# Patient Record
Sex: MALE | Race: ASIAN | NOT HISPANIC OR LATINO | URBAN - METROPOLITAN AREA
[De-identification: names, ages, dates, MRNs, and addresses within clinical notes are randomized per-mention and may not be internally consistent; named-entity substitution may affect disease eponyms.]

---

## 2022-12-20 ENCOUNTER — INPATIENT (INPATIENT)
Facility: HOSPITAL | Age: 72
LOS: 9 days | Discharge: HOME | DRG: 291 | End: 2022-12-30
Attending: STUDENT IN AN ORGANIZED HEALTH CARE EDUCATION/TRAINING PROGRAM | Admitting: STUDENT IN AN ORGANIZED HEALTH CARE EDUCATION/TRAINING PROGRAM
Payer: COMMERCIAL

## 2022-12-20 VITALS
TEMPERATURE: 98 F | WEIGHT: 190.04 LBS | RESPIRATION RATE: 16 BRPM | OXYGEN SATURATION: 98 % | DIASTOLIC BLOOD PRESSURE: 75 MMHG | HEIGHT: 65 IN | SYSTOLIC BLOOD PRESSURE: 176 MMHG | HEART RATE: 75 BPM

## 2022-12-20 LAB
ALBUMIN SERPL ELPH-MCNC: 3.2 G/DL — LOW (ref 3.3–5)
ALP SERPL-CCNC: 109 U/L — SIGNIFICANT CHANGE UP (ref 40–120)
ALT FLD-CCNC: 27 U/L — SIGNIFICANT CHANGE UP (ref 10–45)
ANION GAP SERPL CALC-SCNC: 12 MMOL/L — SIGNIFICANT CHANGE UP (ref 5–17)
APPEARANCE UR: CLEAR — SIGNIFICANT CHANGE UP
APTT BLD: 35.4 SEC — SIGNIFICANT CHANGE UP (ref 27.5–35.5)
AST SERPL-CCNC: 38 U/L — SIGNIFICANT CHANGE UP (ref 10–40)
BACTERIA # UR AUTO: NEGATIVE — SIGNIFICANT CHANGE UP
BASOPHILS # BLD AUTO: 0.13 K/UL — SIGNIFICANT CHANGE UP (ref 0–0.2)
BASOPHILS NFR BLD AUTO: 1.4 % — SIGNIFICANT CHANGE UP (ref 0–2)
BILIRUB SERPL-MCNC: 0.3 MG/DL — SIGNIFICANT CHANGE UP (ref 0.2–1.2)
BILIRUB UR-MCNC: NEGATIVE — SIGNIFICANT CHANGE UP
BUN SERPL-MCNC: 46 MG/DL — HIGH (ref 7–23)
CALCIUM SERPL-MCNC: 8.5 MG/DL — SIGNIFICANT CHANGE UP (ref 8.4–10.5)
CHLORIDE SERPL-SCNC: 109 MMOL/L — HIGH (ref 96–108)
CO2 SERPL-SCNC: 21 MMOL/L — LOW (ref 22–31)
COLOR SPEC: SIGNIFICANT CHANGE UP
CREAT SERPL-MCNC: 3.52 MG/DL — HIGH (ref 0.5–1.3)
DIFF PNL FLD: ABNORMAL
EGFR: 18 ML/MIN/1.73M2 — LOW
EOSINOPHIL # BLD AUTO: 0.26 K/UL — SIGNIFICANT CHANGE UP (ref 0–0.5)
EOSINOPHIL NFR BLD AUTO: 2.7 % — SIGNIFICANT CHANGE UP (ref 0–6)
EPI CELLS # UR: 2 /HPF — SIGNIFICANT CHANGE UP
FLUAV AG NPH QL: SIGNIFICANT CHANGE UP
FLUBV AG NPH QL: SIGNIFICANT CHANGE UP
GLUCOSE SERPL-MCNC: 119 MG/DL — HIGH (ref 70–99)
GLUCOSE UR QL: ABNORMAL
HCT VFR BLD CALC: 29.4 % — LOW (ref 39–50)
HGB BLD-MCNC: 9.7 G/DL — LOW (ref 13–17)
HYALINE CASTS # UR AUTO: 2 /LPF — SIGNIFICANT CHANGE UP (ref 0–2)
IMM GRANULOCYTES NFR BLD AUTO: 0.3 % — SIGNIFICANT CHANGE UP (ref 0–0.9)
INR BLD: 0.92 RATIO — SIGNIFICANT CHANGE UP (ref 0.88–1.16)
KETONES UR-MCNC: NEGATIVE — SIGNIFICANT CHANGE UP
LEUKOCYTE ESTERASE UR-ACNC: NEGATIVE — SIGNIFICANT CHANGE UP
LIDOCAIN IGE QN: 91 U/L — HIGH (ref 7–60)
LYMPHOCYTES # BLD AUTO: 2.01 K/UL — SIGNIFICANT CHANGE UP (ref 1–3.3)
LYMPHOCYTES # BLD AUTO: 21 % — SIGNIFICANT CHANGE UP (ref 13–44)
MCHC RBC-ENTMCNC: 30.2 PG — SIGNIFICANT CHANGE UP (ref 27–34)
MCHC RBC-ENTMCNC: 33 GM/DL — SIGNIFICANT CHANGE UP (ref 32–36)
MCV RBC AUTO: 91.6 FL — SIGNIFICANT CHANGE UP (ref 80–100)
MONOCYTES # BLD AUTO: 0.71 K/UL — SIGNIFICANT CHANGE UP (ref 0–0.9)
MONOCYTES NFR BLD AUTO: 7.4 % — SIGNIFICANT CHANGE UP (ref 2–14)
NEUTROPHILS # BLD AUTO: 6.45 K/UL — SIGNIFICANT CHANGE UP (ref 1.8–7.4)
NEUTROPHILS NFR BLD AUTO: 67.2 % — SIGNIFICANT CHANGE UP (ref 43–77)
NITRITE UR-MCNC: NEGATIVE — SIGNIFICANT CHANGE UP
NRBC # BLD: 0 /100 WBCS — SIGNIFICANT CHANGE UP (ref 0–0)
NT-PROBNP SERPL-SCNC: 6989 PG/ML — HIGH (ref 0–300)
PH UR: 7 — SIGNIFICANT CHANGE UP (ref 5–8)
PLATELET # BLD AUTO: 308 K/UL — SIGNIFICANT CHANGE UP (ref 150–400)
POTASSIUM SERPL-MCNC: 3.5 MMOL/L — SIGNIFICANT CHANGE UP (ref 3.5–5.3)
POTASSIUM SERPL-SCNC: 3.5 MMOL/L — SIGNIFICANT CHANGE UP (ref 3.5–5.3)
PROT SERPL-MCNC: 6.8 G/DL — SIGNIFICANT CHANGE UP (ref 6–8.3)
PROT UR-MCNC: ABNORMAL
PROTHROM AB SERPL-ACNC: 10.6 SEC — SIGNIFICANT CHANGE UP (ref 10.5–13.4)
RBC # BLD: 3.21 M/UL — LOW (ref 4.2–5.8)
RBC # FLD: 13.5 % — SIGNIFICANT CHANGE UP (ref 10.3–14.5)
RBC CASTS # UR COMP ASSIST: 4 /HPF — SIGNIFICANT CHANGE UP (ref 0–4)
RSV RNA NPH QL NAA+NON-PROBE: SIGNIFICANT CHANGE UP
SARS-COV-2 RNA SPEC QL NAA+PROBE: SIGNIFICANT CHANGE UP
SODIUM SERPL-SCNC: 142 MMOL/L — SIGNIFICANT CHANGE UP (ref 135–145)
SP GR SPEC: 1.01 — SIGNIFICANT CHANGE UP (ref 1.01–1.02)
TROPONIN T, HIGH SENSITIVITY RESULT: 109 NG/L — HIGH (ref 0–51)
UROBILINOGEN FLD QL: NEGATIVE — SIGNIFICANT CHANGE UP
WBC # BLD: 9.59 K/UL — SIGNIFICANT CHANGE UP (ref 3.8–10.5)
WBC # FLD AUTO: 9.59 K/UL — SIGNIFICANT CHANGE UP (ref 3.8–10.5)
WBC UR QL: 1 /HPF — SIGNIFICANT CHANGE UP (ref 0–5)

## 2022-12-20 PROCEDURE — 71045 X-RAY EXAM CHEST 1 VIEW: CPT | Mod: 26

## 2022-12-20 PROCEDURE — 99285 EMERGENCY DEPT VISIT HI MDM: CPT

## 2022-12-20 NOTE — ED PROVIDER NOTE - RAPID ASSESSMENT
73 y/o M PMHx HTN and DM, presents to the ED c/o worsening SOB with exertion for the past 3 weeks. Pt reports trouble breathing at night and trouble sleeping. Pt denies pain and denies trouble with PO intake. Pt reports no other acute symptoms and no reaction to medications. Pt is well appearing in the ED.    Davie THOMSON (Dottie) have documented this rapid assessment note under the dictation of Dejah Cruz (PA) which has been reviewed and affirmed to be accurate. Patient was seen as a QPA patient. The patient will be seen and further worked up in the main emergency department and their care will be completed by the main emergency department team along with a thorough physical exam. Receiving team will follow up on labs, analgesia, any clinical imaging, reassess and disposition as clinically indicated, all decisions regarding the progression of care will be made at their discretion. 71 y/o M PMHx HTN and DM, presents to the ED c/o worsening SOB with exertion for the past 3 weeks. Pt reports trouble breathing at night and trouble sleeping, feeling lke he needs to sit up to breathe better. pt denies cp, abdominal pain, fevers, chills, cough.  Pt is well appearing in the ED.    BING THOMSON, personally performed the service described in the documentation recorded by the scribe in my presence, and it accurately and completely records my words and actions.     Davie THOMSON (Scribe) have documented this rapid assessment note under the dictation of Dejah Cruz (PA) which has been reviewed and affirmed to be accurate. Patient was seen as a QPA patient. The patient will be seen and further worked up in the main emergency department and their care will be completed by the main emergency department team along with a thorough physical exam. Receiving team will follow up on labs, analgesia, any clinical imaging, reassess and disposition as clinically indicated, all decisions regarding the progression of care will be made at their discretion. 73 y/o M PMHx HTN and DM, presents to the ED c/o worsening SOB with exertion for the past 3 weeks. Pt reports trouble breathing at night and trouble sleeping, feeling lke he needs to sit up to breathe better. pt denies cp, abdominal pain, fevers, chills, cough.  Pt is well appearing in the ED.    IBING, personally performed the service described in the documentation recorded by the scribe in my presence, and it accurately and completely records my words and actions.     Davie THOMSON (Scribe) have documented this rapid assessment note under the dictation of Dejah Cruz (PA) which has been reviewed and affirmed to be accurate. Patient was seen as a QPA patient. The patient will be seen and further worked up in the main emergency department and their care will be completed by the main emergency department team along with a thorough physical exam. Receiving team will follow up on labs, analgesia, any clinical imaging, reassess and disposition as clinically indicated, all decisions regarding the progression of care will be made at their discretion.    Attending MD Suggs: This patient was seen and orders were placed by the PA as per our department's QPA model.  I was not consulted in regards to this patient although I was present and available in the Emergency Department to the PA.  Patient was to be sent to main ED for full medical evaluation and receiving team was to follow up on any labs, analgesia, clinical imaging ordered by the PA.  Any reassessment and disposition decisions were to be made by receiving team as clinically indicated, all decisions regarding the progression of care to be made at their discretion.  I did not perform a comprehensive history and physical on this patient.

## 2022-12-20 NOTE — ED PROVIDER NOTE - ATTENDING CONTRIBUTION TO CARE
MD Bryan:  patient seen and evaluated personally.   I agree with the History & Physical,  Impression & Plan other than what was detailed in my note.  MD Bryan  72-year-old male history of hypertension, diabetes, presents to the Emergency Department with 1 month of shortness of breath however he stated that today when he was walking he felt significantly more short of breath when going to the car.  He states that he has to sleep on his side because he is having difficulty breathing sitting up.  He denies fevers, chills, nausea, vomiting, headache, blurry vision, any chest pain or palpitations, denies any syncope.  He does have some leg swelling that he states is chronic as well.  He is not on a diuretic.  He does not eat a low-sodium diet although his doctor tells him to.  There is no none history of congestive heart failure diagnosis at this point time.  He does not have any history of DVT or PE that is known.  He is not having hemoptysis.  He is afebrile, his vitals are labile, he is nontoxic well-appearing, he has a large habitus, his mucous membranes are moist, his lungs are having crackles bilaterally in the posterior lung bases, his heart sounds are normal no murmurs rubs or rhonchi, he has a large abdomen that is nontender and soft.  He does have legs with chronic skin changes that seem consistent with venous stasis dermatitis.  There is no erythema, he has no calf tenderness.  They are small with some mild pitting edema.  Was unable to find an EKG for this patient the tech is currently doing 1 at the bedside at this moment.  Looks like he did have some lab work that came back CBC CMP and troponin troponin was elevated did not receive a phone call for this.  Did plan for repeat.  Will give aspirin as well.  Do think this is likely CHF new onset potentially with NSTEMI.  We will review the EKG and reevaluate.  Patient is currently feeling well. will need admission

## 2022-12-20 NOTE — ED PROVIDER NOTE - OBJECTIVE STATEMENT
72-year-old male with a past medical history of hypertension, hyperlipidemia, diabetes presenting with shortness of breath.  Patient has had worsening exertional shortness of breath for the last 3 to 4 weeks.  Patient states that small amounts of walking.  Increased shortness of breath, with associated chest tightness. associated lower extremity edema.  Denies abdominal pain, nausea, vomiting, coughing

## 2022-12-20 NOTE — ED PROVIDER NOTE - CLINICAL SUMMARY MEDICAL DECISION MAKING FREE TEXT BOX
72-year-old male past medical history of hypertension, hyperlipidemia, diabetes presenting with progressively worsening exertional dyspnea, with lower extremity edema.  Initial labs show elevated troponin, elevated proBNP.  Cardiology consulted for NSTEMI evaluation.  Possible also new onset CHF.

## 2022-12-20 NOTE — ED PROVIDER NOTE - PHYSICAL EXAMINATION
gen: well appearing  Mentation: AAO x 3  psych: mood appropriate  HEENT: airway patent, conjunctivae clear bilaterally  Cardio: RRR, no m/r/g  Resp: normal BS b/l  GI: soft/nondistended/nontender  : no CVA tenderness  Neuro: sensation and motor function grossly intact  Skin: No evidence of rash  MSK: normal movement of all extremities  Lymph/Vasc: Bilateral LE edema

## 2022-12-21 DIAGNOSIS — I16.0 HYPERTENSIVE URGENCY: ICD-10-CM

## 2022-12-21 DIAGNOSIS — I50.9 HEART FAILURE, UNSPECIFIED: ICD-10-CM

## 2022-12-21 DIAGNOSIS — Z29.9 ENCOUNTER FOR PROPHYLACTIC MEASURES, UNSPECIFIED: ICD-10-CM

## 2022-12-21 DIAGNOSIS — I16.1 HYPERTENSIVE EMERGENCY: ICD-10-CM

## 2022-12-21 DIAGNOSIS — N17.9 ACUTE KIDNEY FAILURE, UNSPECIFIED: ICD-10-CM

## 2022-12-21 DIAGNOSIS — E11.9 TYPE 2 DIABETES MELLITUS WITHOUT COMPLICATIONS: ICD-10-CM

## 2022-12-21 DIAGNOSIS — E78.5 HYPERLIPIDEMIA, UNSPECIFIED: ICD-10-CM

## 2022-12-21 DIAGNOSIS — E03.9 HYPOTHYROIDISM, UNSPECIFIED: ICD-10-CM

## 2022-12-21 PROBLEM — Z00.00 ENCOUNTER FOR PREVENTIVE HEALTH EXAMINATION: Status: ACTIVE | Noted: 2022-12-21

## 2022-12-21 LAB
GLUCOSE BLDC GLUCOMTR-MCNC: 121 MG/DL — HIGH (ref 70–99)
GLUCOSE BLDC GLUCOMTR-MCNC: 181 MG/DL — HIGH (ref 70–99)
GLUCOSE BLDC GLUCOMTR-MCNC: 182 MG/DL — HIGH (ref 70–99)
GLUCOSE BLDC GLUCOMTR-MCNC: 83 MG/DL — SIGNIFICANT CHANGE UP (ref 70–99)
TROPONIN T, HIGH SENSITIVITY RESULT: 101 NG/L — HIGH (ref 0–51)
TROPONIN T, HIGH SENSITIVITY RESULT: 97 NG/L — HIGH (ref 0–51)

## 2022-12-21 PROCEDURE — 93970 EXTREMITY STUDY: CPT | Mod: 26

## 2022-12-21 PROCEDURE — 99254 IP/OBS CNSLTJ NEW/EST MOD 60: CPT | Mod: 25

## 2022-12-21 PROCEDURE — 99233 SBSQ HOSP IP/OBS HIGH 50: CPT

## 2022-12-21 PROCEDURE — 93306 TTE W/DOPPLER COMPLETE: CPT | Mod: 26

## 2022-12-21 PROCEDURE — 99223 1ST HOSP IP/OBS HIGH 75: CPT

## 2022-12-21 PROCEDURE — 93356 MYOCRD STRAIN IMG SPCKL TRCK: CPT

## 2022-12-21 RX ORDER — HYDRALAZINE HCL 50 MG
5 TABLET ORAL ONCE
Refills: 0 | Status: COMPLETED | OUTPATIENT
Start: 2022-12-21 | End: 2022-12-21

## 2022-12-21 RX ORDER — METOPROLOL TARTRATE 50 MG
25 TABLET ORAL DAILY
Refills: 0 | Status: DISCONTINUED | OUTPATIENT
Start: 2022-12-21 | End: 2022-12-23

## 2022-12-21 RX ORDER — GLUCAGON INJECTION, SOLUTION 0.5 MG/.1ML
1 INJECTION, SOLUTION SUBCUTANEOUS ONCE
Refills: 0 | Status: DISCONTINUED | OUTPATIENT
Start: 2022-12-21 | End: 2022-12-30

## 2022-12-21 RX ORDER — HYDRALAZINE HCL 50 MG
10 TABLET ORAL ONCE
Refills: 0 | Status: COMPLETED | OUTPATIENT
Start: 2022-12-21 | End: 2022-12-21

## 2022-12-21 RX ORDER — AMLODIPINE BESYLATE 2.5 MG/1
10 TABLET ORAL DAILY
Refills: 0 | Status: DISCONTINUED | OUTPATIENT
Start: 2022-12-21 | End: 2022-12-30

## 2022-12-21 RX ORDER — LABETALOL HCL 100 MG
10 TABLET ORAL ONCE
Refills: 0 | Status: COMPLETED | OUTPATIENT
Start: 2022-12-21 | End: 2022-12-21

## 2022-12-21 RX ORDER — ACETAMINOPHEN 500 MG
650 TABLET ORAL EVERY 6 HOURS
Refills: 0 | Status: DISCONTINUED | OUTPATIENT
Start: 2022-12-21 | End: 2022-12-30

## 2022-12-21 RX ORDER — FUROSEMIDE 40 MG
20 TABLET ORAL ONCE
Refills: 0 | Status: COMPLETED | OUTPATIENT
Start: 2022-12-21 | End: 2022-12-21

## 2022-12-21 RX ORDER — HYDRALAZINE HCL 50 MG
50 TABLET ORAL
Refills: 0 | Status: DISCONTINUED | OUTPATIENT
Start: 2022-12-21 | End: 2022-12-22

## 2022-12-21 RX ORDER — SODIUM CHLORIDE 9 MG/ML
1000 INJECTION, SOLUTION INTRAVENOUS
Refills: 0 | Status: DISCONTINUED | OUTPATIENT
Start: 2022-12-21 | End: 2022-12-30

## 2022-12-21 RX ORDER — INSULIN LISPRO 100/ML
VIAL (ML) SUBCUTANEOUS AT BEDTIME
Refills: 0 | Status: DISCONTINUED | OUTPATIENT
Start: 2022-12-21 | End: 2022-12-30

## 2022-12-21 RX ORDER — FUROSEMIDE 40 MG
40 TABLET ORAL
Refills: 0 | Status: DISCONTINUED | OUTPATIENT
Start: 2022-12-21 | End: 2022-12-22

## 2022-12-21 RX ORDER — DEXTROSE 50 % IN WATER 50 %
25 SYRINGE (ML) INTRAVENOUS ONCE
Refills: 0 | Status: DISCONTINUED | OUTPATIENT
Start: 2022-12-21 | End: 2022-12-30

## 2022-12-21 RX ORDER — DEXTROSE 50 % IN WATER 50 %
15 SYRINGE (ML) INTRAVENOUS ONCE
Refills: 0 | Status: DISCONTINUED | OUTPATIENT
Start: 2022-12-21 | End: 2022-12-30

## 2022-12-21 RX ORDER — LEVOTHYROXINE SODIUM 125 MCG
25 TABLET ORAL DAILY
Refills: 0 | Status: DISCONTINUED | OUTPATIENT
Start: 2022-12-21 | End: 2022-12-30

## 2022-12-21 RX ORDER — ATORVASTATIN CALCIUM 80 MG/1
40 TABLET, FILM COATED ORAL AT BEDTIME
Refills: 0 | Status: DISCONTINUED | OUTPATIENT
Start: 2022-12-21 | End: 2022-12-30

## 2022-12-21 RX ORDER — ASPIRIN/CALCIUM CARB/MAGNESIUM 324 MG
81 TABLET ORAL DAILY
Refills: 0 | Status: DISCONTINUED | OUTPATIENT
Start: 2022-12-21 | End: 2022-12-23

## 2022-12-21 RX ORDER — INSULIN LISPRO 100/ML
VIAL (ML) SUBCUTANEOUS
Refills: 0 | Status: DISCONTINUED | OUTPATIENT
Start: 2022-12-21 | End: 2022-12-30

## 2022-12-21 RX ADMIN — Medication 1: at 13:02

## 2022-12-21 RX ADMIN — Medication 5 MILLIGRAM(S): at 10:10

## 2022-12-21 RX ADMIN — Medication 40 MILLIGRAM(S): at 17:16

## 2022-12-21 RX ADMIN — Medication 10 MILLIGRAM(S): at 20:29

## 2022-12-21 RX ADMIN — Medication 10 MILLIGRAM(S): at 04:03

## 2022-12-21 RX ADMIN — AMLODIPINE BESYLATE 10 MILLIGRAM(S): 2.5 TABLET ORAL at 13:03

## 2022-12-21 RX ADMIN — Medication 20 MILLIGRAM(S): at 04:03

## 2022-12-21 RX ADMIN — Medication 81 MILLIGRAM(S): at 13:03

## 2022-12-21 RX ADMIN — Medication 25 MILLIGRAM(S): at 13:04

## 2022-12-21 RX ADMIN — ATORVASTATIN CALCIUM 40 MILLIGRAM(S): 80 TABLET, FILM COATED ORAL at 20:32

## 2022-12-21 RX ADMIN — Medication 50 MILLIGRAM(S): at 13:04

## 2022-12-21 RX ADMIN — Medication 1 TABLET(S): at 13:04

## 2022-12-21 RX ADMIN — Medication 50 MILLIGRAM(S): at 17:16

## 2022-12-21 RX ADMIN — Medication 10 MILLIGRAM(S): at 22:25

## 2022-12-21 NOTE — H&P ADULT - PROBLEM SELECTOR PLAN 1
unspecified   check TTE   Lasix 40mg iv bid   cards consult appreciated   strict i/o's, daily standing weights   LE doppler r/o DVT unspecified   with possible NSTEMI - troponin stable and may be elevated due to CKD  check TTE   Lasix 40mg iv bid   cards consult appreciated   strict i/o's, daily standing weights   LE doppler r/o DVT

## 2022-12-21 NOTE — H&P ADULT - NSICDXFAMILYHX_GEN_ALL_CORE_FT
FAMILY HISTORY:  Father  Still living? Unknown  Family history of CVA, Age at diagnosis: Age Unknown  FH: type 2 diabetes, Age at diagnosis: Age Unknown    Mother  Still living? Unknown  FH: type 2 diabetes, Age at diagnosis: Age Unknown    Sibling  Still living? Unknown  Family history of CVA, Age at diagnosis: Age Unknown

## 2022-12-21 NOTE — H&P ADULT - NSHPREVIEWOFSYSTEMS_GEN_ALL_CORE
Review of Systems:   CONSTITUTIONAL: No fever or chills   EYES: No eye pain, visual disturbances  ENMT:  No difficulty hearing,   NECK: No pain or stiffness  RESPIRATORY: No cough, + shortness of breath  CARDIOVASCULAR: + chest pain, + FLORES, no palpitations,  GASTROINTESTINAL: No abdominal or epigastric pain. No nausea, vomiting,  GENITOURINARY: No dysuria,   NEUROLOGICAL: No headaches,   SKIN: No rashes  ENDOCRINE: No heat or cold intolerance  MUSCULOSKELETAL: No joint pain, +Le swelling for months  PSYCHIATRIC: No depression,   ALLERY AND IMMUNOLOGIC: No hives or eczema

## 2022-12-21 NOTE — H&P ADULT - PROBLEM SELECTOR PLAN 3
suspect patient has CKD as he was told his kidney's are functioning at 30% (?GFR) in the past   now OLENA on CKD likely due to uncontrolled hypertenstion but likely dm contributing   renal consult called  avoid nephrotoxic medications  hold losartan as above

## 2022-12-21 NOTE — H&P ADULT - PROBLEM SELECTOR PROBLEM 7
Prophylactic measure Topical Clindamycin Pregnancy And Lactation Text: This medication is Pregnancy Category B and is considered safe during pregnancy. It is unknown if it is excreted in breast milk.

## 2022-12-21 NOTE — H&P ADULT - PROBLEM SELECTOR PLAN 2
restart home medications including amlodipine, metoprolol, hydralazine for now   holding losartan in setting of suspected OLENA on CKD   monitor bp   cards consult appreciated - can change metoprolol to labetalol if bp still not controlled

## 2022-12-21 NOTE — H&P ADULT - NSHPLABSRESULTS_GEN_ALL_CORE
LABS:                        9.7    9.59  )-----------( 308      ( 20 Dec 2022 22:07 )             29.4     12-20    142  |  109<H>  |  46<H>  ----------------------------<  119<H>  3.5   |  21<L>  |  3.52<H>    Ca    8.5      20 Dec 2022 22:07    TPro  6.8  /  Alb  3.2<L>  /  TBili  0.3  /  DBili  x   /  AST  38  /  ALT  27  /  AlkPhos  109  12-20    PT/INR - ( 20 Dec 2022 22:07 )   PT: 10.6 sec;   INR: 0.92 ratio         PTT - ( 20 Dec 2022 22:07 )  PTT:35.4 sec      Urinalysis Basic - ( 20 Dec 2022 22:34 )    Color: Light Yellow / Appearance: Clear / S.015 / pH: x  Gluc: x / Ketone: Negative  / Bili: Negative / Urobili: Negative   Blood: x / Protein: 300 mg/dL / Nitrite: Negative   Leuk Esterase: Negative / RBC: 4 /hpf / WBC 1 /HPF   Sq Epi: x / Non Sq Epi: 2 /hpf / Bacteria: Negative        RADIOLOGY & ADDITIONAL TESTS:    Imaging Personally Reviewed: EKg tracing reviewed - NSR@81bpm, Incomplete LBBB with repolarization changes v2-V4, QTc 483,   \CXR film reviewed - trace pleural effusions, pulm vascular congestion     Consultant(s) Notes Reviewed:    Care Discussed with Consultants/Other Providers:

## 2022-12-21 NOTE — ED ADULT NURSE REASSESSMENT NOTE - NS ED NURSE REASSESS COMMENT FT1
First encounter with the pt, pt received from the previous shift . Pt is a/ox4 and verbal. Speech is clear and able to speak in full sentences without distress. Airway is patent with no obstruction nor blocking secretions. Breathing is even and unlabored on room air. Pt has strong pulses palpated bilaterally. Pt is SR on the cardiac monitor. Neuro check is wnl. Full rom. Pt denies chest pain, n/v and sob. No acute distress noted. Pt has call light within the reach of the pt at the bedside. [id number] was called and made aware of pt's blood pressure and pending diet order. Provider stated that they would put in order shortly. Will continue to monitor the pt.

## 2022-12-21 NOTE — PROGRESS NOTE ADULT - ASSESSMENT
Pt. is a 72 y.o. M w/ PMHx of HTN, HLD, DM2 presents with Dyspnea for the past 3-4 weeks. Nephrology consulted for OLENA.

## 2022-12-21 NOTE — ED ADULT NURSE NOTE - OBJECTIVE STATEMENT
Pt is 71 y/o male, presenting to the ED c/o SOB. As per pt, has had worsening exertional SOB for the past 3-4 weeks. As per pt, he has had trouble breathing @ night and trouble sleeping. Pt states that he feels like he needs to sit up to breathe better. As per pt, he has had intermittent chest pain w/ SOB. Pt reports PMH HTN, HLD, and DM. Upon assessment, pt AxOx3, sitting up in stretcher, and speaking in full sentences. Breathing spontaneously and unlabored, >95% RA. Abdomen is soft and non-tender to palpation. Pt ambulates w/o difficultly and moves all extremities w/ = strength. Skin is warm, dry, and intact w/ + peripheral pulses. Pt noted to have b/l LE edema. Pt denies n/v/d, dizziness, numbness/tingling, chills and fever. Safety and comfort measures provided- bed in lowest position, locked, and blanket given. Daughter @ bedside.

## 2022-12-21 NOTE — CONSULT NOTE ADULT - SUBJECTIVE AND OBJECTIVE BOX
[facility] DIVISION OF KIDNEY DISEASES AND HYPERTENSION -- [phone]  -- INITIAL CONSULT NOTE  --------------------------------------------------------------------------------  HPI:    Pt. is a 72 y.o. M w/ PMHx of HTN, HLD, DM2 presents with Dyspnea for the past 3-4 weeks. Nephrology consulted for OLENA. Pt. states that he was given a water pill months ago but stopped taking it because he thought he was urinating enough already and he did not like waking up at night to urinate. He started taking the pill again recently but ran out again about 1 week ago and did not  the refill. He states that he was at 50% kidney function but when he renal function went to 30% 2 months ago he saw a "kidney doctor" once but did not follow up. Denies any orthopnea currently but after examination asking to be propped up. Denies having any heart problems. Has had diabetes for over 20 years. Some eye issues but unable to elaborate. CXR here showing pulmonary vascular congestion and bilateral trace to small pleural effusions. Takes Metformin 1000mg BID in addition to Glyburide? Metformin 2.5mg/ 500mg daily for DM. No blood in urine.       PAST HISTORY  --------------------------------------------------------------------------------  PAST MEDICAL & SURGICAL HISTORY:  HTN (hypertension)      HLD (hyperlipidemia)      Diabetes      No significant past surgical history        FAMILY HISTORY:  Family history of CVA (Father, Sibling)    FH: type 2 diabetes (Father, Mother)      PAST SOCIAL HISTORY:    ALLERGIES & MEDICATIONS  --------------------------------------------------------------------------------  Allergies    No Known Allergies    Intolerances      Standing Inpatient Medications  amLODIPine   Tablet 10 milliGRAM(s) Oral daily  aspirin enteric coated 81 milliGRAM(s) Oral daily  atorvastatin 40 milliGRAM(s) Oral at bedtime  dextrose 5%. 1000 milliLiter(s) IV Continuous <Continuous>  dextrose 5%. 1000 milliLiter(s) IV Continuous <Continuous>  dextrose 50% Injectable 25 Gram(s) IV Push once  dextrose 50% Injectable 25 Gram(s) IV Push once  furosemide   Injectable 40 milliGRAM(s) IV Push two times a day  glucagon  Injectable 1 milliGRAM(s) IntraMuscular once  hydrALAZINE 50 milliGRAM(s) Oral two times a day  insulin lispro (ADMELOG) corrective regimen sliding scale   SubCutaneous three times a day before meals  insulin lispro (ADMELOG) corrective regimen sliding scale   SubCutaneous at bedtime  levothyroxine 25 MICROGram(s) Oral daily  metoprolol succinate ER 25 milliGRAM(s) Oral daily  multivitamin 1 Tablet(s) Oral daily    PRN Inpatient Medications  acetaminophen     Tablet .. 650 milliGRAM(s) Oral every 6 hours PRN  dextrose Oral Gel 15 Gram(s) Oral once PRN      REVIEW OF SYSTEMS  --------------------------------------------------------------------------------  As per HPI    VITALS/PHYSICAL EXAM  --------------------------------------------------------------------------------  T(C): 36.7 (12-21-22 @ 13:22), Max: 36.8 (12-20-22 @ 20:56)  HR: 80 (12-21-22 @ 13:22) (63 - 80)  BP: 182/62 (12-21-22 @ 13:22) (154/63 - 192/72)  RR: 18 (12-21-22 @ 13:22) (15 - 20)  SpO2: 97% (12-21-22 @ 13:22) (96% - 99%)  Wt(kg): --  Height (cm): 165.1 (12-20-22 @ 20:56)  Weight (kg): 86.2 (12-20-22 @ 20:56)  BMI (kg/m2): 31.6 (12-20-22 @ 20:56)  BSA (m2): 1.94 (12-20-22 @ 20:56)      Physical Exam:  	Gen: NAD  	HEENT: MMM  	Pulm: Diffuse crackles to mid lungs. however able to lie flat  	CV: S1S2  	Abd: Soft, +BS   	Ext: +++LE edema B/L with chronic skin changes, UE edema+  	Neuro/Psych: Awake, remorseful   	Skin: Warm and dry  	Vascular access: peripheral     LABS/STUDIES  --------------------------------------------------------------------------------              9.7    9.59  >-----------<  308      [12-20-22 @ 22:07]              29.4     142  |  109  |  46  ----------------------------<  119      [12-20-22 @ 22:07]  3.5   |  21  |  3.52        Ca     8.5     [12-20-22 @ 22:07]    TPro  6.8  /  Alb  3.2  /  TBili  0.3  /  DBili  x   /  AST  38  /  ALT  27  /  AlkPhos  109  [12-20-22 @ 22:07]    PT/INR: PT 10.6 , INR 0.92       [12-20-22 @ 22:07]  PTT: 35.4       [12-20-22 @ 22:07]      Creatinine Trend:  SCr 3.52 [12-20 @ 22:07]    Urinalysis - [12-20-22 @ 22:34]      Color Light Yellow / Appearance Clear / SG 1.015 / pH 7.0      Gluc 500 mg/dL / Ketone Negative  / Bili Negative / Urobili Negative       Blood Moderate / Protein 300 mg/dL / Leuk Est Negative / Nitrite Negative      RBC 4 / WBC 1 / Hyaline 2 / Gran  / Sq Epi  / Non Sq Epi 2 / Bacteria Negative

## 2022-12-21 NOTE — CONSULT NOTE ADULT - SUBJECTIVE AND OBJECTIVE BOX
CARDIOLOGY FELLOW CONSULT NOTE    Consulting service: ED  Reason for consult: NSTEMI    HPI: 73 yo M with PMH of DM poorly controlled HTN HLD and hypothyroidism who presents with FLORES and edema. Patient reports 3 weeks of progressive FLORES, PND, and LE edema. At baseline pt  a long standing hx of HTN on multiple medications with various episodes of SBP >200 followed by his PMD, previously refusing to present to ED.     PMH:   No pertinent past medical history    HTN (hypertension)    HLD (hyperlipidemia)    Diabetes        PSH:       FAMILY HISTORY:      SH:      Allergies:  No Known Allergies      Home Meds:    Current Medications:   furosemide   Injectable 20 milliGRAM(s) IV Push Once  hydrALAZINE Injectable 10 milliGRAM(s) IV Push Once        REVIEW OF SYSTEMS:  CONSTITUTIONAL: No weakness, fevers or chills  EYES/ENT: No visual changes;  No dysphagia  NECK: No pain or stiffness  RESPIRATORY: No cough, wheezing, hemoptysis; No shortness of breath  CARDIOVASCULAR: No chest pain or palpitations; No lower extremity edema  GASTROINTESTINAL: No abdominal or epigastric pain. No nausea, vomiting, or hematemesis; No diarrhea or constipation. No melena or hematochezia.  BACK: No back pain  GENITOURINARY: No dysuria, frequency or hematuria  NEUROLOGICAL: No numbness or weakness  SKIN: No itching, burning, rashes, or lesions   All other review of systems is negative unless indicated above.    Physical Exam:  T(F): 98.1 (12-21), Max: 98.2 (12-20)  HR: 77 (12-21) (75 - 79)  BP: 183/76 (12-21) (176/75 - 190/83)  RR: 20 (12-21)  SpO2: 97% (12-21)  GENERAL: No acute distress, well-developed  HEAD:  Atraumatic, Normocephalic  ENT: EOMI, PERRLA, conjunctiva and sclera clear, Neck supple, No JVD, moist mucosa  CHEST/LUNG: Clear to auscultation bilaterally; No wheeze, equal breath sounds bilaterally   BACK: No spinal tenderness  HEART: Regular rate and rhythm; No murmurs, rubs, or gallops  ABDOMEN: Soft, Nontender, Nondistended; Bowel sounds present  EXTREMITIES:  No clubbing, cyanosis, or edema  PSYCH: Nl behavior, nl affect  NEUROLOGY: AAOx3, non-focal, cranial nerves intact  SKIN: Normal color, No rashes or lesions  LINES:    ECG: Personally reviewed    Echo: Personally reviewed    Stress Testing: Personally reviewed    Cath: Personally reviewed    CXR: Personally reviewed    Labs: Personally reviewed                        9.7    9.59  )-----------( 308      ( 20 Dec 2022 22:07 )             29.4     12-20    142  |  109<H>  |  46<H>  ----------------------------<  119<H>  3.5   |  21<L>  |  3.52<H>    Ca    8.5      20 Dec 2022 22:07    TPro  6.8  /  Alb  3.2<L>  /  TBili  0.3  /  DBili  x   /  AST  38  /  ALT  27  /  AlkPhos  109  12-20    PT/INR - ( 20 Dec 2022 22:07 )   PT: 10.6 sec;   INR: 0.92 ratio         PTT - ( 20 Dec 2022 22:07 )  PTT:35.4 sec    CARDIAC MARKERS ( 21 Dec 2022 02:23 )  97 ng/L / x     / x     / x     / x     / x      CARDIAC MARKERS ( 20 Dec 2022 22:07 )  109 ng/L / x     / x     / x     / x     / x            Serum Pro-Brain Natriuretic Peptide: 6989 pg/mL (12-20 @ 22:07)            Signed by:  Kelvin Jay PGY4  Cardiology Fellow   CARDIOLOGY FELLOW CONSULT NOTE    Consulting service: ED  Reason for consult: NSTEMI    HPI: 71 yo M with PMH of DM poorly controlled HTN HLD and hypothyroidism who presents with FLORES and edema. Patient reports 3 weeks of progressive FLORES, PND, and LE edema. At baseline pt has no exertional intolerance however over the last 3 weeks he has had progressive FLORES to the point where can only go 1/2 a block or less than 1 set of stairs before becoming very SOB. When he gets SOB he gets occasional mild tingling in his chest, but very infrequently. He also notes BL LE and BL UE edema, scrotal edema, orthopnea and PND. He saw his PMD who started him on a water pill that he was taking but recently ran out of. He also endorses long standing hx of HTN on multiple medications with various episodes of SBP >200 followed by his PMD, previously refusing to present to ED. Currently denies FC NV CP SOB at rest palpitations or syncope. Denies any other cardiac hx.    PMH: as above  PSH: Denies  FH: Father - CVA 70s, Brother CVA, Mother - DM (all 3 ), denies FHx of CVD  SH: 15 pack years stopped at 29 / denies ETOH / denies drugs, , from [place], works as postman, lives with his wife  Allergies: NKDA  Meds: MTP Succ 25 QDay, Hydralazine 50 BID, Losartan 100 QDay, Metformin 1000 BID, Amlodipine 10 QDay, Glyburide/Metformin 2.5/500 QDay, Lipitor 40 QDay, Synthroid 25 QDay     Current Medications:   furosemide   Injectable 20 milliGRAM(s) IV Push Once  hydrALAZINE Injectable 10 milliGRAM(s) IV Push Once      REVIEW OF SYSTEMS:  All other review of systems is negative unless indicated above.    Physical Exam:  T(F): 98.1 (12-21), Max: 98.2 (12-20)  HR: 77 (12-21) (75 - 79)  BP: 183/76 (12-21) (176/75 - 190/83)  RR: 20 (12-21)  SpO2: 97% (12-21)    GENERAL: No acute distress, well-developed  HEAD:  Atraumatic, Normocephalic  ENT: EOMI, PERRLA, conjunctiva and sclera clear, Neck supple, No JVD, moist mucosa  CHEST/LUNG: BL Crackles in the bases, mildly dec breath sounds  HEART: Regular rate and rhythm; I/VI holosystolic murmur at LLSB, no rubs, or gallops  ABDOMEN: Soft, Nontender, Nondistended; Bowel sounds present  EXTREMITIES:  BL UE 1+ edema, BL LE 2+ edema  PSYCH: Nl behavior, nl affect  NEUROLOGY: AAOx3, non-focal, cranial nerves intact  SKIN: Normal color, No rashes or lesions    ECG: NSR with LVH and NS TW changes, poor R wave progression    CXR: Personally reviewed    Labs: Personally reviewed                        9.7    9.59  )-----------( 308      ( 20 Dec 2022 22:07 )             29.4     12-20    142  |  109<H>  |  46<H>  ----------------------------<  119<H>  3.5   |  21<L>  |  3.52<H>    Ca    8.5      20 Dec 2022 22:07    TPro  6.8  /  Alb  3.2<L>  /  TBili  0.3  /  DBili  x   /  AST  38  /  ALT  27  /  AlkPhos  109  12-20    PT/INR - ( 20 Dec 2022 22:07 )   PT: 10.6 sec;   INR: 0.92 ratio        PTT - ( 20 Dec 2022 22:07 )  PTT:35.4 sec    CARDIAC MARKERS ( 21 Dec 2022 02:23 )  97 ng/L / x     / x     / x     / x     / x      CARDIAC MARKERS ( 20 Dec 2022 22:07 )  109 ng/L / x     / x     / x     / x     / x        Serum Pro-Brain Natriuretic Peptide: 6989 pg/mL (- @ 22:07)    A/P  71 yo M with PMH of DM poorly controlled HTN HLD and hypothyroidism who presents with FLORES and edema. Cardiology consulted for NSTEMI    #Vol Overload  #C/f Diastolic HF Exacerbation  #NSTEMI  - Pt with significant HTN hx on 4 meds p/w volume overload and FLORES  - SBP 190s in ED, no CP or SOB at rest   - No obvious CM on CXR, small effusions with cephalization (my read)  - EKG NSR with LVH and NS TW changes, poor R wave progression  - Trop 109 --> 97, proBNP 6989  - DDx: Likely has HFpEF exacerbation 2/2 long standing HTN and LVH. ACS less likely based on hx. Also minimally elevated troponin iso OLENA on CKD (?) with Cr 3.52.  Plan:  - No need for ACS therapy at this time  - Troponin downtrending, no need to repeat  - F/U TTE in the AM  - Start Lasix 40 IV BID   - Would treat SBP of 190 as HTN emergency based on troponin and Cr, goal dec 25% in 4 hours       > Resume home BP regimen, can consider switching MTP to labetalol, can give pushes of hydralazine PRN   - F/U TSH A1c Lipid panel     Signed by:  Kelvin Jay PGY4  Cardiology Fellow    [facility]  CARDIOLOGY FELLOW CONSULT NOTE    Consulting service: ED  Reason for consult: NSTEMI    HPI: 71 yo M with PMH of DM poorly controlled HTN HLD and hypothyroidism who presents with FLORES and edema. Patient reports 3 weeks of progressive FLORES, PND, and LE edema. At baseline pt has no exertional intolerance however over the last 3 weeks he has had progressive FLORES to the point where can only go 1/2 a block or less than 1 set of stairs before becoming very SOB. When he gets SOB he gets occasional mild tingling in his chest, but very infrequently. He also notes BL LE and BL UE edema, scrotal edema, orthopnea and PND. He saw his PMD who started him on a water pill that he was taking but recently ran out of. He also endorses long standing hx of HTN on multiple medications with various episodes of SBP >200 followed by his PMD, previously refusing to present to ED. Currently denies FC NV CP SOB at rest palpitations or syncope. Denies any other cardiac hx.    PMH: as above  PSH: Denies  FH: Father - CVA 70s, Brother CVA, Mother - DM (all 3 ), denies FHx of CVD  SH: 15 pack years stopped at 29 / denies ETOH / denies drugs, , from [place], works as postman, lives with his wife  Allergies: NKDA  Meds: MTP Succ 25 QDay, Hydralazine 50 BID, Losartan 100 QDay, Metformin 1000 BID, Amlodipine 10 QDay, Glyburide/Metformin 2.5/500 QDay, Lipitor 40 QDay, Synthroid 25 QDay     Current Medications:   furosemide   Injectable 20 milliGRAM(s) IV Push Once  hydrALAZINE Injectable 10 milliGRAM(s) IV Push Once      REVIEW OF SYSTEMS:  All other review of systems is negative unless indicated above.    Physical Exam:  T(F): 98.1 (12-21), Max: 98.2 (12-20)  HR: 77 (12-21) (75 - 79)  BP: 183/76 (12-21) (176/75 - 190/83)  RR: 20 (12-21)  SpO2: 97% (12-21)    GENERAL: No acute distress, well-developed  HEAD:  Atraumatic, Normocephalic  ENT: EOMI, PERRLA, conjunctiva and sclera clear, Neck supple, No JVD, moist mucosa  CHEST/LUNG: BL Crackles in the bases, mildly dec breath sounds  HEART: Regular rate and rhythm; I/VI holosystolic murmur at LLSB, no rubs, or gallops  ABDOMEN: Soft, Nontender, Nondistended; Bowel sounds present  EXTREMITIES:  BL UE 1+ edema, BL LE 2+ edema  PSYCH: Nl behavior, nl affect  NEUROLOGY: AAOx3, non-focal, cranial nerves intact  SKIN: Normal color, No rashes or lesions    ECG: NSR with LVH and NS TW changes, poor R wave progression    CXR: Personally reviewed    Labs: Personally reviewed                        9.7    9.59  )-----------( 308      ( 20 Dec 2022 22:07 )             29.4     12-20    142  |  109<H>  |  46<H>  ----------------------------<  119<H>  3.5   |  21<L>  |  3.52<H>    Ca    8.5      20 Dec 2022 22:07    TPro  6.8  /  Alb  3.2<L>  /  TBili  0.3  /  DBili  x   /  AST  38  /  ALT  27  /  AlkPhos  109  12-20    PT/INR - ( 20 Dec 2022 22:07 )   PT: 10.6 sec;   INR: 0.92 ratio        PTT - ( 20 Dec 2022 22:07 )  PTT:35.4 sec    CARDIAC MARKERS ( 21 Dec 2022 02:23 )  97 ng/L / x     / x     / x     / x     / x      CARDIAC MARKERS ( 20 Dec 2022 22:07 )  109 ng/L / x     / x     / x     / x     / x        Serum Pro-Brain Natriuretic Peptide: 6989 pg/mL (- @ 22:07)    A/P  71 yo M with PMH of DM poorly controlled HTN HLD and hypothyroidism who presents with FLORES and edema. Cardiology consulted for NSTEMI    #Vol Overload  #C/f Diastolic HF Exacerbation  #NSTEMI  - Pt with significant HTN hx on 4 meds p/w volume overload and FLORES  - SBP 190s in ED, no CP or SOB at rest   - No obvious CM on CXR, small effusions with cephalization (my read)  - EKG NSR with LVH and NS TW changes, poor R wave progression  - Trop 109 --> 97, proBNP 6989  - DDx: Likely has HFpEF exacerbation 2/2 long standing HTN and LVH. ACS less likely based on hx. Also minimally elevated troponin iso OLENA on CKD (?) with Cr 3.52.  Plan:  - No need for ACS therapy at this time  - Troponin downtrending, no need to repeat  - F/U TTE in the AM  - Start Lasix 40 IV BID   - Would treat SBP of 190 as HTN emergency based on troponin and Cr, goal dec 25% in 4 hours       > can consider switching MTP to labetalol, can give pushes of hydralazine PRN   - F/U TSH A1c Lipid panel     Signed by:  Kelvin Jay PGY4  Cardiology Fellow

## 2022-12-21 NOTE — H&P ADULT - NSHPPHYSICALEXAM_GEN_ALL_CORE
Vital Signs Last 24 Hrs  T(C): 36.4 (21 Dec 2022 10:02), Max: 36.8 (20 Dec 2022 20:56)  T(F): 97.6 (21 Dec 2022 10:02), Max: 98.2 (20 Dec 2022 20:56)  HR: 71 (21 Dec 2022 10:02) (63 - 79)  BP: 176/71 (21 Dec 2022 10:02) (154/63 - 192/72)  BP(mean): --  RR: 18 (21 Dec 2022 10:02) (15 - 20)  SpO2: 99% (21 Dec 2022 10:02) (96% - 99%)    Parameters below as of 21 Dec 2022 10:02  Patient On (Oxygen Delivery Method): room air    PHYSICAL EXAM:  GENERAL: NAD, breathing normal  HEAD:  Atraumatic, Normocephalic  EYES: conjunctiva and sclera clear  NECK: supple, + JVD  CHEST/LUNG: decreased bs midway up lung, no crackles appreciated  HEART: S1 S2 RRR  ABDOMEN: +BS Soft, NT/ND  EXTREMITIES:  2+ DP Pulses, No c/c. b/l 3+LE edema  NEUROLOGY: AAOx3, no facial droop, moving all extremites  SKIN: No rashes or lesions

## 2022-12-21 NOTE — H&P ADULT - HISTORY OF PRESENT ILLNESS
72M h/o HTN, HLD, DM2 p/w dypnea on exertion for the past 3-4 weeks. He states he has noticed progressive worsening of sob over this time. he gets intermittent chest tightness mostly with exertion and sometimes gets "tingling" in chest. he has had b/l LE edema for months. He states he has seen his primary care doctor a few months ago and was given "water pill" which he completed and was told his kidney function was at 30%. He does not check finger sticks at home. No known history of heart failure. He was told to see a kidney doctor and saw "him" once but cannot recall name.

## 2022-12-21 NOTE — CONSULT NOTE ADULT - PROBLEM SELECTOR RECOMMENDATION 9
Pt with OLENA in the setting of longstanding DM, uncontrolled HTN and volume overload. Exact duration of OLENA however unknown. Pt admitted with SCr. of 3.5 (eGFR 18), was told 2 months ago that his kidneys were functioning at 30%. Send urine electrolytes, spot urine TP/CR. Please obtain Renal US. UA showing blood but only 4 RBCs. Please repeat UA prior to discharge. Check CPK, if abnormal myoglobin as well. Will need to consider HD if renal failure continues to worsen. Agree with IV Lasix 80mg IV BID. Please bladder scan and catheterize if retaining. Strict outputs and daily weights to guide diuresis. Flash pulmonary edema from uncontrolled HTN is on the differential in addition to HF. F/u Echo. Consider Renal Artery Duplex. Monitor labs and urine output. Optimize hemodynamics. Avoid NSAIDs, ACEI/ARBS, RCA and nephrotoxins. Dose medications as per eGFR. Do not resume Metformin of eGFR is not above 30. Check Blood gas. Appreciate Cardiology consult.     #Anemia: HgB 9.7. Please obtain TSAT.   #BMD: Please check Phos, calcium and PTH    Will need f/u with Nephrology on discharge.     If you have any questions, please feel free to contact me  Sb Lilly  Nephrology Fellow  [phone]; Prefer [employer] TEAMS  (After 5pm or on weekends please page the on-call fellow).

## 2022-12-22 DIAGNOSIS — I50.31 ACUTE DIASTOLIC (CONGESTIVE) HEART FAILURE: ICD-10-CM

## 2022-12-22 DIAGNOSIS — Z02.9 ENCOUNTER FOR ADMINISTRATIVE EXAMINATIONS, UNSPECIFIED: ICD-10-CM

## 2022-12-22 LAB
A1C WITH ESTIMATED AVERAGE GLUCOSE RESULT: 5.8 % — HIGH (ref 4–5.6)
ANION GAP SERPL CALC-SCNC: 13 MMOL/L — SIGNIFICANT CHANGE UP (ref 5–17)
BUN SERPL-MCNC: 54 MG/DL — HIGH (ref 7–23)
CALCIUM SERPL-MCNC: 8 MG/DL — LOW (ref 8.4–10.5)
CHLORIDE SERPL-SCNC: 112 MMOL/L — HIGH (ref 96–108)
CHOLEST SERPL-MCNC: 142 MG/DL — SIGNIFICANT CHANGE UP
CO2 SERPL-SCNC: 20 MMOL/L — LOW (ref 22–31)
CREAT ?TM UR-MCNC: 22 MG/DL — SIGNIFICANT CHANGE UP
CREAT SERPL-MCNC: 3.69 MG/DL — HIGH (ref 0.5–1.3)
CULTURE RESULTS: SIGNIFICANT CHANGE UP
EGFR: 17 ML/MIN/1.73M2 — LOW
ESTIMATED AVERAGE GLUCOSE: 120 MG/DL — HIGH (ref 68–114)
GLUCOSE BLDC GLUCOMTR-MCNC: 128 MG/DL — HIGH (ref 70–99)
GLUCOSE BLDC GLUCOMTR-MCNC: 141 MG/DL — HIGH (ref 70–99)
GLUCOSE BLDC GLUCOMTR-MCNC: 167 MG/DL — HIGH (ref 70–99)
GLUCOSE BLDC GLUCOMTR-MCNC: 189 MG/DL — HIGH (ref 70–99)
GLUCOSE SERPL-MCNC: 118 MG/DL — HIGH (ref 70–99)
HCT VFR BLD CALC: 25.6 % — LOW (ref 39–50)
HCV AB S/CO SERPL IA: 0.15 S/CO — SIGNIFICANT CHANGE UP (ref 0–0.99)
HCV AB SERPL-IMP: SIGNIFICANT CHANGE UP
HDLC SERPL-MCNC: 51 MG/DL — SIGNIFICANT CHANGE UP
HGB BLD-MCNC: 8.5 G/DL — LOW (ref 13–17)
LIPID PNL WITH DIRECT LDL SERPL: 53 MG/DL — SIGNIFICANT CHANGE UP
MCHC RBC-ENTMCNC: 30.1 PG — SIGNIFICANT CHANGE UP (ref 27–34)
MCHC RBC-ENTMCNC: 33.2 GM/DL — SIGNIFICANT CHANGE UP (ref 32–36)
MCV RBC AUTO: 90.8 FL — SIGNIFICANT CHANGE UP (ref 80–100)
NON HDL CHOLESTEROL: 91 MG/DL — SIGNIFICANT CHANGE UP
NRBC # BLD: 0 /100 WBCS — SIGNIFICANT CHANGE UP (ref 0–0)
OSMOLALITY UR: 336 MOS/KG — SIGNIFICANT CHANGE UP (ref 300–900)
PLATELET # BLD AUTO: 275 K/UL — SIGNIFICANT CHANGE UP (ref 150–400)
POTASSIUM SERPL-MCNC: 3.4 MMOL/L — LOW (ref 3.5–5.3)
POTASSIUM SERPL-SCNC: 3.4 MMOL/L — LOW (ref 3.5–5.3)
PROT ?TM UR-MCNC: 259 MG/DL — HIGH (ref 0–12)
PROT/CREAT UR-RTO: 11.8 RATIO — HIGH (ref 0–0.2)
RBC # BLD: 2.82 M/UL — LOW (ref 4.2–5.8)
RBC # FLD: 13.7 % — SIGNIFICANT CHANGE UP (ref 10.3–14.5)
SODIUM SERPL-SCNC: 145 MMOL/L — SIGNIFICANT CHANGE UP (ref 135–145)
SODIUM UR-SCNC: 128 MMOL/L — SIGNIFICANT CHANGE UP
SPECIMEN SOURCE: SIGNIFICANT CHANGE UP
TRIGL SERPL-MCNC: 192 MG/DL — HIGH
TSH SERPL-MCNC: 10.1 UIU/ML — HIGH (ref 0.27–4.2)
WBC # BLD: 8.91 K/UL — SIGNIFICANT CHANGE UP (ref 3.8–10.5)
WBC # FLD AUTO: 8.91 K/UL — SIGNIFICANT CHANGE UP (ref 3.8–10.5)

## 2022-12-22 PROCEDURE — 99233 SBSQ HOSP IP/OBS HIGH 50: CPT

## 2022-12-22 PROCEDURE — 76770 US EXAM ABDO BACK WALL COMP: CPT | Mod: 26

## 2022-12-22 PROCEDURE — 99232 SBSQ HOSP IP/OBS MODERATE 35: CPT

## 2022-12-22 PROCEDURE — 99253 IP/OBS CNSLTJ NEW/EST LOW 45: CPT

## 2022-12-22 RX ORDER — HEPARIN SODIUM 5000 [USP'U]/ML
5000 INJECTION INTRAVENOUS; SUBCUTANEOUS EVERY 8 HOURS
Refills: 0 | Status: DISCONTINUED | OUTPATIENT
Start: 2022-12-22 | End: 2022-12-22

## 2022-12-22 RX ORDER — HYDROCORTISONE 1 %
1 OINTMENT (GRAM) TOPICAL
Refills: 0 | Status: DISCONTINUED | OUTPATIENT
Start: 2022-12-22 | End: 2022-12-30

## 2022-12-22 RX ORDER — POTASSIUM CHLORIDE 20 MEQ
40 PACKET (EA) ORAL EVERY 4 HOURS
Refills: 0 | Status: COMPLETED | OUTPATIENT
Start: 2022-12-22 | End: 2022-12-22

## 2022-12-22 RX ORDER — PETROLATUM,WHITE
1 JELLY (GRAM) TOPICAL
Refills: 0 | Status: DISCONTINUED | OUTPATIENT
Start: 2022-12-22 | End: 2022-12-30

## 2022-12-22 RX ORDER — FUROSEMIDE 40 MG
80 TABLET ORAL
Refills: 0 | Status: DISCONTINUED | OUTPATIENT
Start: 2022-12-22 | End: 2022-12-24

## 2022-12-22 RX ORDER — HYDRALAZINE HCL 50 MG
50 TABLET ORAL THREE TIMES A DAY
Refills: 0 | Status: DISCONTINUED | OUTPATIENT
Start: 2022-12-22 | End: 2022-12-28

## 2022-12-22 RX ADMIN — Medication 25 MILLIGRAM(S): at 04:55

## 2022-12-22 RX ADMIN — Medication 80 MILLIGRAM(S): at 14:19

## 2022-12-22 RX ADMIN — ATORVASTATIN CALCIUM 40 MILLIGRAM(S): 80 TABLET, FILM COATED ORAL at 21:51

## 2022-12-22 RX ADMIN — AMLODIPINE BESYLATE 10 MILLIGRAM(S): 2.5 TABLET ORAL at 04:54

## 2022-12-22 RX ADMIN — Medication 40 MILLIGRAM(S): at 04:55

## 2022-12-22 RX ADMIN — Medication 50 MILLIGRAM(S): at 14:20

## 2022-12-22 RX ADMIN — Medication 40 MILLIEQUIVALENT(S): at 17:05

## 2022-12-22 RX ADMIN — Medication 1 APPLICATION(S): at 11:49

## 2022-12-22 RX ADMIN — Medication 25 MICROGRAM(S): at 04:55

## 2022-12-22 RX ADMIN — Medication 50 MILLIGRAM(S): at 21:51

## 2022-12-22 RX ADMIN — Medication 1 APPLICATION(S): at 17:01

## 2022-12-22 RX ADMIN — Medication 50 MILLIGRAM(S): at 04:55

## 2022-12-22 RX ADMIN — Medication 81 MILLIGRAM(S): at 11:21

## 2022-12-22 RX ADMIN — Medication 1: at 12:41

## 2022-12-22 RX ADMIN — Medication 40 MILLIEQUIVALENT(S): at 13:08

## 2022-12-22 RX ADMIN — Medication 1 TABLET(S): at 11:21

## 2022-12-22 NOTE — PROGRESS NOTE ADULT - PROBLEM SELECTOR PLAN 1
TTE with EF 69%  Increase Lasix to 80 mg IVP BID per cardiology and nephrology recommendations  Strict I/O's, daily standing weights  Continue metoprolol

## 2022-12-22 NOTE — PROGRESS NOTE ADULT - ASSESSMENT
Assessment and Recommendations:  72 year old male with history of DM, HTN, HLD, hypothyroidism who presents with progressively worsening FLORES and edema x 2-3 weeks, found to have elevated troponins. His TTE shows normal LVEF 69% with normal strain imaging, moderate concentric LVH and b/l pleural effusions. Suspect his presentation likely related to severe hypertension and progressively worsening renal dysfunction.    1) SOB  2) OLENA on CKD  3) HF exacerbation  4) Elevated troponins (109- 97- 101)  5) HTN  - would not treat for ACS at this time. continue home statin daily  - Pt is significantly volume overloaded on exam, continue IV Lasix 80 BID, aim for net neg 2L/24 hours, keep strict Is/Os, replete lytes, please obtain daily standing weights. If poor response, might need to switch to Bumex  - f/u nephrology recs  - cotinue to hold home losartan for now  - Still hypertensive 170s-180s/60s-80s --> continue amlo 10mg daily, change hydralazine to 50 q8h. If still hypertensive, switch metoprolol to labetalol.     Tigre Pedersen MD  Cardiology Attending  [facility] [place] - Cardiology  [phone] [place] [address], [address] Floor, [address], [place] [address]  Office: [phone]  Fax:  [phone]    All Cardiology service information can be found 24/7 on [email or web address], password: [email or web address]

## 2022-12-22 NOTE — PROGRESS NOTE ADULT - ASSESSMENT
72M h/o HTN, HLD, DM2, noncompliant with medications p/w acute heart failure and hypertensive urgency with elevated troponin

## 2022-12-22 NOTE — PROGRESS NOTE ADULT - PROBLEM SELECTOR PLAN 3
OLENA on CKD in setting of volume overload and uncontrolled hypertension   Renal following  Avoid nephrotoxic medications  Holding Losartan

## 2022-12-22 NOTE — PATIENT PROFILE ADULT - FALL HARM RISK - UNIVERSAL INTERVENTIONS
Bed in lowest position, wheels locked, appropriate side rails in place/Call bell, personal items and telephone in reach/Instruct patient to call for assistance before getting out of bed or chair/Non-slip footwear when patient is out of bed/[place] to call system/Physically safe environment - no spills, clutter or unnecessary equipment/Purposeful Proactive Rounding/Room/bathroom lighting operational, light cord in reach

## 2022-12-23 LAB
ALBUMIN, RANDOM URINE: 148.6 MG/DL — SIGNIFICANT CHANGE UP
ALBUMIN/CREATININE RATIO (ACR): 6447 MG/G — HIGH (ref 0–30)
ANION GAP SERPL CALC-SCNC: 12 MMOL/L — SIGNIFICANT CHANGE UP (ref 5–17)
BUN SERPL-MCNC: 58 MG/DL — HIGH (ref 7–23)
CALCIUM SERPL-MCNC: 8.1 MG/DL — LOW (ref 8.4–10.5)
CHLORIDE SERPL-SCNC: 110 MMOL/L — HIGH (ref 96–108)
CK SERPL-CCNC: 746 U/L — HIGH (ref 30–200)
CO2 SERPL-SCNC: 18 MMOL/L — LOW (ref 22–31)
CREAT ?TM UR-MCNC: 23 MG/DL — SIGNIFICANT CHANGE UP
CREAT SERPL-MCNC: 4.15 MG/DL — HIGH (ref 0.5–1.3)
EGFR: 14 ML/MIN/1.73M2 — LOW
GLUCOSE BLDC GLUCOMTR-MCNC: 126 MG/DL — HIGH (ref 70–99)
GLUCOSE BLDC GLUCOMTR-MCNC: 176 MG/DL — HIGH (ref 70–99)
GLUCOSE BLDC GLUCOMTR-MCNC: 213 MG/DL — HIGH (ref 70–99)
GLUCOSE BLDC GLUCOMTR-MCNC: 237 MG/DL — HIGH (ref 70–99)
GLUCOSE SERPL-MCNC: 119 MG/DL — HIGH (ref 70–99)
HCT VFR BLD CALC: 28.6 % — LOW (ref 39–50)
HGB BLD-MCNC: 9 G/DL — LOW (ref 13–17)
MAGNESIUM SERPL-MCNC: 2 MG/DL — SIGNIFICANT CHANGE UP (ref 1.6–2.6)
MAGNESIUM UR-MCNC: 2.8 MG/DL — SIGNIFICANT CHANGE UP
MCHC RBC-ENTMCNC: 28.8 PG — SIGNIFICANT CHANGE UP (ref 27–34)
MCHC RBC-ENTMCNC: 31.5 GM/DL — LOW (ref 32–36)
MCV RBC AUTO: 91.7 FL — SIGNIFICANT CHANGE UP (ref 80–100)
NRBC # BLD: 0 /100 WBCS — SIGNIFICANT CHANGE UP (ref 0–0)
PLATELET # BLD AUTO: 279 K/UL — SIGNIFICANT CHANGE UP (ref 150–400)
POTASSIUM SERPL-MCNC: 3.9 MMOL/L — SIGNIFICANT CHANGE UP (ref 3.5–5.3)
POTASSIUM SERPL-SCNC: 3.9 MMOL/L — SIGNIFICANT CHANGE UP (ref 3.5–5.3)
RBC # BLD: 3.12 M/UL — LOW (ref 4.2–5.8)
RBC # FLD: 13.7 % — SIGNIFICANT CHANGE UP (ref 10.3–14.5)
SODIUM SERPL-SCNC: 140 MMOL/L — SIGNIFICANT CHANGE UP (ref 135–145)
WBC # BLD: 9.17 K/UL — SIGNIFICANT CHANGE UP (ref 3.8–10.5)
WBC # FLD AUTO: 9.17 K/UL — SIGNIFICANT CHANGE UP (ref 3.8–10.5)

## 2022-12-23 PROCEDURE — 99232 SBSQ HOSP IP/OBS MODERATE 35: CPT

## 2022-12-23 PROCEDURE — 99233 SBSQ HOSP IP/OBS HIGH 50: CPT | Mod: GC

## 2022-12-23 RX ORDER — LABETALOL HCL 100 MG
200 TABLET ORAL
Refills: 0 | Status: DISCONTINUED | OUTPATIENT
Start: 2022-12-23 | End: 2022-12-28

## 2022-12-23 RX ORDER — LABETALOL HCL 100 MG
100 TABLET ORAL
Refills: 0 | Status: DISCONTINUED | OUTPATIENT
Start: 2022-12-23 | End: 2022-12-23

## 2022-12-23 RX ORDER — HEPARIN SODIUM 5000 [USP'U]/ML
5000 INJECTION INTRAVENOUS; SUBCUTANEOUS EVERY 12 HOURS
Refills: 0 | Status: DISCONTINUED | OUTPATIENT
Start: 2022-12-23 | End: 2022-12-26

## 2022-12-23 RX ADMIN — Medication 1: at 12:16

## 2022-12-23 RX ADMIN — AMLODIPINE BESYLATE 10 MILLIGRAM(S): 2.5 TABLET ORAL at 04:59

## 2022-12-23 RX ADMIN — Medication 50 MILLIGRAM(S): at 04:58

## 2022-12-23 RX ADMIN — Medication 50 MILLIGRAM(S): at 21:55

## 2022-12-23 RX ADMIN — Medication 200 MILLIGRAM(S): at 18:16

## 2022-12-23 RX ADMIN — Medication 80 MILLIGRAM(S): at 16:07

## 2022-12-23 RX ADMIN — Medication 80 MILLIGRAM(S): at 05:00

## 2022-12-23 RX ADMIN — Medication 100 MILLIGRAM(S): at 12:16

## 2022-12-23 RX ADMIN — Medication 50 MILLIGRAM(S): at 16:06

## 2022-12-23 RX ADMIN — Medication 25 MICROGRAM(S): at 05:00

## 2022-12-23 RX ADMIN — Medication 2: at 17:32

## 2022-12-23 RX ADMIN — Medication 81 MILLIGRAM(S): at 12:18

## 2022-12-23 RX ADMIN — Medication 1 APPLICATION(S): at 18:17

## 2022-12-23 RX ADMIN — Medication 1 APPLICATION(S): at 05:09

## 2022-12-23 RX ADMIN — HEPARIN SODIUM 5000 UNIT(S): 5000 INJECTION INTRAVENOUS; SUBCUTANEOUS at 18:17

## 2022-12-23 RX ADMIN — ATORVASTATIN CALCIUM 40 MILLIGRAM(S): 80 TABLET, FILM COATED ORAL at 21:55

## 2022-12-23 RX ADMIN — Medication 25 MILLIGRAM(S): at 04:59

## 2022-12-23 RX ADMIN — Medication 1 TABLET(S): at 12:18

## 2022-12-23 NOTE — PROGRESS NOTE ADULT - PROBLEM SELECTOR PLAN 1
Pt with OLENA in the setting of longstanding DM, uncontrolled HTN and volume overload. Exact duration of OLENA however unknown. Pt admitted with SCr. of 3.5 (eGFR 18), was told 2 months ago that his kidneys were functioning at 30%.  Renal US without hydro or stone, 2 small cysts on R. normal sized kidneys. Significant proteinuria at 11.8 grams. UA showing blood but only 4 RBCs. Please repeat UA prior to discharge. CPK mildly elevated. Will need to consider HD if renal failure continues to worsen. Agree with IV Lasix 80mg IV BID. Please bladder scan and catheterize if retaining. Strict outputs and daily weights to guide diuresis. Flash pulmonary edema from uncontrolled HTN is on the differential in addition to HF. F/u Echo. Consider Renal Artery Duplex. Monitor labs and urine output. Optimize hemodynamics. Avoid NSAIDs, ACEI/ARBS, RCA and nephrotoxins. Dose medications as per eGFR. Do not resume Metformin of eGFR is not above 30. Check Blood gas. Appreciate Cardiology consult.     Discussed possibility of renal biopsy this admission to rule out IgA Nephropathy or Membranous given nephrotic syndrome. Please send serum immunofixation. sFLC, PLA2R and SPEP.     #Anemia: HgB 9.7. Please obtain TSAT.   #BMD: Please check Phos and calcium daily. Check PTH and Vitamin D -25 and 1,25    Will need f/u with Nephrology on discharge.     If you have any questions, please feel free to contact me  Sb Lilly  Nephrology Fellow  [phone]; Prefer [employer] TEAMS  (After 5pm or on weekends please page the on-call fellow)

## 2022-12-23 NOTE — PROGRESS NOTE ADULT - PROBLEM SELECTOR PLAN 3
OLENA on CKD in setting of volume overload and uncontrolled hypertension   Renal following- may require renal biopsy- patient hesitant   Avoid nephrotoxic medications  Holding Losartan

## 2022-12-23 NOTE — PROGRESS NOTE ADULT - PROBLEM SELECTOR PLAN 1
TTE with EF 69%  Continue Lasix 80 mg IVP BID   Strict I/O's, daily standing weights  Continue beta-blocker

## 2022-12-24 LAB
24R-OH-CALCIDIOL SERPL-MCNC: 8.8 NG/ML — LOW (ref 30–80)
ANION GAP SERPL CALC-SCNC: 15 MMOL/L — SIGNIFICANT CHANGE UP (ref 5–17)
APPEARANCE UR: CLEAR — SIGNIFICANT CHANGE UP
BACTERIA # UR AUTO: NEGATIVE — SIGNIFICANT CHANGE UP
BILIRUB UR-MCNC: NEGATIVE — SIGNIFICANT CHANGE UP
BUN SERPL-MCNC: 66 MG/DL — HIGH (ref 7–23)
CALCIUM SERPL-MCNC: 8.3 MG/DL — LOW (ref 8.4–10.5)
CALCIUM SERPL-MCNC: 8.4 MG/DL — SIGNIFICANT CHANGE UP (ref 8.4–10.5)
CHLORIDE SERPL-SCNC: 107 MMOL/L — SIGNIFICANT CHANGE UP (ref 96–108)
CO2 SERPL-SCNC: 21 MMOL/L — LOW (ref 22–31)
COLOR SPEC: SIGNIFICANT CHANGE UP
CREAT SERPL-MCNC: 4.72 MG/DL — HIGH (ref 0.5–1.3)
DIFF PNL FLD: ABNORMAL
EGFR: 12 ML/MIN/1.73M2 — LOW
EPI CELLS # UR: 1 /HPF — SIGNIFICANT CHANGE UP
GLUCOSE BLDC GLUCOMTR-MCNC: 164 MG/DL — HIGH (ref 70–99)
GLUCOSE BLDC GLUCOMTR-MCNC: 207 MG/DL — HIGH (ref 70–99)
GLUCOSE BLDC GLUCOMTR-MCNC: 215 MG/DL — HIGH (ref 70–99)
GLUCOSE BLDC GLUCOMTR-MCNC: 216 MG/DL — HIGH (ref 70–99)
GLUCOSE SERPL-MCNC: 143 MG/DL — HIGH (ref 70–99)
GLUCOSE UR QL: ABNORMAL
HYALINE CASTS # UR AUTO: 1 /LPF — SIGNIFICANT CHANGE UP (ref 0–2)
IRON SATN MFR SERPL: 19 % — SIGNIFICANT CHANGE UP (ref 16–55)
IRON SATN MFR SERPL: 48 UG/DL — SIGNIFICANT CHANGE UP (ref 45–165)
KETONES UR-MCNC: NEGATIVE — SIGNIFICANT CHANGE UP
LEUKOCYTE ESTERASE UR-ACNC: NEGATIVE — SIGNIFICANT CHANGE UP
MAGNESIUM SERPL-MCNC: 2.1 MG/DL — SIGNIFICANT CHANGE UP (ref 1.6–2.6)
NITRITE UR-MCNC: NEGATIVE — SIGNIFICANT CHANGE UP
PH UR: 6.5 — SIGNIFICANT CHANGE UP (ref 5–8)
POTASSIUM SERPL-MCNC: 4 MMOL/L — SIGNIFICANT CHANGE UP (ref 3.5–5.3)
POTASSIUM SERPL-SCNC: 4 MMOL/L — SIGNIFICANT CHANGE UP (ref 3.5–5.3)
PROT SERPL-MCNC: 5.4 G/DL — LOW (ref 6–8.3)
PROT SERPL-MCNC: 5.4 G/DL — LOW (ref 6–8.3)
PROT UR-MCNC: ABNORMAL
PTH-INTACT FLD-MCNC: 242 PG/ML — HIGH (ref 15–65)
RBC CASTS # UR COMP ASSIST: 1 /HPF — SIGNIFICANT CHANGE UP (ref 0–4)
SODIUM SERPL-SCNC: 143 MMOL/L — SIGNIFICANT CHANGE UP (ref 135–145)
SP GR SPEC: 1.01 — LOW (ref 1.01–1.02)
TIBC SERPL-MCNC: 256 UG/DL — SIGNIFICANT CHANGE UP (ref 220–430)
UIBC SERPL-MCNC: 207 UG/DL — SIGNIFICANT CHANGE UP (ref 110–370)
UROBILINOGEN FLD QL: NEGATIVE — SIGNIFICANT CHANGE UP
VIT D25+D1,25 OH+D1,25 PNL SERPL-MCNC: 17.1 PG/ML — LOW (ref 19.9–79.3)
WBC UR QL: 1 /HPF — SIGNIFICANT CHANGE UP (ref 0–5)

## 2022-12-24 PROCEDURE — 99233 SBSQ HOSP IP/OBS HIGH 50: CPT

## 2022-12-24 PROCEDURE — 99232 SBSQ HOSP IP/OBS MODERATE 35: CPT

## 2022-12-24 RX ORDER — IRON SUCROSE 20 MG/ML
200 INJECTION, SOLUTION INTRAVENOUS EVERY 24 HOURS
Refills: 0 | Status: COMPLETED | OUTPATIENT
Start: 2022-12-24 | End: 2022-12-28

## 2022-12-24 RX ORDER — ERGOCALCIFEROL 1.25 MG/1
50000 CAPSULE ORAL
Refills: 0 | Status: DISCONTINUED | OUTPATIENT
Start: 2022-12-24 | End: 2022-12-30

## 2022-12-24 RX ADMIN — IRON SUCROSE 220 MILLIGRAM(S): 20 INJECTION, SOLUTION INTRAVENOUS at 20:23

## 2022-12-24 RX ADMIN — Medication 50 MILLIGRAM(S): at 12:52

## 2022-12-24 RX ADMIN — Medication 25 MICROGRAM(S): at 05:10

## 2022-12-24 RX ADMIN — Medication 50 MILLIGRAM(S): at 05:09

## 2022-12-24 RX ADMIN — ATORVASTATIN CALCIUM 40 MILLIGRAM(S): 80 TABLET, FILM COATED ORAL at 21:20

## 2022-12-24 RX ADMIN — Medication 2: at 17:29

## 2022-12-24 RX ADMIN — Medication 50 MILLIGRAM(S): at 21:20

## 2022-12-24 RX ADMIN — Medication 1 APPLICATION(S): at 17:33

## 2022-12-24 RX ADMIN — Medication 1 TABLET(S): at 12:52

## 2022-12-24 RX ADMIN — HEPARIN SODIUM 5000 UNIT(S): 5000 INJECTION INTRAVENOUS; SUBCUTANEOUS at 17:32

## 2022-12-24 RX ADMIN — Medication 2: at 12:50

## 2022-12-24 RX ADMIN — AMLODIPINE BESYLATE 10 MILLIGRAM(S): 2.5 TABLET ORAL at 05:08

## 2022-12-24 RX ADMIN — HEPARIN SODIUM 5000 UNIT(S): 5000 INJECTION INTRAVENOUS; SUBCUTANEOUS at 05:07

## 2022-12-24 RX ADMIN — Medication 1 APPLICATION(S): at 05:09

## 2022-12-24 RX ADMIN — Medication 80 MILLIGRAM(S): at 12:51

## 2022-12-24 RX ADMIN — Medication 200 MILLIGRAM(S): at 17:32

## 2022-12-24 RX ADMIN — Medication 1: at 08:11

## 2022-12-24 RX ADMIN — Medication 80 MILLIGRAM(S): at 05:09

## 2022-12-24 RX ADMIN — Medication 200 MILLIGRAM(S): at 06:32

## 2022-12-24 NOTE — PROGRESS NOTE ADULT - PROBLEM SELECTOR PLAN 3
OLENA on CKD in setting of volume overload and uncontrolled hypertension -- worsening  Renal following- may require renal biopsy vs. HD- patient hesitant  HOLD lasix  Avoid nephrotoxic medications  Holding Losartan

## 2022-12-24 NOTE — PROGRESS NOTE ADULT - ASSESSMENT
72M h/o HTN, HLD, DM2, noncompliant with medications p/w acute heart failure and hypertensive urgency with elevated troponin presented with acute diastolic congestive heart failure exacerbation.

## 2022-12-24 NOTE — PROGRESS NOTE ADULT - PROBLEM SELECTOR PLAN 1
Pt with OLENA in the setting of longstanding DM, uncontrolled HTN and volume overload. Exact duration of OLENA however unknown. Pt admitted with SCr. of 3.5 (eGFR 18), was told 2 months ago that his kidneys were functioning at 30%.  Renal US without hydro or stone, 2 small cysts on R. normal sized kidneys. Significant proteinuria at 11.8 grams. UA showing blood but only 4 RBCs. Please repeat UA prior to discharge. CPK mildly elevated. Will need to consider HD if renal failure continues to worsen. Agree with IV Lasix 80mg IV BID. Please bladder scan and catheterize if retaining. Strict outputs and daily weights to guide diuresis. Flash pulmonary edema from uncontrolled HTN is on the differential in addition to HF. F/u Echo. Consider Renal Artery Duplex. Monitor labs and urine output. Optimize hemodynamics. Avoid NSAIDs, ACEI/ARBS, RCA and nephrotoxins. Dose medications as per eGFR. Do not resume Metformin of eGFR is not above 30. Check Blood gas. Appreciate Cardiology consult.     Discussed possibility of renal biopsy this admission to rule out IgA Nephropathy or Membranous given nephrotic syndrome. Please send serum immunofixation. sFLC, PLA2R and SPEP.     #Anemia: HgB 9.0. TSAT 19%. Please give a dose of IV Iron 200mg. May need NITA this admission.  #BMD: Please check Phos and calcium daily. PTH: 242, Vitamin D -25 and 1,25 both low. Please start Vitamin D 50k units weekly.     Will need f/u with Nephrology on discharge.     If you have any questions, please feel free to contact me  Sb Lilly  Nephrology Fellow  [phone]; Prefer [employer] TEAMS  (After 5pm or on weekends please page the on-call fellow) Pt with OLENA in the setting of longstanding DM, uncontrolled HTN and volume overload. Exact duration of OLENA however unknown. Pt admitted with SCr. of 3.5 (eGFR 18), was told 2 months ago that his kidneys were functioning at 30%.  Renal US without hydro or stone, 2 small cysts on R. normal sized kidneys. Significant proteinuria at 11.8 grams. UA showing blood but only 4 RBCs. Please repeat UA prior to discharge. CPK mildly elevated. Will need to consider HD if renal failure continues to worsen. Agree with IV Lasix 80mg IV BID. Please bladder scan and catheterize if retaining. Strict outputs and daily weights to guide diuresis. Flash pulmonary edema from uncontrolled HTN is on the differential in addition to HF. Echo with EF 69%, moderate concentric LVH. Consider Renal Artery Duplex. Monitor labs and urine output. Optimize hemodynamics. Avoid NSAIDs, ACEI/ARBS, RCA and nephrotoxins. Dose medications as per eGFR. Do not resume Metformin of eGFR is not above 30. Check Blood gas. Appreciate Cardiology consult.     Discussed possibility of renal biopsy this admission to rule out IgA Nephropathy or Membranous given nephrotic syndrome. Please send serum immunofixation. sFLC, PLA2R and SPEP.     #Anemia: HgB 9.0. TSAT 19%. Please give a dose of IV Iron 200mg. May need NITA this admission.  #BMD: Please check Phos and calcium daily. PTH: 242, Vitamin D -25 and 1,25 both low. Please start Vitamin D 50k units weekly.     Will need f/u with Nephrology on discharge.     If you have any questions, please feel free to contact me  Sb Lilly  Nephrology Fellow  [phone]; Prefer [employer] TEAMS  (After 5pm or on weekends please page the on-call fellow)

## 2022-12-24 NOTE — PROGRESS NOTE ADULT - PROBLEM SELECTOR PLAN 1
TTE with EF 69%  HOLDING Lasix 80 mg IVP BID, given worsening OLENA   Strict I/O's, daily standing weights  Continue beta-blocker

## 2022-12-25 LAB
ANION GAP SERPL CALC-SCNC: 15 MMOL/L — SIGNIFICANT CHANGE UP (ref 5–17)
BUN SERPL-MCNC: 72 MG/DL — HIGH (ref 7–23)
CALCIUM SERPL-MCNC: 8.1 MG/DL — LOW (ref 8.4–10.5)
CHLORIDE SERPL-SCNC: 107 MMOL/L — SIGNIFICANT CHANGE UP (ref 96–108)
CO2 SERPL-SCNC: 20 MMOL/L — LOW (ref 22–31)
CREAT SERPL-MCNC: 4.98 MG/DL — HIGH (ref 0.5–1.3)
EGFR: 12 ML/MIN/1.73M2 — LOW
GLUCOSE BLDC GLUCOMTR-MCNC: 157 MG/DL — HIGH (ref 70–99)
GLUCOSE BLDC GLUCOMTR-MCNC: 170 MG/DL — HIGH (ref 70–99)
GLUCOSE BLDC GLUCOMTR-MCNC: 182 MG/DL — HIGH (ref 70–99)
GLUCOSE BLDC GLUCOMTR-MCNC: 232 MG/DL — HIGH (ref 70–99)
GLUCOSE SERPL-MCNC: 161 MG/DL — HIGH (ref 70–99)
PHOSPHATE SERPL-MCNC: 5.9 MG/DL — HIGH (ref 2.5–4.5)
POTASSIUM SERPL-MCNC: 4.1 MMOL/L — SIGNIFICANT CHANGE UP (ref 3.5–5.3)
POTASSIUM SERPL-SCNC: 4.1 MMOL/L — SIGNIFICANT CHANGE UP (ref 3.5–5.3)
SODIUM SERPL-SCNC: 142 MMOL/L — SIGNIFICANT CHANGE UP (ref 135–145)

## 2022-12-25 PROCEDURE — 99233 SBSQ HOSP IP/OBS HIGH 50: CPT | Mod: GC

## 2022-12-25 PROCEDURE — 99232 SBSQ HOSP IP/OBS MODERATE 35: CPT

## 2022-12-25 RX ORDER — FUROSEMIDE 40 MG
40 TABLET ORAL EVERY 12 HOURS
Refills: 0 | Status: DISCONTINUED | OUTPATIENT
Start: 2022-12-25 | End: 2022-12-26

## 2022-12-25 RX ORDER — FUROSEMIDE 40 MG
40 TABLET ORAL EVERY 12 HOURS
Refills: 0 | Status: DISCONTINUED | OUTPATIENT
Start: 2022-12-25 | End: 2022-12-25

## 2022-12-25 RX ADMIN — Medication 25 MICROGRAM(S): at 05:04

## 2022-12-25 RX ADMIN — HEPARIN SODIUM 5000 UNIT(S): 5000 INJECTION INTRAVENOUS; SUBCUTANEOUS at 05:04

## 2022-12-25 RX ADMIN — AMLODIPINE BESYLATE 10 MILLIGRAM(S): 2.5 TABLET ORAL at 05:04

## 2022-12-25 RX ADMIN — Medication 1: at 17:13

## 2022-12-25 RX ADMIN — Medication 40 MILLIGRAM(S): at 17:22

## 2022-12-25 RX ADMIN — Medication 50 MILLIGRAM(S): at 05:04

## 2022-12-25 RX ADMIN — Medication 1 APPLICATION(S): at 05:04

## 2022-12-25 RX ADMIN — ATORVASTATIN CALCIUM 40 MILLIGRAM(S): 80 TABLET, FILM COATED ORAL at 20:59

## 2022-12-25 RX ADMIN — Medication 200 MILLIGRAM(S): at 05:04

## 2022-12-25 RX ADMIN — HEPARIN SODIUM 5000 UNIT(S): 5000 INJECTION INTRAVENOUS; SUBCUTANEOUS at 17:13

## 2022-12-25 RX ADMIN — Medication 1 APPLICATION(S): at 17:15

## 2022-12-25 RX ADMIN — Medication 200 MILLIGRAM(S): at 17:22

## 2022-12-25 RX ADMIN — Medication 2: at 11:53

## 2022-12-25 RX ADMIN — IRON SUCROSE 220 MILLIGRAM(S): 20 INJECTION, SOLUTION INTRAVENOUS at 20:50

## 2022-12-25 RX ADMIN — Medication 50 MILLIGRAM(S): at 20:59

## 2022-12-25 RX ADMIN — Medication 1: at 08:14

## 2022-12-25 RX ADMIN — Medication 1 TABLET(S): at 11:47

## 2022-12-25 RX ADMIN — ERGOCALCIFEROL 50000 UNIT(S): 1.25 CAPSULE ORAL at 11:47

## 2022-12-25 RX ADMIN — Medication 50 MILLIGRAM(S): at 14:43

## 2022-12-25 NOTE — CONSULT NOTE ADULT - SUBJECTIVE AND OBJECTIVE BOX
Vascular & Interventional Radiology Consult Note    Evaluate for Procedure: renal parenchymal biopsy    HPI: 72y Male with OLENA on CKD with IR consulted for renal parenchymal biopsy.    Allergies:   Medications (Abx/Cardiac/Anticoagulation/Blood Products)  amLODIPine   Tablet: 10 milliGRAM(s) Oral (12-25 @ 05:04)  furosemide   Injectable: 80 milliGRAM(s) IV Push (12-24 @ 12:51)  heparin   Injectable: 5000 Unit(s) SubCutaneous (12-25 @ 05:04)  hydrALAZINE: 50 milliGRAM(s) Oral (12-25 @ 05:04)  labetalol: 200 milliGRAM(s) Oral (12-25 @ 05:04)    Data:    T(C): 36.3  HR: 64  BP: 143/65  RR: 18  SpO2: 98%    -WBC 9.17 / HgB 9.0 / Hct 28.6 / Plt 279  -Na 142 / Cl 107 / BUN 72 / Glucose 161  -K 4.1 / CO2 20 / Cr 4.98  -ALT -- / Alk Phos -- / T.Bili --  -INR 0.92 / PTT 35.4

## 2022-12-25 NOTE — PROGRESS NOTE ADULT - PROBLEM SELECTOR PLAN 1
Pt with OLENA in the setting of longstanding DM, uncontrolled HTN and volume overload. Exact duration of OLENA however unknown. Pt admitted with SCr. of 3.5 (eGFR 18), now trended to 4.9 today. Aas told 2 months ago that his kidneys were functioning at 30%. Renal US without hydro or stone, 2 small cysts on R. normal sized kidneys. Significant proteinuria at 11.8 grams. UA showing blood but only 4 RBCs. Please repeat UA prior to discharge. CPK mildly elevated. Will need to consider HD if renal failure continues to worsen.     Can restart Lasix IV 40mg BID.     Strict outputs and daily weights to guide diuresis. Monitor labs and urine output. Optimize hemodynamics. Avoid NSAIDs, ACEI/ARBS, RCA and nephrotoxins. Dose medications as per eGFR. Do not resume Metformin of eGFR is not above 30. Check Blood gas. Appreciate Cardiology consult.     For renal biopsy this admission to rule out IgA Nephropathy or Membranous given nephrotic syndrome. Please consult IR. F/u serum immunofixation. sFLC, PLA2R and SPEP.     #Anemia: HgB 9.0. TSAT 19%. Given IV Iron. May need NITA this admission.  #BMD: Please check Phos and calcium daily. PTH: 242, Vitamin D -25 and 1,25 both low. Please start Vitamin D 50k units weekly.     Will need f/u with Nephrology on discharge.     If you have any questions, please feel free to contact me  Sb Lilly  Nephrology Fellow  [phone]; Prefer [employer] TEAMS  (After 5pm or on weekends please page the on-call fellow)

## 2022-12-25 NOTE — PROGRESS NOTE ADULT - PROBLEM SELECTOR PLAN 1
TTE with EF 69%  resume Lasix 40 mg IVP BID, per renal  Strict I/O's, daily standing weights  Continue beta-blocker

## 2022-12-25 NOTE — PROGRESS NOTE ADULT - PROBLEM SELECTOR PLAN 3
OLENA on CKD in setting of volume overload and uncontrolled hypertension -- worsening  Renal following- may require renal biopsy vs. HD- patient has agreed to renal bx  IR consult placed for renal bx on 12/25  resume lasix  c/w iv iron 200 mg daily   c/w vit D 50,000U weekly  Avoid nephrotoxic medications  Holding Losartan OLENA on CKD in setting of volume overload and uncontrolled hypertension -- worsening  Renal following- may require renal biopsy vs. HD- patient has agreed to renal bx  IR consult placed for renal bx on 12/25-- to happen 12/27, hold heparin morning of and npo MN on 12/26  resume lasix  c/w iv iron 200 mg daily   c/w vit D 50,000U weekly  Avoid nephrotoxic medications  Holding Losartan

## 2022-12-25 NOTE — PROGRESS NOTE ADULT - ASSESSMENT
Pt. is a 72 y.o. M w/ PMHx of HTN, HLD, DM2 presents with Dyspnea for the past 3-4 weeks. Nephrology consulted for OLENA. Report faxed to office

## 2022-12-26 LAB
ANION GAP SERPL CALC-SCNC: 16 MMOL/L — SIGNIFICANT CHANGE UP (ref 5–17)
BUN SERPL-MCNC: 74 MG/DL — HIGH (ref 7–23)
CALCIUM SERPL-MCNC: 7.5 MG/DL — LOW (ref 8.4–10.5)
CHLORIDE SERPL-SCNC: 106 MMOL/L — SIGNIFICANT CHANGE UP (ref 96–108)
CO2 SERPL-SCNC: 19 MMOL/L — LOW (ref 22–31)
CREAT SERPL-MCNC: 5.35 MG/DL — HIGH (ref 0.5–1.3)
EGFR: 11 ML/MIN/1.73M2 — LOW
GLUCOSE BLDC GLUCOMTR-MCNC: 153 MG/DL — HIGH (ref 70–99)
GLUCOSE BLDC GLUCOMTR-MCNC: 163 MG/DL — HIGH (ref 70–99)
GLUCOSE BLDC GLUCOMTR-MCNC: 237 MG/DL — HIGH (ref 70–99)
GLUCOSE BLDC GLUCOMTR-MCNC: 282 MG/DL — HIGH (ref 70–99)
GLUCOSE SERPL-MCNC: 199 MG/DL — HIGH (ref 70–99)
HCT VFR BLD CALC: 24.1 % — LOW (ref 39–50)
HGB BLD-MCNC: 7.8 G/DL — LOW (ref 13–17)
KAPPA LC SER QL IFE: 13.57 MG/DL — HIGH (ref 0.33–1.94)
KAPPA/LAMBDA FREE LIGHT CHAIN RATIO, SERUM: 1.2 RATIO — SIGNIFICANT CHANGE UP (ref 0.26–1.65)
LAMBDA LC SER QL IFE: 11.3 MG/DL — HIGH (ref 0.57–2.63)
MAGNESIUM SERPL-MCNC: 2.2 MG/DL — SIGNIFICANT CHANGE UP (ref 1.6–2.6)
MCHC RBC-ENTMCNC: 29.4 PG — SIGNIFICANT CHANGE UP (ref 27–34)
MCHC RBC-ENTMCNC: 32.4 GM/DL — SIGNIFICANT CHANGE UP (ref 32–36)
MCV RBC AUTO: 90.9 FL — SIGNIFICANT CHANGE UP (ref 80–100)
NRBC # BLD: 0 /100 WBCS — SIGNIFICANT CHANGE UP (ref 0–0)
PHOSPHATE SERPL-MCNC: 6.2 MG/DL — HIGH (ref 2.5–4.5)
PLATELET # BLD AUTO: 250 K/UL — SIGNIFICANT CHANGE UP (ref 150–400)
POTASSIUM SERPL-MCNC: 4.3 MMOL/L — SIGNIFICANT CHANGE UP (ref 3.5–5.3)
POTASSIUM SERPL-SCNC: 4.3 MMOL/L — SIGNIFICANT CHANGE UP (ref 3.5–5.3)
RBC # BLD: 2.65 M/UL — LOW (ref 4.2–5.8)
RBC # FLD: 13.6 % — SIGNIFICANT CHANGE UP (ref 10.3–14.5)
SODIUM SERPL-SCNC: 141 MMOL/L — SIGNIFICANT CHANGE UP (ref 135–145)
WBC # BLD: 8.27 K/UL — SIGNIFICANT CHANGE UP (ref 3.8–10.5)
WBC # FLD AUTO: 8.27 K/UL — SIGNIFICANT CHANGE UP (ref 3.8–10.5)

## 2022-12-26 PROCEDURE — 99233 SBSQ HOSP IP/OBS HIGH 50: CPT

## 2022-12-26 PROCEDURE — 99232 SBSQ HOSP IP/OBS MODERATE 35: CPT

## 2022-12-26 RX ORDER — CALCIUM ACETATE 667 MG
667 TABLET ORAL
Refills: 0 | Status: DISCONTINUED | OUTPATIENT
Start: 2022-12-26 | End: 2022-12-27

## 2022-12-26 RX ADMIN — Medication 200 MILLIGRAM(S): at 16:55

## 2022-12-26 RX ADMIN — Medication 1 APPLICATION(S): at 16:54

## 2022-12-26 RX ADMIN — Medication 50 MILLIGRAM(S): at 15:28

## 2022-12-26 RX ADMIN — Medication 40 MILLIGRAM(S): at 05:00

## 2022-12-26 RX ADMIN — Medication 50 MILLIGRAM(S): at 05:00

## 2022-12-26 RX ADMIN — HEPARIN SODIUM 5000 UNIT(S): 5000 INJECTION INTRAVENOUS; SUBCUTANEOUS at 05:00

## 2022-12-26 RX ADMIN — Medication 2: at 11:59

## 2022-12-26 RX ADMIN — ATORVASTATIN CALCIUM 40 MILLIGRAM(S): 80 TABLET, FILM COATED ORAL at 22:15

## 2022-12-26 RX ADMIN — Medication 1: at 22:14

## 2022-12-26 RX ADMIN — Medication 200 MILLIGRAM(S): at 05:00

## 2022-12-26 RX ADMIN — Medication 1 TABLET(S): at 11:56

## 2022-12-26 RX ADMIN — Medication 25 MICROGRAM(S): at 04:59

## 2022-12-26 RX ADMIN — Medication 50 MILLIGRAM(S): at 22:27

## 2022-12-26 RX ADMIN — Medication 1 APPLICATION(S): at 05:01

## 2022-12-26 RX ADMIN — Medication 1: at 08:12

## 2022-12-26 RX ADMIN — AMLODIPINE BESYLATE 10 MILLIGRAM(S): 2.5 TABLET ORAL at 04:59

## 2022-12-26 RX ADMIN — IRON SUCROSE 220 MILLIGRAM(S): 20 INJECTION, SOLUTION INTRAVENOUS at 22:14

## 2022-12-26 RX ADMIN — Medication 1: at 16:52

## 2022-12-26 NOTE — PROGRESS NOTE ADULT - PROBLEM SELECTOR PLAN 1
Pt with OLENA in the setting of longstanding DM, uncontrolled HTN and volume overload. Exact duration of OLENA however unknown. Pt admitted with SCr. of 3.5 (eGFR 18), now trended to 4.9 today. Aas told 2 months ago that his kidneys were functioning at 30%. Renal US without hydro or stone, 2 small cysts on R. normal sized kidneys. Significant proteinuria at 11.8 grams. UA showing blood but only 4 RBCs. Please repeat UA prior to discharge. CPK mildly elevated. Will need to consider HD if renal failure continues to worsen.     Pt. with improving volume status, can lower dose of Lasix IV 40mg daily.     Strict outputs and daily weights to guide diuresis. Monitor labs and urine output. Optimize hemodynamics. Avoid NSAIDs, ACEI/ARBS, RCA and nephrotoxins. Dose medications as per eGFR. Do not resume Metformin of eGFR is not above 30. Check Blood gas. Appreciate Cardiology consult.     For renal biopsy this admission to rule out IgA Nephropathy or Membranous given nephrotic syndrome. Please consult IR. F/u serum immunofixation. sFLC, PLA2R and SPEP.     #Anemia: HgB below goal. s/p IV Iron. Start NITA 44302 units s/c weekly.  #BMD: Phos level elevated, start Ca acetate 2 tabs TID with meals. Check ionized Ca and replete with IV calcium to maintain ionized Ca level >1.1    Will need f/u with Nephrology on discharge.

## 2022-12-26 NOTE — PROGRESS NOTE ADULT - PROBLEM SELECTOR PLAN 3
OLENA on CKD in setting of volume overload and uncontrolled hypertension -- worsening  Renal following- renal BX 12/27-- covid swab ordered 12/26, npo after MN; coags, cbc and bmp ordered for 12/27  c/w lasix daily   c/w iv iron 200 mg daily   c/w vit D 50,000U weekly  Avoid nephrotoxic medications  Holding Losartan

## 2022-12-26 NOTE — PROGRESS NOTE ADULT - PROBLEM SELECTOR PROBLEM 2
Pt medicated for pain as ordered. Pt DC home in good condition with RX x__1__ and follow up with [facility] as scheduled. V/u of Dc instructions. Denies questions or concerns. Teaching done re: s/s to report.        Wilfredo Gracia, RN  07/03/21 3972 Hypertensive emergency

## 2022-12-27 LAB
ANION GAP SERPL CALC-SCNC: 14 MMOL/L — SIGNIFICANT CHANGE UP (ref 5–17)
BASE EXCESS BLDV CALC-SCNC: -3.3 MMOL/L — LOW (ref -2–3)
BLD GP AB SCN SERPL QL: NEGATIVE — SIGNIFICANT CHANGE UP
BUN SERPL-MCNC: 73 MG/DL — HIGH (ref 7–23)
CA-I BLD-SCNC: 1.06 MMOL/L — LOW (ref 1.15–1.33)
CA-I SERPL-SCNC: 1.11 MMOL/L — LOW (ref 1.15–1.33)
CALCIUM SERPL-MCNC: 7.9 MG/DL — LOW (ref 8.4–10.5)
CHLORIDE BLDV-SCNC: 108 MMOL/L — SIGNIFICANT CHANGE UP (ref 96–108)
CHLORIDE SERPL-SCNC: 108 MMOL/L — SIGNIFICANT CHANGE UP (ref 96–108)
CO2 BLDV-SCNC: 23 MMOL/L — SIGNIFICANT CHANGE UP (ref 22–26)
CO2 SERPL-SCNC: 20 MMOL/L — LOW (ref 22–31)
CREAT SERPL-MCNC: 4.96 MG/DL — HIGH (ref 0.5–1.3)
EGFR: 12 ML/MIN/1.73M2 — LOW
GAS PNL BLDV: 139 MMOL/L — SIGNIFICANT CHANGE UP (ref 136–145)
GAS PNL BLDV: SIGNIFICANT CHANGE UP
GAS PNL BLDV: SIGNIFICANT CHANGE UP
GLUCOSE BLDC GLUCOMTR-MCNC: 151 MG/DL — HIGH (ref 70–99)
GLUCOSE BLDC GLUCOMTR-MCNC: 199 MG/DL — HIGH (ref 70–99)
GLUCOSE BLDC GLUCOMTR-MCNC: 239 MG/DL — HIGH (ref 70–99)
GLUCOSE BLDC GLUCOMTR-MCNC: 314 MG/DL — HIGH (ref 70–99)
GLUCOSE BLDV-MCNC: 142 MG/DL — HIGH (ref 70–99)
GLUCOSE SERPL-MCNC: 141 MG/DL — HIGH (ref 70–99)
HCO3 BLDV-SCNC: 22 MMOL/L — SIGNIFICANT CHANGE UP (ref 22–29)
HCT VFR BLD CALC: 26.6 % — LOW (ref 39–50)
HCT VFR BLDA CALC: 27 % — LOW (ref 39–51)
HGB BLD CALC-MCNC: 8.9 G/DL — LOW (ref 12.6–17.4)
HGB BLD-MCNC: 8.6 G/DL — LOW (ref 13–17)
HOROWITZ INDEX BLDV+IHG-RTO: SIGNIFICANT CHANGE UP
INR BLD: 0.91 RATIO — SIGNIFICANT CHANGE UP (ref 0.88–1.16)
LACTATE BLDV-MCNC: 0.5 MMOL/L — SIGNIFICANT CHANGE UP (ref 0.5–2)
MAGNESIUM SERPL-MCNC: 2.3 MG/DL — SIGNIFICANT CHANGE UP (ref 1.6–2.6)
MCHC RBC-ENTMCNC: 30 PG — SIGNIFICANT CHANGE UP (ref 27–34)
MCHC RBC-ENTMCNC: 32.3 GM/DL — SIGNIFICANT CHANGE UP (ref 32–36)
MCV RBC AUTO: 92.7 FL — SIGNIFICANT CHANGE UP (ref 80–100)
NRBC # BLD: 0 /100 WBCS — SIGNIFICANT CHANGE UP (ref 0–0)
PCO2 BLDV: 40 MMHG — LOW (ref 42–55)
PH BLDV: 7.35 — SIGNIFICANT CHANGE UP (ref 7.32–7.43)
PHOSPHATE SERPL-MCNC: 5.9 MG/DL — HIGH (ref 2.5–4.5)
PLATELET # BLD AUTO: 284 K/UL — SIGNIFICANT CHANGE UP (ref 150–400)
PO2 BLDV: 83 MMHG — HIGH (ref 25–45)
POTASSIUM BLDV-SCNC: 4.3 MMOL/L — SIGNIFICANT CHANGE UP (ref 3.5–5.1)
POTASSIUM SERPL-MCNC: 4.3 MMOL/L — SIGNIFICANT CHANGE UP (ref 3.5–5.3)
POTASSIUM SERPL-SCNC: 4.3 MMOL/L — SIGNIFICANT CHANGE UP (ref 3.5–5.3)
PROTHROM AB SERPL-ACNC: 10.4 SEC — LOW (ref 10.5–13.4)
RBC # BLD: 2.87 M/UL — LOW (ref 4.2–5.8)
RBC # FLD: 13.7 % — SIGNIFICANT CHANGE UP (ref 10.3–14.5)
RH IG SCN BLD-IMP: POSITIVE — SIGNIFICANT CHANGE UP
SAO2 % BLDV: 97.1 % — HIGH (ref 67–88)
SARS-COV-2 RNA SPEC QL NAA+PROBE: SIGNIFICANT CHANGE UP
SODIUM SERPL-SCNC: 142 MMOL/L — SIGNIFICANT CHANGE UP (ref 135–145)
WBC # BLD: 8.67 K/UL — SIGNIFICANT CHANGE UP (ref 3.8–10.5)
WBC # FLD AUTO: 8.67 K/UL — SIGNIFICANT CHANGE UP (ref 3.8–10.5)

## 2022-12-27 PROCEDURE — 99233 SBSQ HOSP IP/OBS HIGH 50: CPT | Mod: GC

## 2022-12-27 PROCEDURE — 88313 SPECIAL STAINS GROUP 2: CPT | Mod: 26

## 2022-12-27 PROCEDURE — 88346 IMFLUOR 1ST 1ANTB STAIN PX: CPT | Mod: 26

## 2022-12-27 PROCEDURE — 99232 SBSQ HOSP IP/OBS MODERATE 35: CPT | Mod: 25

## 2022-12-27 PROCEDURE — 88350 IMFLUOR EA ADDL 1ANTB STN PX: CPT | Mod: 26

## 2022-12-27 PROCEDURE — 50200 RENAL BIOPSY PERQ: CPT | Mod: RT

## 2022-12-27 PROCEDURE — 76942 ECHO GUIDE FOR BIOPSY: CPT | Mod: 26

## 2022-12-27 PROCEDURE — 88305 TISSUE EXAM BY PATHOLOGIST: CPT | Mod: 26

## 2022-12-27 PROCEDURE — 88348 ELECTRON MICROSCOPY DX: CPT | Mod: 26

## 2022-12-27 PROCEDURE — 99233 SBSQ HOSP IP/OBS HIGH 50: CPT | Mod: 25

## 2022-12-27 RX ORDER — CALCIUM ACETATE 667 MG
667 TABLET ORAL
Refills: 0 | Status: DISCONTINUED | OUTPATIENT
Start: 2022-12-27 | End: 2022-12-30

## 2022-12-27 RX ORDER — SPIRONOLACTONE 25 MG/1
50 TABLET, FILM COATED ORAL ONCE
Refills: 0 | Status: COMPLETED | OUTPATIENT
Start: 2022-12-27 | End: 2022-12-27

## 2022-12-27 RX ORDER — DESMOPRESSIN ACETATE 0.1 MG/1
30 TABLET ORAL ONCE
Refills: 0 | Status: COMPLETED | OUTPATIENT
Start: 2022-12-27 | End: 2022-12-27

## 2022-12-27 RX ORDER — DESMOPRESSIN ACETATE 0.1 MG/1
30 TABLET ORAL ONCE
Refills: 0 | Status: DISCONTINUED | OUTPATIENT
Start: 2022-12-27 | End: 2022-12-27

## 2022-12-27 RX ORDER — FUROSEMIDE 40 MG
40 TABLET ORAL
Refills: 0 | Status: DISCONTINUED | OUTPATIENT
Start: 2022-12-27 | End: 2022-12-28

## 2022-12-27 RX ADMIN — DESMOPRESSIN ACETATE 230 MICROGRAM(S): 0.1 TABLET ORAL at 10:04

## 2022-12-27 RX ADMIN — Medication 1 TABLET(S): at 13:48

## 2022-12-27 RX ADMIN — Medication 1 APPLICATION(S): at 06:02

## 2022-12-27 RX ADMIN — Medication 50 MILLIGRAM(S): at 06:01

## 2022-12-27 RX ADMIN — Medication 200 MILLIGRAM(S): at 06:01

## 2022-12-27 RX ADMIN — Medication 200 MILLIGRAM(S): at 17:44

## 2022-12-27 RX ADMIN — AMLODIPINE BESYLATE 10 MILLIGRAM(S): 2.5 TABLET ORAL at 06:01

## 2022-12-27 RX ADMIN — ATORVASTATIN CALCIUM 40 MILLIGRAM(S): 80 TABLET, FILM COATED ORAL at 21:22

## 2022-12-27 RX ADMIN — Medication 667 MILLIGRAM(S): at 17:44

## 2022-12-27 RX ADMIN — SPIRONOLACTONE 50 MILLIGRAM(S): 25 TABLET, FILM COATED ORAL at 10:04

## 2022-12-27 RX ADMIN — Medication 50 MILLIGRAM(S): at 13:48

## 2022-12-27 RX ADMIN — Medication 2: at 22:17

## 2022-12-27 RX ADMIN — Medication 25 MICROGRAM(S): at 06:01

## 2022-12-27 RX ADMIN — Medication 40 MILLIGRAM(S): at 17:43

## 2022-12-27 RX ADMIN — Medication 2: at 17:44

## 2022-12-27 RX ADMIN — Medication 50 MILLIGRAM(S): at 21:22

## 2022-12-27 RX ADMIN — Medication 1: at 08:07

## 2022-12-27 RX ADMIN — Medication 1 APPLICATION(S): at 17:43

## 2022-12-27 RX ADMIN — IRON SUCROSE 220 MILLIGRAM(S): 20 INJECTION, SOLUTION INTRAVENOUS at 22:17

## 2022-12-27 NOTE — PRE PROCEDURE NOTE - PRE PROCEDURE EVALUATION
Interventional Radiology    HPI: 72y Male with OLENA on CKD of unclear etiology. IR consulted for renal biopsy.     Allergies:   Medications (Abx/Cardiac/Anticoagulation/Blood Products)  amLODIPine   Tablet: 10 milliGRAM(s) Oral (12-27 @ 06:01)  furosemide   Injectable: 40 milliGRAM(s) IV Push (12-26 @ 05:00)  heparin   Injectable: 5000 Unit(s) SubCutaneous (12-26 @ 05:00)  hydrALAZINE: 50 milliGRAM(s) Oral (12-27 @ 06:01)  labetalol: 200 milliGRAM(s) Oral (12-27 @ 06:01)    Data:  165  96.5  T(C): 36.6  HR: 63  BP: 127/58  RR: 18  SpO2: 99%    Exam  General: No acute distress  Chest: Non labored breathing  Abdomen: Non-distended  Extremities: No swelling, warm    -WBC 8.67 / HgB 8.6 / Hct 26.6 / Plt 284  -Na 142 / Cl 108 / BUN 73 / Glucose 141  -K 4.3 / CO2 20 / Cr 4.96  -ALT -- / Alk Phos -- / T.Bili --  -INR0.91    Imaging: US reviewed    Plan: 72y Male presents for renal biopsy. Will evaluate patients blood pressure immediately prior to procedure to determine if patient is a candidate.  -Risks/Benefits/alternatives explained with the patient and/or healthcare proxy and witnessed informed consent obtained.    Interventional Radiology    HPI: 72y Male with OLENA on CKD of unclear etiology. IR consulted for renal biopsy.     Allergies:   Medications (Abx/Cardiac/Anticoagulation/Blood Products)  amLODIPine   Tablet: 10 milliGRAM(s) Oral (12-27 @ 06:01)  furosemide   Injectable: 40 milliGRAM(s) IV Push (12-26 @ 05:00)  heparin   Injectable: 5000 Unit(s) SubCutaneous (12-26 @ 05:00)  hydrALAZINE: 50 milliGRAM(s) Oral (12-27 @ 06:01)  labetalol: 200 milliGRAM(s) Oral (12-27 @ 06:01)    Data:  165  96.5  T(C): 36.6  HR: 63  BP: 127/58  RR: 18  SpO2: 99%    Exam  General: No acute distress  Chest: Non labored breathing  Abdomen: Non-distended  Extremities: No swelling, warm    -WBC 8.67 / HgB 8.6 / Hct 26.6 / Plt 284  -Na 142 / Cl 108 / BUN 73 / Glucose 141  -K 4.3 / CO2 20 / Cr 4.96  -ALT -- / Alk Phos -- / T.Bili --  -INR0.91    Imaging: US reviewed    Plan: 72y Male presents for renal biopsy. Will evaluate patients blood pressure immediately prior to procedure.  -Risks/Benefits/alternatives explained with the patient and/or healthcare proxy and witnessed informed consent obtained.

## 2022-12-27 NOTE — PROGRESS NOTE ADULT - PROBLEM SELECTOR PLAN 1
Pt with OLENA in the setting of longstanding DM, uncontrolled HTN and volume overload. Exact duration of OLENA however unknown. Pt admitted with SCr. of 3.5 (eGFR 18), now trended to 4.9 today. Aas told 2 months ago that his kidneys were functioning at 30%. Renal US without hydro or stone, 2 small cysts on R. normal sized kidneys. Significant proteinuria at 11.8 grams. UA showing blood but only 4 RBCs. Please repeat UA prior to discharge. CPK mildly elevated. Will need to consider HD if renal failure continues to worsen.     Pt. with improving volume status. Now off Lasix and on Aldactone for diuresis and BP control post biopsy. Biopsy 12/27/22.    Strict outputs and daily weights to guide diuresis. Monitor labs and urine output. Optimize hemodynamics. Avoid NSAIDs, ACEI/ARBS, RCA and nephrotoxins. Dose medications as per eGFR. Do not resume Metformin of eGFR is not above 30. Check Blood gas. Appreciate Cardiology consult.     For renal biopsy this admission to rule out IgA Nephropathy or Membranous given nephrotic syndrome. sFLC with normal ratio. F/u PLA2R     #Anemia: HgB below goal. s/p IV Iron. Start NITA 73092 units s/c weekly.  #BMD: Phos level elevated, start Ca acetate 2 tabs TID with meals. Check ionized Ca and replete with IV calcium to maintain ionized Ca level >1.1    Will need f/u with Nephrology on discharge.

## 2022-12-27 NOTE — PROGRESS NOTE ADULT - PROBLEM SELECTOR PLAN 1
TTE with EF 69%  Renal following. s/p IV lasix BID, now dc'ed   Strict I/O's, daily standing weights  Continue beta-blocker

## 2022-12-27 NOTE — PROGRESS NOTE ADULT - PROBLEM SELECTOR PLAN 3
OLENA on CKD in setting of volume overload and uncontrolled hypertension -- peaked at cr 5.35  Renal following- renal BX 12/27  c/w iv iron 200 mg daily until 12/29  c/w vit D 50,000U weekly  Avoid nephrotoxic medications  Holding Losartan  started on calcium acetate - monitor ionized calcium

## 2022-12-28 LAB
ANION GAP SERPL CALC-SCNC: 14 MMOL/L — SIGNIFICANT CHANGE UP (ref 5–17)
BUN SERPL-MCNC: 80 MG/DL — HIGH (ref 7–23)
CALCIUM SERPL-MCNC: 8.2 MG/DL — LOW (ref 8.4–10.5)
CHLORIDE SERPL-SCNC: 107 MMOL/L — SIGNIFICANT CHANGE UP (ref 96–108)
CO2 SERPL-SCNC: 20 MMOL/L — LOW (ref 22–31)
CREAT SERPL-MCNC: 5.26 MG/DL — HIGH (ref 0.5–1.3)
EGFR: 11 ML/MIN/1.73M2 — LOW
GLUCOSE BLDC GLUCOMTR-MCNC: 163 MG/DL — HIGH (ref 70–99)
GLUCOSE BLDC GLUCOMTR-MCNC: 164 MG/DL — HIGH (ref 70–99)
GLUCOSE BLDC GLUCOMTR-MCNC: 167 MG/DL — HIGH (ref 70–99)
GLUCOSE BLDC GLUCOMTR-MCNC: 241 MG/DL — HIGH (ref 70–99)
GLUCOSE SERPL-MCNC: 174 MG/DL — HIGH (ref 70–99)
HCT VFR BLD CALC: 26.1 % — LOW (ref 39–50)
HGB BLD-MCNC: 8.3 G/DL — LOW (ref 13–17)
MCHC RBC-ENTMCNC: 29.5 PG — SIGNIFICANT CHANGE UP (ref 27–34)
MCHC RBC-ENTMCNC: 31.8 GM/DL — LOW (ref 32–36)
MCV RBC AUTO: 92.9 FL — SIGNIFICANT CHANGE UP (ref 80–100)
NRBC # BLD: 0 /100 WBCS — SIGNIFICANT CHANGE UP (ref 0–0)
PHOSPHOLIPASE A2 RECEPTOR ELISA: <1.8 RU/ML — SIGNIFICANT CHANGE UP (ref 0–19.9)
PLATELET # BLD AUTO: 243 K/UL — SIGNIFICANT CHANGE UP (ref 150–400)
POTASSIUM SERPL-MCNC: 4.4 MMOL/L — SIGNIFICANT CHANGE UP (ref 3.5–5.3)
POTASSIUM SERPL-SCNC: 4.4 MMOL/L — SIGNIFICANT CHANGE UP (ref 3.5–5.3)
RBC # BLD: 2.81 M/UL — LOW (ref 4.2–5.8)
RBC # FLD: 13.8 % — SIGNIFICANT CHANGE UP (ref 10.3–14.5)
SODIUM SERPL-SCNC: 141 MMOL/L — SIGNIFICANT CHANGE UP (ref 135–145)
WBC # BLD: 8.57 K/UL — SIGNIFICANT CHANGE UP (ref 3.8–10.5)
WBC # FLD AUTO: 8.57 K/UL — SIGNIFICANT CHANGE UP (ref 3.8–10.5)

## 2022-12-28 PROCEDURE — 99232 SBSQ HOSP IP/OBS MODERATE 35: CPT

## 2022-12-28 PROCEDURE — 99253 IP/OBS CNSLTJ NEW/EST LOW 45: CPT | Mod: 57

## 2022-12-28 PROCEDURE — 99231 SBSQ HOSP IP/OBS SF/LOW 25: CPT

## 2022-12-28 PROCEDURE — 99233 SBSQ HOSP IP/OBS HIGH 50: CPT

## 2022-12-28 PROCEDURE — 99233 SBSQ HOSP IP/OBS HIGH 50: CPT | Mod: GC

## 2022-12-28 RX ORDER — SODIUM BICARBONATE 1 MEQ/ML
1300 SYRINGE (ML) INTRAVENOUS
Refills: 0 | Status: DISCONTINUED | OUTPATIENT
Start: 2022-12-28 | End: 2022-12-30

## 2022-12-28 RX ORDER — CARVEDILOL PHOSPHATE 80 MG/1
12.5 CAPSULE, EXTENDED RELEASE ORAL EVERY 12 HOURS
Refills: 0 | Status: DISCONTINUED | OUTPATIENT
Start: 2022-12-28 | End: 2022-12-30

## 2022-12-28 RX ORDER — HYDRALAZINE HCL 50 MG
25 TABLET ORAL ONCE
Refills: 0 | Status: COMPLETED | OUTPATIENT
Start: 2022-12-28 | End: 2022-12-28

## 2022-12-28 RX ORDER — HYDRALAZINE HCL 50 MG
75 TABLET ORAL EVERY 8 HOURS
Refills: 0 | Status: DISCONTINUED | OUTPATIENT
Start: 2022-12-28 | End: 2022-12-29

## 2022-12-28 RX ADMIN — Medication 1: at 17:08

## 2022-12-28 RX ADMIN — ATORVASTATIN CALCIUM 40 MILLIGRAM(S): 80 TABLET, FILM COATED ORAL at 21:22

## 2022-12-28 RX ADMIN — Medication 1 APPLICATION(S): at 17:09

## 2022-12-28 RX ADMIN — Medication 40 MILLIGRAM(S): at 05:27

## 2022-12-28 RX ADMIN — Medication 25 MICROGRAM(S): at 05:26

## 2022-12-28 RX ADMIN — Medication 1 APPLICATION(S): at 17:08

## 2022-12-28 RX ADMIN — Medication 50 MILLIGRAM(S): at 05:27

## 2022-12-28 RX ADMIN — CARVEDILOL PHOSPHATE 12.5 MILLIGRAM(S): 80 CAPSULE, EXTENDED RELEASE ORAL at 17:13

## 2022-12-28 RX ADMIN — IRON SUCROSE 220 MILLIGRAM(S): 20 INJECTION, SOLUTION INTRAVENOUS at 20:47

## 2022-12-28 RX ADMIN — Medication 25 MILLIGRAM(S): at 10:05

## 2022-12-28 RX ADMIN — Medication 1 TABLET(S): at 11:41

## 2022-12-28 RX ADMIN — Medication 667 MILLIGRAM(S): at 11:42

## 2022-12-28 RX ADMIN — Medication 2: at 11:42

## 2022-12-28 RX ADMIN — Medication 200 MILLIGRAM(S): at 05:27

## 2022-12-28 RX ADMIN — Medication 667 MILLIGRAM(S): at 17:08

## 2022-12-28 RX ADMIN — Medication 667 MILLIGRAM(S): at 08:12

## 2022-12-28 RX ADMIN — Medication 1: at 08:10

## 2022-12-28 RX ADMIN — Medication 75 MILLIGRAM(S): at 21:23

## 2022-12-28 RX ADMIN — AMLODIPINE BESYLATE 10 MILLIGRAM(S): 2.5 TABLET ORAL at 05:26

## 2022-12-28 RX ADMIN — Medication 75 MILLIGRAM(S): at 14:43

## 2022-12-28 RX ADMIN — Medication 1300 MILLIGRAM(S): at 17:13

## 2022-12-28 RX ADMIN — Medication 1 APPLICATION(S): at 05:27

## 2022-12-28 NOTE — CONSULT NOTE ADULT - ATTENDING COMMENTS
72 year old male with history of DM, HTN, HLD, hypothyroidism who presents with progressively worsening FLORES and edema x 2-3 weeks, found to have elevated troponins. Cardiology consulted in this setting.    Pt has 3+ edema up to lower back/hips. +JVD and bibasilar crackles  BUN/Cr 46/3.52 (he states he was told his GFR was 50 a few months ago down to 30 1 month ago)  BNP 6989  Trop 109 to 97 to 101  ECG shows NSR with borderline LVH, non-specific ST-T changes inferiorly    1) SOB  2) OLENA on CKD  3) HF exacerbation  4) Elevated troponins   5) HTN  - would not treat for ACS at this time. continue home statin daily  - Pt is significantly volume overloaded on exam, start IV Lasix 80 BID, aim for net neg 2L/24 hours, keep strict Is/Os, replete lytes  - obtain TTE  - Please consult nephrology given significant renal dysfunction  - would hold home losartan for now  - BPs 170s/60s this morning; can resume home hydralazine and amlodipine
73 yo M with CKD with declining kidney function  consult for long term dialysis access planning  left upper extremity precautions  obtain vein mapping  medical and cardiac risk stratification and optimization  will plan for LUE, possible RUE, AVF creation, possible graft on this admission  if pt is cleared and willing, can perform access creation as early as tomorrow  will follow
Presumed CKD stage IV with proteinuria  HTN  Hypervolemia/edema  Needs aggressive diuresis    - Diuretics  - KCl  - Check urine albumin/creatinine ratio  - Check urine protein/creatinine ratio  - Kidney ultrasound  - Remainder per fellow, will follow

## 2022-12-28 NOTE — PROGRESS NOTE ADULT - PROBLEM SELECTOR PLAN 1
TTE with EF 69%  Renal following  Strict I/O's, daily standing weights  Changed to torsemide 60 qd and coreg 12.5 BID

## 2022-12-28 NOTE — PROGRESS NOTE ADULT - PROBLEM SELECTOR PLAN 7
DVT prophylaxis - hold for now, renal bx has high risk of bleed    Dispo: pending final vascular recs    Preserve LUE DVT prophylaxis - hold for now, renal bx has high risk of bleed    Dispo: pending final vascular recs    Preserve LUE    Offered to update family, patient declined.

## 2022-12-28 NOTE — CONSULT NOTE ADULT - ASSESSMENT
72 year old male with OLENA on CKD. Vascular surgery consulted for AVF planning for eventual dialysis need.    Plan:  - Obtain bilateral upper extremity vein mapping  - Protect LUE (no IVs, blood draws, lines, blood pressures, etc)  - Would appreciate medical optimization and cardiac risk stratification  - Will discuss timing of surgery with attending    Discussed with vascular surgery fellow, Dr. Melendrez, on behalf of vascular surgery attending on call, Dr. Friedman.      Vascular Surgery  [id number]
Pt. is a 72 y.o. M w/ PMHx of HTN, HLD, DM2 presents with Dyspnea for the past 3-4 weeks. Nephrology consulted for OLENA.
Assessment: 72y Male with OLENA on CKD with IR consulted for renal parenchymal biopsy.    Plan: IR to perform renal parenchymal biopsy Tuesday, 12/27  -Please place order for IR Procedure, approving attending Dr. Halaibeh  -NPO past midnight prior to procedure  -hold AM hep ppx  -maintain SBP<160  -AM CBC, BMP, and coags  -COVID PCR within 5 days of procedure  -discussed with primary team    Shadi Hampton MD  PGY-V, Interventional Radiology    -Available on [employer] TEAMS for all non-urgent questions  -Emergent issues: [facility]-[phone]; [facility]-[phone] ([phone])  -Non-emergent consults: Please place a [place] order "Consult-Interventional Radiology" with an appropriate callback number  -Scheduling questions: [facility]: [phone]; [facility]: [phone]  -Clinic/Outpatient booking: [facility]: [phone]; [facility]: [phone]

## 2022-12-28 NOTE — CONSULT NOTE ADULT - SUBJECTIVE AND OBJECTIVE BOX
Vascular Surgery Consult  Consulting attending: Dr. Friedman    HPI:  Patient is a 72 year old male with PMHx significant for HTN, HLD, and DM presenting for acute diastolic heart failure exacerbation. Nephrology stating that patient has 30% kidney function remaining based on recent biopsy. Anticipates future need for dialysis. Vascular surgery for AVF planning for eventual HD need.    Patient denies trauma to bilateral upper extremities. No pacemakers. Right hand dominant.      PAST MEDICAL HISTORY:  No pertinent past medical history    HTN (hypertension)    HLD (hyperlipidemia)    Diabetes        PAST SURGICAL HISTORY:  No significant past surgical history        MEDICATIONS:  acetaminophen     Tablet .. 650 milliGRAM(s) Oral every 6 hours PRN  amLODIPine   Tablet 10 milliGRAM(s) Oral daily  AQUAPHOR (petrolatum Ointment) 1 Application(s) Topical two times a day  atorvastatin 40 milliGRAM(s) Oral at bedtime  calcium acetate 667 milliGRAM(s) Oral three times a day with meals  carvedilol 12.5 milliGRAM(s) Oral every 12 hours  dextrose 5%. 1000 milliLiter(s) IV Continuous <Continuous>  dextrose 5%. 1000 milliLiter(s) IV Continuous <Continuous>  dextrose 50% Injectable 25 Gram(s) IV Push once  dextrose 50% Injectable 25 Gram(s) IV Push once  dextrose Oral Gel 15 Gram(s) Oral once PRN  ergocalciferol 85442 Unit(s) Oral every week  glucagon  Injectable 1 milliGRAM(s) IntraMuscular once  hydrALAZINE 75 milliGRAM(s) Oral every 8 hours  hydrocortisone 1% Cream 1 Application(s) Topical two times a day PRN  insulin lispro (ADMELOG) corrective regimen sliding scale   SubCutaneous three times a day before meals  insulin lispro (ADMELOG) corrective regimen sliding scale   SubCutaneous at bedtime  iron sucrose IVPB 200 milliGRAM(s) IV Intermittent every 24 hours  levothyroxine 25 MICROGram(s) Oral daily  multivitamin 1 Tablet(s) Oral daily  sodium bicarbonate 1300 milliGRAM(s) Oral two times a day      ALLERGIES:  No Known Allergies      VITALS & I/Os:  Vital Signs Last 24 Hrs  T(C): 36.4 (28 Dec 2022 11:01), Max: 36.4 (28 Dec 2022 04:04)  T(F): 97.5 (28 Dec 2022 11:01), Max: 97.5 (28 Dec 2022 04:04)  HR: 70 (28 Dec 2022 17:23) (62 - 75)  BP: 163/74 (28 Dec 2022 17:23) (140/54 - 163/74)  BP(mean): --  RR: 18 (28 Dec 2022 11:01) (18 - 18)  SpO2: 97% (28 Dec 2022 11:01) (96% - 97%)    Parameters below as of 28 Dec 2022 11:01  Patient On (Oxygen Delivery Method): room air        I&O's Summary    27 Dec 2022 07:01  -  28 Dec 2022 07:00  --------------------------------------------------------  IN: 0 mL / OUT: 2400 mL / NET: -2400 mL    28 Dec 2022 07:01  -  28 Dec 2022 18:24  --------------------------------------------------------  IN: 720 mL / OUT: 1150 mL / NET: -430 mL        PHYSICAL EXAM:  Gen: NAD  CV: Regular rate  Resp: Nonlabored breathing on room air  Ext: Bilateral upper extremities without injuries, LUE protected with pink band and no IVs in place, no abnormalities noted on Simeon's test bilaterally      LABS:                        8.3    8.57  )-----------( 243      ( 28 Dec 2022 05:44 )             26.1     12-28    141  |  107  |  80<H>  ----------------------------<  174<H>  4.4   |  20<L>  |  5.26<H>    Ca    8.2<L>      28 Dec 2022 05:43  Phos  5.9     12-27  Mg     2.3     12-27      Lactate:    PT/INR - ( 27 Dec 2022 05:39 )   PT: 10.4 sec;   INR: 0.91 ratio                       IMAGING:

## 2022-12-28 NOTE — PROGRESS NOTE ADULT - PROBLEM SELECTOR PLAN 3
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

## 2022-12-28 NOTE — PROGRESS NOTE ADULT - PROBLEM SELECTOR PLAN 1
Pt with OLENA in the setting of longstanding DM, uncontrolled HTN and volume overload. Exact duration of OLENA however unknown. Pt admitted with SCr. of 3.5 (eGFR 18), now trended to 4.9 today. Was told 2 months ago that his kidneys were functioning at 30%. Renal US without hydro or stone, 2 small cysts on R. normal sized kidneys. Significant proteinuria at 11.8 grams. UA showing blood but only 4 RBCs. Please repeat UA prior to discharge. CPK mildly elevated. Will need to consider HD if renal failure continues to worsen.     Pt. with improving volume status. Now off Lasix and on Aldactone for diuresis and BP control post biopsy. S/p biopsy 12/27/22. 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 Discussed future need for RRT. Please consult vascular surgery for AVF mapping. No renal objections to discharge home with close nephrology follow up after mapping.     Strict outputs and daily weights to guide diuresis. Monitor labs and urine output. Optimize hemodynamics. Avoid NSAIDs, ACEI/ARBS, RCA and nephrotoxins. Dose medications as per eGFR. Do not resume Metformin of eGFR is not above 30. Check Blood gas. Appreciate Cardiology consult.     #Anemia: HgB below goal. s/p IV Iron. Start NITA 84823 units s/c weekly.  #BMD: Phos level elevated, start Ca acetate 2 tabs TID with meals. Check ionized Ca and replete with IV calcium to maintain ionized Ca level >1.1    Will need f/u with Nephrology on discharge.    If you have any questions, please feel free to contact me  Sb Lilly  Nephrology Fellow  [phone]; Prefer [employer] TEAMS  (After 5pm or on weekends please page the on-call fellow) Pt with OLENA in the setting of longstanding DM, uncontrolled HTN and volume overload. Exact duration of OLENA however unknown. Pt admitted with SCr. of 3.5 (eGFR 18), now trended to 5.2 today. Was told 2 months ago that his kidneys were functioning at 30%. Renal US without hydro or stone, 2 small cysts on R. normal sized kidneys. Significant proteinuria at 11.8 grams. UA showing blood but only 4 RBCs. Please repeat UA prior to discharge. CPK mildly elevated. Will need to consider HD if renal failure continues to worsen.     Pt. with improving volume status. Transition to PO Lasix 40mg BID prior to discharge. On Aldactone for diuresis and BP control post biopsy. S/p biopsy 12/27/22. 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 Discussed future need for RRT. Please consult vascular surgery for AVF mapping. No renal objections to discharge home with close nephrology follow up after mapping.     Strict outputs and daily weights to guide diuresis. Monitor labs and urine output. Optimize hemodynamics. Avoid NSAIDs, ACEI/ARBS, RCA and nephrotoxins. Dose medications as per eGFR. Do not resume Metformin of eGFR is not above 30. Check Blood gas. Appreciate Cardiology consult.     #Anemia: HgB below goal. s/p IV Iron. Start NITA 95515 units s/c weekly.  #BMD: Phos level elevated, start Ca acetate 2 tabs TID with meals. Check ionized Ca and replete with IV calcium to maintain ionized Ca level >1.1    Will need f/u with Nephrology on discharge.    If you have any questions, please feel free to contact me  Sb Lilly  Nephrology Fellow  [phone]; Prefer [employer] TEAMS  (After 5pm or on weekends please page the on-call fellow)

## 2022-12-28 NOTE — CONSULT NOTE ADULT - TIME-BASED BILLING (NON-CRITICAL CARE)
Verified Results  CANCER ANTIGEN 125 [id number] 12:01AM MINA VEE   [Oct 18, 2017 2:58PM MINA VEE]  normal please call     Test Name Result Flag Reference    14 UNITS/ML  0-35   [employer] NextIOaur Chemiluminescence Immunoassay        Time-based billing (NON-critical care)

## 2022-12-29 LAB
ANION GAP SERPL CALC-SCNC: 15 MMOL/L — SIGNIFICANT CHANGE UP (ref 5–17)
BUN SERPL-MCNC: 77 MG/DL — HIGH (ref 7–23)
CALCIUM SERPL-MCNC: 8.7 MG/DL — SIGNIFICANT CHANGE UP (ref 8.4–10.5)
CHLORIDE SERPL-SCNC: 105 MMOL/L — SIGNIFICANT CHANGE UP (ref 96–108)
CO2 SERPL-SCNC: 20 MMOL/L — LOW (ref 22–31)
CREAT SERPL-MCNC: 4.96 MG/DL — HIGH (ref 0.5–1.3)
EGFR: 12 ML/MIN/1.73M2 — LOW
GLUCOSE BLDC GLUCOMTR-MCNC: 192 MG/DL — HIGH (ref 70–99)
GLUCOSE BLDC GLUCOMTR-MCNC: 193 MG/DL — HIGH (ref 70–99)
GLUCOSE BLDC GLUCOMTR-MCNC: 214 MG/DL — HIGH (ref 70–99)
GLUCOSE BLDC GLUCOMTR-MCNC: 237 MG/DL — HIGH (ref 70–99)
GLUCOSE SERPL-MCNC: 141 MG/DL — HIGH (ref 70–99)
HCT VFR BLD CALC: 27.7 % — LOW (ref 39–50)
HGB BLD-MCNC: 8.9 G/DL — LOW (ref 13–17)
MCHC RBC-ENTMCNC: 29.7 PG — SIGNIFICANT CHANGE UP (ref 27–34)
MCHC RBC-ENTMCNC: 32.1 GM/DL — SIGNIFICANT CHANGE UP (ref 32–36)
MCV RBC AUTO: 92.3 FL — SIGNIFICANT CHANGE UP (ref 80–100)
NRBC # BLD: 0 /100 WBCS — SIGNIFICANT CHANGE UP (ref 0–0)
PLATELET # BLD AUTO: 277 K/UL — SIGNIFICANT CHANGE UP (ref 150–400)
POTASSIUM SERPL-MCNC: 4.3 MMOL/L — SIGNIFICANT CHANGE UP (ref 3.5–5.3)
POTASSIUM SERPL-SCNC: 4.3 MMOL/L — SIGNIFICANT CHANGE UP (ref 3.5–5.3)
RBC # BLD: 3 M/UL — LOW (ref 4.2–5.8)
RBC # FLD: 13.7 % — SIGNIFICANT CHANGE UP (ref 10.3–14.5)
SODIUM SERPL-SCNC: 140 MMOL/L — SIGNIFICANT CHANGE UP (ref 135–145)
SURGICAL PATHOLOGY STUDY: SIGNIFICANT CHANGE UP
WBC # BLD: 9.84 K/UL — SIGNIFICANT CHANGE UP (ref 3.8–10.5)
WBC # FLD AUTO: 9.84 K/UL — SIGNIFICANT CHANGE UP (ref 3.8–10.5)

## 2022-12-29 PROCEDURE — 99232 SBSQ HOSP IP/OBS MODERATE 35: CPT

## 2022-12-29 PROCEDURE — 93970 EXTREMITY STUDY: CPT | Mod: 26

## 2022-12-29 PROCEDURE — 99233 SBSQ HOSP IP/OBS HIGH 50: CPT | Mod: GC

## 2022-12-29 RX ORDER — ERYTHROPOIETIN 10000 [IU]/ML
10000 INJECTION, SOLUTION INTRAVENOUS; SUBCUTANEOUS ONCE
Refills: 0 | Status: DISCONTINUED | OUTPATIENT
Start: 2022-12-29 | End: 2022-12-30

## 2022-12-29 RX ORDER — HYDRALAZINE HCL 50 MG
100 TABLET ORAL EVERY 8 HOURS
Refills: 0 | Status: DISCONTINUED | OUTPATIENT
Start: 2022-12-29 | End: 2022-12-30

## 2022-12-29 RX ADMIN — Medication 60 MILLIGRAM(S): at 04:46

## 2022-12-29 RX ADMIN — Medication 1300 MILLIGRAM(S): at 04:46

## 2022-12-29 RX ADMIN — ATORVASTATIN CALCIUM 40 MILLIGRAM(S): 80 TABLET, FILM COATED ORAL at 22:32

## 2022-12-29 RX ADMIN — Medication 1 TABLET(S): at 12:43

## 2022-12-29 RX ADMIN — Medication 100 MILLIGRAM(S): at 12:41

## 2022-12-29 RX ADMIN — Medication 667 MILLIGRAM(S): at 18:09

## 2022-12-29 RX ADMIN — Medication 100 MILLIGRAM(S): at 22:32

## 2022-12-29 RX ADMIN — AMLODIPINE BESYLATE 10 MILLIGRAM(S): 2.5 TABLET ORAL at 04:45

## 2022-12-29 RX ADMIN — Medication 667 MILLIGRAM(S): at 08:54

## 2022-12-29 RX ADMIN — Medication 25 MICROGRAM(S): at 04:46

## 2022-12-29 RX ADMIN — Medication 1300 MILLIGRAM(S): at 18:10

## 2022-12-29 RX ADMIN — Medication 1 APPLICATION(S): at 18:10

## 2022-12-29 RX ADMIN — CARVEDILOL PHOSPHATE 12.5 MILLIGRAM(S): 80 CAPSULE, EXTENDED RELEASE ORAL at 18:10

## 2022-12-29 RX ADMIN — Medication 1 APPLICATION(S): at 05:53

## 2022-12-29 RX ADMIN — CARVEDILOL PHOSPHATE 12.5 MILLIGRAM(S): 80 CAPSULE, EXTENDED RELEASE ORAL at 04:46

## 2022-12-29 RX ADMIN — Medication 1: at 18:01

## 2022-12-29 RX ADMIN — Medication 2: at 12:49

## 2022-12-29 RX ADMIN — Medication 667 MILLIGRAM(S): at 12:43

## 2022-12-29 RX ADMIN — Medication 75 MILLIGRAM(S): at 04:44

## 2022-12-29 NOTE — PROGRESS NOTE ADULT - ASSESSMENT
72 year old male with OLENA on CKD. Vascular surgery consulted for AVF planning for eventual dialysis need.    Plan:  - Plan for LUE AVF tomorrow  - Obtain bilateral upper extremity vein mapping  - Protect LUE (no IVs, blood draws, lines, blood pressures, etc)  - Please document medical and cardiac clearance    Vascular Surgery  [id number] 72 year old male with OLENA on CKD. Vascular surgery consulted for AVF planning for eventual dialysis need.    Plan:  - Plan for LUE AVF tomorrow - deferred  - Please follow up outpatient with Dr. Friedman for LUE AVF  - Obtain bilateral upper extremity vein mapping  - Protect LUE (no IVs, blood draws, lines, blood pressures, etc)  - Please document medical and cardiac clearance    Vascular Surgery  [id number]

## 2022-12-29 NOTE — PROGRESS NOTE ADULT - PROBLEM SELECTOR PLAN 3
OLENA on CKD in setting of volume overload and uncontrolled hypertension -- peaked at cr 5.35  plan for AVF I 12/30  patient is a high risk for an intermediate risk procedure. RCRI 2 points. Class III risk. 10.1%, 30 day risk of death, MI or cardiac arrest.   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

## 2022-12-29 NOTE — PROVIDER CONTACT NOTE (OTHER) - ASSESSMENT
Patient is A&Ox4, bp was 175/62, all other vitals are stable. Patient is sinus rhythm on tele. Bp is 178/60 manually.
pt a & o x 4. denies cp or SOB. asymptomatic.
Patient is A&Ox4, BP is 164/63. All other vital signs are within normal range.
Patient is A&Ox4, bp is 165/64, remaining vital signs are stable. Normal sinus rhythm on tele. Patient is asymptomatic - no h/a, dizziness, lethargy.

## 2022-12-29 NOTE — PROGRESS NOTE ADULT - PROBLEM SELECTOR PLAN 1
Pt with OLENA in the setting of longstanding DM, uncontrolled HTN and volume overload. Exact duration of OLENA however unknown. Pt admitted with SCr. of 3.5 (eGFR 18), now trended to 5.2 today. Was told 2 months ago that his kidneys were functioning at 30%. Renal US without hydro or stone, 2 small cysts on R. normal sized kidneys. Significant proteinuria at 11.8 grams. UA showing blood but only 4 RBCs. Please repeat UA prior to discharge. CPK mildly elevated. Will need to consider HD if renal failure continues to worsen.     Pt. with improving volume status. Transition to PO Lasix 40mg BID prior to discharge. On Aldactone for diuresis and BP control post biopsy. S/p biopsy 12/27/22. 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 Discussed future need for RRT. Please consult vascular surgery for AVF mapping. No renal objections to discharge home with close nephrology follow up after mapping.     Strict outputs and daily weights to guide diuresis. Monitor labs and urine output. Optimize hemodynamics. Avoid NSAIDs, ACEI/ARBS, RCA and nephrotoxins. Dose medications as per eGFR. Do not resume Metformin of eGFR is not above 30. Check Blood gas. Appreciate Cardiology consult.     #Anemia: HgB below goal. s/p IV Iron. Start NITA 93467 units s/c weekly.  #BMD: Phos level elevated, start Ca acetate 2 tabs TID with meals. Check ionized Ca and replete with IV calcium to maintain ionized Ca level >1.1    Will need f/u with Nephrology on discharge. No objection to Discharge home from renal standpoint. After receiving NITA.     If you have any questions, please feel free to contact me  Sb Lilly  Nephrology Fellow  [phone]; Prefer [employer] TEAMS  (After 5pm or on weekends please page the on-call fellow)

## 2022-12-29 NOTE — PROVIDER CONTACT NOTE (OTHER) - BACKGROUND
Admitted for heart failure.
Patient admitted with CHF.
pt admitted for heart failure
Patient presented with heart failure exacerbation.

## 2022-12-29 NOTE — PROGRESS NOTE ADULT - PROBLEM SELECTOR PLAN 1
TTE with EF 69%  Renal following  Strict I/O's, daily standing weights  C/W torsemide 60 qd and coreg 12.5 BID

## 2022-12-29 NOTE — PROVIDER CONTACT NOTE (OTHER) - RECOMMENDATIONS
Assess vitals every 4 hours and notify provider if systolic bp is over 150.
Notify CHARLES Arias patient's bp - 175/63, bp- 178/60 manually.
Provider ACP Marissa Arias notified. Recommended that 6 AM meds be given early.
okay to give BP meds now.

## 2022-12-29 NOTE — PROVIDER CONTACT NOTE (OTHER) - ACTION/TREATMENT ORDERED:
CHARLES Arias notified and aware patient's bp - 175/63, bp- 178/60 manually. Recommended bp taken @03:00.
Notified provider. Give 6 Am BP meds early.
okay to give BP meds now.
Assess vitals every 4 hours and notify provider if systolic bp is over 150.

## 2022-12-30 ENCOUNTER — TRANSCRIPTION ENCOUNTER (OUTPATIENT)
Age: 72
End: 2022-12-30

## 2022-12-30 VITALS
HEART RATE: 69 BPM | OXYGEN SATURATION: 96 % | SYSTOLIC BLOOD PRESSURE: 159 MMHG | DIASTOLIC BLOOD PRESSURE: 62 MMHG | TEMPERATURE: 98 F | RESPIRATION RATE: 18 BRPM

## 2022-12-30 LAB
ANION GAP SERPL CALC-SCNC: 14 MMOL/L — SIGNIFICANT CHANGE UP (ref 5–17)
APTT BLD: 33.2 SEC — SIGNIFICANT CHANGE UP (ref 27.5–35.5)
BUN SERPL-MCNC: 76 MG/DL — HIGH (ref 7–23)
CALCIUM SERPL-MCNC: 8.8 MG/DL — SIGNIFICANT CHANGE UP (ref 8.4–10.5)
CHLORIDE SERPL-SCNC: 105 MMOL/L — SIGNIFICANT CHANGE UP (ref 96–108)
CO2 SERPL-SCNC: 21 MMOL/L — LOW (ref 22–31)
CREAT SERPL-MCNC: 5.02 MG/DL — HIGH (ref 0.5–1.3)
EGFR: 12 ML/MIN/1.73M2 — LOW
GLUCOSE BLDC GLUCOMTR-MCNC: 154 MG/DL — HIGH (ref 70–99)
GLUCOSE BLDC GLUCOMTR-MCNC: 200 MG/DL — HIGH (ref 70–99)
GLUCOSE BLDC GLUCOMTR-MCNC: 287 MG/DL — HIGH (ref 70–99)
GLUCOSE SERPL-MCNC: 174 MG/DL — HIGH (ref 70–99)
HCT VFR BLD CALC: 28.4 % — LOW (ref 39–50)
HGB BLD-MCNC: 9.2 G/DL — LOW (ref 13–17)
INR BLD: 0.91 RATIO — SIGNIFICANT CHANGE UP (ref 0.88–1.16)
MAGNESIUM SERPL-MCNC: 2.3 MG/DL — SIGNIFICANT CHANGE UP (ref 1.6–2.6)
MCHC RBC-ENTMCNC: 29.6 PG — SIGNIFICANT CHANGE UP (ref 27–34)
MCHC RBC-ENTMCNC: 32.4 GM/DL — SIGNIFICANT CHANGE UP (ref 32–36)
MCV RBC AUTO: 91.3 FL — SIGNIFICANT CHANGE UP (ref 80–100)
NRBC # BLD: 0 /100 WBCS — SIGNIFICANT CHANGE UP (ref 0–0)
PHOSPHATE SERPL-MCNC: 5.3 MG/DL — HIGH (ref 2.5–4.5)
PLATELET # BLD AUTO: 292 K/UL — SIGNIFICANT CHANGE UP (ref 150–400)
POTASSIUM SERPL-MCNC: 4.3 MMOL/L — SIGNIFICANT CHANGE UP (ref 3.5–5.3)
POTASSIUM SERPL-SCNC: 4.3 MMOL/L — SIGNIFICANT CHANGE UP (ref 3.5–5.3)
PROTHROM AB SERPL-ACNC: 10.5 SEC — SIGNIFICANT CHANGE UP (ref 10.5–13.4)
RBC # BLD: 3.11 M/UL — LOW (ref 4.2–5.8)
RBC # FLD: 13.5 % — SIGNIFICANT CHANGE UP (ref 10.3–14.5)
SODIUM SERPL-SCNC: 140 MMOL/L — SIGNIFICANT CHANGE UP (ref 135–145)
WBC # BLD: 10.38 K/UL — SIGNIFICANT CHANGE UP (ref 3.8–10.5)
WBC # FLD AUTO: 10.38 K/UL — SIGNIFICANT CHANGE UP (ref 3.8–10.5)

## 2022-12-30 PROCEDURE — 86334 IMMUNOFIX E-PHORESIS SERUM: CPT

## 2022-12-30 PROCEDURE — 81001 URINALYSIS AUTO W/SCOPE: CPT

## 2022-12-30 PROCEDURE — 88348 ELECTRON MICROSCOPY DX: CPT

## 2022-12-30 PROCEDURE — 83540 ASSAY OF IRON: CPT

## 2022-12-30 PROCEDURE — 99232 SBSQ HOSP IP/OBS MODERATE 35: CPT

## 2022-12-30 PROCEDURE — 83516 IMMUNOASSAY NONANTIBODY: CPT

## 2022-12-30 PROCEDURE — 83605 ASSAY OF LACTIC ACID: CPT

## 2022-12-30 PROCEDURE — U0005: CPT

## 2022-12-30 PROCEDURE — 83970 ASSAY OF PARATHORMONE: CPT

## 2022-12-30 PROCEDURE — 85730 THROMBOPLASTIN TIME PARTIAL: CPT

## 2022-12-30 PROCEDURE — 82947 ASSAY GLUCOSE BLOOD QUANT: CPT

## 2022-12-30 PROCEDURE — 84443 ASSAY THYROID STIM HORMONE: CPT

## 2022-12-30 PROCEDURE — 84155 ASSAY OF PROTEIN SERUM: CPT

## 2022-12-30 PROCEDURE — 82803 BLOOD GASES ANY COMBINATION: CPT

## 2022-12-30 PROCEDURE — 85014 HEMATOCRIT: CPT

## 2022-12-30 PROCEDURE — 99233 SBSQ HOSP IP/OBS HIGH 50: CPT | Mod: GC

## 2022-12-30 PROCEDURE — 82652 VIT D 1 25-DIHYDROXY: CPT

## 2022-12-30 PROCEDURE — 82310 ASSAY OF CALCIUM: CPT

## 2022-12-30 PROCEDURE — 85610 PROTHROMBIN TIME: CPT

## 2022-12-30 PROCEDURE — 83935 ASSAY OF URINE OSMOLALITY: CPT

## 2022-12-30 PROCEDURE — 88305 TISSUE EXAM BY PATHOLOGIST: CPT

## 2022-12-30 PROCEDURE — 99239 HOSP IP/OBS DSCHRG MGMT >30: CPT

## 2022-12-30 PROCEDURE — 84156 ASSAY OF PROTEIN URINE: CPT

## 2022-12-30 PROCEDURE — U0003: CPT

## 2022-12-30 PROCEDURE — 88313 SPECIAL STAINS GROUP 2: CPT

## 2022-12-30 PROCEDURE — 84295 ASSAY OF SERUM SODIUM: CPT

## 2022-12-30 PROCEDURE — 93970 EXTREMITY STUDY: CPT

## 2022-12-30 PROCEDURE — 82962 GLUCOSE BLOOD TEST: CPT

## 2022-12-30 PROCEDURE — 80048 BASIC METABOLIC PNL TOTAL CA: CPT

## 2022-12-30 PROCEDURE — 84300 ASSAY OF URINE SODIUM: CPT

## 2022-12-30 PROCEDURE — 96374 THER/PROPH/DIAG INJ IV PUSH: CPT

## 2022-12-30 PROCEDURE — 85018 HEMOGLOBIN: CPT

## 2022-12-30 PROCEDURE — 86901 BLOOD TYPING SEROLOGIC RH(D): CPT

## 2022-12-30 PROCEDURE — 82330 ASSAY OF CALCIUM: CPT

## 2022-12-30 PROCEDURE — 71045 X-RAY EXAM CHEST 1 VIEW: CPT

## 2022-12-30 PROCEDURE — 86803 HEPATITIS C AB TEST: CPT

## 2022-12-30 PROCEDURE — 84484 ASSAY OF TROPONIN QUANT: CPT

## 2022-12-30 PROCEDURE — 84132 ASSAY OF SERUM POTASSIUM: CPT

## 2022-12-30 PROCEDURE — 83735 ASSAY OF MAGNESIUM: CPT

## 2022-12-30 PROCEDURE — 99285 EMERGENCY DEPT VISIT HI MDM: CPT

## 2022-12-30 PROCEDURE — 84165 PROTEIN E-PHORESIS SERUM: CPT

## 2022-12-30 PROCEDURE — 84100 ASSAY OF PHOSPHORUS: CPT

## 2022-12-30 PROCEDURE — 50200 RENAL BIOPSY PERQ: CPT

## 2022-12-30 PROCEDURE — 93306 TTE W/DOPPLER COMPLETE: CPT

## 2022-12-30 PROCEDURE — 76770 US EXAM ABDO BACK WALL COMP: CPT

## 2022-12-30 PROCEDURE — 87086 URINE CULTURE/COLONY COUNT: CPT

## 2022-12-30 PROCEDURE — 96375 TX/PRO/DX INJ NEW DRUG ADDON: CPT

## 2022-12-30 PROCEDURE — 76942 ECHO GUIDE FOR BIOPSY: CPT

## 2022-12-30 PROCEDURE — 80061 LIPID PANEL: CPT

## 2022-12-30 PROCEDURE — 88350 IMFLUOR EA ADDL 1ANTB STN PX: CPT

## 2022-12-30 PROCEDURE — 82043 UR ALBUMIN QUANTITATIVE: CPT

## 2022-12-30 PROCEDURE — 83550 IRON BINDING TEST: CPT

## 2022-12-30 PROCEDURE — 86900 BLOOD TYPING SEROLOGIC ABO: CPT

## 2022-12-30 PROCEDURE — 85027 COMPLETE CBC AUTOMATED: CPT

## 2022-12-30 PROCEDURE — 36415 COLL VENOUS BLD VENIPUNCTURE: CPT

## 2022-12-30 PROCEDURE — 93356 MYOCRD STRAIN IMG SPCKL TRCK: CPT

## 2022-12-30 PROCEDURE — 82550 ASSAY OF CK (CPK): CPT

## 2022-12-30 PROCEDURE — 82570 ASSAY OF URINE CREATININE: CPT

## 2022-12-30 PROCEDURE — 88346 IMFLUOR 1ST 1ANTB STAIN PX: CPT

## 2022-12-30 PROCEDURE — 87637 SARSCOV2&INF A&B&RSV AMP PRB: CPT

## 2022-12-30 PROCEDURE — 83521 IG LIGHT CHAINS FREE EACH: CPT

## 2022-12-30 PROCEDURE — 82306 VITAMIN D 25 HYDROXY: CPT

## 2022-12-30 PROCEDURE — 83036 HEMOGLOBIN GLYCOSYLATED A1C: CPT

## 2022-12-30 PROCEDURE — 82435 ASSAY OF BLOOD CHLORIDE: CPT

## 2022-12-30 PROCEDURE — 86255 FLUORESCENT ANTIBODY SCREEN: CPT

## 2022-12-30 PROCEDURE — 86850 RBC ANTIBODY SCREEN: CPT

## 2022-12-30 RX ORDER — CALCIUM ACETATE 667 MG
1 TABLET ORAL
Qty: 90 | Refills: 0
Start: 2022-12-30 | End: 2023-01-28

## 2022-12-30 RX ORDER — FUROSEMIDE 40 MG
1 TABLET ORAL
Qty: 60 | Refills: 0
Start: 2022-12-30 | End: 2023-01-28

## 2022-12-30 RX ORDER — CARVEDILOL PHOSPHATE 80 MG/1
1 CAPSULE, EXTENDED RELEASE ORAL
Qty: 60 | Refills: 0
Start: 2022-12-30 | End: 2023-01-28

## 2022-12-30 RX ORDER — SODIUM BICARBONATE 1 MEQ/ML
2 SYRINGE (ML) INTRAVENOUS
Qty: 120 | Refills: 0
Start: 2022-12-30 | End: 2023-01-28

## 2022-12-30 RX ORDER — IRBESARTAN 75 MG/1
1 TABLET ORAL
Qty: 30 | Refills: 0
Start: 2022-12-30 | End: 2023-01-28

## 2022-12-30 RX ORDER — ERGOCALCIFEROL 1.25 MG/1
1 CAPSULE ORAL
Qty: 4 | Refills: 0
Start: 2022-12-30 | End: 2023-01-28

## 2022-12-30 RX ORDER — SPIRONOLACTONE 25 MG/1
100 TABLET, FILM COATED ORAL DAILY
Refills: 0 | Status: DISCONTINUED | OUTPATIENT
Start: 2022-12-30 | End: 2022-12-30

## 2022-12-30 RX ORDER — ACETAMINOPHEN 500 MG
2 TABLET ORAL
Qty: 0 | Refills: 0 | DISCHARGE
Start: 2022-12-30

## 2022-12-30 RX ORDER — HYDRALAZINE HCL 50 MG
1 TABLET ORAL
Qty: 90 | Refills: 0
Start: 2022-12-30 | End: 2023-01-28

## 2022-12-30 RX ADMIN — SPIRONOLACTONE 100 MILLIGRAM(S): 25 TABLET, FILM COATED ORAL at 05:25

## 2022-12-30 RX ADMIN — Medication 100 MILLIGRAM(S): at 05:25

## 2022-12-30 RX ADMIN — CARVEDILOL PHOSPHATE 12.5 MILLIGRAM(S): 80 CAPSULE, EXTENDED RELEASE ORAL at 05:25

## 2022-12-30 RX ADMIN — Medication 1: at 17:59

## 2022-12-30 RX ADMIN — Medication 1 TABLET(S): at 11:55

## 2022-12-30 RX ADMIN — AMLODIPINE BESYLATE 10 MILLIGRAM(S): 2.5 TABLET ORAL at 05:25

## 2022-12-30 RX ADMIN — Medication 60 MILLIGRAM(S): at 05:25

## 2022-12-30 RX ADMIN — Medication 100 MILLIGRAM(S): at 14:47

## 2022-12-30 RX ADMIN — Medication 1 APPLICATION(S): at 06:29

## 2022-12-30 RX ADMIN — Medication 1: at 08:12

## 2022-12-30 RX ADMIN — Medication 25 MICROGRAM(S): at 05:26

## 2022-12-30 RX ADMIN — Medication 3: at 11:57

## 2022-12-30 RX ADMIN — Medication 667 MILLIGRAM(S): at 11:55

## 2022-12-30 RX ADMIN — CARVEDILOL PHOSPHATE 12.5 MILLIGRAM(S): 80 CAPSULE, EXTENDED RELEASE ORAL at 17:59

## 2022-12-30 RX ADMIN — Medication 1300 MILLIGRAM(S): at 05:25

## 2022-12-30 RX ADMIN — Medication 667 MILLIGRAM(S): at 09:14

## 2022-12-30 NOTE — DISCHARGE NOTE PROVIDER - NSDCMRMEDTOKEN_GEN_ALL_CORE_FT
amLODIPine 10 mg oral tablet: 1 tab(s) orally once a day  Aspirin Enteric Coated 81 mg oral delayed release tablet: 1 tab(s) orally once a day  atorvastatin 40 mg oral tablet: 1 tab(s) orally once a day  glyburide-metformin 2.5 mg-500 mg oral tablet: 1 tab(s) orally 2 times a day  hydrALAZINE 50 mg oral tablet: 1 tab(s) orally 2 times a day  levothyroxine 25 mcg (0.025 mg) oral tablet: 1 tab(s) orally once a day  losartan 100 mg oral tablet: 1 tab(s) orally once a day  metFORMIN 1000 mg oral tablet: 1 tab(s) orally once a day  Metoprolol Succinate ER 25 mg oral tablet, extended release: 1 tab(s) orally once a day  Multiple Vitamins oral tablet: 1 tab(s) orally once a day  Vascepa 1 g oral capsule: 1 cap(s) orally 2 times a day   acetaminophen 325 mg oral tablet: 2 tab(s) orally every 6 hours, As needed, Temp greater or equal to 38C (100.4F), Mild Pain (1 - 3)  amLODIPine 10 mg oral tablet: 1 tab(s) orally once a day  Aspirin Enteric Coated 81 mg oral delayed release tablet: 1 tab(s) orally once a day  atorvastatin 40 mg oral tablet: 1 tab(s) orally once a day  calcium acetate 667 mg oral tablet: 1 tab(s) orally 3 times a day (with meals)   carvedilol 12.5 mg oral tablet: 1 tab(s) orally every 12 hours  ergocalciferol 1.25 mg (50,000 intl units) oral capsule: 1 cap(s) orally once a week  glyburide-metformin 2.5 mg-500 mg oral tablet: 1 tab(s) orally 2 times a day  hydrALAZINE 100 mg oral tablet: 1 tab(s) orally every 8 hours  irbesartan 150 mg oral tablet: 1 tab(s) orally once a day   levothyroxine 25 mcg (0.025 mg) oral tablet: 1 tab(s) orally once a day  metFORMIN 1000 mg oral tablet: 1 tab(s) orally once a day  Multiple Vitamins oral tablet: 1 tab(s) orally once a day  sodium bicarbonate 650 mg oral tablet: 2 tab(s) orally 2 times a day  torsemide 60 mg oral tablet: 1 tab(s) orally once a day  Vascepa 1 g oral capsule: 1 cap(s) orally 2 times a day   acetaminophen 325 mg oral tablet: 2 tab(s) orally every 6 hours, As needed, Temp greater or equal to 38C (100.4F), Mild Pain (1 - 3)  amLODIPine 10 mg oral tablet: 1 tab(s) orally once a day  Aspirin Enteric Coated 81 mg oral delayed release tablet: 1 tab(s) orally once a day  atorvastatin 40 mg oral tablet: 1 tab(s) orally once a day  calcium acetate 667 mg oral tablet: 1 tab(s) orally 3 times a day (with meals)   carvedilol 12.5 mg oral tablet: 1 tab(s) orally every 12 hours  ergocalciferol 1.25 mg (50,000 intl units) oral capsule: 1 cap(s) orally once a week  hydrALAZINE 100 mg oral tablet: 1 tab(s) orally every 8 hours  irbesartan 150 mg oral tablet: 1 tab(s) orally once a day   levothyroxine 25 mcg (0.025 mg) oral tablet: 1 tab(s) orally once a day  Multiple Vitamins oral tablet: 1 tab(s) orally once a day  sodium bicarbonate 650 mg oral tablet: 2 tab(s) orally 2 times a day  torsemide 60 mg oral tablet: 1 tab(s) orally once a day  Vascepa 1 g oral capsule: 1 cap(s) orally 2 times a day   acetaminophen 325 mg oral tablet: 2 tab(s) orally every 6 hours, As needed, Temp greater or equal to 38C (100.4F), Mild Pain (1 - 3)  amLODIPine 10 mg oral tablet: 1 tab(s) orally once a day  Aspirin Enteric Coated 81 mg oral delayed release tablet: 1 tab(s) orally once a day  atorvastatin 40 mg oral tablet: 1 tab(s) orally once a day  calcium acetate 667 mg oral tablet: 1 tab(s) orally 3 times a day (with meals)   carvedilol 12.5 mg oral tablet: 1 tab(s) orally every 12 hours  ergocalciferol 1.25 mg (50,000 intl units) oral capsule: 1 cap(s) orally once a week  hydrALAZINE 100 mg oral tablet: 1 tab(s) orally every 8 hours  irbesartan 150 mg oral tablet: 1 tab(s) orally once a day   Lasix 80 mg oral tablet: 1 tab(s) orally 2 times a day , 8am and 2pm daily   levothyroxine 25 mcg (0.025 mg) oral tablet: 1 tab(s) orally once a day  Multiple Vitamins oral tablet: 1 tab(s) orally once a day  sodium bicarbonate 650 mg oral tablet: 2 tab(s) orally 2 times a day  Vascepa 1 g oral capsule: 1 cap(s) orally 2 times a day

## 2022-12-30 NOTE — PROGRESS NOTE ADULT - PROVIDER SPECIALTY LIST ADULT
Cardiology
Hospitalist
Intervent Radiology
Nephrology
Vascular Surgery
Cardiology
Internal Medicine
Nephrology
Nephrology
Vascular Surgery
Cardiology
Nephrology
Hospitalist
Internal Medicine
Hospitalist

## 2022-12-30 NOTE — PROGRESS NOTE ADULT - PROBLEM SELECTOR PLAN 2
bp stable  Continue amlodipine and hydralazine  Holding losartan due to OLENA on CKD
Continue amlodipine and hydralazine  Holding losartan due to OLENA on CKD  If BP not controlled may consider changing metoprolol to labetalol
BP remains uncontrolled  - cant increase coreg given baseline HR 60s  - c/w amlodipine 10 mg po daily  - increase hydralazine dosing to 100 mg po tid   - Holding losartan due to OLENA on CKD
Continue amlodipine and hydralazine - uptitrated to 75 TID   Holding losartan due to OLENA on CKD  Goal SBP < 150 24 hrs post renal bx
bp stable  Continue amlodipine and hydralazine  Holding losartan due to OLENA on CKD  Given aldactone x1 today per renal   close monitoring after renal bx today, per IR maintain SBP < 150. Can inc hydral to 75mg TID if needed for goal
bp stable  Continue amlodipine and hydralazine  Holding losartan due to OLENA on CKD
Continue amlodipine and hydralazine  Holding losartan due to OLENA on CKD  Given continued elevated BPs will change from metoprolol to labetalol
bp stable  Continue amlodipine and hydralazine  Holding losartan due to OLENA on CKD
BP remains uncontrolled  - cant increase coreg given baseline HR 60s  - c/w amlodipine 10 mg po daily  - cw hydralazine dosing to 100 mg po tid   - aldactone today but nephro switched to irbesartan on dc

## 2022-12-30 NOTE — PROGRESS NOTE ADULT - PROBLEM SELECTOR PLAN 5
Continue synthroid
c/w synthroid
Continue synthroid
c/w synthroid
c/w synthroid

## 2022-12-30 NOTE — DISCHARGE NOTE PROVIDER - NSDCCPCAREPLAN_GEN_ALL_CORE_FT
PRINCIPAL DISCHARGE DIAGNOSIS  Diagnosis: CHF exacerbation  Assessment and Plan of Treatment: Weigh yourself daily.  If you gain 3lbs in 3 days, or 5lbs in a week call your Health Care Provider.  Do not eat or drink foods containing more than 2000mg of salt (sodium) in your diet every day.  Call your Health Care Provider if you have any swelling or increased swelling in your feet, ankles, and/or stomach.  Take all of your medication as directed.  If you become dizzy call your Health Care Provider.      SECONDARY DISCHARGE DIAGNOSES  Diagnosis: Hypertensive emergency  Assessment and Plan of Treatment: Low salt diet  Activity as tolerated.  Take all medication as prescribed.  Follow up with your medical doctor for routine blood pressure monitoring at your next visit.  Notify your doctor if you have any of the following symptoms:   Dizziness, Lightheadedness, Blurry vision, Headache, Chest pain, Shortness of breath    Diagnosis: DM2 (diabetes mellitus, type 2)  Assessment and Plan of Treatment: HgA1C this admission.  Make sure you get your HgA1c checked every three months.  If you take oral diabetes medications, check your blood glucose two times a day.  If you take insulin, check your blood glucose before meals and at bedtime.  It's important not to skip any meals.  Keep a log of your blood glucose results and always take it with you to your doctor appointments.  Keep a list of your current medications including injectables and over the counter medications and bring this medication list with you to all your doctor appointments.  If you have not seen your ophthalmologist this year call for appointment.  Check your feet daily for redness, sores, or openings. Do not self treat. If no improvement in two days call your primary care physician for an appointment.  Low blood sugar (hypoglycemia) is a blood sugar below 70mg/dl. Check your blood sugar if you feel signs/symptoms of hypoglycemia. If your blood sugar is below 70 take 15 grams of carbohydrates (ex 4 oz of apple juice, 3-4 glucose tablets, or 4-6 oz of regular soda) wait 15 minutes and repeat blood sugar to make sure it comes up above 70.  If your blood sugar is above 70 and you are due for a meal, have a meal.  If you are not due for a meal have a snack.  This snack helps keeps your blood sugar at a safe range.    Diagnosis: Acute kidney injury superimposed on CKD  Assessment and Plan of Treatment: Follow up with Renal and Vascular MD in 1-2 weeks

## 2022-12-30 NOTE — DISCHARGE NOTE PROVIDER - NSDCFUADDAPPT_GEN_ALL_CORE_FT
APPTS ARE READY TO BE MADE: [ x] YES    Best Family or Patient Contact (if needed):    Additional Information about above appointments (if needed):    1: Vascular 1- 2 weeks Please follow up outpatient with Dr. Friedman for LUE AVF  2:   3:     Other comments or requests:    APPTS ARE READY TO BE MADE: [ x] YES    Best Family or Patient Contact (if needed):    Additional Information about above appointments (if needed):    1: Vascular 1- 2 weeks Please follow up outpatient with Dr. Friedman for LUE AVF  2:   3:     Other comments or requests:   Follow up with PMD next week for labs  APPTS ARE READY TO BE MADE: [ x] YES    Best Family or Patient Contact (if needed):    Additional Information about above appointments (if needed):    1: Vascular 1- 2 weeks Please follow up outpatient with Dr. Friedman for LUE AVF  2:   3:     Other comments or requests:   Follow up with PMD next week for labs     Patient was previously scheduled with Cem Garcia on (1/4/2023 9:00 AM) at [address] [place] [address] [place] [address]. APPTS ARE READY TO BE MADE: [ x] YES    Best Family or Patient Contact (if needed):    Additional Information about above appointments (if needed):    1: Vascular 1- 2 weeks Please follow up outpatient with Dr. Friedman for LUE AVF  2:   3:     Other comments or requests:   Follow up with PMD next week for labs     Patient was previously scheduled with Cem Garcia on (1/4/2023 9:00 AM) at [address] [place] [address].    3 attempts were made to reach patient, which have been unsuccessful. 3 Voicemails have been left 1/4, 1/5, and 1/6. Will await a call back from patient to coordinate follow up  care with Dr. Harry and Dr. Friedman.

## 2022-12-30 NOTE — PROGRESS NOTE ADULT - ATTENDING COMMENTS
no lesions,  no deformities,  no traumatic injuries,  no significant scars are present,  chest wall non-tender,  no masses present, breathing is unlabored without accessory muscle use,normal breath sounds
72 year old male with CKD IV/V  would be good candidate for AVF creation  nephrology prefers to defer to outpatient dialysis access planning  pt should follow up with me as outpatient 1-2 weeks after discharge for further discussion   RAVINDRA niño
CKD IV (presumed), HTN, DM  Nephrotic range proteinuria (UPCR 11.8 g/g, UACR 6.5 g/g)  For kidney bx today, f/u results in coming days  BP control  Remainder per fellow, will follow
CKD IV (presumed), HTN, DM  Nephrotic range proteinuria (UPCR 11.8 g/g, UACR 6.5 g/g)  Hypervolemia    Unclear etiology of kidney disease. While reasonable chance it is diabetic-related, given severity, would proceed with kidney biopsy for definitive diagnosis and possible therapeutic implications.    - Check serologies per fellow  - Furosemide 80mg IV BID  - Hold aspirin in anticipation of kidney biopsy  - Current BP regimen:  ---- Labetalol 100mg BID  ---- Hydralazine 50mg TID  ---- Amlodipine 10mg daily  - Atorvastatin  - Remainder per fellow, will follow
Caitlin Palacios MD  Office : [phone]  Contact me on [employer] Teams.
Kidney biopsy with nodular diabetic glomerulosclerosis, vascular sclerosis, 70% IFTA, ATN    CKD IV/V with nephrotic-range proteinuria, OLENA/ATN, metabolic acidosis, anemia of CKD, HTN, HLD, hypervolemia    Discussed bx results with patient and stressed need for close nephrology follow up for dialysis planning.    - Continue current antihypertensives and other meds  - Outpatient nephrology follow up for dialysis planning and transplant referral  - Do not use left arm (preserve for possible fistula)  - Remainder per fellow
Kidney biopsy with nodular diabetic glomerulosclerosis, vascular sclerosis, 70% IFTA, ATN    CKD IV/V with nephrotic-range proteinuria, OLENA/ATN, metabolic acidosis, anemia of CKD, HTN, HLD, hypervolemia    Discussed bx results with patient and stressed need for close nephrology follow up for dialysis planning.    - Antihypertensives regimen as above  - Outpatient nephrology follow up for dialysis planning and transplant referral  - Do not use left arm (preserve for possible fistula)  - Remainder per fellow
Caitlin Palacios MD  Office : [phone]  Contact me on [employer] Teams.
Kidney biopsy with nodular diabetic glomerulosclerosis, vascular sclerosis, 70% IFTA, ATN    CKD IV/V with nephrotic-range proteinuria, OLENA/ATN, metabolic acidosis, anemia of CKD, HTN, HLD, hypervolemia    Discussed bx results with patient and stressed need for close nephrology follow up for dialysis planning.    - Change diuretic to oral torsemide 60mg daily in preparation for discharge  - Change labetalol to carvedilol 12.5mg BID in preparation for discharge  - Continue amlodipine upon discharge  - Continue statin upon discharge  - Continue calcium acetate  - Continue ergocalciferol  - Start oral sodium bicarbonate 1300mg BID  - Discharge off diabetes medicines given A1c 5.8  - Nutrition counseling for proper diet  - Outpatient nephrology follow up for dialysis planning and transplant referral  - Vein mapping  - Do not use left arm (preserve for possible fistula)  - Remainder per fellow

## 2022-12-30 NOTE — PROGRESS NOTE ADULT - PROBLEM SELECTOR PLAN 6
c/w statin
Continue statin
c/w statin
c/w statin
Continue statin

## 2022-12-30 NOTE — PROGRESS NOTE ADULT - PROBLEM SELECTOR PLAN 1
Pt with OLENA in the setting of longstanding DM, uncontrolled HTN and volume overload. Exact duration of OLENA however unknown. Pt admitted with SCr. of 3.5 (eGFR 18), now trended to 5.2 today. Was told 2 months ago that his kidneys were functioning at 30%. Renal US without hydro or stone, 2 small cysts on R. normal sized kidneys. Significant proteinuria at 11.8 grams. UA showing blood but only 4 RBCs. Repeat UA shows resolution of hematuria. Will need to consider HD if renal failure continues to worsen.     Pt. with improving volume status. On Torsemide 60mg. On Aldactone for diuresis and BP control post biopsy. S/p biopsy 12/27/22. 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 Discussed future need for RRT. Appreciate consult to vascular surgery for AVF mapping. No renal objections to discharge home with close nephrology follow up.    Strict outputs and daily weights to guide diuresis. Monitor labs and urine output. Optimize hemodynamics. Avoid NSAIDs, ACEI/ARBS, RCA and nephrotoxins. Dose medications as per eGFR. Do not resume Metformin as eGFR is not above 30. Appreciate Cardiology consult.     #Anemia: HgB below goal. s/p IV Iron. Start NITA 54841 units s/c weekly.  #BMD: Phos level elevated, start Ca acetate 2 tabs TID with meals. Check ionized Ca and replete with IV calcium to maintain ionized Ca level >1.1    Will need f/u with Nephrology on discharge. No objection to Discharge home from renal standpoint. After receiving NITA.     If you have any questions, please feel free to contact me  Sb Lilly  Nephrology Fellow  [phone]; Prefer [employer] TEAMS  (After 5pm or on weekends please page the on-call fellow) Pt with OLENA in the setting of longstanding DM, uncontrolled HTN and volume overload. Exact duration of OLENA however unknown. Pt admitted with SCr. of 3.5 (eGFR 18), now trended to 5.2 today. Was told 2 months ago that his kidneys were functioning at 30%. Renal US without hydro or stone, 2 small cysts on R. normal sized kidneys. Significant proteinuria at 11.8 grams. UA showing blood but only 4 RBCs. Repeat UA shows resolution of hematuria. Will need to consider HD if renal failure continues to worsen.     Pt. with improving volume status. On Torsemide 60mg. On Aldactone for diuresis and BP control post biopsy. Can stop Aldactone and start Irbesartan 150mg on discharge. S/p biopsy 12/27/22. 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 Discussed future need for RRT. Appreciate consult to vascular surgery for AVF mapping. No renal objections to discharge home with close nephrology follow up.    Strict outputs and daily weights to guide diuresis. Monitor labs and urine output. Optimize hemodynamics. Avoid NSAIDs, ACEI/ARBS, RCA and nephrotoxins. Dose medications as per eGFR. Do not resume Metformin as eGFR is not above 30. Appreciate Cardiology consult.     #Anemia: HgB below goal. s/p IV Iron. Start NITA 85596 units s/c weekly.  #BMD: Phos level elevated, start Ca acetate 2 tabs TID with meals. Check ionized Ca and replete with IV calcium to maintain ionized Ca level >1.1    Will need f/u with Nephrology on discharge. No objection to Discharge home from renal standpoint.    If you have any questions, please feel free to contact me  Sb Lilly  Nephrology Fellow  [phone]; Prefer [employer] TEAMS  (After 5pm or on weekends please page the on-call fellow)

## 2022-12-30 NOTE — PROGRESS NOTE ADULT - NSPROGADDITIONALINFOA_GEN_ALL_CORE
luz Luther
disposition: monitoring cr; renal bx pending    Dorothy Small D.O.  available on [facility] teams
dw ACP Martine
disposition: monitoring cr    Dorothy Small D.O.  available on MS teams
disposition: monitoring cr; renal bx 12/27    Dorothy Small D.O.  available on MS teams
dw ACP Martine
disposition: plan for avf 12/30    Dorothy Small D.O.  available on MS teams

## 2022-12-30 NOTE — PROGRESS NOTE ADULT - PROBLEM SELECTOR PROBLEM 4
DM2 (diabetes mellitus, type 2)

## 2022-12-30 NOTE — PROGRESS NOTE ADULT - PROBLEM SELECTOR PROBLEM 1
Acute kidney injury superimposed on CKD
Acute diastolic congestive heart failure
Acute kidney injury superimposed on CKD
Acute diastolic congestive heart failure
Acute kidney injury superimposed on CKD
Acute diastolic congestive heart failure

## 2022-12-30 NOTE — DISCHARGE NOTE PROVIDER - HOSPITAL COURSE
72M h/o HTN, HLD, DM2, noncompliant with medications p/w acute heart failure and hypertensive urgency with elevated troponin presented with acute diastolic congestive heart failure exacerbation.      Problem/Plan - 1:  ·  Problem: Acute diastolic congestive heart failure.   ·  Plan: TTE with EF 69%  Renal following  Strict I/O's, daily standing weights  C/W torsemide 60 qd and coreg 12.5 BID.     Problem/Plan - 2:  ·  Problem: Hypertensive emergency.   ·  Plan: BP remains uncontrolled  - cant increase coreg given baseline HR 60s  - c/w amlodipine 10 mg po daily  - increase hydralazine dosing to 100 mg po tid   - Holding losartan due to OLENA on CKD.     Problem/Plan - 3:  ·  Problem: OLENA (acute kidney injury).   ·  Plan: OLENA on CKD in setting of volume overload and uncontrolled hypertension -- peaked at cr 5.35  plan for AVF I 12/30  patient is a high risk for an intermediate risk procedure. RCRI 2 points. Class III risk. 10.1%, 30 day risk of death, MI or cardiac arrest.   Renal following- renal BX 12/27  c/w iv iron 200 mg daily until 12/29  c/w vit D 50,000U weekly  Holding Losartan  started on calcium acetate - monitor ionized calcium  Biopsy showed 70% IFTA, and severe vascular sclerosis - nephro requesting vascular c/s for AVF planning - called.     Problem/Plan - 4:  ·  Problem: DM2 (diabetes mellitus, type 2).   ·  Plan: fs controlled  A1c 5.8  Stop hypoglycemics on discharge.     Problem/Plan - 5:  ·  Problem: Hypothyroidism.   ·  Plan: c/w synthroid.     Problem/Plan - 6:  ·  Problem: HLD (hyperlipidemia).   ·  Plan: c/w statin.     72M h/o HTN, HLD, DM2, noncompliant with medications p/w acute heart failure and hypertensive urgency with elevated troponin presented with acute diastolic congestive heart failure exacerbation.      Problem/Plan - 1:  ·  Problem: Acute diastolic congestive heart failure.   ·  Plan: TTE with EF 69%  C/W torsemide 60 qd and coreg 12.5 BID.     Problem/Plan - 2:  ·  Problem: Hypertensive emergency.   ·coreg given baseline HR 60s  - c/w amlodipine 10 mg po daily  hydralazine dosing to 100 mg po tid   ibesartan on dc      Problem/Plan - 3:  ·  Problem: OLENA (acute kidney injury).   ·  Plan: OLENA on CKD stage 5   AVF outpatient   Biopsy showed 70% IFTA, and severe vascular sclerosis      Problem/Plan - 4:  ·  Problem: DM2 (diabetes mellitus, type 2).   A1c 5.8  Stop hypoglycemics on discharge.     Problem/Plan - 5:  ·  Problem: Hypothyroidism.   ·  Plan: c/w synthroid.     Problem/Plan - 6:  ·  Problem: HLD (hyperlipidemia).   ·  Plan: c/w statin.

## 2022-12-30 NOTE — PROGRESS NOTE ADULT - PROBLEM SELECTOR PROBLEM 3
OLENA (acute kidney injury)

## 2022-12-30 NOTE — DISCHARGE NOTE PROVIDER - CARE PROVIDER_API CALL
Lars Harry)  Cardiovascular Disease; Internal Medicine  [address] [place] [address]  Phone: [phone]  Fax: [phone]  Follow Up Time: 1 week    Cem Rees)  Internal Medicine; Nephrology; Preventive Medicine  [address] [facility], [place] [address]  Phone: [phone]  Fax: [phone]  Scheduled Appointment: 01/04/2023 09:00 AM    DINO ARREOLA  Internal Medicine  [address] [place] [address]  Phone: [phone]  Fax: [phone]  Follow Up Time: 1 week   Lars Harry)  Cardiovascular Disease; Internal Medicine  [address] [place] [address]  Phone: [phone]  Fax: [phone]  Follow Up Time: 1 week    Cem Rees)  Internal Medicine; Nephrology; Preventive Medicine  [address] [facility], [place] [address]  Phone: [phone]  Fax: [phone]  Scheduled Appointment: 01/04/2023 09:00 AM    DINO ARREOLA  Internal Medicine  [address] [place] [address]  Phone: [phone]  Fax: [phone]  Follow Up Time: 1 week    Lars Friedman)  Surgery  Vascular  [address] [facility], [address] [place] [address]  Phone: [phone]  Fax: [phone]  Follow Up Time:

## 2022-12-30 NOTE — DISCHARGE NOTE NURSING/CASE MANAGEMENT/SOCIAL WORK - NSDCPEFALRISK_GEN_ALL_CORE
For information on Fall & Injury Prevention, visit: [email or web address] OR  [email or web address] OR  https://www.cdc.gov/steadi/patient.html

## 2022-12-30 NOTE — DISCHARGE NOTE NURSING/CASE MANAGEMENT/SOCIAL WORK - NSCORESITESY/N_GEN_A_CORE_RD
CLINICAL PHARMACY NOTE: MEDS TO [address] Select Patient?: No  Total # of Prescriptions Filled: 5   The following medications were delivered to the patient:  · Spironolactone  · Senna  · Potassium  · Metoprolol succinate  · Pantoprazole  Total # of Interventions Completed: 0  Time Spent (min): 0    Additional Documentation:  Medications delivered to patient. He did not have any questions at this time. Instructed to call the pharmacy if he does have any questions.     Karla Curran  PharmD Candidate [id number]  1/17/2020 6:34 PM
No

## 2022-12-30 NOTE — DISCHARGE NOTE NURSING/CASE MANAGEMENT/SOCIAL WORK - NSDCFUADDAPPT_GEN_ALL_CORE_FT
APPTS ARE READY TO BE MADE: [ x] YES    Best Family or Patient Contact (if needed):    Additional Information about above appointments (if needed):    1: Vascular 1- 2 weeks Please follow up outpatient with Dr. Friedman for LUE AVF  2:   3:     Other comments or requests:   Follow up with PMD next week for labs

## 2022-12-30 NOTE — DISCHARGE NOTE PROVIDER - CARE PROVIDERS DIRECT ADDRESSES
,DirectAddress_Unknown,[email or web address],DirectAddress_Unknown ,DirectAddress_Unknown,[email or web address],DirectAddress_Unknown,DirectAddress_Unknown

## 2022-12-30 NOTE — PROGRESS NOTE ADULT - PROBLEM SELECTOR PLAN 4
fs controlled  fs [facility]  Monitor FS with ISS for coverage  A1c 5.8
fs controlled  A1c 5.8  Stop hypoglycemics on discharge
Monitor FS with ISS for coverage  A1c 5.8
fs controlled  fs [facility]  Monitor FS with ISS for coverage  A1c 5.8
fs controlled  fs [facility]  Monitor FS with ISS for coverage  A1c 5.8
fs controlled  A1c 5.8  Stop hypoglycemics on discharge
fs controlled  fs [facility]  Monitor FS with ISS for coverage  A1c 5.8
Monitor FS with ISS for coverage  A1c ordered
fs controlled  A1c 5.8  Stop hypoglycemics on discharge

## 2022-12-30 NOTE — PROGRESS NOTE ADULT - PROBLEM SELECTOR PLAN 7
Stable for dc with outpatient nephro, vascular, pcp f/u.    Updated daughter at bedside.   Time spent on discharge: 54 min

## 2022-12-30 NOTE — DISCHARGE NOTE PROVIDER - PROVIDER TOKENS
PROVIDER:[TOKEN:[[id number]],FOLLOWUP:[1 week]],PROVIDER:[TOKEN:[[id number]],SCHEDULEDAPPT:[01/04/2023],SCHEDULEDAPPTTIME:[09:00 AM]],PROVIDER:[TOKEN:[[id number]],FOLLOWUP:[1 week]] PROVIDER:[TOKEN:[[id number]],FOLLOWUP:[1 week]],PROVIDER:[TOKEN:[[id number]],SCHEDULEDAPPT:[01/04/2023],SCHEDULEDAPPTTIME:[09:00 AM]],PROVIDER:[TOKEN:[[id number]],FOLLOWUP:[1 week]],PROVIDER:[TOKEN:[[id number]]]

## 2022-12-30 NOTE — PROGRESS NOTE ADULT - SUBJECTIVE AND OBJECTIVE BOX
[facility] DIVISION OF KIDNEY DISEASES AND HYPERTENSION -- FOLLOW UP NOTE  --------------------------------------------------------------------------------  HPI:  Pt. is a 72 y.o. M w/ PMHx of HTN, HLD, DM2 presents with Dyspnea for the past 3-4 weeks. Nephrology consulted for OLENA. Pt. states that he was given a water pill months ago but stopped taking it because he thought he was urinating enough already and he did not like waking up at night to urinate. He started taking the pill again recently but ran out again about 1 week ago and did not  the refill. He states that he was at 50% kidney function but when he renal function went to 30% 2 months ago he saw a "kidney doctor" once but did not follow up. Denies any orthopnea currently but after examination asking to be propped up. Denies having any heart problems. Has had diabetes for over 20 years. Some eye issues but unable to elaborate. CXR here showing pulmonary vascular congestion and bilateral trace to small pleural effusions. Takes Metformin 1000mg BID in addition to Glyburide? Metformin 2.5mg/ 500mg daily for DM. No blood in urine.     Pt. seen today. No acute complaints. Worried about co-pay for hospital followups. DEnies any acute complaints.       PAST HISTORY  --------------------------------------------------------------------------------  No significant changes to PMH, PSH, FHx, SHx, unless otherwise noted    ALLERGIES & MEDICATIONS  --------------------------------------------------------------------------------  Allergies    No Known Allergies    Intolerances      Standing Inpatient Medications  amLODIPine   Tablet 10 milliGRAM(s) Oral daily  AQUAPHOR (petrolatum Ointment) 1 Application(s) Topical two times a day  atorvastatin 40 milliGRAM(s) Oral at bedtime  calcium acetate 667 milliGRAM(s) Oral three times a day with meals  carvedilol 12.5 milliGRAM(s) Oral every 12 hours  dextrose 5%. 1000 milliLiter(s) IV Continuous <Continuous>  dextrose 5%. 1000 milliLiter(s) IV Continuous <Continuous>  dextrose 50% Injectable 25 Gram(s) IV Push once  dextrose 50% Injectable 25 Gram(s) IV Push once  ergocalciferol 86652 Unit(s) Oral every week  glucagon  Injectable 1 milliGRAM(s) IntraMuscular once  hydrALAZINE 100 milliGRAM(s) Oral every 8 hours  insulin lispro (ADMELOG) corrective regimen sliding scale   SubCutaneous three times a day before meals  insulin lispro (ADMELOG) corrective regimen sliding scale   SubCutaneous at bedtime  levothyroxine 25 MICROGram(s) Oral daily  multivitamin 1 Tablet(s) Oral daily  sodium bicarbonate 1300 milliGRAM(s) Oral two times a day  torsemide 60 milliGRAM(s) Oral daily    PRN Inpatient Medications  acetaminophen     Tablet .. 650 milliGRAM(s) Oral every 6 hours PRN  dextrose Oral Gel 15 Gram(s) Oral once PRN  hydrocortisone 1% Cream 1 Application(s) Topical two times a day PRN      REVIEW OF SYSTEMS  --------------------------------------------------------------------------------  As per HPI    VITALS/PHYSICAL EXAM  --------------------------------------------------------------------------------  T(C): 36.6 (12-29-22 @ 12:45), Max: 36.7 (12-29-22 @ 03:10)  HR: 64 (12-29-22 @ 12:45) (64 - 72)  BP: 172/56 (12-29-22 @ 12:45) (163/60 - 178/60)  RR: 18 (12-29-22 @ 12:45) (18 - 18)  SpO2: 98% (12-29-22 @ 12:45) (95% - 98%)  Wt(kg): --        12-28-22 @ 07:01  -  12-29-22 @ 07:00  --------------------------------------------------------  IN: 720 mL / OUT: 3050 mL / NET: -2330 mL    12-29-22 @ 07:01  -  12-29-22 @ 14:50  --------------------------------------------------------  IN: 720 mL / OUT: 1000 mL / NET: -280 mL      Physical Exam:  	Gen: NAD  	HEENT: MMM  	Pulm: CTAB  	CV: S1S2  	Abd: Soft, +BS   	Ext: +LE edema B/L with chronic skin changes,  	Neuro/Psych: Awake, remorseful   	Skin: Warm and dry  	Vascular access: peripheral       LABS/STUDIES  --------------------------------------------------------------------------------              8.9    9.84  >-----------<  277      [12-29-22 @ 05:53]              27.7     140  |  105  |  77  ----------------------------<  141      [12-29-22 @ 05:53]  4.3   |  20  |  4.96        Ca     8.7     [12-29-22 @ 05:53]      Creatinine Trend:  SCr 4.96 [12-29 @ 05:53]  SCr 5.26 [12-28 @ 05:43]  SCr 4.96 [12-27 @ 05:39]  SCr 5.35 [12-26 @ 06:12]  SCr 4.98 [12-25 @ 05:44]    Urinalysis - [12-24-22 @ 20:49]      Color Light Yellow / Appearance Clear / SG 1.009 / pH 6.5      Gluc 500 mg/dL / Ketone Negative  / Bili Negative / Urobili Negative       Blood Small / Protein 300 mg/dL / Leuk Est Negative / Nitrite Negative      RBC 1 / WBC 1 / Hyaline 1 / Gran  / Sq Epi  / Non Sq Epi 1 / Bacteria Negative    Urine Creatinine 22      [12-22-22 @ 17:14]  Urine Protein 259      [12-22-22 @ 17:14]  Urine Sodium 128      [12-22-22 @ 17:14]  Urine Osmolality 336      [12-22-22 @ 17:14]    Iron 48, TIBC 256, %sat 19      [12-24-22 @ 06:13]  PTH -- (Ca 8.3)      [12-24-22 @ 06:06]   242  Vitamin D (25OH) 8.8      [12-24-22 @ 06:06]  TSH 10.10      [12-22-22 @ 05:52]  Lipid: chol 142, , HDL 51, LDL --      [12-22-22 @ 05:56]    HCV 0.15, Nonreact      [12-22-22 @ 05:56]    PLA2R: GIOVANNY <1.8, IFA --      [12-24-22 @ 06:05]  Free Light Chains: kappa 13.57, lambda 11.30, ratio = 1.20      [12-24 @ 06:13]  
[facility] DIVISION OF KIDNEY DISEASES AND HYPERTENSION -- FOLLOW UP NOTE  --------------------------------------------------------------------------------  HPI:  Pt. is a 72 y.o. M w/ PMHx of HTN, HLD, DM2 presents with Dyspnea for the past 3-4 weeks. Nephrology consulted for OLENA. Pt. states that he was given a water pill months ago but stopped taking it because he thought he was urinating enough already and he did not like waking up at night to urinate. He started taking the pill again recently but ran out again about 1 week ago and did not  the refill. He states that he was at 50% kidney function but when he renal function went to 30% 2 months ago he saw a "kidney doctor" once but did not follow up. Denies any orthopnea currently but after examination asking to be propped up. Denies having any heart problems. Has had diabetes for over 20 years. Some eye issues but unable to elaborate. CXR here showing pulmonary vascular congestion and bilateral trace to small pleural effusions. Takes Metformin 1000mg BID in addition to Glyburide? Metformin 2.5mg/ 500mg daily for DM. No blood in urine.     Pt. seen today. No acute complaints. Discussed with daughter and patient about hospital follow up and dialysis options. Discussion to be continued as outpatient.       PAST HISTORY  --------------------------------------------------------------------------------  No significant changes to PMH, PSH, FHx, SHx, unless otherwise noted    ALLERGIES & MEDICATIONS  --------------------------------------------------------------------------------  Allergies    No Known Allergies    Intolerances      Standing Inpatient Medications  amLODIPine   Tablet 10 milliGRAM(s) Oral daily  AQUAPHOR (petrolatum Ointment) 1 Application(s) Topical two times a day  atorvastatin 40 milliGRAM(s) Oral at bedtime  calcium acetate 667 milliGRAM(s) Oral three times a day with meals  carvedilol 12.5 milliGRAM(s) Oral every 12 hours  dextrose 5%. 1000 milliLiter(s) IV Continuous <Continuous>  dextrose 5%. 1000 milliLiter(s) IV Continuous <Continuous>  dextrose 50% Injectable 25 Gram(s) IV Push once  dextrose 50% Injectable 25 Gram(s) IV Push once  epoetin rose-epbx (RETACRIT) Injectable 85842 Unit(s) SubCutaneous once  ergocalciferol 35066 Unit(s) Oral every week  glucagon  Injectable 1 milliGRAM(s) IntraMuscular once  hydrALAZINE 100 milliGRAM(s) Oral every 8 hours  insulin lispro (ADMELOG) corrective regimen sliding scale   SubCutaneous three times a day before meals  insulin lispro (ADMELOG) corrective regimen sliding scale   SubCutaneous at bedtime  levothyroxine 25 MICROGram(s) Oral daily  multivitamin 1 Tablet(s) Oral daily  sodium bicarbonate 1300 milliGRAM(s) Oral two times a day  spironolactone 100 milliGRAM(s) Oral daily  torsemide 60 milliGRAM(s) Oral daily    PRN Inpatient Medications  acetaminophen     Tablet .. 650 milliGRAM(s) Oral every 6 hours PRN  dextrose Oral Gel 15 Gram(s) Oral once PRN  hydrocortisone 1% Cream 1 Application(s) Topical two times a day PRN      REVIEW OF SYSTEMS  --------------------------------------------------------------------------------  As per HPI    VITALS/PHYSICAL EXAM  --------------------------------------------------------------------------------  T(C): 36.7 (12-30-22 @ 11:56), Max: 36.8 (12-30-22 @ 00:07)  HR: 69 (12-30-22 @ 11:56) (64 - 77)  BP: 159/62 (12-30-22 @ 11:56) (159/62 - 174/65)  RR: 18 (12-30-22 @ 11:56) (18 - 18)  SpO2: 96% (12-30-22 @ 11:56) (96% - 98%)  Wt(kg): --        12-29-22 @ 07:01  -  12-30-22 @ 07:00  --------------------------------------------------------  IN: 1230 mL / OUT: 1800 mL / NET: -570 mL    12-30-22 @ 07:01  -  12-30-22 @ 12:41  --------------------------------------------------------  IN: 360 mL / OUT: 850 mL / NET: -490 mL        Physical Exam:  	Gen: NAD  	HEENT: MMM  	Pulm: CTAB  	CV: S1S2  	Abd: Soft, +BS   	Ext: +LE edema B/L with chronic skin changes,  	Neuro/Psych: Awake, remorseful   	Skin: Warm and dry  	Vascular access: peripheral       LABS/STUDIES  --------------------------------------------------------------------------------              9.2    10.38 >-----------<  292      [12-30-22 @ 02:22]              28.4     140  |  105  |  76  ----------------------------<  174      [12-30-22 @ 02:22]  4.3   |  21  |  5.02        Ca     8.8     [12-30-22 @ 02:22]      Mg     2.3     [12-30-22 @ 02:22]      Phos  5.3     [12-30-22 @ 02:22]      PT/INR: PT 10.5 , INR 0.91       [12-30-22 @ 02:22]  PTT: 33.2       [12-30-22 @ 02:22]      Creatinine Trend:  SCr 5.02 [12-30 @ 02:22]  SCr 4.96 [12-29 @ 05:53]  SCr 5.26 [12-28 @ 05:43]  SCr 4.96 [12-27 @ 05:39]  SCr 5.35 [12-26 @ 06:12]    Urinalysis - [12-24-22 @ 20:49]      Color Light Yellow / Appearance Clear / SG 1.009 / pH 6.5      Gluc 500 mg/dL / Ketone Negative  / Bili Negative / Urobili Negative       Blood Small / Protein 300 mg/dL / Leuk Est Negative / Nitrite Negative      RBC 1 / WBC 1 / Hyaline 1 / Gran  / Sq Epi  / Non Sq Epi 1 / Bacteria Negative      Iron 48, TIBC 256, %sat 19      [12-24-22 @ 06:13]  PTH -- (Ca 8.3)      [12-24-22 @ 06:06]   242  Vitamin D (25OH) 8.8      [12-24-22 @ 06:06]  TSH 10.10      [12-22-22 @ 05:52]  Lipid: chol 142, , HDL 51, LDL --      [12-22-22 @ 05:56]    HCV 0.15, Nonreact      [12-22-22 @ 05:56]    PLA2R: GIOVANNY <1.8, IFA --      [12-24-22 @ 06:05]  Free Light Chains: kappa 13.57, lambda 11.30, ratio = 1.20      [12-24 @ 06:13]  
INTERVAL EVENTS/SUBJ:  No events     Home Medications:  amLODIPine 10 mg oral tablet: 1 tab(s) orally once a day (21 Dec 2022 09:58)  Aspirin Enteric Coated 81 mg oral delayed release tablet: 1 tab(s) orally once a day (21 Dec 2022 09:58)  atorvastatin 40 mg oral tablet: 1 tab(s) orally once a day (21 Dec 2022 09:58)  glyburide-metformin 2.5 mg-500 mg oral tablet: 1 tab(s) orally 2 times a day (21 Dec 2022 09:58)  hydrALAZINE 50 mg oral tablet: 1 tab(s) orally 2 times a day (21 Dec 2022 09:58)  levothyroxine 25 mcg (0.025 mg) oral tablet: 1 tab(s) orally once a day (21 Dec 2022 09:58)  losartan 100 mg oral tablet: 1 tab(s) orally once a day (21 Dec 2022 09:58)  metFORMIN 1000 mg oral tablet: 1 tab(s) orally once a day (21 Dec 2022 09:58)  Metoprolol Succinate ER 25 mg oral tablet, extended release: 1 tab(s) orally once a day (21 Dec 2022 09:58)  Multiple Vitamins oral tablet: 1 tab(s) orally once a day (21 Dec 2022 09:58)  Vascepa 1 g oral capsule: 1 cap(s) orally 2 times a day (21 Dec 2022 09:58)      MEDICATIONS  (STANDING):  amLODIPine   Tablet 10 milliGRAM(s) Oral daily  AQUAPHOR (petrolatum Ointment) 1 Application(s) Topical two times a day  atorvastatin 40 milliGRAM(s) Oral at bedtime  calcium acetate 667 milliGRAM(s) Oral three times a day with meals  dextrose 5%. 1000 milliLiter(s) (50 mL/Hr) IV Continuous <Continuous>  dextrose 5%. 1000 milliLiter(s) (100 mL/Hr) IV Continuous <Continuous>  dextrose 50% Injectable 25 Gram(s) IV Push once  dextrose 50% Injectable 25 Gram(s) IV Push once  ergocalciferol 77006 Unit(s) Oral every week  furosemide   Injectable 40 milliGRAM(s) IV Push two times a day  glucagon  Injectable 1 milliGRAM(s) IntraMuscular once  hydrALAZINE 50 milliGRAM(s) Oral three times a day  insulin lispro (ADMELOG) corrective regimen sliding scale   SubCutaneous three times a day before meals  insulin lispro (ADMELOG) corrective regimen sliding scale   SubCutaneous at bedtime  iron sucrose IVPB 200 milliGRAM(s) IV Intermittent every 24 hours  labetalol 200 milliGRAM(s) Oral two times a day  levothyroxine 25 MICROGram(s) Oral daily  multivitamin 1 Tablet(s) Oral daily    MEDICATIONS  (PRN):  acetaminophen     Tablet .. 650 milliGRAM(s) Oral every 6 hours PRN Temp greater or equal to 38C (100.4F), Mild Pain (1 - 3)  dextrose Oral Gel 15 Gram(s) Oral once PRN Blood Glucose LESS THAN 70 milliGRAM(s)/deciliter  hydrocortisone 1% Cream 1 Application(s) Topical two times a day PRN Itching      Vital Signs Last 24 Hrs  T(C): 36.4 (28 Dec 2022 11:01), Max: 36.8 (27 Dec 2022 17:00)  T(F): 97.5 (28 Dec 2022 11:01), Max: 98.2 (27 Dec 2022 17:00)  HR: 66 (28 Dec 2022 11:01) (62 - 75)  BP: 156/63 (28 Dec 2022 11:01) (122/52 - 163/65)  BP(mean): 92 (27 Dec 2022 17:00) (70 - 92)  RR: 18 (28 Dec 2022 11:01) (14 - 20)  SpO2: 97% (28 Dec 2022 11:01) (95% - 99%)    Parameters below as of 28 Dec 2022 11:01  Patient On (Oxygen Delivery Method): room air        REVIEW OF SYSTEMS:  As per HPI, otherwise unremarkable.     PHYSICAL EXAM:  Constitutional/Appearance: Normal, Well-developed  HEENT:   Normal oral mucosa, no drainage or redness, supple neck  Lymphatic: No lymphadenopathy  Cardiovascular: Normal S1 S2, No edema, II/VI HOLDEN  Respiratory: Lungs clear to auscultation, respirations non-labored  Psychiatry: A & O x 3, appropriate affect.   Gastrointestinal:  Soft, Non-tender, no distention  Skin: No rashes, No ecchymoses, No cyanosis	  Neurologic: Non-focal, Alert and oriented x 3  Extremities: Normal range of motion  Vascular: Peripheral pulses palpable 2+ bilaterally (radial)    LABS:  CBC Full  -  ( 28 Dec 2022 05:44 )  WBC Count : 8.57 K/uL  RBC Count : 2.81 M/uL  Hemoglobin : 8.3 g/dL  Hematocrit : 26.1 %  Platelet Count - Automated : 243 K/uL  Mean Cell Volume : 92.9 fl  Mean Cell Hemoglobin : 29.5 pg  Mean Cell Hemoglobin Concentration : 31.8 gm/dL  Auto Neutrophil # : x  Auto Lymphocyte # : x  Auto Monocyte # : x  Auto Eosinophil # : x  Auto Basophil # : x  Auto Neutrophil % : x  Auto Lymphocyte % : x  Auto Monocyte % : x  Auto Eosinophil % : x  Auto Basophil % : x      12-28    141  |  107  |  80<H>  ----------------------------<  174<H>  4.4   |  20<L>  |  5.26<H>    Ca    8.2<L>      28 Dec 2022 05:43  Phos  5.9     12-27  Mg     2.3     12-27    IMPRESSION AND PLAN: 72M w DM, HTN, HL, hypothyroid here w volume overload and elevated troponin. Likely 2/2 severe hypertension with OLENA on CKD  -holding lasix, appreciate nephrology recs  -holding losartan  -cont labetalol  -amlodipine 10, hydral 50 q8   -renal bx today    ***    Bryan Harry MD, MPhil, [facility]  Cardiologist, [facility]  ; [employer] at [facility]  email: [email or web address] and Cardiovascular Surgery on-service contact/call information, go to [email or web address] and use [email or web address]" to login.  Outpatient Cardiology appointments, call  [phone] to arrange with a colleague; I do not have outpatient Cardiology clinic.   
[facility] Division of Hospital Medicine  Gladis Brown DO   Available via Microsoft Teams      Patient is a 72y old  Male who presents with a chief complaint of vol overload (22 Dec 2022 10:52)      SUBJECTIVE / OVERNIGHT EVENTS:  Seen prior to IR procedure. Nervous about procedure but otherwise no complaints. No CP, SOB, dizziness, abd pain. Urinating.     MEDICATIONS  (STANDING):  amLODIPine   Tablet 10 milliGRAM(s) Oral daily  AQUAPHOR (petrolatum Ointment) 1 Application(s) Topical two times a day  atorvastatin 40 milliGRAM(s) Oral at bedtime  calcium acetate 667 milliGRAM(s) Oral three times a day with meals  dextrose 5%. 1000 milliLiter(s) (50 mL/Hr) IV Continuous <Continuous>  dextrose 5%. 1000 milliLiter(s) (100 mL/Hr) IV Continuous <Continuous>  dextrose 50% Injectable 25 Gram(s) IV Push once  dextrose 50% Injectable 25 Gram(s) IV Push once  ergocalciferol 88985 Unit(s) Oral every week  glucagon  Injectable 1 milliGRAM(s) IntraMuscular once  hydrALAZINE 50 milliGRAM(s) Oral three times a day  insulin lispro (ADMELOG) corrective regimen sliding scale   SubCutaneous three times a day before meals  insulin lispro (ADMELOG) corrective regimen sliding scale   SubCutaneous at bedtime  iron sucrose IVPB 200 milliGRAM(s) IV Intermittent every 24 hours  labetalol 200 milliGRAM(s) Oral two times a day  levothyroxine 25 MICROGram(s) Oral daily  multivitamin 1 Tablet(s) Oral daily    MEDICATIONS  (PRN):  acetaminophen     Tablet .. 650 milliGRAM(s) Oral every 6 hours PRN Temp greater or equal to 38C (100.4F), Mild Pain (1 - 3)  dextrose Oral Gel 15 Gram(s) Oral once PRN Blood Glucose LESS THAN 70 milliGRAM(s)/deciliter  hydrocortisone 1% Cream 1 Application(s) Topical two times a day PRN Itching      CAPILLARY BLOOD GLUCOSE      POCT Blood Glucose.: 151 mg/dL (27 Dec 2022 07:49)  POCT Blood Glucose.: 282 mg/dL (26 Dec 2022 21:36)  POCT Blood Glucose.: 153 mg/dL (26 Dec 2022 16:49)    I&O's Summary    26 Dec 2022 07:01  -  27 Dec 2022 07:00  --------------------------------------------------------  IN: 1200 mL / OUT: 2565 mL / NET: -1365 mL    27 Dec 2022 07:01  -  27 Dec 2022 13:35  --------------------------------------------------------  IN: 0 mL / OUT: 500 mL / NET: -500 mL        PHYSICAL EXAM:  Vital Signs Last 24 Hrs  T(C): 36.4 (27 Dec 2022 11:15), Max: 36.9 (27 Dec 2022 05:12)  T(F): 97.5 (27 Dec 2022 11:15), Max: 98.5 (27 Dec 2022 05:12)  HR: 66 (27 Dec 2022 13:15) (63 - 71)  BP: 134/49 (27 Dec 2022 13:15) (122/52 - 178/68)  BP(mean): 74 (27 Dec 2022 13:15) (70 - 81)  RR: 14 (27 Dec 2022 13:15) (14 - 20)  SpO2: 99% (27 Dec 2022 13:15) (95% - 99%)    Parameters below as of 27 Dec 2022 12:00  Patient On (Oxygen Delivery Method): room air      CONSTITUTIONAL: NAD, well-developed, well-groomed  EYES: PERRL; conjunctiva and sclera clear  ENMT: Moist oral mucosa  RESPIRATORY: Normal respiratory effort; lungs are clear to auscultation bilaterally  CARDIOVASCULAR: Regular rate and rhythm, normal S1 and S2, + SM; No lower extremity edema   ABDOMEN: Nontender to palpation, normoactive bowel sounds, no rebound/guarding  MUSCULOSKELETAL: no clubbing or cyanosis of digits; no joint swelling or tenderness to palpation  PSYCH: A+O to person, place, and time; affect appropriate  SKIN: No rashes; no palpable lesions      LABS:                        8.6    8.67  )-----------( 284      ( 27 Dec 2022 05:39 )             26.6     12-27    142  |  108  |  73<H>  ----------------------------<  141<H>  4.3   |  20<L>  |  4.96<H>    Ca    7.9<L>      27 Dec 2022 05:39  Phos  5.9     12-27  Mg     2.3     12-27      PT/INR - ( 27 Dec 2022 05:39 )   PT: 10.4 sec;   INR: 0.91 ratio                     RADIOLOGY & ADDITIONAL TESTS:  Results Reviewed:   Imaging Personally Reviewed:  Electrocardiogram Personally Reviewed:    COORDINATION OF CARE:  Care Discussed with Consultants/Other Providers [Y/N]:  Prior or Outpatient Records Reviewed [Y/N]:  
[facility] DIVISION OF KIDNEY DISEASES AND HYPERTENSION -- PROGRESS NOTE    Chief complaint: OLENA on CKD    24 hour events/subjective:  resumed lasix      PAST HISTORY  --------------------------------------------------------------------------------  No significant changes to PMH, PSH, FHx, SHx, unless otherwise noted    ALLERGIES & MEDICATIONS  --------------------------------------------------------------------------------  Allergies    No Known Allergies    Intolerances      Standing Inpatient Medications  amLODIPine   Tablet 10 milliGRAM(s) Oral daily  AQUAPHOR (petrolatum Ointment) 1 Application(s) Topical two times a day  atorvastatin 40 milliGRAM(s) Oral at bedtime  dextrose 5%. 1000 milliLiter(s) IV Continuous <Continuous>  dextrose 5%. 1000 milliLiter(s) IV Continuous <Continuous>  dextrose 50% Injectable 25 Gram(s) IV Push once  dextrose 50% Injectable 25 Gram(s) IV Push once  ergocalciferol 54404 Unit(s) Oral every week  glucagon  Injectable 1 milliGRAM(s) IntraMuscular once  heparin   Injectable 5000 Unit(s) SubCutaneous every 12 hours  hydrALAZINE 50 milliGRAM(s) Oral three times a day  insulin lispro (ADMELOG) corrective regimen sliding scale   SubCutaneous three times a day before meals  insulin lispro (ADMELOG) corrective regimen sliding scale   SubCutaneous at bedtime  iron sucrose IVPB 200 milliGRAM(s) IV Intermittent every 24 hours  labetalol 200 milliGRAM(s) Oral two times a day  levothyroxine 25 MICROGram(s) Oral daily  multivitamin 1 Tablet(s) Oral daily    PRN Inpatient Medications  acetaminophen     Tablet .. 650 milliGRAM(s) Oral every 6 hours PRN  dextrose Oral Gel 15 Gram(s) Oral once PRN  hydrocortisone 1% Cream 1 Application(s) Topical two times a day PRN      REVIEW OF SYSTEMS  --------------------------------------------------------------------------------  Constitutional: [ ] Fever [ ] Chills [ ] Fatigue [ ] Weight change   HEENT: [ ] Blurred vision [ ] Eye Pain [ ] Headache [ ] Runny nose [ ] Sore Throat   Respiratory: [ ] Cough [ ] Wheezing [ ] Shortness of breath  Cardiovascular: [ ] Chest Pain [ ] Palpitations [ ] FLORES [ ] PND [ ] Orthopnea  Gastrointestinal: [ ] Abdominal Pain [ ] Diarrhea [ ] Constipation [ ] Hemorrhoids [ ] Nausea [ ] Vomiting  Genitourinary: [ ] Nocturia [ ] Dysuria [ ] Incontinence  Extremities: [ ] Swelling [ ] Joint Pain  Neurologic: [ ] Focal deficit [ ] Paresthesias [ ] Syncope  Lymphatic: [ ] Swelling [ ] Lymphadenopathy   Skin: [ ] Rash [ ] Ecchymoses [ ] Wounds [ ] Lesions  Psychiatry: [ ] Depression [ ] Suicidal/Homicidal Ideation [ ] Anxiety [ ] Sleep Disturbances  [x ] 10 point review of systems is otherwise negative except as mentioned above              [ ]Unable to obtain due to   All other systems were reviewed and are negative, except as noted.    VITALS/PHYSICAL EXAM  --------------------------------------------------------------------------------  T(C): 36.7 (12-26-22 @ 04:12), Max: 36.7 (12-25-22 @ 20:01)  HR: 76 (12-26-22 @ 04:12) (64 - 76)  BP: 158/63 (12-26-22 @ 04:12) (143/65 - 158/63)  RR: 18 (12-26-22 @ 04:12) (18 - 18)  SpO2: 97% (12-26-22 @ 04:12) (97% - 99%)  Wt(kg): --        12-25-22 @ 07:01  -  12-26-22 @ 07:00  --------------------------------------------------------  IN: 660 mL / OUT: 2050 mL / NET: -1390 mL    12-26-22 @ 07:01  -  12-26-22 @ 10:20  --------------------------------------------------------  IN: 0 mL / OUT: 650 mL / NET: -650 mL      Physical Exam:  	Gen: NAD, well-appearing  	HEENT: on room air  	Pulm: CTA B/L  	CV: normal S1S2; no rub  	Abd: soft                      Back : No sacral edema  	: No patrick  	LE: improving LE edema  	Skin: Warm, without rashes  	    LABS/STUDIES  --------------------------------------------------------------------------------              7.8    8.27  >-----------<  250      [12-26-22 @ 06:12]              24.1     141  |  106  |  74  ----------------------------<  199      [12-26-22 @ 06:12]  4.3   |  19  |  5.35        Ca     7.5     [12-26-22 @ 06:12]      Mg     2.2     [12-26-22 @ 06:12]      Phos  6.2     [12-26-22 @ 06:12]            Creatinine Trend:  SCr 5.35 [12-26 @ 06:12]  SCr 4.98 [12-25 @ 05:44]  SCr 4.72 [12-24 @ 06:14]  SCr 4.15 [12-23 @ 05:58]  SCr 3.69 [12-22 @ 05:56]    Urinalysis - [12-24-22 @ 20:49]      Color Light Yellow / Appearance Clear / SG 1.009 / pH 6.5      Gluc 500 mg/dL / Ketone Negative  / Bili Negative / Urobili Negative       Blood Small / Protein 300 mg/dL / Leuk Est Negative / Nitrite Negative      RBC 1 / WBC 1 / Hyaline 1 / Gran  / Sq Epi  / Non Sq Epi 1 / Bacteria Negative    Urine Creatinine 22      [12-22-22 @ 17:14]  Urine Protein 259      [12-22-22 @ 17:14]  Urine Sodium 128      [12-22-22 @ 17:14]  Urine Osmolality 336      [12-22-22 @ 17:14]    Iron 48, TIBC 256, %sat 19      [12-24-22 @ 06:13]  PTH -- (Ca 8.3)      [12-24-22 @ 06:06]   242  Vitamin D (25OH) 8.8      [12-24-22 @ 06:06]  TSH 10.10      [12-22-22 @ 05:52]  Lipid: chol 142, , HDL 51, LDL --      [12-22-22 @ 05:56]    HCV 0.15, Nonreact      [12-22-22 @ 05:56]    Free Light Chains: kappa 13.57, lambda 11.30, ratio = 1.20      [12-24 @ 06:13]  
[facility] DIVISION OF KIDNEY DISEASES AND HYPERTENSION -- FOLLOW UP NOTE  --------------------------------------------------------------------------------  HPI:  Pt. is a 72 y.o. M w/ PMHx of HTN, HLD, DM2 presents with Dyspnea for the past 3-4 weeks. Nephrology consulted for OLENA. Pt. states that he was given a water pill months ago but stopped taking it because he thought he was urinating enough already and he did not like waking up at night to urinate. He started taking the pill again recently but ran out again about 1 week ago and did not  the refill. He states that he was at 50% kidney function but when he renal function went to 30% 2 months ago he saw a "kidney doctor" once but did not follow up. Denies any orthopnea currently but after examination asking to be propped up. Denies having any heart problems. Has had diabetes for over 20 years. Some eye issues but unable to elaborate. CXR here showing pulmonary vascular congestion and bilateral trace to small pleural effusions. Takes Metformin 1000mg BID in addition to Glyburide? Metformin 2.5mg/ 500mg daily for DM. No blood in urine.     Pt. seen this AM and endorses marked improvement in breathing and decreased swelling. Pt. with 2.5L UOP. Lasix stopped Aldactone started. For biopsy today.    PAST HISTORY  --------------------------------------------------------------------------------  No significant changes to PMH, PSH, FHx, SHx, unless otherwise noted    ALLERGIES & MEDICATIONS  --------------------------------------------------------------------------------  Allergies    No Known Allergies    Intolerances      Standing Inpatient Medications  amLODIPine   Tablet 10 milliGRAM(s) Oral daily  AQUAPHOR (petrolatum Ointment) 1 Application(s) Topical two times a day  atorvastatin 40 milliGRAM(s) Oral at bedtime  calcium acetate 667 milliGRAM(s) Oral two times a day with meals  dextrose 5%. 1000 milliLiter(s) IV Continuous <Continuous>  dextrose 5%. 1000 milliLiter(s) IV Continuous <Continuous>  dextrose 50% Injectable 25 Gram(s) IV Push once  dextrose 50% Injectable 25 Gram(s) IV Push once  ergocalciferol 62979 Unit(s) Oral every week  glucagon  Injectable 1 milliGRAM(s) IntraMuscular once  hydrALAZINE 50 milliGRAM(s) Oral three times a day  insulin lispro (ADMELOG) corrective regimen sliding scale   SubCutaneous three times a day before meals  insulin lispro (ADMELOG) corrective regimen sliding scale   SubCutaneous at bedtime  iron sucrose IVPB 200 milliGRAM(s) IV Intermittent every 24 hours  labetalol 200 milliGRAM(s) Oral two times a day  levothyroxine 25 MICROGram(s) Oral daily  multivitamin 1 Tablet(s) Oral daily    PRN Inpatient Medications  acetaminophen     Tablet .. 650 milliGRAM(s) Oral every 6 hours PRN  dextrose Oral Gel 15 Gram(s) Oral once PRN  hydrocortisone 1% Cream 1 Application(s) Topical two times a day PRN      REVIEW OF SYSTEMS  --------------------------------------------------------------------------------  As per HPI    VITALS/PHYSICAL EXAM  --------------------------------------------------------------------------------  T(C): 36.4 (12-27-22 @ 11:15), Max: 36.9 (12-27-22 @ 05:12)  HR: 67 (12-27-22 @ 12:45) (63 - 71)  BP: 134/51 (12-27-22 @ 12:45) (122/52 - 178/68)  RR: 19 (12-27-22 @ 12:45) (15 - 20)  SpO2: 96% (12-27-22 @ 12:45) (95% - 99%)  Wt(kg): --  Height (cm): 165.1 (12-27-22 @ 09:47)  Weight (kg): 96.5 (12-27-22 @ 09:47)  BMI (kg/m2): 35.4 (12-27-22 @ 09:47)  BSA (m2): 2.03 (12-27-22 @ 09:47)      12-26-22 @ 07:01  -  12-27-22 @ 07:00  --------------------------------------------------------  IN: 1200 mL / OUT: 2565 mL / NET: -1365 mL    12-27-22 @ 07:01  -  12-27-22 @ 12:56  --------------------------------------------------------  IN: 0 mL / OUT: 500 mL / NET: -500 mL      Physical Exam:  	Gen: NAD  	HEENT: MMM  	Pulm: CTAB  	CV: S1S2  	Abd: Soft, +BS   	Ext: ++LE edema B/L with chronic skin changes,  	Neuro/Psych: Awake, remorseful   	Skin: Warm and dry  	Vascular access: peripheral       LABS/STUDIES  --------------------------------------------------------------------------------              8.6    8.67  >-----------<  284      [12-27-22 @ 05:39]              26.6     142  |  108  |  73  ----------------------------<  141      [12-27-22 @ 05:39]  4.3   |  20  |  4.96        Ca     7.9     [12-27-22 @ 05:39]      iCa    1.06     [12-27 @ 05:39]      Mg     2.3     [12-27-22 @ 05:39]      Phos  5.9     [12-27-22 @ 05:39]      PT/INR: PT 10.4 , INR 0.91       [12-27-22 @ 05:39]      Creatinine Trend:  SCr 4.96 [12-27 @ 05:39]  SCr 5.35 [12-26 @ 06:12]  SCr 4.98 [12-25 @ 05:44]  SCr 4.72 [12-24 @ 06:14]  SCr 4.15 [12-23 @ 05:58]    Urinalysis - [12-24-22 @ 20:49]      Color Light Yellow / Appearance Clear / SG 1.009 / pH 6.5      Gluc 500 mg/dL / Ketone Negative  / Bili Negative / Urobili Negative       Blood Small / Protein 300 mg/dL / Leuk Est Negative / Nitrite Negative      RBC 1 / WBC 1 / Hyaline 1 / Gran  / Sq Epi  / Non Sq Epi 1 / Bacteria Negative    Urine Creatinine 22      [12-22-22 @ 17:14]  Urine Protein 259      [12-22-22 @ 17:14]  Urine Sodium 128      [12-22-22 @ 17:14]  Urine Osmolality 336      [12-22-22 @ 17:14]    Iron 48, TIBC 256, %sat 19      [12-24-22 @ 06:13]  PTH -- (Ca 8.3)      [12-24-22 @ 06:06]   242  Vitamin D (25OH) 8.8      [12-24-22 @ 06:06]  TSH 10.10      [12-22-22 @ 05:52]  Lipid: chol 142, , HDL 51, LDL --      [12-22-22 @ 05:56]    HCV 0.15, Nonreact      [12-22-22 @ 05:56]    Free Light Chains: kappa 13.57, lambda 11.30, ratio = 1.20      [12-24 @ 06:13]  
[facility] DIVISION OF KIDNEY DISEASES AND HYPERTENSION -- FOLLOW UP NOTE  --------------------------------------------------------------------------------  HPI:  Pt. is a 72 y.o. M w/ PMHx of HTN, HLD, DM2 presents with Dyspnea for the past 3-4 weeks. Nephrology consulted for OLENA. Pt. states that he was given a water pill months ago but stopped taking it because he thought he was urinating enough already and he did not like waking up at night to urinate. He started taking the pill again recently but ran out again about 1 week ago and did not  the refill. He states that he was at 50% kidney function but when he renal function went to 30% 2 months ago he saw a "kidney doctor" once but did not follow up. Denies any orthopnea currently but after examination asking to be propped up. Denies having any heart problems. Has had diabetes for over 20 years. Some eye issues but unable to elaborate. CXR here showing pulmonary vascular congestion and bilateral trace to small pleural effusions. Takes Metformin 1000mg BID in addition to Glyburide? Metformin 2.5mg/ 500mg daily for DM. No blood in urine.     Pt. seen this AM and endorses marked improvement in breathing and decreased swelling. UOP: 2.4L Biopsy results showed.       PAST HISTORY  --------------------------------------------------------------------------------  No significant changes to PMH, PSH, FHx, SHx, unless otherwise noted    ALLERGIES & MEDICATIONS  --------------------------------------------------------------------------------  Allergies    No Known Allergies    Intolerances      Standing Inpatient Medications  amLODIPine   Tablet 10 milliGRAM(s) Oral daily  AQUAPHOR (petrolatum Ointment) 1 Application(s) Topical two times a day  atorvastatin 40 milliGRAM(s) Oral at bedtime  calcium acetate 667 milliGRAM(s) Oral three times a day with meals  dextrose 5%. 1000 milliLiter(s) IV Continuous <Continuous>  dextrose 5%. 1000 milliLiter(s) IV Continuous <Continuous>  dextrose 50% Injectable 25 Gram(s) IV Push once  dextrose 50% Injectable 25 Gram(s) IV Push once  ergocalciferol 63801 Unit(s) Oral every week  furosemide   Injectable 40 milliGRAM(s) IV Push two times a day  glucagon  Injectable 1 milliGRAM(s) IntraMuscular once  hydrALAZINE 75 milliGRAM(s) Oral every 8 hours  insulin lispro (ADMELOG) corrective regimen sliding scale   SubCutaneous three times a day before meals  insulin lispro (ADMELOG) corrective regimen sliding scale   SubCutaneous at bedtime  iron sucrose IVPB 200 milliGRAM(s) IV Intermittent every 24 hours  labetalol 200 milliGRAM(s) Oral two times a day  levothyroxine 25 MICROGram(s) Oral daily  multivitamin 1 Tablet(s) Oral daily    PRN Inpatient Medications  acetaminophen     Tablet .. 650 milliGRAM(s) Oral every 6 hours PRN  dextrose Oral Gel 15 Gram(s) Oral once PRN  hydrocortisone 1% Cream 1 Application(s) Topical two times a day PRN      REVIEW OF SYSTEMS  --------------------------------------------------------------------------------  As per HPI    VITALS/PHYSICAL EXAM  --------------------------------------------------------------------------------  T(C): 36.4 (12-28-22 @ 11:01), Max: 36.8 (12-27-22 @ 17:00)  HR: 66 (12-28-22 @ 11:01) (62 - 75)  BP: 156/63 (12-28-22 @ 11:01) (140/54 - 163/65)  RR: 18 (12-28-22 @ 11:01) (18 - 18)  SpO2: 97% (12-28-22 @ 11:01) (96% - 99%)  Wt(kg): --  Height (cm): 165.1 (12-27-22 @ 09:47)  Weight (kg): 96.5 (12-27-22 @ 09:47)  BMI (kg/m2): 35.4 (12-27-22 @ 09:47)  BSA (m2): 2.03 (12-27-22 @ 09:47)      12-27-22 @ 07:01  -  12-28-22 @ 07:00  --------------------------------------------------------  IN: 0 mL / OUT: 2400 mL / NET: -2400 mL    12-28-22 @ 07:01  -  12-28-22 @ 14:32  --------------------------------------------------------  IN: 720 mL / OUT: 600 mL / NET: 120 mL      Physical Exam:  	Gen: NAD  	HEENT: MMM  	Pulm: CTAB  	CV: S1S2  	Abd: Soft, +BS   	Ext: ++LE edema B/L with chronic skin changes,  	Neuro/Psych: Awake, remorseful   	Skin: Warm and dry  	Vascular access: peripheral       LABS/STUDIES  --------------------------------------------------------------------------------              8.3    8.57  >-----------<  243      [12-28-22 @ 05:44]              26.1     141  |  107  |  80  ----------------------------<  174      [12-28-22 @ 05:43]  4.4   |  20  |  5.26        Ca     8.2     [12-28-22 @ 05:43]      iCa    1.06     [12-27 @ 05:39]      Mg     2.3     [12-27-22 @ 05:39]      Phos  5.9     [12-27-22 @ 05:39]      PT/INR: PT 10.4 , INR 0.91       [12-27-22 @ 05:39]      Creatinine Trend:  SCr 5.26 [12-28 @ 05:43]  SCr 4.96 [12-27 @ 05:39]  SCr 5.35 [12-26 @ 06:12]  SCr 4.98 [12-25 @ 05:44]  SCr 4.72 [12-24 @ 06:14]    Urinalysis - [12-24-22 @ 20:49]      Color Light Yellow / Appearance Clear / SG 1.009 / pH 6.5      Gluc 500 mg/dL / Ketone Negative  / Bili Negative / Urobili Negative       Blood Small / Protein 300 mg/dL / Leuk Est Negative / Nitrite Negative      RBC 1 / WBC 1 / Hyaline 1 / Gran  / Sq Epi  / Non Sq Epi 1 / Bacteria Negative    Urine Creatinine 22      [12-22-22 @ 17:14]  Urine Protein 259      [12-22-22 @ 17:14]  Urine Sodium 128      [12-22-22 @ 17:14]  Urine Osmolality 336      [12-22-22 @ 17:14]    Iron 48, TIBC 256, %sat 19      [12-24-22 @ 06:13]  PTH -- (Ca 8.3)      [12-24-22 @ 06:06]   242  Vitamin D (25OH) 8.8      [12-24-22 @ 06:06]  TSH 10.10      [12-22-22 @ 05:52]  Lipid: chol 142, , HDL 51, LDL --      [12-22-22 @ 05:56]    HCV 0.15, Nonreact      [12-22-22 @ 05:56]    PLA2R: GIOVANNY <1.8, IFA --      [12-24-22 @ 06:05]  Free Light Chains: kappa 13.57, lambda 11.30, ratio = 1.20      [12-24 @ 06:13]  
Vascular Surgery Progress Note    SUBJECTIVE  No acute events overnight. Pt seen and examined on mornings rounds.    OBJECTIVE  ___________________________________________________  VITAL SIGNS / I&O's   Vital Signs Last 24 Hrs  T(C): 36.5 (29 Dec 2022 04:44), Max: 36.7 (29 Dec 2022 03:10)  T(F): 97.7 (29 Dec 2022 04:44), Max: 98.1 (29 Dec 2022 03:10)  HR: 71 (29 Dec 2022 04:44) (66 - 72)  BP: 163/60 (29 Dec 2022 04:44) (160/64 - 178/60)  BP(mean): --  RR: 18 (29 Dec 2022 04:44) (18 - 18)  SpO2: 96% (29 Dec 2022 04:44) (95% - 98%)    Parameters below as of 29 Dec 2022 03:10  Patient On (Oxygen Delivery Method): room air          28 Dec 2022 07:01  -  29 Dec 2022 07:00  --------------------------------------------------------  IN:    Oral Fluid: 720 mL  Total IN: 720 mL    OUT:    Voided (mL): 3050 mL  Total OUT: 3050 mL    Total NET: -2330 mL      29 Dec 2022 07:01  -  29 Dec 2022 11:52  --------------------------------------------------------  IN:    Oral Fluid: 360 mL  Total IN: 360 mL    OUT:    Voided (mL): 600 mL  Total OUT: 600 mL    Total NET: -240 mL        ___________________________________________________  PHYSICAL EXAM    Gen: NAD  CV: Regular rate  Resp: Nonlabored breathing on room air  Ext: Bilateral upper extremities without injuries, LUE protected with pink band and no IVs in place, no abnormalities noted on Simeon's test bilaterally    ___________________________________________________  LABS                        8.9    9.84  )-----------( 277      ( 29 Dec 2022 05:53 )             27.7     29 Dec 2022 05:53    140    |  105    |  77     ----------------------------<  141    4.3     |  20     |  4.96     Ca    8.7        29 Dec 2022 05:53        CAPILLARY BLOOD GLUCOSE      POCT Blood Glucose.: 192 mg/dL (29 Dec 2022 08:18)  POCT Blood Glucose.: 167 mg/dL (28 Dec 2022 21:56)  POCT Blood Glucose.: 163 mg/dL (28 Dec 2022 16:51)    ___________________________________________________  MEDICATIONS  (STANDING):  amLODIPine   Tablet 10 milliGRAM(s) Oral daily  AQUAPHOR (petrolatum Ointment) 1 Application(s) Topical two times a day  atorvastatin 40 milliGRAM(s) Oral at bedtime  calcium acetate 667 milliGRAM(s) Oral three times a day with meals  carvedilol 12.5 milliGRAM(s) Oral every 12 hours  dextrose 5%. 1000 milliLiter(s) (50 mL/Hr) IV Continuous <Continuous>  dextrose 5%. 1000 milliLiter(s) (100 mL/Hr) IV Continuous <Continuous>  dextrose 50% Injectable 25 Gram(s) IV Push once  dextrose 50% Injectable 25 Gram(s) IV Push once  ergocalciferol 18526 Unit(s) Oral every week  glucagon  Injectable 1 milliGRAM(s) IntraMuscular once  hydrALAZINE 100 milliGRAM(s) Oral every 8 hours  insulin lispro (ADMELOG) corrective regimen sliding scale   SubCutaneous three times a day before meals  insulin lispro (ADMELOG) corrective regimen sliding scale   SubCutaneous at bedtime  levothyroxine 25 MICROGram(s) Oral daily  multivitamin 1 Tablet(s) Oral daily  sodium bicarbonate 1300 milliGRAM(s) Oral two times a day  torsemide 60 milliGRAM(s) Oral daily    MEDICATIONS  (PRN):  acetaminophen     Tablet .. 650 milliGRAM(s) Oral every 6 hours PRN Temp greater or equal to 38C (100.4F), Mild Pain (1 - 3)  dextrose Oral Gel 15 Gram(s) Oral once PRN Blood Glucose LESS THAN 70 milliGRAM(s)/deciliter  hydrocortisone 1% Cream 1 Application(s) Topical two times a day PRN Itching  
INTERVAL EVENTS/SUBJ:  No events     Home Medications:  amLODIPine 10 mg oral tablet: 1 tab(s) orally once a day (21 Dec 2022 09:58)  Aspirin Enteric Coated 81 mg oral delayed release tablet: 1 tab(s) orally once a day (21 Dec 2022 09:58)  atorvastatin 40 mg oral tablet: 1 tab(s) orally once a day (21 Dec 2022 09:58)  glyburide-metformin 2.5 mg-500 mg oral tablet: 1 tab(s) orally 2 times a day (21 Dec 2022 09:58)  hydrALAZINE 50 mg oral tablet: 1 tab(s) orally 2 times a day (21 Dec 2022 09:58)  levothyroxine 25 mcg (0.025 mg) oral tablet: 1 tab(s) orally once a day (21 Dec 2022 09:58)  losartan 100 mg oral tablet: 1 tab(s) orally once a day (21 Dec 2022 09:58)  metFORMIN 1000 mg oral tablet: 1 tab(s) orally once a day (21 Dec 2022 09:58)  Metoprolol Succinate ER 25 mg oral tablet, extended release: 1 tab(s) orally once a day (21 Dec 2022 09:58)  Multiple Vitamins oral tablet: 1 tab(s) orally once a day (21 Dec 2022 09:58)  Vascepa 1 g oral capsule: 1 cap(s) orally 2 times a day (21 Dec 2022 09:58)      MEDICATIONS  (STANDING):  amLODIPine   Tablet 10 milliGRAM(s) Oral daily  AQUAPHOR (petrolatum Ointment) 1 Application(s) Topical two times a day  atorvastatin 40 milliGRAM(s) Oral at bedtime  calcium acetate 667 milliGRAM(s) Oral three times a day with meals  carvedilol 12.5 milliGRAM(s) Oral every 12 hours  dextrose 5%. 1000 milliLiter(s) (50 mL/Hr) IV Continuous <Continuous>  dextrose 5%. 1000 milliLiter(s) (100 mL/Hr) IV Continuous <Continuous>  dextrose 50% Injectable 25 Gram(s) IV Push once  dextrose 50% Injectable 25 Gram(s) IV Push once  epoetin roes-epbx (RETACRIT) Injectable 54337 Unit(s) SubCutaneous once  ergocalciferol 26797 Unit(s) Oral every week  glucagon  Injectable 1 milliGRAM(s) IntraMuscular once  hydrALAZINE 100 milliGRAM(s) Oral every 8 hours  insulin lispro (ADMELOG) corrective regimen sliding scale   SubCutaneous three times a day before meals  insulin lispro (ADMELOG) corrective regimen sliding scale   SubCutaneous at bedtime  levothyroxine 25 MICROGram(s) Oral daily  multivitamin 1 Tablet(s) Oral daily  sodium bicarbonate 1300 milliGRAM(s) Oral two times a day  torsemide 60 milliGRAM(s) Oral daily    MEDICATIONS  (PRN):  acetaminophen     Tablet .. 650 milliGRAM(s) Oral every 6 hours PRN Temp greater or equal to 38C (100.4F), Mild Pain (1 - 3)  dextrose Oral Gel 15 Gram(s) Oral once PRN Blood Glucose LESS THAN 70 milliGRAM(s)/deciliter  hydrocortisone 1% Cream 1 Application(s) Topical two times a day PRN Itching      Vital Signs Last 24 Hrs  T(C): 36.6 (29 Dec 2022 12:45), Max: 36.7 (29 Dec 2022 03:10)  T(F): 97.8 (29 Dec 2022 12:45), Max: 98.1 (29 Dec 2022 03:10)  HR: 64 (29 Dec 2022 12:45) (64 - 72)  BP: 172/56 (29 Dec 2022 12:45) (163/60 - 178/60)  BP(mean): --  RR: 18 (29 Dec 2022 12:45) (18 - 18)  SpO2: 98% (29 Dec 2022 12:45) (95% - 98%)    Parameters below as of 29 Dec 2022 12:45  Patient On (Oxygen Delivery Method): room air        REVIEW OF SYSTEMS:  As per HPI, otherwise unremarkable.     PHYSICAL EXAM:  Constitutional/Appearance: Normal, Well-developed  HEENT:   Normal oral mucosa, no drainage or redness, supple neck  Lymphatic: No lymphadenopathy  Cardiovascular: Normal S1 S2, No edema, II/VI HOLDEN  Respiratory: Lungs clear to auscultation, respirations non-labored  Psychiatry: A & O x 3, appropriate affect.   Gastrointestinal:  Soft, Non-tender, no distention  Skin: No rashes, No ecchymoses, No cyanosis	  Neurologic: Non-focal, Alert and oriented x 3  Extremities: Normal range of motion  Vascular: Peripheral pulses palpable 2+ bilaterally (radial)    LABS:  CBC Full  -  ( 29 Dec 2022 05:53 )  WBC Count : 9.84 K/uL  RBC Count : 3.00 M/uL  Hemoglobin : 8.9 g/dL  Hematocrit : 27.7 %  Platelet Count - Automated : 277 K/uL  Mean Cell Volume : 92.3 fl  Mean Cell Hemoglobin : 29.7 pg  Mean Cell Hemoglobin Concentration : 32.1 gm/dL  Auto Neutrophil # : x  Auto Lymphocyte # : x  Auto Monocyte # : x  Auto Eosinophil # : x  Auto Basophil # : x  Auto Neutrophil % : x  Auto Lymphocyte % : x  Auto Monocyte % : x  Auto Eosinophil % : x  Auto Basophil % : x      12-29    140  |  105  |  77<H>  ----------------------------<  141<H>  4.3   |  20<L>  |  4.96<H>    Ca    8.7      29 Dec 2022 05:53    IMPRESSION AND PLAN: 72M w DM, HTN, HL, hypothyroid here w volume overload and elevated troponin. Likely 2/2 severe hypertension with OLENA on CKD. AVF pending -- pt is moderate risk for low risk procedure with cardiac risk factors and uncontrolled BP, however no further cardiac testing is indicated. Pt is stable to proceed with AVF as follows  -holding lasix, appreciate nephrology recs  -holding losartan  -cont labetalol  -cont amlodipine 10, hydral 50 q8   -tele    ***    Bryan Harry MD, MPhil, [facility]  Cardiologist, [facility]  ; [employer] at [facility]  email: [email or web address] Card[facility] and Cardiovascular Surgery on-service contact/call information, go to [email or web address] and use "[email or web address]" to login.  Outpatient Cardiology appointments, call  [phone] to arrange with a colleague; I do not have outpatient Cardiology clinic.   
INTERVAL EVENTS/SUBJ:  No events     Home Medications:  amLODIPine 10 mg oral tablet: 1 tab(s) orally once a day (21 Dec 2022 09:58)  Aspirin Enteric Coated 81 mg oral delayed release tablet: 1 tab(s) orally once a day (21 Dec 2022 09:58)  atorvastatin 40 mg oral tablet: 1 tab(s) orally once a day (21 Dec 2022 09:58)  glyburide-metformin 2.5 mg-500 mg oral tablet: 1 tab(s) orally 2 times a day (21 Dec 2022 09:58)  hydrALAZINE 50 mg oral tablet: 1 tab(s) orally 2 times a day (21 Dec 2022 09:58)  levothyroxine 25 mcg (0.025 mg) oral tablet: 1 tab(s) orally once a day (21 Dec 2022 09:58)  losartan 100 mg oral tablet: 1 tab(s) orally once a day (21 Dec 2022 09:58)  metFORMIN 1000 mg oral tablet: 1 tab(s) orally once a day (21 Dec 2022 09:58)  Metoprolol Succinate ER 25 mg oral tablet, extended release: 1 tab(s) orally once a day (21 Dec 2022 09:58)  Multiple Vitamins oral tablet: 1 tab(s) orally once a day (21 Dec 2022 09:58)  Vascepa 1 g oral capsule: 1 cap(s) orally 2 times a day (21 Dec 2022 09:58)      MEDICATIONS  (STANDING):  amLODIPine   Tablet 10 milliGRAM(s) Oral daily  AQUAPHOR (petrolatum Ointment) 1 Application(s) Topical two times a day  atorvastatin 40 milliGRAM(s) Oral at bedtime  calcium acetate 667 milliGRAM(s) Oral three times a day with meals  carvedilol 12.5 milliGRAM(s) Oral every 12 hours  dextrose 5%. 1000 milliLiter(s) (50 mL/Hr) IV Continuous <Continuous>  dextrose 5%. 1000 milliLiter(s) (100 mL/Hr) IV Continuous <Continuous>  dextrose 50% Injectable 25 Gram(s) IV Push once  dextrose 50% Injectable 25 Gram(s) IV Push once  epoetin rose-epbx (RETACRIT) Injectable 95734 Unit(s) SubCutaneous once  ergocalciferol 95795 Unit(s) Oral every week  glucagon  Injectable 1 milliGRAM(s) IntraMuscular once  hydrALAZINE 100 milliGRAM(s) Oral every 8 hours  insulin lispro (ADMELOG) corrective regimen sliding scale   SubCutaneous three times a day before meals  insulin lispro (ADMELOG) corrective regimen sliding scale   SubCutaneous at bedtime  levothyroxine 25 MICROGram(s) Oral daily  multivitamin 1 Tablet(s) Oral daily  sodium bicarbonate 1300 milliGRAM(s) Oral two times a day  spironolactone 100 milliGRAM(s) Oral daily  torsemide 60 milliGRAM(s) Oral daily    MEDICATIONS  (PRN):  acetaminophen     Tablet .. 650 milliGRAM(s) Oral every 6 hours PRN Temp greater or equal to 38C (100.4F), Mild Pain (1 - 3)  dextrose Oral Gel 15 Gram(s) Oral once PRN Blood Glucose LESS THAN 70 milliGRAM(s)/deciliter  hydrocortisone 1% Cream 1 Application(s) Topical two times a day PRN Itching      Vital Signs Last 24 Hrs  T(C): 36.7 (30 Dec 2022 11:56), Max: 36.8 (30 Dec 2022 00:07)  T(F): 98 (30 Dec 2022 11:56), Max: 98.2 (30 Dec 2022 00:07)  HR: 69 (30 Dec 2022 11:56) (64 - 77)  BP: 159/62 (30 Dec 2022 11:56) (159/62 - 174/65)  BP(mean): --  RR: 18 (30 Dec 2022 11:56) (18 - 18)  SpO2: 96% (30 Dec 2022 11:56) (96% - 98%)    Parameters below as of 30 Dec 2022 11:56  Patient On (Oxygen Delivery Method): room air        REVIEW OF SYSTEMS:  As per HPI, otherwise unremarkable.     PHYSICAL EXAM:  Constitutional/Appearance: Normal, Well-developed  HEENT:   Normal oral mucosa, no drainage or redness, supple neck  Lymphatic: No lymphadenopathy  Cardiovascular: Normal S1 S2, No edema, II/VI HOLDEN  Respiratory: Lungs clear to auscultation, respirations non-labored  Psychiatry: A & O x 3, appropriate affect.   Gastrointestinal:  Soft, Non-tender, no distention  Skin: No rashes, No ecchymoses, No cyanosis	  Neurologic: Non-focal, Alert and oriented x 3  Extremities: Normal range of motion  Vascular: Peripheral pulses palpable 2+ bilaterally (radial)    LABS:  CBC Full  -  ( 30 Dec 2022 02:22 )  WBC Count : 10.38 K/uL  RBC Count : 3.11 M/uL  Hemoglobin : 9.2 g/dL  Hematocrit : 28.4 %  Platelet Count - Automated : 292 K/uL  Mean Cell Volume : 91.3 fl  Mean Cell Hemoglobin : 29.6 pg  Mean Cell Hemoglobin Concentration : 32.4 gm/dL  Auto Neutrophil # : x  Auto Lymphocyte # : x  Auto Monocyte # : x  Auto Eosinophil # : x  Auto Basophil # : x  Auto Neutrophil % : x  Auto Lymphocyte % : x  Auto Monocyte % : x  Auto Eosinophil % : x  Auto Basophil % : x      12-30    140  |  105  |  76<H>  ----------------------------<  174<H>  4.3   |  21<L>  |  5.02<H>    Ca    8.8      30 Dec 2022 02:22  Phos  5.3     12-30  Mg     2.3     12-30      IMPRESSION AND PLAN: 72M w DM, HTN, HL, hypothyroid here w volume overload and elevated troponin. Likely 2/2 severe hypertension with OLENA on CKD. AVF pending   -holding lasix, appreciate nephrology recs  -holding losartan  -cont labetalol  -cont amlodipine 10, hydral 50 q8   -tele  -cardiology to sign off, please reconsult for further questions      ***    Bryan Harry MD, MPhil, [facility]  Cardiologist, [facility]  ; [employer] at [facility]  email: [email or web address] Cardiology and Cardiovascular Surgery on-service contact/call information, go to [email or web address] and use "[email or web address]" to login.  Outpatient Cardiology appointments, call  [phone] to arrange with a colleague; I do not have outpatient Cardiology clinic.   
INTERVAL EVENTS/SUBJ:  no events    Home Medications:  amLODIPine 10 mg oral tablet: 1 tab(s) orally once a day (21 Dec 2022 09:58)  Aspirin Enteric Coated 81 mg oral delayed release tablet: 1 tab(s) orally once a day (21 Dec 2022 09:58)  atorvastatin 40 mg oral tablet: 1 tab(s) orally once a day (21 Dec 2022 09:58)  glyburide-metformin 2.5 mg-500 mg oral tablet: 1 tab(s) orally 2 times a day (21 Dec 2022 09:58)  hydrALAZINE 50 mg oral tablet: 1 tab(s) orally 2 times a day (21 Dec 2022 09:58)  levothyroxine 25 mcg (0.025 mg) oral tablet: 1 tab(s) orally once a day (21 Dec 2022 09:58)  losartan 100 mg oral tablet: 1 tab(s) orally once a day (21 Dec 2022 09:58)  metFORMIN 1000 mg oral tablet: 1 tab(s) orally once a day (21 Dec 2022 09:58)  Metoprolol Succinate ER 25 mg oral tablet, extended release: 1 tab(s) orally once a day (21 Dec 2022 09:58)  Multiple Vitamins oral tablet: 1 tab(s) orally once a day (21 Dec 2022 09:58)  Vascepa 1 g oral capsule: 1 cap(s) orally 2 times a day (21 Dec 2022 09:58)      MEDICATIONS  (STANDING):  amLODIPine   Tablet 10 milliGRAM(s) Oral daily  AQUAPHOR (petrolatum Ointment) 1 Application(s) Topical two times a day  aspirin enteric coated 81 milliGRAM(s) Oral daily  atorvastatin 40 milliGRAM(s) Oral at bedtime  dextrose 5%. 1000 milliLiter(s) (50 mL/Hr) IV Continuous <Continuous>  dextrose 5%. 1000 milliLiter(s) (100 mL/Hr) IV Continuous <Continuous>  dextrose 50% Injectable 25 Gram(s) IV Push once  dextrose 50% Injectable 25 Gram(s) IV Push once  furosemide   Injectable 80 milliGRAM(s) IV Push two times a day  glucagon  Injectable 1 milliGRAM(s) IntraMuscular once  hydrALAZINE 50 milliGRAM(s) Oral three times a day  insulin lispro (ADMELOG) corrective regimen sliding scale   SubCutaneous three times a day before meals  insulin lispro (ADMELOG) corrective regimen sliding scale   SubCutaneous at bedtime  levothyroxine 25 MICROGram(s) Oral daily  metoprolol succinate ER 25 milliGRAM(s) Oral daily  multivitamin 1 Tablet(s) Oral daily    MEDICATIONS  (PRN):  acetaminophen     Tablet .. 650 milliGRAM(s) Oral every 6 hours PRN Temp greater or equal to 38C (100.4F), Mild Pain (1 - 3)  dextrose Oral Gel 15 Gram(s) Oral once PRN Blood Glucose LESS THAN 70 milliGRAM(s)/deciliter  hydrocortisone 1% Cream 1 Application(s) Topical two times a day PRN Itching      Vital Signs Last 24 Hrs  T(C): 36.4 (23 Dec 2022 04:15), Max: 36.9 (22 Dec 2022 21:33)  T(F): 97.5 (23 Dec 2022 04:15), Max: 98.5 (22 Dec 2022 21:33)  HR: 70 (23 Dec 2022 04:15) (69 - 80)  BP: 184/71 (23 Dec 2022 04:15) (155/76 - 184/71)  BP(mean): --  RR: 18 (23 Dec 2022 04:15) (18 - 20)  SpO2: 97% (23 Dec 2022 04:15) (96% - 98%)    Parameters below as of 23 Dec 2022 04:15  Patient On (Oxygen Delivery Method): room air        REVIEW OF SYSTEMS:  As per HPI, otherwise unremarkable.     PHYSICAL EXAM:  Constitutional/Appearance: Normal, Well-developed  HEENT:   Normal oral mucosa, no drainage or redness, supple neck  Lymphatic: No lymphadenopathy  Cardiovascular: Normal S1 S2, No edema, II/VI HOLDEN  Respiratory: Lungs clear to auscultation, respirations non-labored  Psychiatry: A & O x 3, appropriate affect.   Gastrointestinal:  Soft, Non-tender, no distention  Skin: No rashes, No ecchymoses, No cyanosis	  Neurologic: Non-focal, Alert and oriented x 3  Extremities: Normal range of motion  Vascular: Peripheral pulses palpable 2+ bilaterally (radial)    LABS:  CBC Full  -  ( 23 Dec 2022 05:58 )  WBC Count : 9.17 K/uL  RBC Count : 3.12 M/uL  Hemoglobin : 9.0 g/dL  Hematocrit : 28.6 %  Platelet Count - Automated : 279 K/uL  Mean Cell Volume : 91.7 fl  Mean Cell Hemoglobin : 28.8 pg  Mean Cell Hemoglobin Concentration : 31.5 gm/dL  Auto Neutrophil # : x  Auto Lymphocyte # : x  Auto Monocyte # : x  Auto Eosinophil # : x  Auto Basophil # : x  Auto Neutrophil % : x  Auto Lymphocyte % : x  Auto Monocyte % : x  Auto Eosinophil % : x  Auto Basophil % : x      12-23    140  |  110<H>  |  58<H>  ----------------------------<  119<H>  3.9   |  18<L>  |  4.15<H>    Ca    8.1<L>      23 Dec 2022 05:58  Mg     2.0     12-23        CARDIAC MARKERS ( 23 Dec 2022 05:58 )  x     / x     / 746 U/L / x     / x        IMPRESSION AND PLAN: 72M w DM, HTN, HL, hypothyroid here w volume overload and elevated troponin. Likely 2/2 severe hypertension with OLENA on CKD  -lasix 80IV bid goal net NEG 2L  -appreciate nephrology recs  -holding losartan  -amlodipine 10, hydral 50 q8   -switch metoprolol to carvedilol if remains hypertensive    ***    Bryan Harry MD, MPhil, [facility]  Cardiologist, [facility]  ; [employer] at [facility]  email: [email or web address] Cardiology and Cardiovascular Surgery on-service contact/call information, go to [email or web address] and use [email or web address] to login.  Outpatient Cardiology appointments, call  [phone] to arrange with a colleague; I do not have outpatient Cardiology clinic.   
INTERVAL EVENTS/SUBJ:  no events    Home Medications:  amLODIPine 10 mg oral tablet: 1 tab(s) orally once a day (21 Dec 2022 09:58)  Aspirin Enteric Coated 81 mg oral delayed release tablet: 1 tab(s) orally once a day (21 Dec 2022 09:58)  atorvastatin 40 mg oral tablet: 1 tab(s) orally once a day (21 Dec 2022 09:58)  glyburide-metformin 2.5 mg-500 mg oral tablet: 1 tab(s) orally 2 times a day (21 Dec 2022 09:58)  hydrALAZINE 50 mg oral tablet: 1 tab(s) orally 2 times a day (21 Dec 2022 09:58)  levothyroxine 25 mcg (0.025 mg) oral tablet: 1 tab(s) orally once a day (21 Dec 2022 09:58)  losartan 100 mg oral tablet: 1 tab(s) orally once a day (21 Dec 2022 09:58)  metFORMIN 1000 mg oral tablet: 1 tab(s) orally once a day (21 Dec 2022 09:58)  Metoprolol Succinate ER 25 mg oral tablet, extended release: 1 tab(s) orally once a day (21 Dec 2022 09:58)  Multiple Vitamins oral tablet: 1 tab(s) orally once a day (21 Dec 2022 09:58)  Vascepa 1 g oral capsule: 1 cap(s) orally 2 times a day (21 Dec 2022 09:58)      MEDICATIONS  (STANDING):  amLODIPine   Tablet 10 milliGRAM(s) Oral daily  AQUAPHOR (petrolatum Ointment) 1 Application(s) Topical two times a day  atorvastatin 40 milliGRAM(s) Oral at bedtime  calcium acetate 667 milliGRAM(s) Oral two times a day with meals  dextrose 5%. 1000 milliLiter(s) (50 mL/Hr) IV Continuous <Continuous>  dextrose 5%. 1000 milliLiter(s) (100 mL/Hr) IV Continuous <Continuous>  dextrose 50% Injectable 25 Gram(s) IV Push once  dextrose 50% Injectable 25 Gram(s) IV Push once  ergocalciferol 95732 Unit(s) Oral every week  glucagon  Injectable 1 milliGRAM(s) IntraMuscular once  hydrALAZINE 50 milliGRAM(s) Oral three times a day  insulin lispro (ADMELOG) corrective regimen sliding scale   SubCutaneous three times a day before meals  insulin lispro (ADMELOG) corrective regimen sliding scale   SubCutaneous at bedtime  iron sucrose IVPB 200 milliGRAM(s) IV Intermittent every 24 hours  labetalol 200 milliGRAM(s) Oral two times a day  levothyroxine 25 MICROGram(s) Oral daily  multivitamin 1 Tablet(s) Oral daily    MEDICATIONS  (PRN):  acetaminophen     Tablet .. 650 milliGRAM(s) Oral every 6 hours PRN Temp greater or equal to 38C (100.4F), Mild Pain (1 - 3)  dextrose Oral Gel 15 Gram(s) Oral once PRN Blood Glucose LESS THAN 70 milliGRAM(s)/deciliter  hydrocortisone 1% Cream 1 Application(s) Topical two times a day PRN Itching      Vital Signs Last 24 Hrs  T(C): 36.9 (27 Dec 2022 05:12), Max: 36.9 (27 Dec 2022 05:12)  T(F): 98.5 (27 Dec 2022 05:12), Max: 98.5 (27 Dec 2022 05:12)  HR: 63 (27 Dec 2022 05:12) (63 - 69)  BP: 178/68 (27 Dec 2022 05:12) (152/66 - 178/68)  BP(mean): --  RR: 16 (27 Dec 2022 05:12) (16 - 17)  SpO2: 98% (27 Dec 2022 05:12) (97% - 98%)    Parameters below as of 27 Dec 2022 05:12  Patient On (Oxygen Delivery Method): room air        REVIEW OF SYSTEMS:  As per HPI, otherwise unremarkable.     PHYSICAL EXAM:  Constitutional/Appearance: Normal, Well-developed  HEENT:   Normal oral mucosa, no drainage or redness, supple neck  Lymphatic: No lymphadenopathy  Cardiovascular: Normal S1 S2, No edema, II/VI HOLDEN  Respiratory: Lungs clear to auscultation, respirations non-labored  Psychiatry: A & O x 3, appropriate affect.   Gastrointestinal:  Soft, Non-tender, no distention  Skin: No rashes, No ecchymoses, No cyanosis	  Neurologic: Non-focal, Alert and oriented x 3  Extremities: Normal range of motion  Vascular: Peripheral pulses palpable 2+ bilaterally (radial)    LABS:  CBC Full  -  ( 27 Dec 2022 05:39 )  WBC Count : 8.67 K/uL  RBC Count : 2.87 M/uL  Hemoglobin : 8.6 g/dL  Hematocrit : 26.6 %  Platelet Count - Automated : 284 K/uL  Mean Cell Volume : 92.7 fl  Mean Cell Hemoglobin : 30.0 pg  Mean Cell Hemoglobin Concentration : 32.3 gm/dL  Auto Neutrophil # : x  Auto Lymphocyte # : x  Auto Monocyte # : x  Auto Eosinophil # : x  Auto Basophil # : x  Auto Neutrophil % : x  Auto Lymphocyte % : x  Auto Monocyte % : x  Auto Eosinophil % : x  Auto Basophil % : x      12-27    142  |  108  |  73<H>  ----------------------------<  141<H>  4.3   |  20<L>  |  4.96<H>    Ca    7.9<L>      27 Dec 2022 05:39  Phos  5.9     12-27  Mg     2.3     12-27      IMPRESSION AND PLAN: 72M w DM, HTN, HL, hypothyroid here w volume overload and elevated troponin. Likely 2/2 severe hypertension with OLENA on CKD  -lasix 40IV bid, appreciate nephrology recs  -holding losartan  -cont labetalol  -amlodipine 10, hydral 50 q8     ***    Bryan Harry MD, MPhil, [facility]  Cardiologist, [facility]  ; [employer] at [facility]  email: [email or web address] Cardiology and Cardiovascular Surgery on-service contact/call information, go to [email or web address] and use "[email or web address]" to login.  Outpatient Cardiology appointments, call  [phone] to arrange with a colleague; I do not have outpatient Cardiology clinic.   
Interventional Radiology Follow-Up Note.    Patient seen and examined @ bedside around 6:30am.    This is a 72y Male s/p right renal biopsy on 12/27/22 in Interventional Radiology.    No complaint offered.      Medications:   amLODIPine   Tablet: (12-28)  furosemide   Injectable: (12-28)  hydrALAZINE: (12-28)  labetalol: (12-28)  spironolactone: (12-27)    Vitals:  T(F): 97.5, Max: 98.2 (17:00)  HR: 75  BP: 163/65  RR: 18  SpO2: 96%    Physical Exam:  General: Nontoxic, in NAD.  Abdomen: Soft, ND, nontender.   Back: right renal biopsy dressing c/d/i, soft with no evidence of hematoma, no ttp.         LABS:  Na: 141 (12-28 @ 05:43), 142 (12-27 @ 05:39), 141 (12-26 @ 06:12)  K: 4.4 (12-28 @ 05:43), 4.3 (12-27 @ 05:39), 4.3 (12-26 @ 06:12)  Cl: 107 (12-28 @ 05:43), 108 (12-27 @ 05:39), 106 (12-26 @ 06:12)  CO2: 20 (12-28 @ 05:43), 20 (12-27 @ 05:39), 19 (12-26 @ 06:12)  BUN: 80 (12-28 @ 05:43), 73 (12-27 @ 05:39), 74 (12-26 @ 06:12)  Cr: 5.26 (12-28 @ 05:43), 4.96 (12-27 @ 05:39), 5.35 (12-26 @ 06:12)  Glu: 174(12-28 @ 05:43), 141(12-27 @ 05:39), 199(12-26 @ 06:12)    Hgb: 8.3 (12-28 @ 05:44), 8.6 (12-27 @ 05:39), 7.8 (12-26 @ 06:12)  Hct: 26.1 (12-28 @ 05:44), 26.6 (12-27 @ 05:39), 24.1 (12-26 @ 06:12)  WBC: 8.57 (12-28 @ 05:44), 8.67 (12-27 @ 05:39), 8.27 (12-26 @ 06:12)  Plt: 243 (12-28 @ 05:44), 284 (12-27 @ 05:39), 250 (12-26 @ 06:12)    INR: 0.91 12-27-22 @ 05:39                 Assessment/Plan:  72M h/o HTN, HLD, DM2, noncompliant with medications p/w acute heart failure and hypertensive urgency with elevated troponin presented with acute diastolic congestive heart failure exacerbation.   Pt most recently s/p right renal biopsy on 12/27/22 in Interventional Radiology.      - Strict BP control. SBP goal <150 for at least 24 hrs post procedure   - f/u cytopathology   - IR will sign off    Please call IR at [phone] with any questions, concerns, or issues regarding above.    Also available on Teams.  
[facility] DIVISION OF KIDNEY DISEASES AND HYPERTENSION -- FOLLOW UP NOTE  --------------------------------------------------------------------------------  HPI:  Pt. is a 72 y.o. M w/ PMHx of HTN, HLD, DM2 presents with Dyspnea for the past 3-4 weeks. Nephrology consulted for OLENA. Pt. states that he was given a water pill months ago but stopped taking it because he thought he was urinating enough already and he did not like waking up at night to urinate. He started taking the pill again recently but ran out again about 1 week ago and did not  the refill. He states that he was at 50% kidney function but when he renal function went to 30% 2 months ago he saw a "kidney doctor" once but did not follow up. Denies any orthopnea currently but after examination asking to be propped up. Denies having any heart problems. Has had diabetes for over 20 years. Some eye issues but unable to elaborate. CXR here showing pulmonary vascular congestion and bilateral trace to small pleural effusions. Takes Metformin 1000mg BID in addition to Glyburide? Metformin 2.5mg/ 500mg daily for DM. No blood in urine.     Pt. seen this AM and endorses marked improvement in breathing and decreased swelling. Pt. with 4.8L again UOP on IV Lasix. Received 80mg IV this AM. ASA being held in preparation for biopsy.        PAST HISTORY  --------------------------------------------------------------------------------  No significant changes to PMH, PSH, FHx, SHx, unless otherwise noted    ALLERGIES & MEDICATIONS  --------------------------------------------------------------------------------  Allergies    No Known Allergies    Intolerances      Standing Inpatient Medications  amLODIPine   Tablet 10 milliGRAM(s) Oral daily  AQUAPHOR (petrolatum Ointment) 1 Application(s) Topical two times a day  atorvastatin 40 milliGRAM(s) Oral at bedtime  dextrose 5%. 1000 milliLiter(s) IV Continuous <Continuous>  dextrose 5%. 1000 milliLiter(s) IV Continuous <Continuous>  dextrose 50% Injectable 25 Gram(s) IV Push once  dextrose 50% Injectable 25 Gram(s) IV Push once  glucagon  Injectable 1 milliGRAM(s) IntraMuscular once  heparin   Injectable 5000 Unit(s) SubCutaneous every 12 hours  hydrALAZINE 50 milliGRAM(s) Oral three times a day  insulin lispro (ADMELOG) corrective regimen sliding scale   SubCutaneous three times a day before meals  insulin lispro (ADMELOG) corrective regimen sliding scale   SubCutaneous at bedtime  labetalol 200 milliGRAM(s) Oral two times a day  levothyroxine 25 MICROGram(s) Oral daily  multivitamin 1 Tablet(s) Oral daily    PRN Inpatient Medications  acetaminophen     Tablet .. 650 milliGRAM(s) Oral every 6 hours PRN  dextrose Oral Gel 15 Gram(s) Oral once PRN  hydrocortisone 1% Cream 1 Application(s) Topical two times a day PRN      REVIEW OF SYSTEMS  --------------------------------------------------------------------------------  AS per HPI    VITALS/PHYSICAL EXAM  --------------------------------------------------------------------------------  T(C): 36.4 (12-24-22 @ 11:59), Max: 36.8 (12-23-22 @ 19:41)  HR: 72 (12-24-22 @ 11:59) (66 - 76)  BP: 142/53 (12-24-22 @ 11:59) (142/53 - 170/68)  RR: 18 (12-24-22 @ 11:59) (18 - 20)  SpO2: 97% (12-24-22 @ 11:59) (97% - 97%)  Wt(kg): --      12-23-22 @ 07:01  -  12-24-22 @ 07:00  --------------------------------------------------------  IN: 680 mL / OUT: 4830 mL / NET: -4150 mL      Physical Exam:  	Gen: NAD  	HEENT: MMM  	Pulm: CTAB  	CV: S1S2  	Abd: Soft, +BS   	Ext: ++LE edema B/L with chronic skin changes,  	Neuro/Psych: Awake, remorseful   	Skin: Warm and dry  	Vascular access: peripheral     LABS/STUDIES  --------------------------------------------------------------------------------              9.0    9.17  >-----------<  279      [12-23-22 @ 05:58]              28.6     143  |  107  |  66  ----------------------------<  143      [12-24-22 @ 06:14]  4.0   |  21  |  4.72        Ca     8.4     [12-24-22 @ 06:14]      Mg     2.1     [12-24-22 @ 06:14]    TPro  5.4  /  Alb  x   /  TBili  x   /  DBili  x   /  AST  x   /  ALT  x   /  AlkPhos  x   [12-24-22 @ 06:13]              [12-23-22 @ 05:58]    Creatinine Trend:  SCr 4.72 [12-24 @ 06:14]  SCr 4.15 [12-23 @ 05:58]  SCr 3.69 [12-22 @ 05:56]  SCr 3.52 [12-20 @ 22:07]    Urinalysis - [12-20-22 @ 22:34]      Color Light Yellow / Appearance Clear / SG 1.015 / pH 7.0      Gluc 500 mg/dL / Ketone Negative  / Bili Negative / Urobili Negative       Blood Moderate / Protein 300 mg/dL / Leuk Est Negative / Nitrite Negative      RBC 4 / WBC 1 / Hyaline 2 / Gran  / Sq Epi  / Non Sq Epi 2 / Bacteria Negative    Urine Creatinine 22      [12-22-22 @ 17:14]  Urine Protein 259      [12-22-22 @ 17:14]  Urine Sodium 128      [12-22-22 @ 17:14]  Urine Osmolality 336      [12-22-22 @ 17:14]    Iron 48, TIBC 256, %sat 19      [12-24-22 @ 06:13]  PTH -- (Ca 8.3)      [12-24-22 @ 06:06]   242  Vitamin D (25OH) 8.8      [12-24-22 @ 06:06]  TSH 10.10      [12-22-22 @ 05:52]  Lipid: chol 142, , HDL 51, LDL --      [12-22-22 @ 05:56]    HCV 0.15, Nonreact      [12-22-22 @ 05:56]    
Overnight Events: NAEON. I/Os not well recorded. Feels slightly improved. States he is urinating.    Telemetry: NSR 60s-90s    Review Of Systems: No chest pain, shortness of breath, or palpitations  CONSTITUTIONAL: no fevers or chills  EYES/ENT: No visual changes;  No vertigo or throat pain   NECK: No pain or stiffness  RESPIRATORY: No cough, wheezing. No shortness of breath  CARDIOVASCULAR: No chest pain or palpitations  GASTROINTESTINAL: No abdominal or epigastric pain. No nausea, vomiting. No diarrhea. No melena.  GENITOURINARY: No dysuria, frequency or hematuria  NEUROLOGICAL: No numbness or weakness  SKIN: No itching, burning, rashes, or lesions   All other review of systems is negative unless indicated above.     Current Meds:  acetaminophen     Tablet .. 650 milliGRAM(s) Oral every 6 hours PRN  amLODIPine   Tablet 10 milliGRAM(s) Oral daily  aspirin enteric coated 81 milliGRAM(s) Oral daily  atorvastatin 40 milliGRAM(s) Oral at bedtime  dextrose 5%. 1000 milliLiter(s) IV Continuous <Continuous>  dextrose 5%. 1000 milliLiter(s) IV Continuous <Continuous>  dextrose 50% Injectable 25 Gram(s) IV Push once  dextrose 50% Injectable 25 Gram(s) IV Push once  dextrose Oral Gel 15 Gram(s) Oral once PRN  furosemide   Injectable 80 milliGRAM(s) IV Push two times a day  glucagon  Injectable 1 milliGRAM(s) IntraMuscular once  heparin   Injectable 5000 Unit(s) SubCutaneous every 8 hours  hydrALAZINE 50 milliGRAM(s) Oral two times a day  insulin lispro (ADMELOG) corrective regimen sliding scale   SubCutaneous three times a day before meals  insulin lispro (ADMELOG) corrective regimen sliding scale   SubCutaneous at bedtime  levothyroxine 25 MICROGram(s) Oral daily  metoprolol succinate ER 25 milliGRAM(s) Oral daily  multivitamin 1 Tablet(s) Oral daily    Vitals:  T(F): 97.9 (12-22), Max: 98.2 (12-21)  HR: 80 (12-22) (69 - 80)  BP: 173/68 (12-22) (168/74 - 188/72)  RR: 18 (12-22)  SpO2: 96% (12-22)  I&O's Summary    Physical Exam:  Appearance: No acute distress; well appearing  Eyes: PERRL, EOMI, pink conjunctiva  HEENT: Normal oral mucosa  Cardiovascular: RRR, S1, S2, no murmurs, rubs, or gallops; +JVD  Respiratory: Decreased BS at bases, Crackles  Gastrointestinal: soft, non-tender, non-distended with normal bowel sounds  Extremities: 2/3+ edema up to b/l hips  Musculoskeletal: No clubbing; no joint deformity   Neurologic: Non-focal  Lymphatic: No lymphadenopathy  Psychiatry: AAOx3, mood & affect appropriate  Skin: No rashes, ecchymoses, or cyanosis                          8.5    8.91  )-----------( 275      ( 22 Dec 2022 05:56 )             25.6     12-22    145  |  112<H>  |  54<H>  ----------------------------<  118<H>  3.4<L>   |  20<L>  |  3.69<H>    Ca    8.0<L>      22 Dec 2022 05:56    TPro  6.8  /  Alb  3.2<L>  /  TBili  0.3  /  DBili  x   /  AST  38  /  ALT  27  /  AlkPhos  109  12-20    PT/INR - ( 20 Dec 2022 22:07 )   PT: 10.6 sec;   INR: 0.92 ratio       PTT - ( 20 Dec 2022 22:07 )  PTT:35.4 sec  CARDIAC MARKERS ( 21 Dec 2022 05:59 )  101 ng/L / x     / x     / x     / x     / x      CARDIAC MARKERS ( 21 Dec 2022 02:23 )  97 ng/L / x     / x     / x     / x     / x      CARDIAC MARKERS ( 20 Dec 2022 22:07 )  109 ng/L / x     / x     / x     / x     / x        Serum Pro-Brain Natriuretic Peptide: 6989 pg/mL (12-20 @ 22:07)    Echo: Conclusions:  1. Normal mitral valve. Minimal mitral regurgitation.  2. Aortic valve not well visualized; appears to be a  calcified trileaflet valve with normal opening. Mild aortic  regurgitation.  3. Moderate concentric left ventricular hypertrophy.  4. Normal left ventricular systolic function. No segmental  wall motion abnormalities.  5. Global longitudinal strain measurements performed on  John Epiq machine atHR 80 bpm, BP mmHg. Average GLS= -  22.8% (normal).  6. Indeterminate diastolic function.  7. Normal right ventricular size and function.  8. Bilateral pleural effusions.  *** No previous Echo exam.  ------------------------------------------------------------------------  Confirmed on  12/21/2022 - 17:02:41 by Branden Phillip M.D.  ------------------------------------------------------------------------  
no complaints.    GENERAL: No fevers, no chills.  EYES: No blurry vision,  No photophobia  ENT: No sore throat.  No dysphagia  Cardiovascular: No chest pain, palpitations, orthopnea  Pulmonary: No cough, no wheezing. No shortness of breath  Gastrointestinal: No abdominal pain, no diarrhea, no constipation.  Musculoskeletal: No weakness.  No myalgias.  Dermatology:  No rashes.  Neuro: No Headache.  No vertigo.  No dizziness.  Psych: No anxiety, no depression.  Denies suicidal thoughts.    MEDICATIONS  (STANDING):  amLODIPine   Tablet 10 milliGRAM(s) Oral daily  AQUAPHOR (petrolatum Ointment) 1 Application(s) Topical two times a day  atorvastatin 40 milliGRAM(s) Oral at bedtime  calcium acetate 667 milliGRAM(s) Oral three times a day with meals  carvedilol 12.5 milliGRAM(s) Oral every 12 hours  dextrose 5%. 1000 milliLiter(s) (50 mL/Hr) IV Continuous <Continuous>  dextrose 5%. 1000 milliLiter(s) (100 mL/Hr) IV Continuous <Continuous>  dextrose 50% Injectable 25 Gram(s) IV Push once  dextrose 50% Injectable 25 Gram(s) IV Push once  ergocalciferol 21584 Unit(s) Oral every week  glucagon  Injectable 1 milliGRAM(s) IntraMuscular once  hydrALAZINE 75 milliGRAM(s) Oral every 8 hours  insulin lispro (ADMELOG) corrective regimen sliding scale   SubCutaneous three times a day before meals  insulin lispro (ADMELOG) corrective regimen sliding scale   SubCutaneous at bedtime  levothyroxine 25 MICROGram(s) Oral daily  multivitamin 1 Tablet(s) Oral daily  sodium bicarbonate 1300 milliGRAM(s) Oral two times a day  torsemide 60 milliGRAM(s) Oral daily    MEDICATIONS  (PRN):  acetaminophen     Tablet .. 650 milliGRAM(s) Oral every 6 hours PRN Temp greater or equal to 38C (100.4F), Mild Pain (1 - 3)  dextrose Oral Gel 15 Gram(s) Oral once PRN Blood Glucose LESS THAN 70 milliGRAM(s)/deciliter  hydrocortisone 1% Cream 1 Application(s) Topical two times a day PRN Itching    Vital Signs Last 24 Hrs  T(C): 36.5 (29 Dec 2022 04:44), Max: 36.7 (29 Dec 2022 03:10)  T(F): 97.7 (29 Dec 2022 04:44), Max: 98.1 (29 Dec 2022 03:10)  HR: 71 (29 Dec 2022 04:44) (66 - 72)  BP: 163/60 (29 Dec 2022 04:44) (156/63 - 178/60)  BP(mean): --  RR: 18 (29 Dec 2022 04:44) (18 - 18)  SpO2: 96% (29 Dec 2022 04:44) (95% - 98%)    Parameters below as of 29 Dec 2022 03:10  Patient On (Oxygen Delivery Method): room air      CONSTITUTIONAL: NAD  EYES: conjunctiva and sclera clear  ENMT: Moist oral mucosa  RESPIRATORY: Normal respiratory effort; lungs are clear to auscultation bilaterally  CARDIOVASCULAR: Regular rate and rhythm, normal S1 and S2, + SM; No lower extremity edema   ABDOMEN: Nontender to palpation, normoactive bowel sounds, no rebound/guarding  MUSCULOSKELETAL: no clubbing or cyanosis of digits; no joint swelling or tenderness to palpation  PSYCH: A+O to person, place, and time; affect appropriate  SKIN: No rashes; no palpable lesions    .  LABS:                         8.9    9.84  )-----------( 277      ( 29 Dec 2022 05:53 )             27.7     12-29    140  |  105  |  77<H>  ----------------------------<  141<H>  4.3   |  20<L>  |  4.96<H>    Ca    8.7      29 Dec 2022 05:53                RADIOLOGY, EKG & ADDITIONAL TESTS: Reviewed. 
Vascular Surgery Progress Note    SUBJECTIVE  No acute events overnight.    OBJECTIVE  ___________________________________________________  VITAL SIGNS / I&O's   Vital Signs Last 24 Hrs  T(C): 36.6 (30 Dec 2022 04:44), Max: 36.8 (30 Dec 2022 00:07)  T(F): 97.8 (30 Dec 2022 04:44), Max: 98.2 (30 Dec 2022 00:07)  HR: 65 (30 Dec 2022 04:44) (64 - 77)  BP: 173/57 (30 Dec 2022 04:44) (165/64 - 174/65)  BP(mean): --  RR: 18 (30 Dec 2022 04:44) (18 - 18)  SpO2: 96% (30 Dec 2022 04:44) (96% - 98%)    Parameters below as of 30 Dec 2022 04:44  Patient On (Oxygen Delivery Method): room air          29 Dec 2022 07:01  -  30 Dec 2022 07:00  --------------------------------------------------------  IN:    Oral Fluid: 1230 mL  Total IN: 1230 mL    OUT:    Voided (mL): 1800 mL  Total OUT: 1800 mL    Total NET: -570 mL      30 Dec 2022 07:01  -  30 Dec 2022 09:10  --------------------------------------------------------  IN:    Oral Fluid: 360 mL  Total IN: 360 mL    OUT:    Voided (mL): 350 mL  Total OUT: 350 mL    Total NET: 10 mL      ___________________________________________________  LABS                        9.2    10.38 )-----------( 292      ( 30 Dec 2022 02:22 )             28.4     30 Dec 2022 02:22    140    |  105    |  76     ----------------------------<  174    4.3     |  21     |  5.02     Ca    8.8        30 Dec 2022 02:22  Phos  5.3       30 Dec 2022 02:22  Mg     2.3       30 Dec 2022 02:22      PT/INR - ( 30 Dec 2022 02:22 )   PT: 10.5 sec;   INR: 0.91 ratio         PTT - ( 30 Dec 2022 02:22 )  PTT:33.2 sec  CAPILLARY BLOOD GLUCOSE      POCT Blood Glucose.: 154 mg/dL (30 Dec 2022 07:48)  POCT Blood Glucose.: 237 mg/dL (29 Dec 2022 20:57)  POCT Blood Glucose.: 193 mg/dL (29 Dec 2022 16:56)  POCT Blood Glucose.: 214 mg/dL (29 Dec 2022 12:30)    ___________________________________________________  MEDICATIONS  (STANDING):  amLODIPine   Tablet 10 milliGRAM(s) Oral daily  AQUAPHOR (petrolatum Ointment) 1 Application(s) Topical two times a day  atorvastatin 40 milliGRAM(s) Oral at bedtime  calcium acetate 667 milliGRAM(s) Oral three times a day with meals  carvedilol 12.5 milliGRAM(s) Oral every 12 hours  dextrose 5%. 1000 milliLiter(s) (50 mL/Hr) IV Continuous <Continuous>  dextrose 5%. 1000 milliLiter(s) (100 mL/Hr) IV Continuous <Continuous>  dextrose 50% Injectable 25 Gram(s) IV Push once  dextrose 50% Injectable 25 Gram(s) IV Push once  epoetin rose-epbx (RETACRIT) Injectable 38158 Unit(s) SubCutaneous once  ergocalciferol 02666 Unit(s) Oral every week  glucagon  Injectable 1 milliGRAM(s) IntraMuscular once  hydrALAZINE 100 milliGRAM(s) Oral every 8 hours  insulin lispro (ADMELOG) corrective regimen sliding scale   SubCutaneous three times a day before meals  insulin lispro (ADMELOG) corrective regimen sliding scale   SubCutaneous at bedtime  levothyroxine 25 MICROGram(s) Oral daily  multivitamin 1 Tablet(s) Oral daily  sodium bicarbonate 1300 milliGRAM(s) Oral two times a day  spironolactone 100 milliGRAM(s) Oral daily  torsemide 60 milliGRAM(s) Oral daily    MEDICATIONS  (PRN):  acetaminophen     Tablet .. 650 milliGRAM(s) Oral every 6 hours PRN Temp greater or equal to 38C (100.4F), Mild Pain (1 - 3)  dextrose Oral Gel 15 Gram(s) Oral once PRN Blood Glucose LESS THAN 70 milliGRAM(s)/deciliter  hydrocortisone 1% Cream 1 Application(s) Topical two times a day PRN Itching  
[facility] DIVISION OF KIDNEY DISEASES AND HYPERTENSION -- FOLLOW UP NOTE  --------------------------------------------------------------------------------  HPI:  Pt. is a 72 y.o. M w/ PMHx of HTN, HLD, DM2 presents with Dyspnea for the past 3-4 weeks. Nephrology consulted for OLENA. Pt. states that he was given a water pill months ago but stopped taking it because he thought he was urinating enough already and he did not like waking up at night to urinate. He started taking the pill again recently but ran out again about 1 week ago and did not  the refill. He states that he was at 50% kidney function but when he renal function went to 30% 2 months ago he saw a "kidney doctor" once but did not follow up. Denies any orthopnea currently but after examination asking to be propped up. Denies having any heart problems. Has had diabetes for over 20 years. Some eye issues but unable to elaborate. CXR here showing pulmonary vascular congestion and bilateral trace to small pleural effusions. Takes Metformin 1000mg BID in addition to Glyburide? Metformin 2.5mg/ 500mg daily for DM. No blood in urine.     Pt. seen this AM and endorses marked improvement in breathing and decreased swelling. Pt. with 3.4L UOP on IV Lasix. Lasix held for rising SCr. since yesterday evening. Pt. denies any acute complaints Awaiting biopsy.    PAST HISTORY  --------------------------------------------------------------------------------  No significant changes to PMH, PSH, FHx, SHx, unless otherwise noted    ALLERGIES & MEDICATIONS  --------------------------------------------------------------------------------  Allergies    No Known Allergies    Intolerances      Standing Inpatient Medications  amLODIPine   Tablet 10 milliGRAM(s) Oral daily  AQUAPHOR (petrolatum Ointment) 1 Application(s) Topical two times a day  atorvastatin 40 milliGRAM(s) Oral at bedtime  dextrose 5%. 1000 milliLiter(s) IV Continuous <Continuous>  dextrose 5%. 1000 milliLiter(s) IV Continuous <Continuous>  dextrose 50% Injectable 25 Gram(s) IV Push once  dextrose 50% Injectable 25 Gram(s) IV Push once  ergocalciferol 63658 Unit(s) Oral every week  glucagon  Injectable 1 milliGRAM(s) IntraMuscular once  heparin   Injectable 5000 Unit(s) SubCutaneous every 12 hours  hydrALAZINE 50 milliGRAM(s) Oral three times a day  insulin lispro (ADMELOG) corrective regimen sliding scale   SubCutaneous three times a day before meals  insulin lispro (ADMELOG) corrective regimen sliding scale   SubCutaneous at bedtime  iron sucrose IVPB 200 milliGRAM(s) IV Intermittent every 24 hours  labetalol 200 milliGRAM(s) Oral two times a day  levothyroxine 25 MICROGram(s) Oral daily  multivitamin 1 Tablet(s) Oral daily    PRN Inpatient Medications  acetaminophen     Tablet .. 650 milliGRAM(s) Oral every 6 hours PRN  dextrose Oral Gel 15 Gram(s) Oral once PRN  hydrocortisone 1% Cream 1 Application(s) Topical two times a day PRN      REVIEW OF SYSTEMS  --------------------------------------------------------------------------------  As per HPI    VITALS/PHYSICAL EXAM  --------------------------------------------------------------------------------  T(C): 36.4 (12-25-22 @ 04:51), Max: 36.4 (12-24-22 @ 11:59)  HR: 62 (12-25-22 @ 04:51) (62 - 72)  BP: 166/66 (12-25-22 @ 04:51) (142/53 - 166/66)  RR: 18 (12-25-22 @ 04:51) (18 - 18)  SpO2: 99% (12-25-22 @ 04:51) (97% - 99%)  Wt(kg): --        12-24-22 @ 07:01  -  12-25-22 @ 07:00  --------------------------------------------------------  IN: 0 mL / OUT: 3375 mL / NET: -3375 mL      Physical Exam:  	Gen: NAD  	HEENT: MMM  	Pulm: CTAB  	CV: S1S2  	Abd: Soft, +BS   	Ext: ++LE edema B/L- improving with chronic skin changes,  	Neuro/Psych: Awake, remorseful   	Skin: Warm and dry  	Vascular access: peripheral     LABS/STUDIES  --------------------------------------------------------------------------------    142  |  107  |  72  ----------------------------<  161      [12-25-22 @ 05:44]  4.1   |  20  |  4.98        Ca     8.1     [12-25-22 @ 05:44]      Mg     2.1     [12-24-22 @ 06:14]      Phos  5.9     [12-25-22 @ 05:44]    TPro  5.4  /  Alb  x   /  TBili  x   /  DBili  x   /  AST  x   /  ALT  x   /  AlkPhos  x   [12-24-22 @ 06:13]    Creatinine Trend:  SCr 4.98 [12-25 @ 05:44]  SCr 4.72 [12-24 @ 06:14]  SCr 4.15 [12-23 @ 05:58]  SCr 3.69 [12-22 @ 05:56]  SCr 3.52 [12-20 @ 22:07]    Urinalysis - [12-24-22 @ 20:49]      Color Light Yellow / Appearance Clear / SG 1.009 / pH 6.5      Gluc 500 mg/dL / Ketone Negative  / Bili Negative / Urobili Negative       Blood Small / Protein 300 mg/dL / Leuk Est Negative / Nitrite Negative      RBC 1 / WBC 1 / Hyaline 1 / Gran  / Sq Epi  / Non Sq Epi 1 / Bacteria Negative    Urine Creatinine 22      [12-22-22 @ 17:14]  Urine Protein 259      [12-22-22 @ 17:14]  Urine Sodium 128      [12-22-22 @ 17:14]  Urine Osmolality 336      [12-22-22 @ 17:14]    Iron 48, TIBC 256, %sat 19      [12-24-22 @ 06:13]  PTH -- (Ca 8.3)      [12-24-22 @ 06:06]   242  Vitamin D (25OH) 8.8      [12-24-22 @ 06:06]  TSH 10.10      [12-22-22 @ 05:52]  Lipid: chol 142, , HDL 51, LDL --      [12-22-22 @ 05:56]    HCV 0.15, Nonreact      [12-22-22 @ 05:56]    
[facility] DIVISION OF KIDNEY DISEASES AND HYPERTENSION -- [phone]  -- INITIAL CONSULT NOTE  --------------------------------------------------------------------------------  HPI:    Pt. is a 72 y.o. M w/ PMHx of HTN, HLD, DM2 presents with Dyspnea for the past 3-4 weeks. Nephrology consulted for OLENA. Pt. states that he was given a water pill months ago but stopped taking it because he thought he was urinating enough already and he did not like waking up at night to urinate. He started taking the pill again recently but ran out again about 1 week ago and did not  the refill. He states that he was at 50% kidney function but when he renal function went to 30% 2 months ago he saw a "kidney doctor" once but did not follow up. Denies any orthopnea currently but after examination asking to be propped up. Denies having any heart problems. Has had diabetes for over 20 years. Some eye issues but unable to elaborate. CXR here showing pulmonary vascular congestion and bilateral trace to small pleural effusions. Takes Metformin 1000mg BID in addition to Glyburide? Metformin 2.5mg/ 500mg daily for DM. No blood in urine.       PAST HISTORY  --------------------------------------------------------------------------------  PAST MEDICAL & SURGICAL HISTORY:  HTN (hypertension)      HLD (hyperlipidemia)      Diabetes      No significant past surgical history        FAMILY HISTORY:  Family history of CVA (Father, Sibling)    FH: type 2 diabetes (Father, Mother)      PAST SOCIAL HISTORY:    ALLERGIES & MEDICATIONS  --------------------------------------------------------------------------------  Allergies    No Known Allergies    Intolerances      Standing Inpatient Medications  amLODIPine   Tablet 10 milliGRAM(s) Oral daily  aspirin enteric coated 81 milliGRAM(s) Oral daily  atorvastatin 40 milliGRAM(s) Oral at bedtime  dextrose 5%. 1000 milliLiter(s) IV Continuous <Continuous>  dextrose 5%. 1000 milliLiter(s) IV Continuous <Continuous>  dextrose 50% Injectable 25 Gram(s) IV Push once  dextrose 50% Injectable 25 Gram(s) IV Push once  furosemide   Injectable 40 milliGRAM(s) IV Push two times a day  glucagon  Injectable 1 milliGRAM(s) IntraMuscular once  hydrALAZINE 50 milliGRAM(s) Oral two times a day  insulin lispro (ADMELOG) corrective regimen sliding scale   SubCutaneous three times a day before meals  insulin lispro (ADMELOG) corrective regimen sliding scale   SubCutaneous at bedtime  levothyroxine 25 MICROGram(s) Oral daily  metoprolol succinate ER 25 milliGRAM(s) Oral daily  multivitamin 1 Tablet(s) Oral daily    PRN Inpatient Medications  acetaminophen     Tablet .. 650 milliGRAM(s) Oral every 6 hours PRN  dextrose Oral Gel 15 Gram(s) Oral once PRN      REVIEW OF SYSTEMS  --------------------------------------------------------------------------------  As per HPI    VITALS/PHYSICAL EXAM  --------------------------------------------------------------------------------  T(C): 36.7 (12-21-22 @ 13:22), Max: 36.8 (12-20-22 @ 20:56)  HR: 80 (12-21-22 @ 13:22) (63 - 80)  BP: 182/62 (12-21-22 @ 13:22) (154/63 - 192/72)  RR: 18 (12-21-22 @ 13:22) (15 - 20)  SpO2: 97% (12-21-22 @ 13:22) (96% - 99%)  Wt(kg): --  Height (cm): 165.1 (12-20-22 @ 20:56)  Weight (kg): 86.2 (12-20-22 @ 20:56)  BMI (kg/m2): 31.6 (12-20-22 @ 20:56)  BSA (m2): 1.94 (12-20-22 @ 20:56)      Physical Exam:  	Gen: NAD  	HEENT: MMM  	Pulm: Diffuse crackles to mid lungs. however able to lie flat  	CV: S1S2  	Abd: Soft, +BS   	Ext: +++LE edema B/L with chronic skin changes, UE edema+  	Neuro/Psych: Awake, remorseful   	Skin: Warm and dry  	Vascular access: peripheral     LABS/STUDIES  --------------------------------------------------------------------------------              9.7    9.59  >-----------<  308      [12-20-22 @ 22:07]              29.4     142  |  109  |  46  ----------------------------<  119      [12-20-22 @ 22:07]  3.5   |  21  |  3.52        Ca     8.5     [12-20-22 @ 22:07]    TPro  6.8  /  Alb  3.2  /  TBili  0.3  /  DBili  x   /  AST  38  /  ALT  27  /  AlkPhos  109  [12-20-22 @ 22:07]    PT/INR: PT 10.6 , INR 0.92       [12-20-22 @ 22:07]  PTT: 35.4       [12-20-22 @ 22:07]      Creatinine Trend:  SCr 3.52 [12-20 @ 22:07]    Urinalysis - [12-20-22 @ 22:34]      Color Light Yellow / Appearance Clear / SG 1.015 / pH 7.0      Gluc 500 mg/dL / Ketone Negative  / Bili Negative / Urobili Negative       Blood Moderate / Protein 300 mg/dL / Leuk Est Negative / Nitrite Negative      RBC 4 / WBC 1 / Hyaline 2 / Gran  / Sq Epi  / Non Sq Epi 2 / Bacteria Negative          
Rikki Delarosa MD    Patient is a 72y old  Male who presents with a chief complaint of vol overload (22 Dec 2022 10:52)        SUBJECTIVE / OVERNIGHT EVENTS: Patient without complaints- increased urination overnight  TELEMETRY: SR 60-80's      MEDICATIONS  (STANDING):  amLODIPine   Tablet 10 milliGRAM(s) Oral daily  AQUAPHOR (petrolatum Ointment) 1 Application(s) Topical two times a day  aspirin enteric coated 81 milliGRAM(s) Oral daily  atorvastatin 40 milliGRAM(s) Oral at bedtime  dextrose 5%. 1000 milliLiter(s) (50 mL/Hr) IV Continuous <Continuous>  dextrose 5%. 1000 milliLiter(s) (100 mL/Hr) IV Continuous <Continuous>  dextrose 50% Injectable 25 Gram(s) IV Push once  dextrose 50% Injectable 25 Gram(s) IV Push once  furosemide   Injectable 80 milliGRAM(s) IV Push two times a day  glucagon  Injectable 1 milliGRAM(s) IntraMuscular once  hydrALAZINE 50 milliGRAM(s) Oral three times a day  insulin lispro (ADMELOG) corrective regimen sliding scale   SubCutaneous three times a day before meals  insulin lispro (ADMELOG) corrective regimen sliding scale   SubCutaneous at bedtime  levothyroxine 25 MICROGram(s) Oral daily  metoprolol succinate ER 25 milliGRAM(s) Oral daily  multivitamin 1 Tablet(s) Oral daily    MEDICATIONS  (PRN):  acetaminophen     Tablet .. 650 milliGRAM(s) Oral every 6 hours PRN Temp greater or equal to 38C (100.4F), Mild Pain (1 - 3)  dextrose Oral Gel 15 Gram(s) Oral once PRN Blood Glucose LESS THAN 70 milliGRAM(s)/deciliter  hydrocortisone 1% Cream 1 Application(s) Topical two times a day PRN Itching      Vital Signs Last 24 Hrs  T(C): 36.4 (23 Dec 2022 04:15), Max: 36.9 (22 Dec 2022 21:33)  T(F): 97.5 (23 Dec 2022 04:15), Max: 98.5 (22 Dec 2022 21:33)  HR: 70 (23 Dec 2022 04:15) (69 - 80)  BP: 184/71 (23 Dec 2022 04:15) (155/76 - 184/71)  BP(mean): --  RR: 18 (23 Dec 2022 04:15) (18 - 20)  SpO2: 97% (23 Dec 2022 04:15) (96% - 98%)    Parameters below as of 23 Dec 2022 04:15  Patient On (Oxygen Delivery Method): room air      CAPILLARY BLOOD GLUCOSE      POCT Blood Glucose.: 126 mg/dL (23 Dec 2022 08:14)  POCT Blood Glucose.: 167 mg/dL (22 Dec 2022 21:31)  POCT Blood Glucose.: 141 mg/dL (22 Dec 2022 16:59)  POCT Blood Glucose.: 189 mg/dL (22 Dec 2022 12:10)    I&O's Summary    22 Dec 2022 07:01  -  23 Dec 2022 07:00  --------------------------------------------------------  IN: 525 mL / OUT: 4775 mL / NET: -4250 mL          PHYSICAL EXAM  GENERAL: NAD, well-developed  HEAD:  Atraumatic, normocephalic  EYES: EOMI, PERRLA, conjunctiva and sclera clear  CHEST/LUNG: Clear to auscultation bilaterally; no wheezes  HEART: +S1+S2, regular rate and rhythm, 3/6 HOLDEN  ABDOMEN: Soft, nontender, nondistended; bowel sounds present  EXTREMITIES:  2+ peripheral pulses; 1+ pedal edema  PSYCH: AAOx3      LABS:                        9.0    9.17  )-----------( 279      ( 23 Dec 2022 05:58 )             28.6     12-23    140  |  110<H>  |  58<H>  ----------------------------<  119<H>  3.9   |  18<L>  |  4.15<H>    Ca    8.1<L>      23 Dec 2022 05:58  Mg     2.0     12-23        CARDIAC MARKERS ( 23 Dec 2022 05:58 )  x     / x     / 746 U/L / x     / x              Culture - Urine (collected 20 Dec 2022 22:34)  Source: Clean Catch Clean Catch (Midstream)  Final Report (22 Dec 2022 08:04):    <10,000 CFU/mL Normal Urogenital Gabby        RADIOLOGY & ADDITIONAL TESTS:    Imaging Personally Reviewed:  Consultant(s) Notes Reviewed:    Care Discussed with Consultants/Other Providers:  
[facility] DIVISION OF KIDNEY DISEASES AND HYPERTENSION -- FOLLOW UP NOTE  --------------------------------------------------------------------------------  HPI:  Pt. is a 72 y.o. M w/ PMHx of HTN, HLD, DM2 presents with Dyspnea for the past 3-4 weeks. Nephrology consulted for OLENA. Pt. states that he was given a water pill months ago but stopped taking it because he thought he was urinating enough already and he did not like waking up at night to urinate. He started taking the pill again recently but ran out again about 1 week ago and did not  the refill. He states that he was at 50% kidney function but when he renal function went to 30% 2 months ago he saw a "kidney doctor" once but did not follow up. Denies any orthopnea currently but after examination asking to be propped up. Denies having any heart problems. Has had diabetes for over 20 years. Some eye issues but unable to elaborate. CXR here showing pulmonary vascular congestion and bilateral trace to small pleural effusions. Takes Metformin 1000mg BID in addition to Glyburide? Metformin 2.5mg/ 500mg daily for DM. No blood in urine.     Pt. seen this AM and endorses marked improvement in breathing. Pt. with 4.8L UOP on IV Lasix. Renal US performed. Discussed possibility to doing biopsy given significant proteinuria.         PAST HISTORY  --------------------------------------------------------------------------------  No significant changes to PMH, PSH, FHx, SHx, unless otherwise noted    ALLERGIES & MEDICATIONS  --------------------------------------------------------------------------------  Allergies    No Known Allergies    Intolerances      Standing Inpatient Medications  amLODIPine   Tablet 10 milliGRAM(s) Oral daily  AQUAPHOR (petrolatum Ointment) 1 Application(s) Topical two times a day  aspirin enteric coated 81 milliGRAM(s) Oral daily  atorvastatin 40 milliGRAM(s) Oral at bedtime  dextrose 5%. 1000 milliLiter(s) IV Continuous <Continuous>  dextrose 5%. 1000 milliLiter(s) IV Continuous <Continuous>  dextrose 50% Injectable 25 Gram(s) IV Push once  dextrose 50% Injectable 25 Gram(s) IV Push once  furosemide   Injectable 80 milliGRAM(s) IV Push two times a day  glucagon  Injectable 1 milliGRAM(s) IntraMuscular once  hydrALAZINE 50 milliGRAM(s) Oral three times a day  insulin lispro (ADMELOG) corrective regimen sliding scale   SubCutaneous three times a day before meals  insulin lispro (ADMELOG) corrective regimen sliding scale   SubCutaneous at bedtime  levothyroxine 25 MICROGram(s) Oral daily  metoprolol succinate ER 25 milliGRAM(s) Oral daily  multivitamin 1 Tablet(s) Oral daily    PRN Inpatient Medications  acetaminophen     Tablet .. 650 milliGRAM(s) Oral every 6 hours PRN  dextrose Oral Gel 15 Gram(s) Oral once PRN  hydrocortisone 1% Cream 1 Application(s) Topical two times a day PRN      REVIEW OF SYSTEMS  --------------------------------------------------------------------------------  As per HPI    VITALS/PHYSICAL EXAM  --------------------------------------------------------------------------------  T(C): 36.4 (12-23-22 @ 04:15), Max: 36.9 (12-22-22 @ 21:33)  HR: 70 (12-23-22 @ 04:15) (69 - 80)  BP: 184/71 (12-23-22 @ 04:15) (155/76 - 184/71)  RR: 18 (12-23-22 @ 04:15) (18 - 20)  SpO2: 97% (12-23-22 @ 04:15) (96% - 98%)  Wt(kg): --        12-22-22 @ 07:01  -  12-23-22 @ 07:00  --------------------------------------------------------  IN: 525 mL / OUT: 4775 mL / NET: -4250 mL        Physical Exam:  	Gen: NAD  	HEENT: MMM  	Pulm: Diffuse crackles to mid lungs. however able to lie flat  	CV: S1S2  	Abd: Soft, +BS   	Ext: +++LE edema B/L with chronic skin changes, UE edema+  	Neuro/Psych: Awake, remorseful   	Skin: Warm and dry  	Vascular access: peripheral       LABS/STUDIES  --------------------------------------------------------------------------------              9.0    9.17  >-----------<  279      [12-23-22 @ 05:58]              28.6     140  |  110  |  58  ----------------------------<  119      [12-23-22 @ 05:58]  3.9   |  18  |  4.15        Ca     8.1     [12-23-22 @ 05:58]      Mg     2.0     [12-23-22 @ 05:58]            [12-23-22 @ 05:58]    Creatinine Trend:  SCr 4.15 [12-23 @ 05:58]  SCr 3.69 [12-22 @ 05:56]  SCr 3.52 [12-20 @ 22:07]    Urinalysis - [12-20-22 @ 22:34]      Color Light Yellow / Appearance Clear / SG 1.015 / pH 7.0      Gluc 500 mg/dL / Ketone Negative  / Bili Negative / Urobili Negative       Blood Moderate / Protein 300 mg/dL / Leuk Est Negative / Nitrite Negative      RBC 4 / WBC 1 / Hyaline 2 / Gran  / Sq Epi  / Non Sq Epi 2 / Bacteria Negative    Urine Creatinine 22      [12-22-22 @ 17:14]  Urine Protein 259      [12-22-22 @ 17:14]  Urine Sodium 128      [12-22-22 @ 17:14]  Urine Osmolality 336      [12-22-22 @ 17:14]    TSH 10.10      [12-22-22 @ 05:52]  Lipid: chol 142, , HDL 51, LDL --      [12-22-22 @ 05:56]    HCV 0.15, Nonreact      [12-22-22 @ 05:56]    
No events overnight.     GENERAL: No fevers, no chills.  EYES: No blurry vision,  No photophobia  ENT: No sore throat.  No dysphagia  Cardiovascular: No chest pain, palpitations, orthopnea  Pulmonary: No cough, no wheezing. No shortness of breath  Gastrointestinal: No abdominal pain, no diarrhea, no constipation.   Musculoskeletal: No weakness.  No myalgias.  Dermatology:  No rashes.  Neuro: No Headache.  No vertigo.  No dizziness.  Psych: No anxiety, no depression.  Denies suicidal thoughts.    MEDICATIONS  (STANDING):  amLODIPine   Tablet 10 milliGRAM(s) Oral daily  AQUAPHOR (petrolatum Ointment) 1 Application(s) Topical two times a day  atorvastatin 40 milliGRAM(s) Oral at bedtime  dextrose 5%. 1000 milliLiter(s) (50 mL/Hr) IV Continuous <Continuous>  dextrose 5%. 1000 milliLiter(s) (100 mL/Hr) IV Continuous <Continuous>  dextrose 50% Injectable 25 Gram(s) IV Push once  dextrose 50% Injectable 25 Gram(s) IV Push once  glucagon  Injectable 1 milliGRAM(s) IntraMuscular once  heparin   Injectable 5000 Unit(s) SubCutaneous every 12 hours  hydrALAZINE 50 milliGRAM(s) Oral three times a day  insulin lispro (ADMELOG) corrective regimen sliding scale   SubCutaneous three times a day before meals  insulin lispro (ADMELOG) corrective regimen sliding scale   SubCutaneous at bedtime  labetalol 200 milliGRAM(s) Oral two times a day  levothyroxine 25 MICROGram(s) Oral daily  multivitamin 1 Tablet(s) Oral daily    MEDICATIONS  (PRN):  acetaminophen     Tablet .. 650 milliGRAM(s) Oral every 6 hours PRN Temp greater or equal to 38C (100.4F), Mild Pain (1 - 3)  dextrose Oral Gel 15 Gram(s) Oral once PRN Blood Glucose LESS THAN 70 milliGRAM(s)/deciliter  hydrocortisone 1% Cream 1 Application(s) Topical two times a day PRN Itching    Vital Signs Last 24 Hrs  T(C): 36.4 (24 Dec 2022 11:59), Max: 36.8 (23 Dec 2022 19:41)  T(F): 97.6 (24 Dec 2022 11:59), Max: 98.2 (23 Dec 2022 19:41)  HR: 72 (24 Dec 2022 11:59) (66 - 76)  BP: 142/53 (24 Dec 2022 11:59) (142/53 - 170/68)  BP(mean): --  RR: 18 (24 Dec 2022 11:59) (18 - 20)  SpO2: 97% (24 Dec 2022 11:59) (97% - 97%)    Parameters below as of 24 Dec 2022 11:59  Patient On (Oxygen Delivery Method): room air    PHYSICAL EXAM  GENERAL: NAD  HEAD:  Atraumatic, normocephalic  EYES: EOMI, PERRLA, conjunctiva and sclera clear  CHEST/LUNG: Clear to auscultation bilaterally; no wheezes  HEART: +S1+S2, regular rate and rhythm, 3/6 HOLDEN  ABDOMEN: Soft, nontender, nondistended; bowel sounds present  EXTREMITIES:  2+ peripheral pulses; 1+ pedal edema  PSYCH: AAOx3    .  LABS:                         9.0    9.17  )-----------( 279      ( 23 Dec 2022 05:58 )             28.6     12-24    143  |  107  |  66<H>  ----------------------------<  143<H>  4.0   |  21<L>  |  4.72<H>    Ca    8.4      24 Dec 2022 06:14  Mg     2.1     12-24    TPro  5.4<L>  /  Alb  x   /  TBili  x   /  DBili  x   /  AST  x   /  ALT  x   /  AlkPhos  x   12-24              RADIOLOGY, EKG & ADDITIONAL TESTS: Reviewed. 
Rikki Delarosa MD    Patient is a 72y old  Male who presents with a chief complaint of SOB (21 Dec 2022 03:49)        SUBJECTIVE / OVERNIGHT EVENTS: Patient unable to sleep overnight due to bed and neighbor coughing. Denies CP/SOB  TELEMETRY: SR 60-90's PACs      MEDICATIONS  (STANDING):  amLODIPine   Tablet 10 milliGRAM(s) Oral daily  aspirin enteric coated 81 milliGRAM(s) Oral daily  atorvastatin 40 milliGRAM(s) Oral at bedtime  dextrose 5%. 1000 milliLiter(s) (50 mL/Hr) IV Continuous <Continuous>  dextrose 5%. 1000 milliLiter(s) (100 mL/Hr) IV Continuous <Continuous>  dextrose 50% Injectable 25 Gram(s) IV Push once  dextrose 50% Injectable 25 Gram(s) IV Push once  furosemide   Injectable 80 milliGRAM(s) IV Push two times a day  glucagon  Injectable 1 milliGRAM(s) IntraMuscular once  hydrALAZINE 50 milliGRAM(s) Oral two times a day  insulin lispro (ADMELOG) corrective regimen sliding scale   SubCutaneous three times a day before meals  insulin lispro (ADMELOG) corrective regimen sliding scale   SubCutaneous at bedtime  levothyroxine 25 MICROGram(s) Oral daily  metoprolol succinate ER 25 milliGRAM(s) Oral daily  multivitamin 1 Tablet(s) Oral daily    MEDICATIONS  (PRN):  acetaminophen     Tablet .. 650 milliGRAM(s) Oral every 6 hours PRN Temp greater or equal to 38C (100.4F), Mild Pain (1 - 3)  dextrose Oral Gel 15 Gram(s) Oral once PRN Blood Glucose LESS THAN 70 milliGRAM(s)/deciliter      Vital Signs Last 24 Hrs  T(C): 36.6 (22 Dec 2022 04:16), Max: 36.8 (21 Dec 2022 17:10)  T(F): 97.9 (22 Dec 2022 04:16), Max: 98.2 (21 Dec 2022 17:10)  HR: 80 (22 Dec 2022 04:16) (69 - 80)  BP: 173/68 (22 Dec 2022 04:16) (168/74 - 188/72)  BP(mean): --  RR: 18 (22 Dec 2022 04:16) (18 - 19)  SpO2: 96% (22 Dec 2022 04:16) (96% - 98%)    Parameters below as of 22 Dec 2022 04:16  Patient On (Oxygen Delivery Method): room air      CAPILLARY BLOOD GLUCOSE      POCT Blood Glucose.: 128 mg/dL (22 Dec 2022 07:55)  POCT Blood Glucose.: 182 mg/dL (21 Dec 2022 22:30)  POCT Blood Glucose.: 121 mg/dL (21 Dec 2022 17:45)  POCT Blood Glucose.: 181 mg/dL (21 Dec 2022 12:58)    I&O's Summary        PHYSICAL EXAM  GENERAL: NAD, well-developed  HEAD:  Atraumatic, normocephalic  EYES: EOMI, PERRLA, conjunctiva and sclera clear  CHEST/LUNG: Clear to auscultation bilaterally; no wheezes  HEART: +S1+S2, regular rate and rhythm, 3/6 HOLDEN  ABDOMEN: Soft, nontender, nondistended; bowel sounds present  EXTREMITIES:  2+ peripheral pulses; +1-2+ pedal edema  PSYCH: AAOx3      LABS:                        8.5    8.91  )-----------( 275      ( 22 Dec 2022 05:56 )             25.6     12-22    145  |  112<H>  |  54<H>  ----------------------------<  118<H>  3.4<L>   |  20<L>  |  3.69<H>    Ca    8.0<L>      22 Dec 2022 05:56    TPro  6.8  /  Alb  3.2<L>  /  TBili  0.3  /  DBili  x   /  AST  38  /  ALT  27  /  AlkPhos  109  12-20    PT/INR - ( 20 Dec 2022 22:07 )   PT: 10.6 sec;   INR: 0.92 ratio         PTT - ( 20 Dec 2022 22:07 )  PTT:35.4 sec      Urinalysis Basic - ( 20 Dec 2022 22:34 )    Color: Light Yellow / Appearance: Clear / S.015 / pH: x  Gluc: x / Ketone: Negative  / Bili: Negative / Urobili: Negative   Blood: x / Protein: 300 mg/dL / Nitrite: Negative   Leuk Esterase: Negative / RBC: 4 /hpf / WBC 1 /HPF   Sq Epi: x / Non Sq Epi: 2 /hpf / Bacteria: Negative        Culture - Urine (collected 20 Dec 2022 22:34)  Source: Clean Catch Clean Catch (Midstream)  Final Report (22 Dec 2022 08:04):    <10,000 CFU/mL Normal Urogenital Gbaby        RADIOLOGY & ADDITIONAL TESTS:    Imaging Personally Reviewed:  Consultant(s) Notes Reviewed:    Care Discussed with Consultants/Other Providers:  
[facility] Division of Hospital Medicine  Gladis Brown DO   Available via Microsoft Teams      Patient is a 72y old  Male who presents with a chief complaint of vol overload (22 Dec 2022 10:52)      SUBJECTIVE / OVERNIGHT EVENTS:  No pain at biopsy site. Urinating, no CP or SOB     MEDICATIONS  (STANDING):  amLODIPine   Tablet 10 milliGRAM(s) Oral daily  AQUAPHOR (petrolatum Ointment) 1 Application(s) Topical two times a day  atorvastatin 40 milliGRAM(s) Oral at bedtime  calcium acetate 667 milliGRAM(s) Oral three times a day with meals  carvedilol 12.5 milliGRAM(s) Oral every 12 hours  dextrose 5%. 1000 milliLiter(s) (50 mL/Hr) IV Continuous <Continuous>  dextrose 5%. 1000 milliLiter(s) (100 mL/Hr) IV Continuous <Continuous>  dextrose 50% Injectable 25 Gram(s) IV Push once  dextrose 50% Injectable 25 Gram(s) IV Push once  ergocalciferol 97672 Unit(s) Oral every week  glucagon  Injectable 1 milliGRAM(s) IntraMuscular once  hydrALAZINE 75 milliGRAM(s) Oral every 8 hours  insulin lispro (ADMELOG) corrective regimen sliding scale   SubCutaneous three times a day before meals  insulin lispro (ADMELOG) corrective regimen sliding scale   SubCutaneous at bedtime  iron sucrose IVPB 200 milliGRAM(s) IV Intermittent every 24 hours  levothyroxine 25 MICROGram(s) Oral daily  multivitamin 1 Tablet(s) Oral daily  sodium bicarbonate 1300 milliGRAM(s) Oral two times a day    MEDICATIONS  (PRN):  acetaminophen     Tablet .. 650 milliGRAM(s) Oral every 6 hours PRN Temp greater or equal to 38C (100.4F), Mild Pain (1 - 3)  dextrose Oral Gel 15 Gram(s) Oral once PRN Blood Glucose LESS THAN 70 milliGRAM(s)/deciliter  hydrocortisone 1% Cream 1 Application(s) Topical two times a day PRN Itching      CAPILLARY BLOOD GLUCOSE      POCT Blood Glucose.: 241 mg/dL (28 Dec 2022 11:26)  POCT Blood Glucose.: 164 mg/dL (28 Dec 2022 07:50)  POCT Blood Glucose.: 314 mg/dL (27 Dec 2022 21:46)  POCT Blood Glucose.: 239 mg/dL (27 Dec 2022 16:53)    I&O's Summary    27 Dec 2022 07:01  -  28 Dec 2022 07:00  --------------------------------------------------------  IN: 0 mL / OUT: 2400 mL / NET: -2400 mL    28 Dec 2022 07:01  -  28 Dec 2022 15:45  --------------------------------------------------------  IN: 720 mL / OUT: 1150 mL / NET: -430 mL        PHYSICAL EXAM:  Vital Signs Last 24 Hrs  T(C): 36.4 (28 Dec 2022 11:01), Max: 36.8 (27 Dec 2022 17:00)  T(F): 97.5 (28 Dec 2022 11:01), Max: 98.2 (27 Dec 2022 17:00)  HR: 70 (28 Dec 2022 14:47) (62 - 75)  BP: 160/64 (28 Dec 2022 14:47) (140/54 - 163/65)  BP(mean): 92 (27 Dec 2022 17:00) (92 - 92)  RR: 18 (28 Dec 2022 11:01) (18 - 18)  SpO2: 97% (28 Dec 2022 11:01) (96% - 99%)    Parameters below as of 28 Dec 2022 11:01  Patient On (Oxygen Delivery Method): room air      CONSTITUTIONAL: NAD, well-developed, well-groomed  EYES: conjunctiva and sclera clear  ENMT: Moist oral mucosa  RESPIRATORY: Normal respiratory effort; lungs are clear to auscultation bilaterally  CARDIOVASCULAR: Regular rate and rhythm, normal S1 and S2, + SM; No lower extremity edema   ABDOMEN: Nontender to palpation, normoactive bowel sounds, no rebound/guarding  MUSCULOSKELETAL: no clubbing or cyanosis of digits; no joint swelling or tenderness to palpation  PSYCH: A+O to person, place, and time; affect appropriate  SKIN: No rashes; no palpable lesions    LABS:                        8.3    8.57  )-----------( 243      ( 28 Dec 2022 05:44 )             26.1     12-28    141  |  107  |  80<H>  ----------------------------<  174<H>  4.4   |  20<L>  |  5.26<H>    Ca    8.2<L>      28 Dec 2022 05:43  Phos  5.9     12-27  Mg     2.3     12-27      PT/INR - ( 27 Dec 2022 05:39 )   PT: 10.4 sec;   INR: 0.91 ratio                     RADIOLOGY & ADDITIONAL TESTS:  Results Reviewed:   Imaging Personally Reviewed:  Electrocardiogram Personally Reviewed:    COORDINATION OF CARE:  Care Discussed with Consultants/Other Providers [Y/N]:  Prior or Outpatient Records Reviewed [Y/N]:  
No events overnight.     GENERAL: No fevers, no chills.  EYES: No blurry vision,  No photophobia  ENT: No sore throat.  No dysphagia  Cardiovascular: No chest pain, palpitations, orthopnea  Pulmonary: No cough, no wheezing. No shortness of breath  Gastrointestinal: No abdominal pain, no diarrhea, no constipation.   Musculoskeletal: No weakness.  No myalgias.  Dermatology:  No rashes.  Neuro: No Headache.  No vertigo.  No dizziness.  Psych: No anxiety, no depression.  Denies suicidal thoughts.    MEDICATIONS  (STANDING):  amLODIPine   Tablet 10 milliGRAM(s) Oral daily  AQUAPHOR (petrolatum Ointment) 1 Application(s) Topical two times a day  atorvastatin 40 milliGRAM(s) Oral at bedtime  dextrose 5%. 1000 milliLiter(s) (50 mL/Hr) IV Continuous <Continuous>  dextrose 5%. 1000 milliLiter(s) (100 mL/Hr) IV Continuous <Continuous>  dextrose 50% Injectable 25 Gram(s) IV Push once  dextrose 50% Injectable 25 Gram(s) IV Push once  ergocalciferol 71379 Unit(s) Oral every week  furosemide   IVPB 40 milliGRAM(s) IV Intermittent every 12 hours  glucagon  Injectable 1 milliGRAM(s) IntraMuscular once  heparin   Injectable 5000 Unit(s) SubCutaneous every 12 hours  hydrALAZINE 50 milliGRAM(s) Oral three times a day  insulin lispro (ADMELOG) corrective regimen sliding scale   SubCutaneous three times a day before meals  insulin lispro (ADMELOG) corrective regimen sliding scale   SubCutaneous at bedtime  iron sucrose IVPB 200 milliGRAM(s) IV Intermittent every 24 hours  labetalol 200 milliGRAM(s) Oral two times a day  levothyroxine 25 MICROGram(s) Oral daily  multivitamin 1 Tablet(s) Oral daily    MEDICATIONS  (PRN):  acetaminophen     Tablet .. 650 milliGRAM(s) Oral every 6 hours PRN Temp greater or equal to 38C (100.4F), Mild Pain (1 - 3)  dextrose Oral Gel 15 Gram(s) Oral once PRN Blood Glucose LESS THAN 70 milliGRAM(s)/deciliter  hydrocortisone 1% Cream 1 Application(s) Topical two times a day PRN Itching    Vital Signs Last 24 Hrs  T(C): 36.3 (25 Dec 2022 13:11), Max: 36.4 (24 Dec 2022 20:36)  T(F): 97.4 (25 Dec 2022 13:11), Max: 97.6 (25 Dec 2022 04:51)  HR: 64 (25 Dec 2022 13:11) (62 - 64)  BP: 143/65 (25 Dec 2022 13:11) (143/65 - 166/66)  BP(mean): --  RR: 18 (25 Dec 2022 13:11) (18 - 18)  SpO2: 98% (25 Dec 2022 13:11) (98% - 99%)    Parameters below as of 25 Dec 2022 13:11  Patient On (Oxygen Delivery Method): room air    PHYSICAL EXAM  GENERAL: NAD  HEAD:  Atraumatic, normocephalic  EYES: EOMI, PERRLA, conjunctiva and sclera clear  CHEST/LUNG: Clear to auscultation bilaterally; no wheezes  HEART: +S1+S2, regular rate and rhythm, 3/6 HOLDEN  ABDOMEN: Soft, nontender, nondistended; bowel sounds present  EXTREMITIES:  2+ peripheral pulses; 1+ pedal edema  PSYCH: AAOx3    .  LABS:         142  |  107  |  72<H>  ----------------------------<  161<H>  4.1   |  20<L>  |  4.98<H>    Ca    8.1<L>      25 Dec 2022 05:44  Phos  5.9       Mg     2.1     -    TPro  5.4<L>  /  Alb  x   /  TBili  x   /  DBili  x   /  AST  x   /  ALT  x   /  AlkPhos  x   12-24      Urinalysis Basic - ( 24 Dec 2022 20:49 )    Color: Light Yellow / Appearance: Clear / S.009 / pH: x  Gluc: x / Ketone: Negative  / Bili: Negative / Urobili: Negative   Blood: x / Protein: 300 mg/dL / Nitrite: Negative   Leuk Esterase: Negative / RBC: 1 /hpf / WBC 1 /HPF   Sq Epi: x / Non Sq Epi: 1 /hpf / Bacteria: Negative            RADIOLOGY, EKG & ADDITIONAL TESTS: Reviewed. 
No events overnight.     GENERAL: No fevers, no chills.  EYES: No blurry vision,  No photophobia  ENT: No sore throat.  No dysphagia  Cardiovascular: No chest pain, palpitations, orthopnea  Pulmonary: No cough, no wheezing. No shortness of breath  Gastrointestinal: No abdominal pain, no diarrhea, no constipation.   Musculoskeletal: No weakness.  No myalgias.  Dermatology:  No rashes.  Neuro: No Headache.  No vertigo.  No dizziness.  Psych: No anxiety, no depression.  Denies suicidal thoughts.    MEDICATIONS  (STANDING):  amLODIPine   Tablet 10 milliGRAM(s) Oral daily  AQUAPHOR (petrolatum Ointment) 1 Application(s) Topical two times a day  atorvastatin 40 milliGRAM(s) Oral at bedtime  dextrose 5%. 1000 milliLiter(s) (50 mL/Hr) IV Continuous <Continuous>  dextrose 5%. 1000 milliLiter(s) (100 mL/Hr) IV Continuous <Continuous>  dextrose 50% Injectable 25 Gram(s) IV Push once  dextrose 50% Injectable 25 Gram(s) IV Push once  ergocalciferol 52103 Unit(s) Oral every week  glucagon  Injectable 1 milliGRAM(s) IntraMuscular once  hydrALAZINE 50 milliGRAM(s) Oral three times a day  insulin lispro (ADMELOG) corrective regimen sliding scale   SubCutaneous three times a day before meals  insulin lispro (ADMELOG) corrective regimen sliding scale   SubCutaneous at bedtime  iron sucrose IVPB 200 milliGRAM(s) IV Intermittent every 24 hours  labetalol 200 milliGRAM(s) Oral two times a day  levothyroxine 25 MICROGram(s) Oral daily  multivitamin 1 Tablet(s) Oral daily    MEDICATIONS  (PRN):  acetaminophen     Tablet .. 650 milliGRAM(s) Oral every 6 hours PRN Temp greater or equal to 38C (100.4F), Mild Pain (1 - 3)  dextrose Oral Gel 15 Gram(s) Oral once PRN Blood Glucose LESS THAN 70 milliGRAM(s)/deciliter  hydrocortisone 1% Cream 1 Application(s) Topical two times a day PRN Itching    Vital Signs Last 24 Hrs  T(C): 36.7 (26 Dec 2022 04:12), Max: 36.7 (25 Dec 2022 20:01)  T(F): 98.1 (26 Dec 2022 04:12), Max: 98.1 (25 Dec 2022 20:01)  HR: 76 (26 Dec 2022 04:12) (64 - 76)  BP: 158/63 (26 Dec 2022 04:12) (143/65 - 158/63)  BP(mean): --  RR: 18 (26 Dec 2022 04:12) (18 - 18)  SpO2: 97% (26 Dec 2022 04:12) (97% - 99%)    Parameters below as of 26 Dec 2022 04:12  Patient On (Oxygen Delivery Method): room air    PHYSICAL EXAM  GENERAL: NAD  HEAD:  Atraumatic, normocephalic  EYES: EOMI, PERRLA, conjunctiva and sclera clear  CHEST/LUNG: Clear to auscultation bilaterally; no wheezes  HEART: +S1+S2, regular rate and rhythm, 3/6 HOLDEN  ABDOMEN: Soft, nontender, nondistended; bowel sounds present  EXTREMITIES:  2+ peripheral pulses  PSYCH: AAOx3    .  LABS:                         7.8    8.27  )-----------( 250      ( 26 Dec 2022 06:12 )             24.1     12-26    141  |  106  |  74<H>  ----------------------------<  199<H>  4.3   |  19<L>  |  5.35<H>    Ca    7.5<L>      26 Dec 2022 06:12  Phos  6.2     12-  Mg     2.2     12-        Urinalysis Basic - ( 24 Dec 2022 20:49 )    Color: Light Yellow / Appearance: Clear / S.009 / pH: x  Gluc: x / Ketone: Negative  / Bili: Negative / Urobili: Negative   Blood: x / Protein: 300 mg/dL / Nitrite: Negative   Leuk Esterase: Negative / RBC: 1 /hpf / WBC 1 /HPF   Sq Epi: x / Non Sq Epi: 1 /hpf / Bacteria: Negative            RADIOLOGY, EKG & ADDITIONAL TESTS: Reviewed. 
[facility] Division of Hospital Medicine  Gladis Brown DO   Available via Microsoft Teams      Patient is a 72y old  Male who presents with a chief complaint of vol overload (22 Dec 2022 10:52)      SUBJECTIVE / OVERNIGHT EVENTS:  No acute overnight events. No CP, SOB, HA, vision changes     MEDICATIONS  (STANDING):  amLODIPine   Tablet 10 milliGRAM(s) Oral daily  AQUAPHOR (petrolatum Ointment) 1 Application(s) Topical two times a day  atorvastatin 40 milliGRAM(s) Oral at bedtime  calcium acetate 667 milliGRAM(s) Oral three times a day with meals  carvedilol 12.5 milliGRAM(s) Oral every 12 hours  dextrose 5%. 1000 milliLiter(s) (50 mL/Hr) IV Continuous <Continuous>  dextrose 5%. 1000 milliLiter(s) (100 mL/Hr) IV Continuous <Continuous>  dextrose 50% Injectable 25 Gram(s) IV Push once  dextrose 50% Injectable 25 Gram(s) IV Push once  epoetin rose-epbx (RETACRIT) Injectable 49287 Unit(s) SubCutaneous once  ergocalciferol 22424 Unit(s) Oral every week  glucagon  Injectable 1 milliGRAM(s) IntraMuscular once  hydrALAZINE 100 milliGRAM(s) Oral every 8 hours  insulin lispro (ADMELOG) corrective regimen sliding scale   SubCutaneous three times a day before meals  insulin lispro (ADMELOG) corrective regimen sliding scale   SubCutaneous at bedtime  levothyroxine 25 MICROGram(s) Oral daily  multivitamin 1 Tablet(s) Oral daily  sodium bicarbonate 1300 milliGRAM(s) Oral two times a day  torsemide 60 milliGRAM(s) Oral daily    MEDICATIONS  (PRN):  acetaminophen     Tablet .. 650 milliGRAM(s) Oral every 6 hours PRN Temp greater or equal to 38C (100.4F), Mild Pain (1 - 3)  dextrose Oral Gel 15 Gram(s) Oral once PRN Blood Glucose LESS THAN 70 milliGRAM(s)/deciliter  hydrocortisone 1% Cream 1 Application(s) Topical two times a day PRN Itching      CAPILLARY BLOOD GLUCOSE      POCT Blood Glucose.: 287 mg/dL (30 Dec 2022 11:14)  POCT Blood Glucose.: 154 mg/dL (30 Dec 2022 07:48)  POCT Blood Glucose.: 237 mg/dL (29 Dec 2022 20:57)  POCT Blood Glucose.: 193 mg/dL (29 Dec 2022 16:56)    I&O's Summary    29 Dec 2022 07:01  -  30 Dec 2022 07:00  --------------------------------------------------------  IN: 1230 mL / OUT: 1800 mL / NET: -570 mL    30 Dec 2022 07:01  -  30 Dec 2022 14:07  --------------------------------------------------------  IN: 360 mL / OUT: 850 mL / NET: -490 mL        PHYSICAL EXAM:  Vital Signs Last 24 Hrs  T(C): 36.7 (30 Dec 2022 11:56), Max: 36.8 (30 Dec 2022 00:07)  T(F): 98 (30 Dec 2022 11:56), Max: 98.2 (30 Dec 2022 00:07)  HR: 69 (30 Dec 2022 11:56) (65 - 77)  BP: 159/62 (30 Dec 2022 11:56) (159/62 - 174/65)  BP(mean): --  RR: 18 (30 Dec 2022 11:56) (18 - 18)  SpO2: 96% (30 Dec 2022 11:56) (96% - 97%)    Parameters below as of 30 Dec 2022 11:56  Patient On (Oxygen Delivery Method): room air      CONSTITUTIONAL: NAD  EYES: conjunctiva and sclera clear  ENMT: Moist oral mucosa  RESPIRATORY: Normal respiratory effort; lungs are clear to auscultation bilaterally  CARDIOVASCULAR: Regular rate and rhythm, normal S1 and S2, + SM; No lower extremity edema   ABDOMEN: Nontender to palpation, normoactive bowel sounds, no rebound/guarding  MUSCULOSKELETAL: no clubbing or cyanosis of digits; no joint swelling or tenderness to palpation, pink band LUE  PSYCH: A+O to person, place, and time; affect appropriate  SKIN: No rashes; no palpable lesions    LABS:                        9.2    10.38 )-----------( 292      ( 30 Dec 2022 02:22 )             28.4     12-30    140  |  105  |  76<H>  ----------------------------<  174<H>  4.3   |  21<L>  |  5.02<H>    Ca    8.8      30 Dec 2022 02:22  Phos  5.3     12-30  Mg     2.3     12-30      PT/INR - ( 30 Dec 2022 02:22 )   PT: 10.5 sec;   INR: 0.91 ratio         PTT - ( 30 Dec 2022 02:22 )  PTT:33.2 sec            RADIOLOGY & ADDITIONAL TESTS:  Results Reviewed:   Imaging Personally Reviewed:  Electrocardiogram Personally Reviewed:    COORDINATION OF CARE:  Care Discussed with Consultants/Other Providers [Y/N]:  Prior or Outpatient Records Reviewed [Y/N]:

## 2022-12-30 NOTE — DISCHARGE NOTE NURSING/CASE MANAGEMENT/SOCIAL WORK - PATIENT PORTAL LINK FT
You can access the FollowMyHealth Patient Portal offered by [facility] by registering at the following website: [email or web address]. By joining [facility]’s FollowMyHealth portal, you will also be able to view your health information using other applications (apps) compatible with our system.

## 2022-12-30 NOTE — PROGRESS NOTE ADULT - PROBLEM SELECTOR PLAN 3
OLENA on CKD in setting of volume overload and uncontrolled hypertension -- peaked at cr 5.35. 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

## 2022-12-30 NOTE — PROGRESS NOTE ADULT - ASSESSMENT
72 year old male with OLENA on CKD. Vascular surgery consulted for AVF planning for eventual dialysis need.    Plan:  - Plan for LUE AVF deferred for now  - Please follow up outpatient with Dr. Friedman for LUE AVF  - Protect LUE (no IVs, blood draws, lines, blood pressures, etc)  - Please document medical and cardiac clearance  - Appreciate nephrology care  - Appreciate excellent care per primary team  - Please re-page vascular with future questions    Vascular Surgery  [phone] 72 year old male with OLENA on CKD. Vascular surgery consulted for AVF planning for eventual dialysis need.    Plan:  - Please follow up outpatient with Dr. Friedman for LUE AVF  - Protect LUE (no IVs, blood draws, lines, blood pressures, etc)  - Please document medical and cardiac clearance  - Appreciate nephrology care  - Appreciate excellent care per primary team  - Please re-page vascular with future questions    Vascular Surgery  [phone]

## 2023-01-02 LAB
% ALBUMIN: 45.8 % — SIGNIFICANT CHANGE UP
% ALPHA 1: 6 % — SIGNIFICANT CHANGE UP
% ALPHA 2: 17.7 % — SIGNIFICANT CHANGE UP
% BETA: 13.9 % — SIGNIFICANT CHANGE UP
% GAMMA: 16.6 % — SIGNIFICANT CHANGE UP
% M SPIKE: SIGNIFICANT CHANGE UP
ALBUMIN SERPL ELPH-MCNC: 2.5 G/DL — LOW (ref 3.6–5.5)
ALBUMIN/GLOB SERPL ELPH: 0.9 RATIO — SIGNIFICANT CHANGE UP
ALPHA1 GLOB SERPL ELPH-MCNC: 0.3 G/DL — SIGNIFICANT CHANGE UP (ref 0.1–0.4)
ALPHA2 GLOB SERPL ELPH-MCNC: 1 G/DL — SIGNIFICANT CHANGE UP (ref 0.5–1)
B-GLOBULIN SERPL ELPH-MCNC: 0.8 G/DL — SIGNIFICANT CHANGE UP (ref 0.5–1)
GAMMA GLOBULIN: 0.9 G/DL — SIGNIFICANT CHANGE UP (ref 0.6–1.6)
INTERPRETATION SERPL IFE-IMP: SIGNIFICANT CHANGE UP
M-SPIKE: SIGNIFICANT CHANGE UP (ref 0–0)
PROT PATTERN SERPL ELPH-IMP: SIGNIFICANT CHANGE UP

## 2023-01-04 ENCOUNTER — APPOINTMENT (OUTPATIENT)
Dept: NEPHROLOGY | Facility: CLINIC | Age: 73
End: 2023-01-04
Payer: COMMERCIAL

## 2023-01-04 VITALS
TEMPERATURE: 97.9 F | WEIGHT: 191 LBS | DIASTOLIC BLOOD PRESSURE: 70 MMHG | SYSTOLIC BLOOD PRESSURE: 139 MMHG | HEART RATE: 60 BPM | OXYGEN SATURATION: 97 %

## 2023-01-04 PROCEDURE — 99215 OFFICE O/P EST HI 40 MIN: CPT

## 2023-01-04 RX ORDER — ICOSAPENT ETHYL 1 G/1
1 CAPSULE ORAL TWICE DAILY
Qty: 180 | Refills: 3 | Status: ACTIVE | COMMUNITY
Start: 2023-01-04

## 2023-01-04 NOTE — ASSESSMENT
[FreeTextEntry1] : Mr. Angulo is a 72 year old man with CKD stage V with proteinuria, hypertension, diabetes mellitus, hyperlipidemia, hypothyroidism, and recent hospitalization for worsening kidney function and hypervolemia (12/2023), here for kidney evaluation and management.\par \par Mr. Angulo has advanced CKD due to diabetes, and his GFR is in the teens. We need to prepare to dialysis and/or transplant given the very high risk of end-stage renal disease this year. I spent considerable time educating Mr. Angulo and his daughter about kidney disease, ESRD, dialysis and its modalities, and transplant, including with the use of a living donor.\par \par CKD IV/V with proteinuria due to diabetes\par - Continue irbesartan\par - Continue bicarbonate for metabolic acidosis\par - No need to restrict potassium in diet as yet given normokalemia\par - Continue calcium acetate and ergocalciferol for hyperphosphatemia and hypovitaminosis D\par - Needs NITA, will obtain prior approval\par - Enrolled in CKD Healthy Transitions program for assistance with ESRD planning\par \par Hypertension\par - Continue\par 	Amlodipine 10mg daily\par 	Irbesartan 150mg daily\par 	Carvedilol 12.5mg BID\par 	Hydralazine 100mg TID\par - Decrease furosemide from 80mg BID to daily dosing (I wonder if he is over-diuresed given symptoms)\par \par Diabetes mellitus: no medications at this time given normal A1c\par \par Hyperlipidemia: Continue statin and Vascepa\par \par \par PLAN\par - Decrease furosemide from BID to daily\par - Enroll in CKD Healthy Transitions program ([facility])\par - Labs today\par - Follow up in 1 month\par \par \par Discussed importance of healthful habits, including physical activity/exercise and improvements in diet.\par \par I have spent a total of 60 minutes in which >50% was spent in discussion with patient regarding chronic kidney disease, hypertension, diet (including counseling on salt intake).

## 2023-01-04 NOTE — HISTORY OF PRESENT ILLNESS
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 Angulo is a 72 year old man with CKD stage V with proteinuria, hypertension, diabetes mellitus, hyperlipidemia, hypothyroidism, and recent hospitalization for worsening kidney function and hypervolemia (2023), here for kidney evaluation and management.\par \par Mr. Angulo is well known to me from recent admission. He has a history of CKD, though first told of it within the prior year. He was told to see a nephrologist, but never did prior, and believes his kidneys were working at "50%". He has long-standing hypertension and diabetes, and admits to taking poor care of his health.\par \par In the hospital, he was diagnosed with CKD stage IV/V with nephrotic-range proteinuria, and a kidney biopsy revealed glomerular nephropathy and ATN. He was effectivelly diuresed and discharged home with nephrology follow up for ongoing kidney care and kidney failure planning.\par \par Today, Mr. Angulo is doing alright, though he does not significant weakness and some dizziness. His appetite is poor as well. He has been adhering to a very strict diet, including a 1 liter fluid restriction, which was imposed in the hospital.\par \par BP at home has been 140-160.\par \par \par ROS: All other systems were reviewed and are negative, except as per HPI.\par 	\par -------------------------------------------------------------------------------\par Social History:\par 	From [place], came to [place] 1960s\par 	Lives in [place] with wife\par 	Two daughters has 5 grandchild\par 	Works as  for [email or web address] service\par 	Smoked as teenager, stopped aged 29\par 	\par Family History:\par 	Father had stroked ( age 70s)\par 	Mother  age 93	\par 	No known history of renal disease, hematuria, proteinuria, ESRD, dialysis, transplant\par \par -------------------------------------------------------------------------------\par Allergies: NKDA\par \par Medications:\par 	Furosemide 80mg BID\par 	Amlodipine 10mg daily\par 	Irbesartan 150mg daily\par 	Carvedilol 12.5mg BID\par 	Hydralazine 100mg TID\par 	Sodium bicarbonate 1300mg BID\par 	Atorvastatin 40mg daily\par 	Vascepa 1g BID\par 	Aspirin 81mg daily\par 	Calcium acetate 667mg TID\par 	Ergocalciferol 50K units weekly\par 	Levothyroxine 25mcg daily\par 	Multivitamin\par 	\par -------------------------------------------------------------------------------\par Physical Exam:\par 	Gen: NAD\par 	HEENT: Supple neck\par 	Pulm: CTA\par 	CV: RRR\par 	Back: No spinal or CVA terndeness\par 	Abd: +BS, soft, nontender/nondistended\par 	UE: Warm, FROM, intact strength; no asterixis\par 	LE: Warm, FROM, intact strength; trace edema\par 	Neuro: No focal deficits\par 	Psych: Normal affect\par 	Skin: Warm, without rashes\par 	\par -------------------------------------------------------------------------------\par Labs/Studies:\par \par 	2022\par \par 		140 | 105 | 76\par 		------------------< 174 Ca 8.8 eGFR 12\par 		4.3 |  21 | 5.02\par 		\par 		Magnesium 2.3\par 		Phosphorus 5.3\par \par 	2022 UACR 6447 mg/g\par 	2022 UPCR 11.8 g/g\par \par 	2022 CBC: WBC 10.4 / Hgb 9.2 / plt 292\par 	2022 iron 48, TIBC 256, sat 19%, ferritin N/A\par 	2022 Lipid: chol 142, , HDL 51, LDL 53\par 	2022 HbA1c 5.8\par 	2022 Vitamin D (25OH) 8.8\par 	2022 \par \par \par 	2022 Kidney U/S\par 		- Right kidney: 11.4 cm. No hydronephrosis. Upper pole and lower pole cyst measuring 2.9 cm a 1.6 cm.\par 		- Left kidney: 12.4 cm. No renal mass, hydronephrosis or calculi.\par \par 	2022 Kidney Biopsy\par 		- Advanced diffuse and nodular diabetic glomerulosclerosis\par 		- Acute tubular injury with focal tubular necrosis\par 		- Chronic and focally active interstitial nephritis\par \par 		Summary of chronic changes:\par 		- Diffuse nodular glomerulosclerosis\par 		- Global glomerulosclerosis (30% of glomeruli)\par 		- Tubular atrophy and interstitial fibrosis (70% of the sampled	cortex)\par 		- Severe arterial and arteriolar sclerosis

## 2023-01-05 NOTE — CHART NOTE - NSCHARTNOTEFT_GEN_A_CORE
Left (1) message(s) for patient in regards to follow up care with callback information.
Worsening renal function noted. Discussed with Renal, IV Lasix discontinued for now. Renal to re-assess patient tomorrow for further diuresis
Left message for patient in regards to follow up care with callback information.
Torsemide not covered by patients insurance - d/w Dr. Lilly ( patient switched to Lasix 80 in am and 80 in 2pm   New RX sent to pharmacy

## 2023-01-09 LAB
ALBUMIN SERPL ELPH-MCNC: 3.4 G/DL
ANION GAP SERPL CALC-SCNC: 15 MMOL/L
BASOPHILS # BLD AUTO: 0.18 K/UL
BASOPHILS NFR BLD AUTO: 1.3 %
BUN SERPL-MCNC: 74 MG/DL
CALCIUM SERPL-MCNC: 9 MG/DL
CHLORIDE SERPL-SCNC: 104 MMOL/L
CO2 SERPL-SCNC: 24 MMOL/L
CREAT SERPL-MCNC: 5.14 MG/DL
CYSTATIN C SERPL-MCNC: 3.98 MG/L
EGFR: 11 ML/MIN/1.73M2
EOSINOPHIL # BLD AUTO: 0.31 K/UL
EOSINOPHIL NFR BLD AUTO: 2.3 %
FERRITIN SERPL-MCNC: 992 NG/ML
GFR/BSA.PRED SERPLBLD CYS-BASED-ARV: 12 ML/MIN/1.73M2
GLUCOSE SERPL-MCNC: 264 MG/DL
HCT VFR BLD CALC: 33 %
HGB BLD-MCNC: 10.7 G/DL
IMM GRANULOCYTES NFR BLD AUTO: 0.2 %
IRON SATN MFR SERPL: 30 %
IRON SERPL-MCNC: 82 UG/DL
LYMPHOCYTES # BLD AUTO: 1.88 K/UL
LYMPHOCYTES NFR BLD AUTO: 13.9 %
MAN DIFF?: NORMAL
MCHC RBC-ENTMCNC: 30.1 PG
MCHC RBC-ENTMCNC: 32.4 GM/DL
MCV RBC AUTO: 92.7 FL
MONOCYTES # BLD AUTO: 1.13 K/UL
MONOCYTES NFR BLD AUTO: 8.4 %
NEUTROPHILS # BLD AUTO: 9.98 K/UL
NEUTROPHILS NFR BLD AUTO: 73.9 %
PHOSPHATE SERPL-MCNC: 4.9 MG/DL
PLATELET # BLD AUTO: 311 K/UL
POTASSIUM SERPL-SCNC: 4.2 MMOL/L
RBC # BLD: 3.56 M/UL
RBC # FLD: 13.2 %
SODIUM SERPL-SCNC: 143 MMOL/L
TIBC SERPL-MCNC: 272 UG/DL
UIBC SERPL-MCNC: 189 UG/DL
WBC # FLD AUTO: 13.51 K/UL

## 2023-01-20 RX ORDER — FUROSEMIDE 80 MG/1
80 TABLET ORAL DAILY
Qty: 90 | Refills: 3 | Status: DISCONTINUED | COMMUNITY
Start: 2023-01-04 | End: 2023-01-20

## 2023-01-20 RX ORDER — CALCIUM ACETATE 667 MG/1
667 CAPSULE ORAL 3 TIMES DAILY
Qty: 270 | Refills: 3 | Status: DISCONTINUED | COMMUNITY
Start: 2023-01-04 | End: 2023-01-20

## 2023-01-25 ENCOUNTER — TRANSCRIPTION ENCOUNTER (OUTPATIENT)
Age: 73
End: 2023-01-25

## 2023-01-31 ENCOUNTER — RESULT CHARGE (OUTPATIENT)
Age: 73
End: 2023-01-31

## 2023-01-31 ENCOUNTER — APPOINTMENT (OUTPATIENT)
Dept: ENDOCRINOLOGY | Facility: CLINIC | Age: 73
End: 2023-01-31
Payer: COMMERCIAL

## 2023-01-31 VITALS
SYSTOLIC BLOOD PRESSURE: 110 MMHG | DIASTOLIC BLOOD PRESSURE: 50 MMHG | OXYGEN SATURATION: 98 % | HEART RATE: 68 BPM | TEMPERATURE: 98 F | HEIGHT: 71 IN

## 2023-01-31 LAB
GLUCOSE BLDC GLUCOMTR-MCNC: 332
HBA1C MFR BLD HPLC: 9.3

## 2023-01-31 PROCEDURE — 83036 HEMOGLOBIN GLYCOSYLATED A1C: CPT | Mod: QW

## 2023-01-31 PROCEDURE — 99204 OFFICE O/P NEW MOD 45 MIN: CPT

## 2023-01-31 RX ORDER — BLOOD-GLUCOSE METER
W/DEVICE EACH MISCELLANEOUS
Qty: 1 | Refills: 0 | Status: ACTIVE | COMMUNITY
Start: 2023-01-31 | End: 1900-01-01

## 2023-01-31 RX ORDER — GLUCOSAM/CHON-MSM1/C/MANG/BOSW 500-416.6
TABLET ORAL
Qty: 1 | Refills: 0 | Status: ACTIVE | COMMUNITY
Start: 2023-01-31 | End: 1900-01-01

## 2023-01-31 NOTE — HISTORY OF PRESENT ILLNESS
[FreeTextEntry1] : 72 yearM here for assessment for Type 2 diabetes mellitus; CKD V.  \par Patient here with daughter. Was recently hospitalized. \par During hospitalization, all his DM medication was discontinued. \par \par Most recently daughter reports he has been more fatigued\par \par Dry skin: yes\par Vision problems: yes\par Burning pain or tingling in the feet, legs, hands, other areas: yes\par High blood pressure: yes\par Extreme hunger: no \par Frequent and/or recurring urinary infections:  no \par Skin infections:  no  \par Slow healing of cuts: no \par \par  \par Screening \par Ophthalmology: follows\par Podiatry:  due for visit \par LDL: 61\par EGFR: 14\par \par Current diabetic medication regimen (verified with patient): none, glyburide and metformin as stopped by another provider \par \par \par \par SMBG ranges (glucometer): not performed\par \par glucose today 332mg/dl

## 2023-01-31 NOTE — DATA REVIEWED
[FreeTextEntry1] : 1/24/2023\par \par TSH: 2.21\par Free T4: 1.7\par \par A1c: 9.2%\par \par Cr: 4.59\par \par LFTs: WNL

## 2023-01-31 NOTE — PHYSICAL EXAM
[Alert] : alert [Well Nourished] : well nourished [No Acute Distress] : no acute distress [Well Developed] : well developed [Normal Sclera/Conjunctiva] : normal sclera/conjunctiva [EOMI] : extra ocular movement intact [No Proptosis] : no proptosis [Normal Oropharynx] : the oropharynx was normal [Thyroid Not Enlarged] : the thyroid was not enlarged [No Respiratory Distress] : no respiratory distress [No Accessory Muscle Use] : no accessory muscle use [Clear to Auscultation] : lungs were clear to auscultation bilaterally [Normal S1, S2] : normal S1 and S2 [Normal Rate] : heart rate was normal [Regular Rhythm] : with a regular rhythm [No Edema] : no peripheral edema [Pedal Pulses Normal] : the pedal pulses are present [Normal Bowel Sounds] : normal bowel sounds [Not Tender] : non-tender [Not Distended] : not distended [Soft] : abdomen soft [Normal Anterior Cervical Nodes] : no anterior cervical lymphadenopathy [Normal Posterior Cervical Nodes] : no posterior cervical lymphadenopathy [No Spinal Tenderness] : no spinal tenderness [Spine Straight] : spine straight [No Stigmata of Cushings Syndrome] : no stigmata of Cushings Syndrome [Normal Gait] : normal gait [Normal Strength/Tone] : muscle strength and tone were normal [No Rash] : no rash [Normal Reflexes] : deep tendon reflexes were 2+ and symmetric [No Tremors] : no tremors [Oriented x3] : oriented to person, place, and time [Swelling] : swollen [Acanthosis Nigricans] : no acanthosis nigricans

## 2023-02-01 ENCOUNTER — APPOINTMENT (OUTPATIENT)
Dept: NEPHROLOGY | Facility: CLINIC | Age: 73
End: 2023-02-01
Payer: COMMERCIAL

## 2023-02-01 VITALS
SYSTOLIC BLOOD PRESSURE: 145 MMHG | WEIGHT: 182 LBS | DIASTOLIC BLOOD PRESSURE: 57 MMHG | BODY MASS INDEX: 25.38 KG/M2 | HEIGHT: 71 IN | TEMPERATURE: 98 F | HEART RATE: 61 BPM | OXYGEN SATURATION: 100 %

## 2023-02-01 DIAGNOSIS — E55.9 VITAMIN D DEFICIENCY, UNSPECIFIED: ICD-10-CM

## 2023-02-01 PROCEDURE — 99215 OFFICE O/P EST HI 40 MIN: CPT

## 2023-02-01 NOTE — HISTORY OF PRESENT ILLNESS
[FreeTextEntry1] : Contacts:\par 	Mariposa (daughter)\par 	Dr. 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 Angulo is a 72 year old man with CKD stage V with proteinuria, hypertension, diabetes mellitus, hyperlipidemia, hypothyroidism, and recent hospitalization for worsening kidney function and hypervolemia (2023), here for kidney evaluation and management.\par \par Last saw Mr. Angulo about 1 month ago. Since that appt., we did the following:\par 	STOP furosemide\par 	STOP Calcium acetate 667mg TID\par 	DECREASE Hydralazine from 100mg to 50mg TID\par 	INCREASE levothyroxine from 25mcg to 50mcg daily\par \par In the interim, he saw Dr. Ta from endocrinology, who started diabetes medications.\par \par He notes feeling "droswy" and "dizzy" when he wakes up. He naps during the day as well. His appetite isn't great and he isn't eating as much. In discussion with his son-in-law, it appears that because of his salt-restricted diet, Mr. Angulo finds food not to his taste as much. He has no metallic taste in his mouth at this time.\par \par BP at home has been 110-130.\par \par \par ROS: All other systems were reviewed and are negative, except as per HPI.\par 	\par -------------------------------------------------------------------------------\par Social History:\par 	From [place], came to  1960s\par 	Lives in [place] with wife\par 	Two daughters has 5 grandchild\par 	Works as  for "[employer]" service\par 	Smoked as teenager, stopped aged 29\par 	\par Family History:\par 	Father had stroked ( age 70s)\par 	Mother  age 93	\par 	No known history of renal disease, hematuria, proteinuria, ESRD, dialysis, transplant\par \par -------------------------------------------------------------------------------\par Allergies: NKDA\par \par Medications:\par 	Amlodipine 10mg daily\par 	Irbesartan 150mg daily\par 	Carvedilol 12.5mg two times per day\par 	Hydralazine 50mg three times per day\par 	Sodium bicarbonate 1300mg two times per day\par 	Atorvastatin 40mg daily\par 	Glipizide 5mg daily\par 	Januvia/sitagliptin 25mg daily\par 	Vascepa 1g BID\par 	Aspirin 81mg daily\par 	Ergocalciferol 50K units weekly\par 	Levothyroxine 50mcg daily\par 	Multivitamin\par 	\par -------------------------------------------------------------------------------\par Physical Exam:\par \par 145/57, 61\par \par 	Gen: NAD\par 	HEENT: Supple neck\par 	Pulm: CTA\par 	CV: RRR; no rub\par 	Back: No spinal or CVA terndeness\par 	Abd: +BS, soft, nontender/nondistended\par 	UE: Warm, FROM, intact strength; no asterixis\par 	LE: Warm, FROM, intact strength; minimal edema\par 	Neuro: No focal deficits\par 	Psych: Normal affect\par 	Skin: Warm, without rashes\par 	\par -------------------------------------------------------------------------------\par Labs/Studies:\par \par 	2023\par \par 		138 | 97 | 74\par 		------------------< 319 Ca 8.9 eGFR 13 (Cystatin-C 3.5, eGFR 14)\par 		4.6 | 26 | 4.59\par 		\par 		Phosphorus 4.8\par 		Albumin 3.7\par \par 	2022 UACR 6447 mg/g\par 	2022 UPCR 11.8 g/g\par \par 	2023 CBC: WBC 9.2 / Hgb 12.5 / plt 207''\par 	2023 iron 75, TIBC 244, sat 31%, ferritin 938\par 	2023 Lipid: chol 153, , HDL 43, LDL 61\par 	2023 HbA1c 9.2\par 	2023 Vitamin D (25OH) 29\par 	2023 PTH 99\par \par \par 	2022 Kidney U/S\par 		- Right kidney: 11.4 cm. No hydronephrosis. Upper pole and lower pole cyst measuring 2.9 cm a 1.6 cm.\par 		- Left kidney: 12.4 cm. No renal mass, hydronephrosis or calculi.\par \par 	2022 Kidney Biopsy\par 		- Advanced diffuse and nodular diabetic glomerulosclerosis\par 		- Acute tubular injury with focal tubular necrosis\par 		- Chronic and focally active interstitial nephritis\par \par 		Summary of chronic changes:\par 		- Diffuse nodular glomerulosclerosis\par 		- Global glomerulosclerosis (30% of glomeruli)\par 		- Tubular atrophy and interstitial fibrosis (70% of the sampled	cortex)\par 		- Severe arterial and arteriolar sclerosis

## 2023-02-01 NOTE — ASSESSMENT
[FreeTextEntry1] : Mr. Angulo is a 72 year old man with CKD stage V with proteinuria, hypertension, diabetes mellitus, hyperlipidemia, hypothyroidism, and recent hospitalization for worsening kidney function and hypervolemia (12/2023), here for kidney evaluation and management.\par \par Mr. Angulo has advanced CKD due to diabetes, and his GFR is in the teens. We need to prepare to dialysis and/or transplant given the very high risk of end-stage renal disease this year.\par \par Blood pressure, while normal, may be too low for him causing symptoms. Will back off a bit.\par \par CKD IV/V with proteinuria due to diabetes\par - Continue irbesartan\par - Decresae dose of bicarbonate for metabolic acidosis\par - No need to restrict potassium in diet as yet given normokalemia\par - Continue ergocalciferol\par - Hemoglobin at goal, no need for NITA at this time\par - Continue in CKD Healthy Transitions program ([facility])\par \par Hypertension\par - Continue\par 	Amlodipine 10mg daily\par 	Irbesartan 150mg daily\par 	Carvedilol 12.5mg BID\par - Stop hydralazine 50mg TID\par - Continue off of diuretics\par \par Diabetes mellitus: Per endocrinology\par \par Hyperlipidemia: Continue statin and Vascepa\par \par \par PLAN\par - Stop hydralazine and decrease sodium bicarbonate\par - Continue in CKD Healthy Transitions program for ongoing ESRD planning (dialysis and/or transplantation)\par - Labs later in February\par - Follow up in 1 month\par \par \par Discussed importance of healthful habits, including physical activity/exercise and improvements in diet.\par \par I have spent a total of 40 minutes in which >50% was spent in discussion with patient regarding chronic kidney disease, hypertension, diet (including counseling on salt intake). \par \par \par \par Dionisio Angulo – medications as of Feb. 1\par \par 	Amlodipine 10mg daily\par 	Irbesartan 150mg daily\par 	Carvedilol 12.5mg two times per day\par 	Sodium bicarbonate 650mg two times per day\par 	Atorvastatin 40mg daily\par 	Glipizide 5mg daily\par 	Januvia/sitagliptin 25mg daily\par 	Vascepa 1g two times per day\par 	Aspirin 81mg daily\par 	Ergocalciferol 50K units weekly\par 	Levothyroxine 50mcg daily\par \par CHANGES\par 	DECREASE sodium bicarbonate from 1300mg (2 tablets) to 650mg (1 tablet) two times per day\par 	STOP Hydralazine 50mg three times per day

## 2023-02-06 ENCOUNTER — APPOINTMENT (OUTPATIENT)
Dept: PODIATRY | Facility: CLINIC | Age: 73
End: 2023-02-06
Payer: COMMERCIAL

## 2023-02-06 DIAGNOSIS — R26.2 DIFFICULTY IN WALKING, NOT ELSEWHERE CLASSIFIED: ICD-10-CM

## 2023-02-06 DIAGNOSIS — Z91.81 HISTORY OF FALLING: ICD-10-CM

## 2023-02-06 DIAGNOSIS — B35.1 TINEA UNGUIUM: ICD-10-CM

## 2023-02-06 DIAGNOSIS — Z82.49 FAMILY HISTORY OF ISCHEMIC HEART DISEASE AND OTHER DISEASES OF THE CIRCULATORY SYSTEM: ICD-10-CM

## 2023-02-06 DIAGNOSIS — Z84.1 FAMILY HISTORY OF DISORDERS OF KIDNEY AND URETER: ICD-10-CM

## 2023-02-06 DIAGNOSIS — Z82.3 FAMILY HISTORY OF STROKE: ICD-10-CM

## 2023-02-06 DIAGNOSIS — L85.1 ACQUIRED KERATOSIS [KERATODERMA] PALMARIS ET PLANTARIS: ICD-10-CM

## 2023-02-06 DIAGNOSIS — Z81.3 FAMILY HISTORY OF OTHER PSYCHOACTIVE SUBSTANCE ABUSE AND DEPENDENCE: ICD-10-CM

## 2023-02-06 DIAGNOSIS — Z83.438 FAMILY HISTORY OF OTHER DISORDER OF LIPOPROTEIN METABOLISM AND OTHER LIPIDEMIA: ICD-10-CM

## 2023-02-06 DIAGNOSIS — Z87.891 PERSONAL HISTORY OF NICOTINE DEPENDENCE: ICD-10-CM

## 2023-02-06 PROCEDURE — 99203 OFFICE O/P NEW LOW 30 MIN: CPT | Mod: 25

## 2023-02-06 PROCEDURE — 11056 PARNG/CUTG B9 HYPRKR LES 2-4: CPT

## 2023-02-06 PROCEDURE — 11721 DEBRIDE NAIL 6 OR MORE: CPT | Mod: 59

## 2023-02-06 RX ORDER — HYDROCORTISONE 1 %
12 CREAM (GRAM) TOPICAL
Qty: 1 | Refills: 3 | Status: ACTIVE | COMMUNITY
Start: 2023-02-06 | End: 1900-01-01

## 2023-02-08 PROBLEM — B35.1 ONYCHOMYCOSIS: Status: ACTIVE | Noted: 2023-02-07

## 2023-02-08 PROBLEM — Z91.81 RISK FOR FALLS: Status: ACTIVE | Noted: 2023-02-07

## 2023-02-08 PROBLEM — R26.2 DIFFICULTY WALKING: Status: ACTIVE | Noted: 2023-02-07

## 2023-02-08 PROBLEM — L85.1 KERATODERMA, ACQUIRED: Status: ACTIVE | Noted: 2023-02-07

## 2023-02-08 NOTE — PHYSICAL EXAM
[0] : left foot dorsalis pedis 0 [FreeTextEntry3] : Absent hair growth. The patient has a class finding of Q8-Two Class B findings.  [de-identified] : There is minor intrinsic muscular atrophy with no sign of drop-foot.  He has a cautious shuffling gait and he is a fall risk. [FreeTextEntry4] : absent vibratory  [FreeTextEntry8] : absent vibratory  [FreeTextEntry1] : [employer] monofilament testing absent at the hallux, 1st MPJ and heel bilateral.

## 2023-02-08 NOTE — ASSESSMENT
[FreeTextEntry1] : \par Impression: Fall risk. Diabetic neuropathy. Keratosis. Onychomycosis.\par \par Treatment: I trimmed the keratosis x3 without incident. I aggressively debrided and debulked mycotic nails x10 without incident. I applied lotion. I ePrescribed Ammonium Lactate. I want him to soak with warm water and white vinegar. I referred him for Vascular consultation and wrote a prescription for diabetic shoes and diabetic inserts. I would like to see him back in the office in 3 months for evaluation. I gave him seated exercises to work on regarding strengthening. I discussed what to avoid in terms of being a fall risk. He needs to use an assistive device at all times.

## 2023-02-08 NOTE — HISTORY OF PRESENT ILLNESS
[Sneakers] : carol [FreeTextEntry1] : Patient presents today with his daughter for evaluation of very problematic feet.  He has horrible keratosis and end-stage deformed mycotic nails. His A1c is 9.3. Fasting sugar is 200+. He has end-stage renal disease looking at potential dialysis. He complains of instability and weakness.

## 2023-02-15 ENCOUNTER — APPOINTMENT (OUTPATIENT)
Dept: NEPHROLOGY | Facility: CLINIC | Age: 73
End: 2023-02-15

## 2023-02-15 ENCOUNTER — APPOINTMENT (OUTPATIENT)
Dept: ENDOCRINOLOGY | Facility: CLINIC | Age: 73
End: 2023-02-15
Payer: COMMERCIAL

## 2023-02-15 VITALS
WEIGHT: 183 LBS | DIASTOLIC BLOOD PRESSURE: 70 MMHG | BODY MASS INDEX: 25.62 KG/M2 | HEIGHT: 71 IN | SYSTOLIC BLOOD PRESSURE: 110 MMHG | OXYGEN SATURATION: 99 % | TEMPERATURE: 97.7 F | HEART RATE: 58 BPM

## 2023-02-15 LAB — GLUCOSE BLDC GLUCOMTR-MCNC: 121

## 2023-02-15 PROCEDURE — 99214 OFFICE O/P EST MOD 30 MIN: CPT

## 2023-02-15 PROCEDURE — 82962 GLUCOSE BLOOD TEST: CPT

## 2023-02-15 NOTE — PHYSICAL EXAM
[Alert] : alert [Well Nourished] : well nourished [No Acute Distress] : no acute distress [Well Developed] : well developed [Normal Sclera/Conjunctiva] : normal sclera/conjunctiva [EOMI] : extra ocular movement intact [No Proptosis] : no proptosis [Normal Oropharynx] : the oropharynx was normal [Thyroid Not Enlarged] : the thyroid was not enlarged [No Respiratory Distress] : no respiratory distress [No Accessory Muscle Use] : no accessory muscle use [Clear to Auscultation] : lungs were clear to auscultation bilaterally [Normal S1, S2] : normal S1 and S2 [Normal Rate] : heart rate was normal [Regular Rhythm] : with a regular rhythm [No Edema] : no peripheral edema [Pedal Pulses Normal] : the pedal pulses are present [Normal Bowel Sounds] : normal bowel sounds [Not Tender] : non-tender [Not Distended] : not distended [Soft] : abdomen soft [Normal Anterior Cervical Nodes] : no anterior cervical lymphadenopathy [No Spinal Tenderness] : no spinal tenderness [Spine Straight] : spine straight [No Stigmata of Cushings Syndrome] : no stigmata of Cushings Syndrome [Normal Gait] : normal gait [Normal Strength/Tone] : muscle strength and tone were normal [No Rash] : no rash [Swelling] : swollen [Normal Reflexes] : deep tendon reflexes were 2+ and symmetric [No Tremors] : no tremors [Oriented x3] : oriented to person, place, and time [Acanthosis Nigricans] : no acanthosis nigricans

## 2023-02-15 NOTE — HISTORY OF PRESENT ILLNESS
[FreeTextEntry1] : 72 yearM here for f/up for Type 2 diabetes mellitus; CKD V.  \par Patient here with daughter. Was recently hospitalized and during hospitalization, all his DM medication was discontinued. \par \par At last visit was started on glipizide 5mg po daily and januvia 25mg po daily \par \par  \par Screening \par Ophthalmology: follows\par Podiatry:  follows \par LDL: 61\par EGFR: 14\par \par Current diabetic medication regimen (verified with patient): \par Glipizide 5mg po daily \par Januvia 25mg po daily \par \par \par SMBG ranges (glucometer): reported, checks 1x/ day avg 120's. rare >200 depending on diet \par \par POCT today: 121, had "boiled egg and 6 crackers" took medication\par \par No recent reported hypoglycemia\par \par

## 2023-03-08 ENCOUNTER — APPOINTMENT (OUTPATIENT)
Dept: VASCULAR SURGERY | Facility: CLINIC | Age: 73
End: 2023-03-08
Payer: COMMERCIAL

## 2023-03-08 VITALS
HEART RATE: 53 BPM | WEIGHT: 179 LBS | SYSTOLIC BLOOD PRESSURE: 131 MMHG | DIASTOLIC BLOOD PRESSURE: 57 MMHG | TEMPERATURE: 97.6 F | HEIGHT: 71 IN | BODY MASS INDEX: 25.06 KG/M2

## 2023-03-08 PROCEDURE — 93985 DUP-SCAN HEMO COMPL BI STD: CPT

## 2023-03-08 PROCEDURE — 99204 OFFICE O/P NEW MOD 45 MIN: CPT

## 2023-03-31 ENCOUNTER — APPOINTMENT (OUTPATIENT)
Dept: NEPHROLOGY | Facility: CLINIC | Age: 73
End: 2023-03-31
Payer: MEDICARE

## 2023-03-31 VITALS
WEIGHT: 176 LBS | BODY MASS INDEX: 24.64 KG/M2 | HEART RATE: 53 BPM | HEIGHT: 71 IN | OXYGEN SATURATION: 98 % | DIASTOLIC BLOOD PRESSURE: 50 MMHG | SYSTOLIC BLOOD PRESSURE: 116 MMHG | TEMPERATURE: 97.4 F

## 2023-03-31 PROCEDURE — 99215 OFFICE O/P EST HI 40 MIN: CPT

## 2023-03-31 NOTE — HISTORY OF PRESENT ILLNESS
[FreeTextEntry1] : Contacts:\par 	Mariposa (daughter)\par 	Dr. Lalo Ta (endocrinology)\par 	Dr. Carol Ann Jewell (vascular)\par 	Dr. 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 Angulo is a 72 year old man with CKD stage V with proteinuria, hypertension, diabetes mellitus, hyperlipidemia, hypothyroidism, and recent hospitalization for worsening kidney function and hypervolemia (2023), here for kidney evaluation and management.\par \par Last saw Mr. Angulo in 2023, at which time we decreased sodium bicarbonate from 1300mg (2 tablets) to 650mg (1 tablet) two times per day and stopped Hydralazine 50mg three times per day. \par \par In the interim, saw podiatry, endocrinology, and vascular, the latter for planned left radiocephalic AVF creation.\par \par He is still tired, though doing alright. Eating poorly because he finds food has no taste at home. Has been avoiding takeout, which he prefers.\par \par BP at home has been 110-130.\par \par \par ROS: All other systems were reviewed and are negative, except as per HPI.\par 	\par -------------------------------------------------------------------------------\par Social History:\par 	From [place], came [place] 1960[place]\par 	Lives in [place] with wife\par 	Two daughters has 5 grandchild\par 	Works as  for [employer] service\par 	Smoked as teenager, stopped aged 29\par 	\par Family History:\par 	Father had stroked ( age 70s)\par 	Mother  age 93	\par 	No known history of renal disease, hematuria, proteinuria, ESRD, dialysis, transplant\par \par -------------------------------------------------------------------------------\par Allergies: NKDA\par \par Medications:\par 	Amlodipine 10mg daily\par 	Irbesartan 150mg daily\par 	Carvedilol 12.5mg two times per day\par 	Sodium bicarbonate 650mg two times per day\par 	Atorvastatin 40mg daily\par 	Glipizide 5mg daily\par 	Januvia/sitagliptin 25mg daily\par 	Vascepa 1g two times per day\par 	Aspirin 81mg daily\par 	Ergocalciferol 50K units weekly\par 	Levothyroxine 50mcg daily\par 	\par -------------------------------------------------------------------------------\par Physical Exam:\par \par 	Gen: NAD\par 	HEENT: Supple neck\par 	Pulm: CTA\par 	CV: RRR; no rub\par 	Back: No spinal or CVA terndeness\par 	Abd: +BS, soft, nontender/nondistended\par 	UE: Warm, FROM, intact strength; no asterixis\par 	LE: Warm, FROM, intact strength; minimal edema\par 	Neuro: No focal deficits\par 	Psych: Normal affect\par 	Skin: Warm, without rashes\par 	\par -------------------------------------------------------------------------------\par Labs/Studies:\par \par 	2023\par \par 		138 | 97 | 74\par 		------------------< 319 Ca 8.9 eGFR 13 (Cystatin-C 3.5, eGFR 14)\par 		4.6 | 26 | 4.59\par 		\par 		Phosphorus 4.8\par 		Albumin 3.7\par \par 	2022 UACR 6447 mg/g\par 	2022 UPCR 11.8 g/g\par \par 	2023 CBC: WBC 9.2 / Hgb 12.5 / plt 207\par 	2023 iron 75, TIBC 244, sat 31%, ferritin 938\par 	2023 Lipid: chol 153, , HDL 43, LDL 61\par 	2023 HbA1c 9.2\par 	2023 Vitamin D (25OH) 29\par 	2023 PTH 99\par \par \par 	2022 Kidney U/S\par 		- Right kidney: 11.4 cm. No hydronephrosis. Upper pole and lower pole cyst measuring 2.9 cm a 1.6 cm.\par 		- Left kidney: 12.4 cm. No renal mass, hydronephrosis or calculi.\par \par 	2022 Kidney Biopsy\par 		- Advanced diffuse and nodular diabetic glomerulosclerosis\par 		- Acute tubular injury with focal tubular necrosis\par 		- Chronic and focally active interstitial nephritis\par \par 		Summary of chronic changes:\par 		- Diffuse nodular glomerulosclerosis\par 		- Global glomerulosclerosis (30% of glomeruli)\par 		- Tubular atrophy and interstitial fibrosis (70% of the sampled	cortex)\par 		- Severe arterial and arteriolar sclerosis

## 2023-03-31 NOTE — ASSESSMENT
[FreeTextEntry1] : Mr. Angulo is a 72 year old man with CKD stage V with proteinuria, hypertension, diabetes mellitus, hyperlipidemia, hypothyroidism, and recent hospitalization for worsening kidney function and hypervolemia (12/2023), here for kidney evaluation and management.\par \par Mr. Angulo has advanced CKD due to diabetes, and his GFR is in the teens. We need to prepare to dialysis and/or transplant given the very high risk of end-stage renal disease this year.\par \par Blood pressure well-controlled. We may be too restrictive on diet, will liberalize somewhat.\par \par CKD IV/V with proteinuria due to diabetes\par - Continue irbesartan\par - Bicarbonate for metabolic acidosis\par - No need to restrict potassium in diet as yet given normokalemia\par - Continue ergocalciferol\par - Hemoglobin at goal, no need for NITA at this time\par - Continue in CKD Healthy Transitions program ([facility])\par \par Hypertension\par - Continue\par 	Amlodipine 10mg daily\par 	Irbesartan 150mg daily\par 	Carvedilol 12.5mg BID\par - Continue off of diuretics\par \par Diabetes mellitus: Per endocrinology\par \par Hyperlipidemia: Continue statin and Vascepa\par \par \par PLAN\par - Liberalize diet somewhat, allowing for takeout 2-3 times per week in order to ensure adequate nutrition; coffee OK too\par - Continue current meds\par - Labs today\par - Upcoming txp eval appt\par - Follow up 6 weeks\par \par \par \par Discussed importance of healthful habits, including physical activity/exercise and improvements in diet.\par \par I have spent a total of 40 minutes in which >50% was spent in discussion with patient regarding chronic kidney disease, hypertension, diet (including counseling on salt intake).

## 2023-04-01 ENCOUNTER — NON-APPOINTMENT (OUTPATIENT)
Age: 73
End: 2023-04-01

## 2023-04-01 ENCOUNTER — INPATIENT (INPATIENT)
Facility: HOSPITAL | Age: 73
LOS: 5 days | Discharge: HOME | DRG: 628 | End: 2023-04-07
Attending: INTERNAL MEDICINE | Admitting: INTERNAL MEDICINE
Payer: MEDICARE

## 2023-04-01 VITALS
DIASTOLIC BLOOD PRESSURE: 68 MMHG | SYSTOLIC BLOOD PRESSURE: 152 MMHG | WEIGHT: 173.94 LBS | HEART RATE: 63 BPM | RESPIRATION RATE: 19 BRPM | OXYGEN SATURATION: 100 % | TEMPERATURE: 98 F | HEIGHT: 72 IN

## 2023-04-01 DIAGNOSIS — I10 ESSENTIAL (PRIMARY) HYPERTENSION: ICD-10-CM

## 2023-04-01 DIAGNOSIS — E87.5 HYPERKALEMIA: ICD-10-CM

## 2023-04-01 DIAGNOSIS — N18.5 CHRONIC KIDNEY DISEASE, STAGE 5: ICD-10-CM

## 2023-04-01 DIAGNOSIS — E78.5 HYPERLIPIDEMIA, UNSPECIFIED: ICD-10-CM

## 2023-04-01 DIAGNOSIS — E11.9 TYPE 2 DIABETES MELLITUS WITHOUT COMPLICATIONS: ICD-10-CM

## 2023-04-01 LAB
ALBUMIN SERPL ELPH-MCNC: 3.8 G/DL — SIGNIFICANT CHANGE UP (ref 3.3–5)
ALP SERPL-CCNC: 51 U/L — SIGNIFICANT CHANGE UP (ref 40–120)
ALT FLD-CCNC: 60 U/L — HIGH (ref 10–45)
ANION GAP SERPL CALC-SCNC: 11 MMOL/L — SIGNIFICANT CHANGE UP (ref 5–17)
ANION GAP SERPL CALC-SCNC: 13 MMOL/L — SIGNIFICANT CHANGE UP (ref 5–17)
ANION GAP SERPL CALC-SCNC: 14 MMOL/L — SIGNIFICANT CHANGE UP (ref 5–17)
ANION GAP SERPL CALC-SCNC: 14 MMOL/L — SIGNIFICANT CHANGE UP (ref 5–17)
AST SERPL-CCNC: 30 U/L — SIGNIFICANT CHANGE UP (ref 10–40)
BASE EXCESS BLDV CALC-SCNC: -8.9 MMOL/L — LOW (ref -2–3)
BASOPHILS # BLD AUTO: 0.11 K/UL — SIGNIFICANT CHANGE UP (ref 0–0.2)
BASOPHILS NFR BLD AUTO: 1.3 % — SIGNIFICANT CHANGE UP (ref 0–2)
BILIRUB SERPL-MCNC: 0.3 MG/DL — SIGNIFICANT CHANGE UP (ref 0.2–1.2)
BUN SERPL-MCNC: 100 MG/DL — HIGH (ref 7–23)
BUN SERPL-MCNC: 96 MG/DL — HIGH (ref 7–23)
BUN SERPL-MCNC: 98 MG/DL — HIGH (ref 7–23)
BUN SERPL-MCNC: 99 MG/DL — HIGH (ref 7–23)
CA-I SERPL-SCNC: 1.23 MMOL/L — SIGNIFICANT CHANGE UP (ref 1.15–1.33)
CALCIUM SERPL-MCNC: 9 MG/DL — SIGNIFICANT CHANGE UP (ref 8.4–10.5)
CALCIUM SERPL-MCNC: 9.1 MG/DL — SIGNIFICANT CHANGE UP (ref 8.4–10.5)
CALCIUM SERPL-MCNC: 9.2 MG/DL — SIGNIFICANT CHANGE UP (ref 8.4–10.5)
CALCIUM SERPL-MCNC: 9.5 MG/DL — SIGNIFICANT CHANGE UP (ref 8.4–10.5)
CHLORIDE BLDV-SCNC: 109 MMOL/L — HIGH (ref 96–108)
CHLORIDE SERPL-SCNC: 105 MMOL/L — SIGNIFICANT CHANGE UP (ref 96–108)
CHLORIDE SERPL-SCNC: 106 MMOL/L — SIGNIFICANT CHANGE UP (ref 96–108)
CHLORIDE SERPL-SCNC: 107 MMOL/L — SIGNIFICANT CHANGE UP (ref 96–108)
CHLORIDE SERPL-SCNC: 108 MMOL/L — SIGNIFICANT CHANGE UP (ref 96–108)
CO2 BLDV-SCNC: 19 MMOL/L — LOW (ref 22–26)
CO2 SERPL-SCNC: 15 MMOL/L — LOW (ref 22–31)
CO2 SERPL-SCNC: 16 MMOL/L — LOW (ref 22–31)
CO2 SERPL-SCNC: 17 MMOL/L — LOW (ref 22–31)
CO2 SERPL-SCNC: 20 MMOL/L — LOW (ref 22–31)
CREAT SERPL-MCNC: 5.21 MG/DL — HIGH (ref 0.5–1.3)
CREAT SERPL-MCNC: 5.24 MG/DL — HIGH (ref 0.5–1.3)
CREAT SERPL-MCNC: 5.39 MG/DL — HIGH (ref 0.5–1.3)
CREAT SERPL-MCNC: 5.78 MG/DL — HIGH (ref 0.5–1.3)
EGFR: 10 ML/MIN/1.73M2 — LOW
EGFR: 11 ML/MIN/1.73M2 — LOW
EOSINOPHIL # BLD AUTO: 0.35 K/UL — SIGNIFICANT CHANGE UP (ref 0–0.5)
EOSINOPHIL NFR BLD AUTO: 4 % — SIGNIFICANT CHANGE UP (ref 0–6)
FLUAV AG NPH QL: SIGNIFICANT CHANGE UP
FLUBV AG NPH QL: SIGNIFICANT CHANGE UP
GAS PNL BLDV: 132 MMOL/L — LOW (ref 136–145)
GAS PNL BLDV: SIGNIFICANT CHANGE UP
GAS PNL BLDV: SIGNIFICANT CHANGE UP
GLUCOSE BLDC GLUCOMTR-MCNC: 125 MG/DL — HIGH (ref 70–99)
GLUCOSE BLDC GLUCOMTR-MCNC: 152 MG/DL — HIGH (ref 70–99)
GLUCOSE BLDV-MCNC: 116 MG/DL — HIGH (ref 70–99)
GLUCOSE SERPL-MCNC: 120 MG/DL — HIGH (ref 70–99)
GLUCOSE SERPL-MCNC: 125 MG/DL — HIGH (ref 70–99)
GLUCOSE SERPL-MCNC: 138 MG/DL — HIGH (ref 70–99)
GLUCOSE SERPL-MCNC: 155 MG/DL — HIGH (ref 70–99)
HBV SURFACE AG SER-ACNC: SIGNIFICANT CHANGE UP
HCO3 BLDV-SCNC: 18 MMOL/L — LOW (ref 22–29)
HCT VFR BLD CALC: 29.3 % — LOW (ref 39–50)
HCT VFR BLDA CALC: 30 % — LOW (ref 39–51)
HGB BLD CALC-MCNC: 10.1 G/DL — LOW (ref 12.6–17.4)
HGB BLD-MCNC: 9.7 G/DL — LOW (ref 13–17)
IMM GRANULOCYTES NFR BLD AUTO: 0.6 % — SIGNIFICANT CHANGE UP (ref 0–0.9)
LACTATE BLDV-MCNC: 0.8 MMOL/L — SIGNIFICANT CHANGE UP (ref 0.5–2)
LYMPHOCYTES # BLD AUTO: 1.55 K/UL — SIGNIFICANT CHANGE UP (ref 1–3.3)
LYMPHOCYTES # BLD AUTO: 17.9 % — SIGNIFICANT CHANGE UP (ref 13–44)
MAGNESIUM SERPL-MCNC: 2.8 MG/DL — HIGH (ref 1.6–2.6)
MCHC RBC-ENTMCNC: 29.7 PG — SIGNIFICANT CHANGE UP (ref 27–34)
MCHC RBC-ENTMCNC: 33.1 GM/DL — SIGNIFICANT CHANGE UP (ref 32–36)
MCV RBC AUTO: 89.6 FL — SIGNIFICANT CHANGE UP (ref 80–100)
MONOCYTES # BLD AUTO: 0.84 K/UL — SIGNIFICANT CHANGE UP (ref 0–0.9)
MONOCYTES NFR BLD AUTO: 9.7 % — SIGNIFICANT CHANGE UP (ref 2–14)
NEUTROPHILS # BLD AUTO: 5.76 K/UL — SIGNIFICANT CHANGE UP (ref 1.8–7.4)
NEUTROPHILS NFR BLD AUTO: 66.5 % — SIGNIFICANT CHANGE UP (ref 43–77)
NRBC # BLD: 0 /100 WBCS — SIGNIFICANT CHANGE UP (ref 0–0)
NT-PROBNP SERPL-SCNC: 401 PG/ML — HIGH (ref 0–300)
PCO2 BLDV: 42 MMHG — SIGNIFICANT CHANGE UP (ref 42–55)
PH BLDV: 7.24 — LOW (ref 7.32–7.43)
PHOSPHATE SERPL-MCNC: 5.9 MG/DL — HIGH (ref 2.5–4.5)
PLATELET # BLD AUTO: 204 K/UL — SIGNIFICANT CHANGE UP (ref 150–400)
PO2 BLDV: 37 MMHG — SIGNIFICANT CHANGE UP (ref 25–45)
POTASSIUM BLDV-SCNC: 6.6 MMOL/L — CRITICAL HIGH (ref 3.5–5.1)
POTASSIUM SERPL-MCNC: 5.4 MMOL/L — HIGH (ref 3.5–5.3)
POTASSIUM SERPL-MCNC: 5.9 MMOL/L — HIGH (ref 3.5–5.3)
POTASSIUM SERPL-MCNC: 6.4 MMOL/L — CRITICAL HIGH (ref 3.5–5.3)
POTASSIUM SERPL-MCNC: 6.4 MMOL/L — CRITICAL HIGH (ref 3.5–5.3)
POTASSIUM SERPL-SCNC: 5.4 MMOL/L — HIGH (ref 3.5–5.3)
POTASSIUM SERPL-SCNC: 5.9 MMOL/L — HIGH (ref 3.5–5.3)
POTASSIUM SERPL-SCNC: 6.4 MMOL/L — CRITICAL HIGH (ref 3.5–5.3)
POTASSIUM SERPL-SCNC: 6.4 MMOL/L — CRITICAL HIGH (ref 3.5–5.3)
PROT SERPL-MCNC: 6.9 G/DL — SIGNIFICANT CHANGE UP (ref 6–8.3)
RBC # BLD: 3.27 M/UL — LOW (ref 4.2–5.8)
RBC # FLD: 13.3 % — SIGNIFICANT CHANGE UP (ref 10.3–14.5)
RSV RNA NPH QL NAA+NON-PROBE: SIGNIFICANT CHANGE UP
SAO2 % BLDV: 66.4 % — LOW (ref 67–88)
SARS-COV-2 RNA SPEC QL NAA+PROBE: SIGNIFICANT CHANGE UP
SODIUM SERPL-SCNC: 134 MMOL/L — LOW (ref 135–145)
SODIUM SERPL-SCNC: 135 MMOL/L — SIGNIFICANT CHANGE UP (ref 135–145)
SODIUM SERPL-SCNC: 138 MMOL/L — SIGNIFICANT CHANGE UP (ref 135–145)
SODIUM SERPL-SCNC: 139 MMOL/L — SIGNIFICANT CHANGE UP (ref 135–145)
WBC # BLD: 8.66 K/UL — SIGNIFICANT CHANGE UP (ref 3.8–10.5)
WBC # FLD AUTO: 8.66 K/UL — SIGNIFICANT CHANGE UP (ref 3.8–10.5)

## 2023-04-01 PROCEDURE — 99291 CRITICAL CARE FIRST HOUR: CPT

## 2023-04-01 PROCEDURE — 71045 X-RAY EXAM CHEST 1 VIEW: CPT | Mod: 26

## 2023-04-01 RX ORDER — ALBUTEROL 90 UG/1
7.5 AEROSOL, METERED ORAL ONCE
Refills: 0 | Status: COMPLETED | OUTPATIENT
Start: 2023-04-01 | End: 2023-04-01

## 2023-04-01 RX ORDER — INSULIN HUMAN 100 [IU]/ML
5 INJECTION, SOLUTION SUBCUTANEOUS ONCE
Refills: 0 | Status: COMPLETED | OUTPATIENT
Start: 2023-04-01 | End: 2023-04-01

## 2023-04-01 RX ORDER — INSULIN LISPRO 100/ML
VIAL (ML) SUBCUTANEOUS
Refills: 0 | Status: DISCONTINUED | OUTPATIENT
Start: 2023-04-01 | End: 2023-04-07

## 2023-04-01 RX ORDER — ATORVASTATIN CALCIUM 80 MG/1
40 TABLET, FILM COATED ORAL AT BEDTIME
Refills: 0 | Status: DISCONTINUED | OUTPATIENT
Start: 2023-04-01 | End: 2023-04-07

## 2023-04-01 RX ORDER — CALCIUM GLUCONATE 100 MG/ML
2 VIAL (ML) INTRAVENOUS ONCE
Refills: 0 | Status: COMPLETED | OUTPATIENT
Start: 2023-04-01 | End: 2023-04-01

## 2023-04-01 RX ORDER — AMLODIPINE BESYLATE 2.5 MG/1
10 TABLET ORAL DAILY
Refills: 0 | Status: DISCONTINUED | OUTPATIENT
Start: 2023-04-01 | End: 2023-04-07

## 2023-04-01 RX ORDER — DEXTROSE 50 % IN WATER 50 %
25 SYRINGE (ML) INTRAVENOUS ONCE
Refills: 0 | Status: DISCONTINUED | OUTPATIENT
Start: 2023-04-01 | End: 2023-04-07

## 2023-04-01 RX ORDER — SODIUM ZIRCONIUM CYCLOSILICATE 10 G/10G
10 POWDER, FOR SUSPENSION ORAL ONCE
Refills: 0 | Status: COMPLETED | OUTPATIENT
Start: 2023-04-01 | End: 2023-04-01

## 2023-04-01 RX ORDER — DEXTROSE 50 % IN WATER 50 %
12.5 SYRINGE (ML) INTRAVENOUS ONCE
Refills: 0 | Status: DISCONTINUED | OUTPATIENT
Start: 2023-04-01 | End: 2023-04-07

## 2023-04-01 RX ORDER — LEVOTHYROXINE SODIUM 125 MCG
50 TABLET ORAL DAILY
Refills: 0 | Status: DISCONTINUED | OUTPATIENT
Start: 2023-04-01 | End: 2023-04-07

## 2023-04-01 RX ORDER — DEXTROSE 50 % IN WATER 50 %
15 SYRINGE (ML) INTRAVENOUS ONCE
Refills: 0 | Status: DISCONTINUED | OUTPATIENT
Start: 2023-04-01 | End: 2023-04-07

## 2023-04-01 RX ORDER — SODIUM CHLORIDE 9 MG/ML
1000 INJECTION, SOLUTION INTRAVENOUS
Refills: 0 | Status: DISCONTINUED | OUTPATIENT
Start: 2023-04-01 | End: 2023-04-07

## 2023-04-01 RX ORDER — ASPIRIN/CALCIUM CARB/MAGNESIUM 324 MG
81 TABLET ORAL DAILY
Refills: 0 | Status: DISCONTINUED | OUTPATIENT
Start: 2023-04-01 | End: 2023-04-07

## 2023-04-01 RX ORDER — SODIUM BICARBONATE 1 MEQ/ML
650 SYRINGE (ML) INTRAVENOUS
Refills: 0 | Status: DISCONTINUED | OUTPATIENT
Start: 2023-04-01 | End: 2023-04-01

## 2023-04-01 RX ORDER — CHOLECALCIFEROL (VITAMIN D3) 125 MCG
2000 CAPSULE ORAL DAILY
Refills: 0 | Status: DISCONTINUED | OUTPATIENT
Start: 2023-04-01 | End: 2023-04-07

## 2023-04-01 RX ORDER — INSULIN GLARGINE 100 [IU]/ML
2 INJECTION, SOLUTION SUBCUTANEOUS AT BEDTIME
Refills: 0 | Status: DISCONTINUED | OUTPATIENT
Start: 2023-04-01 | End: 2023-04-02

## 2023-04-01 RX ORDER — DEXTROSE 50 % IN WATER 50 %
50 SYRINGE (ML) INTRAVENOUS ONCE
Refills: 0 | Status: COMPLETED | OUTPATIENT
Start: 2023-04-01 | End: 2023-04-01

## 2023-04-01 RX ORDER — FUROSEMIDE 40 MG
80 TABLET ORAL ONCE
Refills: 0 | Status: COMPLETED | OUTPATIENT
Start: 2023-04-01 | End: 2023-04-01

## 2023-04-01 RX ORDER — CARVEDILOL PHOSPHATE 80 MG/1
12.5 CAPSULE, EXTENDED RELEASE ORAL EVERY 12 HOURS
Refills: 0 | Status: DISCONTINUED | OUTPATIENT
Start: 2023-04-01 | End: 2023-04-07

## 2023-04-01 RX ORDER — GLUCAGON INJECTION, SOLUTION 0.5 MG/.1ML
1 INJECTION, SOLUTION SUBCUTANEOUS ONCE
Refills: 0 | Status: DISCONTINUED | OUTPATIENT
Start: 2023-04-01 | End: 2023-04-07

## 2023-04-01 RX ORDER — SODIUM BICARBONATE 1 MEQ/ML
1300 SYRINGE (ML) INTRAVENOUS
Refills: 0 | Status: DISCONTINUED | OUTPATIENT
Start: 2023-04-01 | End: 2023-04-07

## 2023-04-01 RX ADMIN — INSULIN HUMAN 5 UNIT(S): 100 INJECTION, SOLUTION SUBCUTANEOUS at 12:58

## 2023-04-01 RX ADMIN — INSULIN HUMAN 5 UNIT(S): 100 INJECTION, SOLUTION SUBCUTANEOUS at 08:19

## 2023-04-01 RX ADMIN — Medication 50 MILLILITER(S): at 08:19

## 2023-04-01 RX ADMIN — SODIUM ZIRCONIUM CYCLOSILICATE 10 GRAM(S): 10 POWDER, FOR SUSPENSION ORAL at 18:03

## 2023-04-01 RX ADMIN — Medication 80 MILLIGRAM(S): at 12:58

## 2023-04-01 RX ADMIN — SODIUM ZIRCONIUM CYCLOSILICATE 10 GRAM(S): 10 POWDER, FOR SUSPENSION ORAL at 08:19

## 2023-04-01 RX ADMIN — ATORVASTATIN CALCIUM 40 MILLIGRAM(S): 80 TABLET, FILM COATED ORAL at 21:35

## 2023-04-01 RX ADMIN — INSULIN GLARGINE 2 UNIT(S): 100 INJECTION, SOLUTION SUBCUTANEOUS at 21:40

## 2023-04-01 RX ADMIN — Medication 50 MILLILITER(S): at 12:58

## 2023-04-01 RX ADMIN — CARVEDILOL PHOSPHATE 12.5 MILLIGRAM(S): 80 CAPSULE, EXTENDED RELEASE ORAL at 21:35

## 2023-04-01 RX ADMIN — Medication 200 GRAM(S): at 08:10

## 2023-04-01 RX ADMIN — ALBUTEROL 7.5 MILLIGRAM(S): 90 AEROSOL, METERED ORAL at 08:19

## 2023-04-01 RX ADMIN — Medication 1300 MILLIGRAM(S): at 21:35

## 2023-04-01 NOTE — ED PROVIDER NOTE - CROS ED CARDIOVAS ALL NEG
Returned call to patient. No answer, left message for return call.   
Toni called stating his CT scan is for tomorrow. He wanted to discuss another area of concern on left side. Please advise.  
negative...

## 2023-04-01 NOTE — ED PROVIDER NOTE - PHYSICAL EXAMINATION
GENERAL: Awake, alert, NAD  HEAD: NC/AT, neck supple, moist mucous membranes  EYES: PERRL, EOM grossly intact, sclera anicteric  LUNGS: normal WOB on RA, CTAB, no wheezes or crackles   CARDIAC: RRR, no m/r/g  ABDOMEN: Soft, non tender, non distended, no rebound, no guarding  BACK: No midline spinal tenderness, no CVA tenderness  EXT: Chronic appearing venous stasis skin changes; no edema, no calf tenderness, no deformities.  NEURO: A&Ox3. Moving all extremities.  SKIN: Warm and dry. No rash.  PSYCH: Normal affect.

## 2023-04-01 NOTE — ED PROVIDER NOTE - OBJECTIVE STATEMENT
75Y M PMH CKD5 not on dialysis, DM, HLD, HTN, presenting for abnormal labs. Patient reportedly had labs drawn at nephrology clinic yesterday, called at 0500 this morning for K+ of 6.5, told to come to ED for management. Currently endorses fatigue but denies chest pain, SOB, abd pain, n/v/d, leg pain or swelling. No recent fevers, chills, cough.

## 2023-04-01 NOTE — H&P ADULT - PROBLEM SELECTOR PLAN 1
persistent  s/p initial treatment in the Emergency Department  will repeat BMP now to assess level  continue to follow renal recommendations

## 2023-04-01 NOTE — CONSULT NOTE ADULT - ASSESSMENT
Pt. is a 72 y.o. M w/ PMHx of HTN, HLD, DM2 and advanced CKD well known to me from prior admission presents for hyperkalemia noted on outpatient labs. Nephrology consulted for the same.

## 2023-04-01 NOTE — ED PROVIDER NOTE - PROGRESS NOTE DETAILS
Maryanne Coelho MD PGY1: K+ 6.6 on CMP, 5U IV insulin, albuterol, and lokelma ordered. Nephrology consulted. Maryanne Coelho MD PGY1: patient with persistent hyper K after first round of shifting. nephrology evaluated patient, rec 80mg IV lasix, additional attempt at shifting. repeat EKG unchanged from prior. plan for admission, discussed with unattached hospitalist Dr. Day who accepted the admission.

## 2023-04-01 NOTE — H&P ADULT - ASSESSMENT
72 y.o. M with PMH of HTN, HLD, DM2 and advanced CKD well known to me from prior admission presents for hyperkalemia noted on outpatient labs now admitted for persistent hyperkalemia

## 2023-04-01 NOTE — ED PROVIDER NOTE - CLINICAL SUMMARY MEDICAL DECISION MAKING FREE TEXT BOX
72Y M PMH CKD not on dialysis, DM, HTN, HLD sent in by nephrologist for hyperkalemia on outpatient labs. Triage EKG with hyperacute T waves in leads V2, V3, V4. Patient asymptomatic at this time - denies CP, SOB, new or worsening lower extremity edema. Does note 40-50lb unintentional weight loss over the last 3-4 months. Plan for repeat labs here, patient placed on cardiac monitor, 2g IV calcium ordered for cardiac stabilization. Given EKG changes will plan on shifting patient if K persistently elevated. Nephrology consult, likely TBA. 72Y M PMH CKD not on dialysis, DM, HTN, HLD sent in by nephrologist for hyperkalemia on outpatient labs. Triage EKG with hyperacute T waves in leads V2, V3, V4. Patient asymptomatic at this time - denies CP, SOB, new or worsening lower extremity edema. Does note 40-50lb unintentional weight loss over the last 3-4 months. Plan for repeat labs here, patient placed on cardiac monitor, 2g IV calcium ordered for cardiac stabilization. Given EKG changes will plan on shifting patient if K persistently elevated. Nephrology consult, likely TBA.    Paolo: 72 year old male CKD, dm, htn, hld, ssent in by nephrologist for hyperkalemia on [place] labs. went for follow up and had blood work done yesterday.  EKG shows hyeractue T waves.  placed on monitor. patient asymptomatic.  PE: att exam: patient awake alert NAD . LUNGS CTAB no wheeze no crackle. CARD RRR no m/r/g.  Abdomen soft NT ND no rebound no guarding no CVA tenderness. EXT WWP no edema no calf tenderness CV 2+DP/PT bilaterally. neuro A&Ox3. Plan: Patient with hyperacute T wave, calcium ivp given. will get labs, mag, phos, consult nephro. reassess.

## 2023-04-01 NOTE — ED PROVIDER NOTE - PRO INTERPRETER NEED 2
Problem: Falls - Risk of:  Goal: Will remain free from falls  Will remain free from falls   Outcome: Ongoing  Pt remains free from falls, will monitor.
English

## 2023-04-01 NOTE — H&P ADULT - HISTORY OF PRESENT ILLNESS
72 y.o. M w/ PMHx of HTN, HLD, DM2 and advanced CKD presents for hyperkalemia noted on outpatient labs. The patient was seen by Dr. Rees nephrologist whom he was following as outpatient. Had blood work on 3/31 which showed potassium of 6.5. Repeat blood work here conformed potassium of 6.4. The patient reports no symptoms at all including palpitations or chest pain ir nausea, vomiting or diarrhea

## 2023-04-01 NOTE — CHART NOTE - NSCHARTNOTEFT_GEN_A_CORE
[id number]  YU ADWOANADYA    Received phone call from attending Dr. Day at change of shift regarding the above patient and hyperkalemia. Plan to repeat BMP at 22:00 to assess improvement of hyperkalemia post interventions. If K >5.5, plan to administer additional Lokelma 10g tonight; if K <5.5 will monitor overnight and repeat labs in AM. Continue to monitor patient on telemetry      Tigist Willis PA-C  Dept of Medicine [id number]  YU APOLLO    Received phone call from attending Dr. Day at the change of shift regarding the above patient and hyperkalemia. Patient admitted with hyperkalemia and is s/p treatment with Lokelma 10g PO x2, insulin, D50, Lasix, calcium gluconate, and albuterol. Per discussion with attending, plan to repeat BMP at 22:00 to assess improvement of hyperkalemia post interventions. If K >5.5, plan to administer additional Lokelma 10g tonight; if K <5.5 will monitor overnight and repeat labs in AM. Continue to monitor patient on telemetry. Follow up labs and renal recommendations in AM.       Tigist Willis PA-C  Dept of Medicine  [id number]

## 2023-04-01 NOTE — H&P ADULT - NSICDXPASTMEDICALHX_GEN_ALL_CORE_FT
PAST MEDICAL HISTORY:  Diabetes     HLD (hyperlipidemia)     Hypertension     Stage 5 chronic kidney disease not on chronic dialysis

## 2023-04-01 NOTE — ED PROVIDER NOTE - WR ORDER STATUS 1
----- Message from Sharron Burkett RN sent at 5/13/2021  8:33 AM CDT -----    ----- Message -----  From: Sofia Enciso MD  Sent: 5/12/2021   5:29 PM CDT  To: Obg Triage Nurse Message Pool [facility]    Can we please give her a call in a few days, had bleeding at 12 weeks and hx of sab so super nervous.coming in next week again       Performed Resulted

## 2023-04-01 NOTE — CONSULT NOTE ADULT - PROBLEM SELECTOR RECOMMENDATION 9
Pt. presents for potassium of 6.4 which has remained 6.4 despite receiving Loklema, Insulin, Calcium gluconate and Albuterol. Discontinue Irbesartan. Please consult Nutrition to review low potassium diet. Low potassium diet. S.p Lasix 80mg IV. Loklema 10gm TID for now. Consent obtained for dialysis. discussed with patient that if potassium does not improved by this evening will need to start HD. EKG showed peaked T-waves. Pt. presents for potassium of 6.4 which has remained 6.4 despite receiving Loklema, Insulin, Calcium gluconate and Albuterol. Discontinue Irbesartan. Please consult Nutrition to review low potassium diet. Low potassium diet. S.p Lasix 80mg IV. Loklema 10gm TID for now. Shift potassium again with Insulin. Consent obtained for dialysis and placed in uint. discussed with patient that if potassium does not improved by this evening will need to start HD. EKG showed peaked T-waves. Repeat Potassium at 4pm.

## 2023-04-01 NOTE — CONSULT NOTE ADULT - PROBLEM SELECTOR RECOMMENDATION 2
Scr. at baseline range. Resume NaHCO3 at 1300mg TID. Check ABG. This will help with hyperkalemia. F/u as outpatient with Dr. Rees. RRT as above. Check TSAT. May benefit from IV Iron +/- NITA.     If you have any questions, please feel free to contact me  Sb Lilly  Nephrology Fellow  [phone]; Prefer [employer] TEAMS  (After 5pm or on weekends please page the on-call fellow)

## 2023-04-01 NOTE — H&P ADULT - NSHPPHYSICALEXAM_GEN_ALL_CORE
PHYSICAL EXAM: vital signs noted on [place]  in no apparent distress  HEENT: LESLIE EOMI  Neck: Supple, no JVD, no thyromegaly  Lungs: no wheeze, no crackles  CVS: S1 S2 no M/R/G  Abdomen: no tenderness, no organomegaly, BS present  Neuro: AO x 3 no focal weakness, no sensory abnormalities  Psych: appropriate affect  Skin: warm, dry  Ext: no cyanosis or clubbing, no edema  Msk: no joint swelling or deformities  Back: no CVA tenderness, no kyphosis/scoliosis

## 2023-04-01 NOTE — ED ADULT NURSE NOTE - OBJECTIVE STATEMENT
Male 72 years old alert and orientedx4 with past medical history of HTN and DM came in for abnormal labs. As per daughter pt had blood works done at nephrology clinic yesterday and was called this morning for potassium of 6.6. Denies chest pain, sob, N/V, fever or chills. Reports of feeling tired. Safety and comfort maintained. Call bell within easy reach. Will continue to monitor.

## 2023-04-01 NOTE — H&P ADULT - PROBLEM SELECTOR PLAN 3
continue finger sticks with short acting insulin sliding scale  no oral meds  small dose Lantus 2 units for now

## 2023-04-02 LAB
A1C WITH ESTIMATED AVERAGE GLUCOSE RESULT: 7.7 % — HIGH (ref 4–5.6)
ALBUMIN SERPL ELPH-MCNC: 3.8 G/DL — SIGNIFICANT CHANGE UP (ref 3.3–5)
ALP SERPL-CCNC: 56 U/L — SIGNIFICANT CHANGE UP (ref 40–120)
ALT FLD-CCNC: 52 U/L — HIGH (ref 10–45)
ANION GAP SERPL CALC-SCNC: 14 MMOL/L — SIGNIFICANT CHANGE UP (ref 5–17)
AST SERPL-CCNC: 26 U/L — SIGNIFICANT CHANGE UP (ref 10–40)
BILIRUB SERPL-MCNC: 0.4 MG/DL — SIGNIFICANT CHANGE UP (ref 0.2–1.2)
BUN SERPL-MCNC: 101 MG/DL — HIGH (ref 7–23)
CALCIUM SERPL-MCNC: 9.4 MG/DL — SIGNIFICANT CHANGE UP (ref 8.4–10.5)
CHLORIDE SERPL-SCNC: 107 MMOL/L — SIGNIFICANT CHANGE UP (ref 96–108)
CHOLEST SERPL-MCNC: 109 MG/DL — SIGNIFICANT CHANGE UP
CO2 SERPL-SCNC: 19 MMOL/L — LOW (ref 22–31)
CREAT SERPL-MCNC: 5.47 MG/DL — HIGH (ref 0.5–1.3)
EGFR: 10 ML/MIN/1.73M2 — LOW
ESTIMATED AVERAGE GLUCOSE: 174 MG/DL — HIGH (ref 68–114)
FERRITIN SERPL-MCNC: 741 NG/ML — HIGH (ref 30–400)
FOLATE SERPL-MCNC: 9.3 NG/ML — SIGNIFICANT CHANGE UP
GLUCOSE BLDC GLUCOMTR-MCNC: 110 MG/DL — HIGH (ref 70–99)
GLUCOSE BLDC GLUCOMTR-MCNC: 129 MG/DL — HIGH (ref 70–99)
GLUCOSE BLDC GLUCOMTR-MCNC: 177 MG/DL — HIGH (ref 70–99)
GLUCOSE BLDC GLUCOMTR-MCNC: 211 MG/DL — HIGH (ref 70–99)
GLUCOSE SERPL-MCNC: 105 MG/DL — HIGH (ref 70–99)
HBV CORE IGM SER-ACNC: SIGNIFICANT CHANGE UP
HBV SURFACE AB SER-ACNC: <3 MIU/ML — LOW
HCT VFR BLD CALC: 33.3 % — LOW (ref 39–50)
HCV AB S/CO SERPL IA: 0.23 S/CO — SIGNIFICANT CHANGE UP (ref 0–0.99)
HCV AB SERPL-IMP: SIGNIFICANT CHANGE UP
HDLC SERPL-MCNC: 35 MG/DL — LOW
HGB BLD-MCNC: 10.9 G/DL — LOW (ref 13–17)
IRON SATN MFR SERPL: 37 % — SIGNIFICANT CHANGE UP (ref 16–55)
IRON SATN MFR SERPL: 78 UG/DL — SIGNIFICANT CHANGE UP (ref 45–165)
LIPID PNL WITH DIRECT LDL SERPL: 45 MG/DL — SIGNIFICANT CHANGE UP
MCHC RBC-ENTMCNC: 29.1 PG — SIGNIFICANT CHANGE UP (ref 27–34)
MCHC RBC-ENTMCNC: 32.7 GM/DL — SIGNIFICANT CHANGE UP (ref 32–36)
MCV RBC AUTO: 88.8 FL — SIGNIFICANT CHANGE UP (ref 80–100)
NON HDL CHOLESTEROL: 74 MG/DL — SIGNIFICANT CHANGE UP
NRBC # BLD: 0 /100 WBCS — SIGNIFICANT CHANGE UP (ref 0–0)
PLATELET # BLD AUTO: 223 K/UL — SIGNIFICANT CHANGE UP (ref 150–400)
POTASSIUM SERPL-MCNC: 5.4 MMOL/L — HIGH (ref 3.5–5.3)
POTASSIUM SERPL-SCNC: 5.4 MMOL/L — HIGH (ref 3.5–5.3)
PROT SERPL-MCNC: 6.9 G/DL — SIGNIFICANT CHANGE UP (ref 6–8.3)
RBC # BLD: 3.75 M/UL — LOW (ref 4.2–5.8)
RBC # FLD: 13.4 % — SIGNIFICANT CHANGE UP (ref 10.3–14.5)
SODIUM SERPL-SCNC: 140 MMOL/L — SIGNIFICANT CHANGE UP (ref 135–145)
TIBC SERPL-MCNC: 213 UG/DL — LOW (ref 220–430)
TRIGL SERPL-MCNC: 146 MG/DL — SIGNIFICANT CHANGE UP
TSH SERPL-MCNC: 2.97 UIU/ML — SIGNIFICANT CHANGE UP (ref 0.27–4.2)
UIBC SERPL-MCNC: 134 UG/DL — SIGNIFICANT CHANGE UP (ref 110–370)
VIT B12 SERPL-MCNC: 1163 PG/ML — SIGNIFICANT CHANGE UP (ref 232–1245)
WBC # BLD: 9.71 K/UL — SIGNIFICANT CHANGE UP (ref 3.8–10.5)
WBC # FLD AUTO: 9.71 K/UL — SIGNIFICANT CHANGE UP (ref 3.8–10.5)

## 2023-04-02 PROCEDURE — 99223 1ST HOSP IP/OBS HIGH 75: CPT | Mod: GC

## 2023-04-02 RX ORDER — INSULIN GLARGINE 100 [IU]/ML
5 INJECTION, SOLUTION SUBCUTANEOUS AT BEDTIME
Refills: 0 | Status: DISCONTINUED | OUTPATIENT
Start: 2023-04-02 | End: 2023-04-07

## 2023-04-02 RX ORDER — HEPARIN SODIUM 5000 [USP'U]/ML
5000 INJECTION INTRAVENOUS; SUBCUTANEOUS EVERY 12 HOURS
Refills: 0 | Status: DISCONTINUED | OUTPATIENT
Start: 2023-04-02 | End: 2023-04-07

## 2023-04-02 RX ORDER — SODIUM ZIRCONIUM CYCLOSILICATE 10 G/10G
10 POWDER, FOR SUSPENSION ORAL ONCE
Refills: 0 | Status: COMPLETED | OUTPATIENT
Start: 2023-04-02 | End: 2023-04-02

## 2023-04-02 RX ADMIN — ATORVASTATIN CALCIUM 40 MILLIGRAM(S): 80 TABLET, FILM COATED ORAL at 21:07

## 2023-04-02 RX ADMIN — CARVEDILOL PHOSPHATE 12.5 MILLIGRAM(S): 80 CAPSULE, EXTENDED RELEASE ORAL at 06:40

## 2023-04-02 RX ADMIN — Medication 1300 MILLIGRAM(S): at 17:44

## 2023-04-02 RX ADMIN — SODIUM ZIRCONIUM CYCLOSILICATE 10 GRAM(S): 10 POWDER, FOR SUSPENSION ORAL at 09:34

## 2023-04-02 RX ADMIN — AMLODIPINE BESYLATE 10 MILLIGRAM(S): 2.5 TABLET ORAL at 06:40

## 2023-04-02 RX ADMIN — Medication 2: at 13:08

## 2023-04-02 RX ADMIN — Medication 2000 UNIT(S): at 12:33

## 2023-04-02 RX ADMIN — HEPARIN SODIUM 5000 UNIT(S): 5000 INJECTION INTRAVENOUS; SUBCUTANEOUS at 17:46

## 2023-04-02 RX ADMIN — CARVEDILOL PHOSPHATE 12.5 MILLIGRAM(S): 80 CAPSULE, EXTENDED RELEASE ORAL at 17:44

## 2023-04-02 RX ADMIN — Medication 81 MILLIGRAM(S): at 12:33

## 2023-04-02 RX ADMIN — Medication 1300 MILLIGRAM(S): at 06:40

## 2023-04-02 RX ADMIN — Medication 50 MICROGRAM(S): at 06:40

## 2023-04-02 RX ADMIN — INSULIN GLARGINE 5 UNIT(S): 100 INJECTION, SOLUTION SUBCUTANEOUS at 22:46

## 2023-04-02 NOTE — PATIENT PROFILE ADULT - FALL HARM RISK - HARM RISK INTERVENTIONS
Statement Selected Assistance with ambulation/Assistance OOB with selected safe patient handling equipment/Communicate Risk of Fall with Harm to all staff/Discuss with provider need for PT consult/Monitor gait and stability/Provide patient with walking aids - walker, cane, crutches/Reinforce activity limits and safety measures with patient and family/Tailored Fall Risk Interventions/Visual Cue: Yellow wristband and red socks/Bed in lowest position, wheels locked, appropriate side rails in place/Call bell, personal items and telephone in reach/Instruct patient to call for assistance before getting out of bed or chair/Non-slip footwear when patient is out of bed/[place] to call system/Physically safe environment - no spills, clutter or unnecessary equipment/Purposeful Proactive Rounding/Room/bathroom lighting operational, light cord in reach

## 2023-04-03 PROBLEM — Z00.00 ENCOUNTER FOR PREVENTIVE HEALTH EXAMINATION: Noted: 2023-04-03

## 2023-04-03 LAB
ANION GAP SERPL CALC-SCNC: 13 MMOL/L — SIGNIFICANT CHANGE UP (ref 5–17)
BUN SERPL-MCNC: 102 MG/DL — HIGH (ref 7–23)
CALCIUM SERPL-MCNC: 9 MG/DL — SIGNIFICANT CHANGE UP (ref 8.4–10.5)
CHLORIDE SERPL-SCNC: 104 MMOL/L — SIGNIFICANT CHANGE UP (ref 96–108)
CO2 SERPL-SCNC: 21 MMOL/L — LOW (ref 22–31)
CREAT SERPL-MCNC: 5.42 MG/DL — HIGH (ref 0.5–1.3)
EGFR: 11 ML/MIN/1.73M2 — LOW
GLUCOSE BLDC GLUCOMTR-MCNC: 120 MG/DL — HIGH (ref 70–99)
GLUCOSE BLDC GLUCOMTR-MCNC: 162 MG/DL — HIGH (ref 70–99)
GLUCOSE BLDC GLUCOMTR-MCNC: 165 MG/DL — HIGH (ref 70–99)
GLUCOSE BLDC GLUCOMTR-MCNC: 209 MG/DL — HIGH (ref 70–99)
GLUCOSE SERPL-MCNC: 105 MG/DL — HIGH (ref 70–99)
MAGNESIUM SERPL-MCNC: 2.6 MG/DL — SIGNIFICANT CHANGE UP (ref 1.6–2.6)
PHOSPHATE SERPL-MCNC: 6.6 MG/DL — HIGH (ref 2.5–4.5)
POTASSIUM SERPL-MCNC: 5.2 MMOL/L — SIGNIFICANT CHANGE UP (ref 3.5–5.3)
POTASSIUM SERPL-SCNC: 5.2 MMOL/L — SIGNIFICANT CHANGE UP (ref 3.5–5.3)
SODIUM SERPL-SCNC: 138 MMOL/L — SIGNIFICANT CHANGE UP (ref 135–145)

## 2023-04-03 PROCEDURE — 99223 1ST HOSP IP/OBS HIGH 75: CPT

## 2023-04-03 PROCEDURE — 99233 SBSQ HOSP IP/OBS HIGH 50: CPT | Mod: GC

## 2023-04-03 RX ADMIN — HEPARIN SODIUM 5000 UNIT(S): 5000 INJECTION INTRAVENOUS; SUBCUTANEOUS at 17:33

## 2023-04-03 RX ADMIN — AMLODIPINE BESYLATE 10 MILLIGRAM(S): 2.5 TABLET ORAL at 05:22

## 2023-04-03 RX ADMIN — Medication 1300 MILLIGRAM(S): at 17:33

## 2023-04-03 RX ADMIN — HEPARIN SODIUM 5000 UNIT(S): 5000 INJECTION INTRAVENOUS; SUBCUTANEOUS at 05:24

## 2023-04-03 RX ADMIN — Medication 2: at 13:18

## 2023-04-03 RX ADMIN — Medication 50 MICROGRAM(S): at 05:22

## 2023-04-03 RX ADMIN — Medication 1: at 17:36

## 2023-04-03 RX ADMIN — INSULIN GLARGINE 5 UNIT(S): 100 INJECTION, SOLUTION SUBCUTANEOUS at 21:45

## 2023-04-03 RX ADMIN — Medication 1300 MILLIGRAM(S): at 05:22

## 2023-04-03 RX ADMIN — ATORVASTATIN CALCIUM 40 MILLIGRAM(S): 80 TABLET, FILM COATED ORAL at 21:45

## 2023-04-03 RX ADMIN — CARVEDILOL PHOSPHATE 12.5 MILLIGRAM(S): 80 CAPSULE, EXTENDED RELEASE ORAL at 05:22

## 2023-04-03 RX ADMIN — Medication 2000 UNIT(S): at 13:16

## 2023-04-03 RX ADMIN — CARVEDILOL PHOSPHATE 12.5 MILLIGRAM(S): 80 CAPSULE, EXTENDED RELEASE ORAL at 17:33

## 2023-04-03 RX ADMIN — Medication 81 MILLIGRAM(S): at 13:17

## 2023-04-03 NOTE — CONSULT NOTE ADULT - ASSESSMENT
72 y.o. M with PMH of HTN, HLD, DM2 and advanced CKD well known to me from prior admission presents for hyperkalemia noted on outpatient labs now admitted for persistent hyperkalemia. Vascular surgery consulted for AVF creation. Patient currently not on HD.     Plan:  - Please protect ---- arm (no blood draws, IV, BP)   - Please obtain vein mapping (ordered)   - Appreciate medicine optimization   - Please obtain cardiology optimization   - OR date pending vein mapping and optimization     Plan discussed Dr. Melendrez on behalf of Dr. Fanta Leonardo PGY-2   Vascular Surgery  [id number]  72 y.o. M with PMH of HTN, HLD, DM2 and advanced CKD well known to me from prior admission presents for hyperkalemia noted on outpatient labs now admitted for persistent hyperkalemia. Vascular surgery consulted for AVF creation. Patient currently not on HD.     Plan:  - Please protect LEFT arm - pink band applied at bedside (no blood draws, IVs, BP)   - Please obtain vein mapping (ordered)   - Appreciate medicine optimization   - Please obtain cardiology optimization   - OR date pending vein mapping and optimization     Plan discussed Dr. Melendrez on behalf of Dr. Fanta Leonardo PGY-2   Vascular Surgery  [id number]

## 2023-04-03 NOTE — CONSULT NOTE ADULT - ATTENDING COMMENTS
new onset ESRD now requiring HD  pt. needs HD access  will plan for AVF creation on this admission once medically optimized  protect L arm   vein mapping
Patient with advanced chronic kidney disease presenting with severe hyperkalemia     - Hold Irbesartan and Spironolactone  - K restricted diet ( has been having foods rich in K at home)   - sodium bicarbonate 1300 mg BID   - Lokelma 10 BID   - Lengthy discussion about dialysis planning- no need for urgent start at the moment but definitely needs fistula placed. will get vascular surgery.   patient had lots of questions that were answered to his satisfaction.     rock pinzon  nephrology attending   please contact me on TEAMS   Office- [phone]

## 2023-04-03 NOTE — PROGRESS NOTE ADULT - ATTENDING COMMENTS
patient with CKD 5 admitted with hyperkalemia now resolved s/p medical therapy   does have subtle uremic symptoms but no emergent indication to start at the moment and would rather start with a fistula   please have vascular surgery evaluate for fistula placement   continue current meds especially lokelma and sodium bicarbonate   would not restart Irbesartan or spironolactone at this time- once he gets started on dialysis, will start.     rock pinzon  nephrology attending   please contact me on TEAMS   Office- [phone]

## 2023-04-04 LAB
ANION GAP SERPL CALC-SCNC: 13 MMOL/L — SIGNIFICANT CHANGE UP (ref 5–17)
BUN SERPL-MCNC: 95 MG/DL — HIGH (ref 7–23)
CALCIUM SERPL-MCNC: 9 MG/DL — SIGNIFICANT CHANGE UP (ref 8.4–10.5)
CHLORIDE SERPL-SCNC: 106 MMOL/L — SIGNIFICANT CHANGE UP (ref 96–108)
CO2 SERPL-SCNC: 20 MMOL/L — LOW (ref 22–31)
CREAT SERPL-MCNC: 4.91 MG/DL — HIGH (ref 0.5–1.3)
EGFR: 12 ML/MIN/1.73M2 — LOW
GLUCOSE BLDC GLUCOMTR-MCNC: 131 MG/DL — HIGH (ref 70–99)
GLUCOSE BLDC GLUCOMTR-MCNC: 146 MG/DL — HIGH (ref 70–99)
GLUCOSE BLDC GLUCOMTR-MCNC: 147 MG/DL — HIGH (ref 70–99)
GLUCOSE BLDC GLUCOMTR-MCNC: 184 MG/DL — HIGH (ref 70–99)
GLUCOSE SERPL-MCNC: 113 MG/DL — HIGH (ref 70–99)
POTASSIUM SERPL-MCNC: 5.2 MMOL/L — SIGNIFICANT CHANGE UP (ref 3.5–5.3)
POTASSIUM SERPL-SCNC: 5.2 MMOL/L — SIGNIFICANT CHANGE UP (ref 3.5–5.3)
SODIUM SERPL-SCNC: 139 MMOL/L — SIGNIFICANT CHANGE UP (ref 135–145)

## 2023-04-04 PROCEDURE — 93970 EXTREMITY STUDY: CPT | Mod: 26

## 2023-04-04 PROCEDURE — 99233 SBSQ HOSP IP/OBS HIGH 50: CPT | Mod: GC

## 2023-04-04 RX ADMIN — AMLODIPINE BESYLATE 10 MILLIGRAM(S): 2.5 TABLET ORAL at 06:48

## 2023-04-04 RX ADMIN — ATORVASTATIN CALCIUM 40 MILLIGRAM(S): 80 TABLET, FILM COATED ORAL at 21:54

## 2023-04-04 RX ADMIN — Medication 2000 UNIT(S): at 14:44

## 2023-04-04 RX ADMIN — Medication 1300 MILLIGRAM(S): at 06:48

## 2023-04-04 RX ADMIN — Medication 50 MICROGRAM(S): at 06:49

## 2023-04-04 RX ADMIN — HEPARIN SODIUM 5000 UNIT(S): 5000 INJECTION INTRAVENOUS; SUBCUTANEOUS at 06:49

## 2023-04-04 RX ADMIN — Medication 81 MILLIGRAM(S): at 14:44

## 2023-04-04 RX ADMIN — INSULIN GLARGINE 5 UNIT(S): 100 INJECTION, SOLUTION SUBCUTANEOUS at 21:54

## 2023-04-04 RX ADMIN — Medication 1300 MILLIGRAM(S): at 17:17

## 2023-04-04 RX ADMIN — CARVEDILOL PHOSPHATE 12.5 MILLIGRAM(S): 80 CAPSULE, EXTENDED RELEASE ORAL at 17:17

## 2023-04-04 RX ADMIN — CARVEDILOL PHOSPHATE 12.5 MILLIGRAM(S): 80 CAPSULE, EXTENDED RELEASE ORAL at 06:48

## 2023-04-04 RX ADMIN — HEPARIN SODIUM 5000 UNIT(S): 5000 INJECTION INTRAVENOUS; SUBCUTANEOUS at 17:17

## 2023-04-04 RX ADMIN — Medication 1: at 17:19

## 2023-04-04 NOTE — PROGRESS NOTE ADULT - ATTENDING COMMENTS
patient with CKD 5 admitted with hyperkalemia now resolved s/p medical therapy   does have subtle uremic symptoms but no emergent indication to start at the moment and would rather start with a fistula   fistula placement this admission   continue current meds especially lokelma and sodium bicarbonate   would not restart Irbesartan or spironolactone at this time- once he gets started on dialysis, will start.     rock pinzon  nephrology attending   please contact me on TEAMS   Office- [phone]

## 2023-04-05 ENCOUNTER — TRANSCRIPTION ENCOUNTER (OUTPATIENT)
Age: 73
End: 2023-04-05

## 2023-04-05 LAB
ALBUMIN SERPL ELPH-MCNC: 3.8 G/DL
ANION GAP SERPL CALC-SCNC: 14 MMOL/L — SIGNIFICANT CHANGE UP (ref 5–17)
ANION GAP SERPL CALC-SCNC: 15 MMOL/L
BASOPHILS # BLD AUTO: 0.09 K/UL
BASOPHILS NFR BLD AUTO: 0.9 %
BUN SERPL-MCNC: 102 MG/DL
BUN SERPL-MCNC: 94 MG/DL — HIGH (ref 7–23)
CALCIUM SERPL-MCNC: 8.5 MG/DL — SIGNIFICANT CHANGE UP (ref 8.4–10.5)
CALCIUM SERPL-MCNC: 9 MG/DL
CHLORIDE SERPL-SCNC: 106 MMOL/L
CHLORIDE SERPL-SCNC: 106 MMOL/L — SIGNIFICANT CHANGE UP (ref 96–108)
CHOLEST SERPL-MCNC: 105 MG/DL
CO2 SERPL-SCNC: 15 MMOL/L
CO2 SERPL-SCNC: 19 MMOL/L — LOW (ref 22–31)
CREAT SERPL-MCNC: 4.42 MG/DL — HIGH (ref 0.5–1.3)
CREAT SERPL-MCNC: 5.45 MG/DL
CREAT SPEC-SCNC: 204 MG/DL
CYSTATIN C SERPL-MCNC: 3.73 MG/L
EGFR: 10 ML/MIN/1.73M2
EGFR: 13 ML/MIN/1.73M2 — LOW
EOSINOPHIL # BLD AUTO: 0.28 K/UL
EOSINOPHIL NFR BLD AUTO: 2.7 %
ESTIMATED AVERAGE GLUCOSE: 174 MG/DL
GFR/BSA.PRED SERPLBLD CYS-BASED-ARV: 13 ML/MIN/1.73M2
GLUCOSE BLDC GLUCOMTR-MCNC: 123 MG/DL — HIGH (ref 70–99)
GLUCOSE BLDC GLUCOMTR-MCNC: 159 MG/DL — HIGH (ref 70–99)
GLUCOSE BLDC GLUCOMTR-MCNC: 181 MG/DL — HIGH (ref 70–99)
GLUCOSE BLDC GLUCOMTR-MCNC: 214 MG/DL — HIGH (ref 70–99)
GLUCOSE SERPL-MCNC: 103 MG/DL
GLUCOSE SERPL-MCNC: 111 MG/DL — HIGH (ref 70–99)
HBA1C MFR BLD HPLC: 7.7 %
HCT VFR BLD CALC: 27.9 %
HDLC SERPL-MCNC: 33 MG/DL
HGB BLD-MCNC: 9.3 G/DL
IMM GRANULOCYTES NFR BLD AUTO: 0.3 %
LDLC SERPL CALC-MCNC: 50 MG/DL
LYMPHOCYTES # BLD AUTO: 1.85 K/UL
LYMPHOCYTES NFR BLD AUTO: 17.6 %
MAN DIFF?: NORMAL
MCHC RBC-ENTMCNC: 30.2 PG
MCHC RBC-ENTMCNC: 33.3 GM/DL
MCV RBC AUTO: 90.6 FL
MICROALBUMIN 24H UR DL<=1MG/L-MCNC: 156.5 MG/DL
MICROALBUMIN/CREAT 24H UR-RTO: 769 MG/G
MONOCYTES # BLD AUTO: 1.04 K/UL
MONOCYTES NFR BLD AUTO: 9.9 %
NEUTROPHILS # BLD AUTO: 7.22 K/UL
NEUTROPHILS NFR BLD AUTO: 68.6 %
NONHDLC SERPL-MCNC: 72 MG/DL
PHOSPHATE SERPL-MCNC: 5.8 MG/DL
PLATELET # BLD AUTO: 204 K/UL
POTASSIUM SERPL-MCNC: 5.3 MMOL/L — SIGNIFICANT CHANGE UP (ref 3.5–5.3)
POTASSIUM SERPL-SCNC: 5.3 MMOL/L — SIGNIFICANT CHANGE UP (ref 3.5–5.3)
POTASSIUM SERPL-SCNC: 6.5 MMOL/L
RBC # BLD: 3.08 M/UL
RBC # FLD: 13.6 %
SARS-COV-2 RNA SPEC QL NAA+PROBE: SIGNIFICANT CHANGE UP
SODIUM SERPL-SCNC: 136 MMOL/L
SODIUM SERPL-SCNC: 139 MMOL/L — SIGNIFICANT CHANGE UP (ref 135–145)
TRIGL SERPL-MCNC: 110 MG/DL
WBC # FLD AUTO: 10.51 K/UL

## 2023-04-05 PROCEDURE — 93356 MYOCRD STRAIN IMG SPCKL TRCK: CPT

## 2023-04-05 PROCEDURE — 93306 TTE W/DOPPLER COMPLETE: CPT | Mod: 26

## 2023-04-05 RX ORDER — PETROLATUM,WHITE
1 JELLY (GRAM) TOPICAL
Refills: 0 | Status: DISCONTINUED | OUTPATIENT
Start: 2023-04-05 | End: 2023-04-07

## 2023-04-05 RX ADMIN — Medication 81 MILLIGRAM(S): at 12:45

## 2023-04-05 RX ADMIN — CARVEDILOL PHOSPHATE 12.5 MILLIGRAM(S): 80 CAPSULE, EXTENDED RELEASE ORAL at 17:13

## 2023-04-05 RX ADMIN — HEPARIN SODIUM 5000 UNIT(S): 5000 INJECTION INTRAVENOUS; SUBCUTANEOUS at 06:09

## 2023-04-05 RX ADMIN — CARVEDILOL PHOSPHATE 12.5 MILLIGRAM(S): 80 CAPSULE, EXTENDED RELEASE ORAL at 06:11

## 2023-04-05 RX ADMIN — Medication 2: at 13:02

## 2023-04-05 RX ADMIN — Medication 1300 MILLIGRAM(S): at 17:13

## 2023-04-05 RX ADMIN — HEPARIN SODIUM 5000 UNIT(S): 5000 INJECTION INTRAVENOUS; SUBCUTANEOUS at 17:14

## 2023-04-05 RX ADMIN — Medication 50 MICROGRAM(S): at 06:11

## 2023-04-05 RX ADMIN — Medication 1: at 17:14

## 2023-04-05 RX ADMIN — Medication 1300 MILLIGRAM(S): at 06:10

## 2023-04-05 RX ADMIN — Medication 2000 UNIT(S): at 12:45

## 2023-04-05 RX ADMIN — INSULIN GLARGINE 5 UNIT(S): 100 INJECTION, SOLUTION SUBCUTANEOUS at 21:53

## 2023-04-05 RX ADMIN — AMLODIPINE BESYLATE 10 MILLIGRAM(S): 2.5 TABLET ORAL at 06:11

## 2023-04-05 RX ADMIN — ATORVASTATIN CALCIUM 40 MILLIGRAM(S): 80 TABLET, FILM COATED ORAL at 21:53

## 2023-04-05 NOTE — CONSULT NOTE ADULT - SUBJECTIVE AND OBJECTIVE BOX
[facility] DIVISION OF KIDNEY DISEASES AND HYPERTENSION -- [phone]  -- INITIAL CONSULT NOTE  --------------------------------------------------------------------------------  HPI:  Sara is a 72 y.o. M w/ PMHx of HTN, HLD, DM2 and advanced CKD well known to me from prior admission presents for hyperkalemia noted on outpatient labs. Nephrology consulted for the same. History provided by patient and daughter. Pt. was seen by Dr. Rees whom he was following as outpatient. Had blood work on 3/31 which showed potassium of 6.5. Repeat blood work here conformed potassium of 6.4. Pt. has been following with [facility] team and was provided list of foods to abstain and minimize. Pt. states he has been eating tangerines daily at home in addition to peanut butter. He remains on stable Irbesartan. He had been weaned off diuretics for several months. His Sodium Bicarobonate was recently reduced.  He denies any urinary retention, chest pain. He endorses some tremors and bitter taste in his mouth. He has his LUE protected for AVF formation but has not had any other intervention to start dialysis yet.         PAST HISTORY  --------------------------------------------------------------------------------  PAST MEDICAL & SURGICAL HISTORY:  Hypertension      Diabetes        FAMILY HISTORY:    PAST SOCIAL HISTORY:    ALLERGIES & MEDICATIONS  --------------------------------------------------------------------------------  Allergies    No Known Allergies    Intolerances      Standing Inpatient Medications    PRN Inpatient Medications      REVIEW OF SYSTEMS  --------------------------------------------------------------------------------  Gen: No fevers/chills  Skin: No rashes  Head/Eyes/Ears: Normal hearing,   Respiratory: No dyspnea, cough  CV: No chest pain  GI: No abdominal pain, diarrhea  : No dysuria, hematuria  MSK: No  edema  Heme: No easy bruising or bleeding  Psych: No significant depression    All other systems were reviewed and are negative, except as noted.    VITALS/PHYSICAL EXAM  --------------------------------------------------------------------------------  T(C): 36.8 (04-01-23 @ 06:55), Max: 36.8 (04-01-23 @ 06:55)  HR: 68 (04-01-23 @ 08:00) (63 - 68)  BP: 144/56 (04-01-23 @ 08:00) (144/56 - 152/68)  RR: 20 (04-01-23 @ 08:00) (19 - 20)  SpO2: 100% (04-01-23 @ 08:00) (100% - 100%)  Wt(kg): --  Height (cm): 182.9 (04-01-23 @ 06:55)  Weight (kg): 78.9 (04-01-23 @ 06:55)  BMI (kg/m2): 23.6 (04-01-23 @ 06:55)  BSA (m2): 2.01 (04-01-23 @ 06:55)      Physical Exam:  	Gen: NAD  	HEENT: MMM  	Pulm: CTA B/L  	CV: S1S2  	Abd: Soft, +BS   	Ext: No LE edema B/L  	Neuro: Awake  	Skin: Warm and dry  	Vascular access: peripheral     LABS/STUDIES  --------------------------------------------------------------------------------              9.7    8.66  >-----------<  204      [04-01-23 @ 07:40]              29.3     134  |  108  |  99  ----------------------------<  138      [04-01-23 @ 11:06]  6.4   |  15  |  5.21        Ca     9.0     [04-01-23 @ 11:06]      Mg     2.8     [04-01-23 @ 07:40]      Phos  5.9     [04-01-23 @ 07:40]    TPro  6.9  /  Alb  3.8  /  TBili  0.3  /  DBili  x   /  AST  30  /  ALT  60  /  AlkPhos  51  [04-01-23 @ 07:40]      Creatinine Trend:  SCr 5.21 [04-01 @ 11:06]  SCr 5.78 [04-01 @ 07:40]            
GENERAL SURGERY CONSULT NOTE  Consulting surgical team: Vascular Surgery  Consulting attending: Dr. Jewell     HPI:  HPI:  72 y.o. M w/ PMHx of HTN, HLD, DM2 and advanced CKD presents for hyperkalemia noted on outpatient labs. The patient was seen by Dr. Rees nephrologist whom he was following as outpatient. Had blood work on 3/31 which showed potassium of 6.5. Repeat blood work here conformed potassium of 6.4. The patient reports no symptoms at all including palpitations or chest pain ir nausea, vomiting or diarrhea  (01 Apr 2023 16:28)    Patient admitted with CKD 5 with hyperkalemia now resolved with medical therapy. Per nephrology, will require dialysis in near future and vascular consulted for AVF placement.     PAST MEDICAL HISTORY:  Hypertension    Diabetes    Stage 5 chronic kidney disease not on chronic dialysis    HLD (hyperlipidemia)        PAST SURGICAL HISTORY:      SOCIAL HISTORY:  - Denies EtOH abuse, smoking, IVDA    MEDICATIONS:  amLODIPine   Tablet 10 milliGRAM(s) Oral daily  aspirin enteric coated 81 milliGRAM(s) Oral daily  atorvastatin 40 milliGRAM(s) Oral at bedtime  carvedilol 12.5 milliGRAM(s) Oral every 12 hours  cholecalciferol 2000 Unit(s) Oral daily  dextrose 5%. 1000 milliLiter(s) IV Continuous <Continuous>  dextrose 5%. 1000 milliLiter(s) IV Continuous <Continuous>  dextrose 50% Injectable 25 Gram(s) IV Push once  dextrose 50% Injectable 12.5 Gram(s) IV Push once  dextrose 50% Injectable 25 Gram(s) IV Push once  dextrose Oral Gel 15 Gram(s) Oral once PRN  glucagon  Injectable 1 milliGRAM(s) IntraMuscular once  heparin   Injectable 5000 Unit(s) SubCutaneous every 12 hours  insulin glargine Injectable (LANTUS) 5 Unit(s) SubCutaneous at bedtime  insulin lispro (ADMELOG) corrective regimen sliding scale   SubCutaneous three times a day before meals  levothyroxine 50 MICROGram(s) Oral daily  sodium bicarbonate 1300 milliGRAM(s) Oral two times a day      ALLERGIES:  No Known Allergies      VITALS & I/Os:  Vital Signs Last 24 Hrs  T(C): 36.4 (03 Apr 2023 12:18), Max: 36.6 (03 Apr 2023 04:31)  T(F): 97.6 (03 Apr 2023 12:18), Max: 97.9 (03 Apr 2023 04:31)  HR: 84 (03 Apr 2023 12:18) (61 - 84)  BP: 102/67 (03 Apr 2023 12:18) (102/67 - 135/68)  BP(mean): --  RR: 18 (03 Apr 2023 12:18) (18 - 18)  SpO2: 98% (03 Apr 2023 12:18) (98% - 100%)    Parameters below as of 03 Apr 2023 12:18  Patient On (Oxygen Delivery Method): room air        I&O's Summary    02 Apr 2023 07:01  -  03 Apr 2023 07:00  --------------------------------------------------------  IN: 360 mL / OUT: 870 mL / NET: -510 mL    03 Apr 2023 07:01  -  03 Apr 2023 15:39  --------------------------------------------------------  IN: 358 mL / OUT: 650 mL / NET: -292 mL        PHYSICAL EXAM:  GEN: resting comfortably in bed, in NAD  RESP: no acute respiratory distress, breathing comfortably   EXT:  b/l palpable radial/ulnar pulses   NEURO:  no focal neuro deficits     LABS:                        10.9   9.71  )-----------( 223      ( 02 Apr 2023 06:48 )             33.3     04-03    138  |  104  |  102<H>  ----------------------------<  105<H>  5.2   |  21<L>  |  5.42<H>    Ca    9.0      03 Apr 2023 07:08  Phos  6.6     04-03  Mg     2.6     04-03    TPro  6.9  /  Alb  3.8  /  TBili  0.4  /  DBili  x   /  AST  26  /  ALT  52<H>  /  AlkPhos  56  04-02    Lactate:                  IMAGING:  
Luca Diaz MD  Interventional Cardiology / Advance Heart Failure and Cardiac Transplant Specialist  [place] Office : [address] [place] [address] [place] [address]  Tel:   [place] Office : [address] [facility] [place] [address]  Tel: [phone]         HISTORY OF PRESENTING ILLNESS:  HPI:  72 y.o. M w/ PMHx of HTN, HLD, DM2 and advanced CKD presents for hyperkalemia noted on outpatient labs. The patient was seen by Dr. Rees nephrologist whom he was following as outpatient. Had blood work on 3/31 which showed potassium of 6.5. Repeat blood work here conformed potassium of 6.4. The patient reports no symptoms at all including palpitations or chest pain ir nausea, vomiting or diarrhea  Cardiology called for clearamce for AVF, pt denies chest pains, some SOB during exertion, no palpitations no syncope     PAST MEDICAL & SURGICAL HISTORY:  Hypertension      Diabetes      Stage 5 chronic kidney disease not on chronic dialysis      HLD (hyperlipidemia)          SOCIAL HISTORY: Substance Use (street drugs): ( x ) never used  (  ) other:    FAMILY HISTORY:      REVIEW OF SYSTEMS:  CONSTITUTIONAL: No fever, weight loss, or fatigue  EYES: No eye pain, visual disturbances, or discharge  ENMT:  No difficulty hearing, tinnitus, vertigo; No sinus or throat pain  BREASTS: No pain, masses, or nipple discharge  GASTROINTESTINAL: No abdominal or epigastric pain. No nausea, vomiting, or hematemesis; No diarrhea or constipation. No melena or hematochezia.  GENITOURINARY: No dysuria, frequency, hematuria, or incontinence  NEUROLOGICAL: No headaches, memory loss, loss of strength, numbness, or tremors  ENDOCRINE: No heat or cold intolerance; No hair loss  MUSCULOSKELETAL: No joint pain or swelling; No muscle, back, or extremity pain  PSYCHIATRIC: No depression, anxiety, mood swings, or difficulty sleeping  HEME/LYMPH: No easy bruising, or bleeding gums  All others negative    MEDICATIONS:  amLODIPine   Tablet 10 milliGRAM(s) Oral daily  aspirin enteric coated 81 milliGRAM(s) Oral daily  carvedilol 12.5 milliGRAM(s) Oral every 12 hours  heparin   Injectable 5000 Unit(s) SubCutaneous every 12 hours  atorvastatin 40 milliGRAM(s) Oral at bedtime  dextrose 50% Injectable 25 Gram(s) IV Push once  dextrose 50% Injectable 12.5 Gram(s) IV Push once  dextrose 50% Injectable 25 Gram(s) IV Push once  dextrose Oral Gel 15 Gram(s) Oral once PRN  glucagon  Injectable 1 milliGRAM(s) IntraMuscular once  insulin glargine Injectable (LANTUS) 5 Unit(s) SubCutaneous at bedtime  insulin lispro (ADMELOG) corrective regimen sliding scale   SubCutaneous three times a day before meals  levothyroxine 50 MICROGram(s) Oral daily    AQUAPHOR (petrolatum Ointment) 1 Application(s) Topical two times a day  cholecalciferol 2000 Unit(s) Oral daily  dextrose 5%. 1000 milliLiter(s) IV Continuous <Continuous>  dextrose 5%. 1000 milliLiter(s) IV Continuous <Continuous>  sodium bicarbonate 1300 milliGRAM(s) Oral two times a day      FAMILY HISTORY:        Allergies    No Known Allergies    Intolerances    	      PHYSICAL EXAM:  T(C): 36.6 (04-05-23 @ 20:00), Max: 36.6 (04-05-23 @ 12:05)  HR: 68 (04-05-23 @ 20:00) (60 - 68)  BP: 99/61 (04-05-23 @ 20:00) (99/61 - 142/62)  RR: 18 (04-05-23 @ 20:00) (18 - 18)  SpO2: 98% (04-05-23 @ 20:00) (98% - 100%)  Wt(kg): --  I&O's Summary    04 Apr 2023 07:01  -  05 Apr 2023 07:00  --------------------------------------------------------  IN: 660 mL / OUT: 1150 mL / NET: -490 mL    05 Apr 2023 07:01  -  05 Apr 2023 22:39  --------------------------------------------------------  IN: 420 mL / OUT: 950 mL / NET: -530 mL        GENERAL: NAD   EYES: EOMI, PERRLA, conjunctiva and sclera clear  ENMT: No tonsillar erythema, exudates, or enlargement; Moist mucous membranes, Good dentition, No lesions  Cardiovascular: Normal S1 S2, No JVD, No murmurs, No edema  Respiratory: Lungs clear to auscultation	  Gastrointestinal:  Soft, Non-tender, + BS	  Extremities: no edema      LABS:	 	    CARDIAC MARKERS:              04-05    139  |  106  |  94<H>  ----------------------------<  111<H>  5.3   |  19<L>  |  4.42<H>    Ca    8.5      05 Apr 2023 06:31      proBNP:   Lipid Profile:   HgA1c:   TSH:     Consultant(s) Notes Reviewed:  [x ] YES  [ ] NO    Care Discussed with Consultants/Other Providers [ x] YES  [ ] NO    Imaging Personally Reviewed independently:  [x] YES  [ ] NO    All labs, radiologic studies, vitals, orders and medications list reviewed. Patient is seen and examined at bedside. Case discussed with medical team.    ASSESSMENT/PLAN:

## 2023-04-05 NOTE — CHART NOTE - NSCHARTNOTEFT_GEN_A_CORE
Plan for L AVF with vascular surgery tomorrow    Plan:  -Please protect LUE  -NPO after midnight--ordered  -COVID-19 PCR STAT--ordered  -4am labs--ordered    Vascular Surgery  [id number]

## 2023-04-05 NOTE — CONSULT NOTE ADULT - ASSESSMENT
EKG - NSR   Echo - Normal LV function     a/p     1) Preop AVF - no cp some SOB sec to volume overload no palpitations, EKG and 2d echo normal                       - pt is mod risk for AVF    2) HTN - cont coreg and amlodipine     3) DVT prophylasix - heparin SC

## 2023-04-06 ENCOUNTER — APPOINTMENT (OUTPATIENT)
Dept: VASCULAR SURGERY | Facility: HOSPITAL | Age: 73
End: 2023-04-06

## 2023-04-06 ENCOUNTER — TRANSCRIPTION ENCOUNTER (OUTPATIENT)
Age: 73
End: 2023-04-06

## 2023-04-06 LAB
ANION GAP SERPL CALC-SCNC: 11 MMOL/L — SIGNIFICANT CHANGE UP (ref 5–17)
APTT BLD: 28.3 SEC — SIGNIFICANT CHANGE UP (ref 27.5–35.5)
BLD GP AB SCN SERPL QL: NEGATIVE — SIGNIFICANT CHANGE UP
BUN SERPL-MCNC: 96 MG/DL — HIGH (ref 7–23)
CALCIUM SERPL-MCNC: 8.4 MG/DL — SIGNIFICANT CHANGE UP (ref 8.4–10.5)
CHLORIDE SERPL-SCNC: 108 MMOL/L — SIGNIFICANT CHANGE UP (ref 96–108)
CO2 SERPL-SCNC: 18 MMOL/L — LOW (ref 22–31)
CREAT SERPL-MCNC: 4.45 MG/DL — HIGH (ref 0.5–1.3)
EGFR: 13 ML/MIN/1.73M2 — LOW
GLUCOSE BLDC GLUCOMTR-MCNC: 113 MG/DL — HIGH (ref 70–99)
GLUCOSE BLDC GLUCOMTR-MCNC: 136 MG/DL — HIGH (ref 70–99)
GLUCOSE BLDC GLUCOMTR-MCNC: 181 MG/DL — HIGH (ref 70–99)
GLUCOSE BLDC GLUCOMTR-MCNC: 196 MG/DL — HIGH (ref 70–99)
GLUCOSE SERPL-MCNC: 130 MG/DL — HIGH (ref 70–99)
HCT VFR BLD CALC: 26.7 % — LOW (ref 39–50)
HCT VFR BLD CALC: 31.6 % — LOW (ref 39–50)
HGB BLD-MCNC: 10.1 G/DL — LOW (ref 13–17)
HGB BLD-MCNC: 8.6 G/DL — LOW (ref 13–17)
INR BLD: 0.91 RATIO — SIGNIFICANT CHANGE UP (ref 0.88–1.16)
MAGNESIUM SERPL-MCNC: 2.5 MG/DL — SIGNIFICANT CHANGE UP (ref 1.6–2.6)
MCHC RBC-ENTMCNC: 28.6 PG — SIGNIFICANT CHANGE UP (ref 27–34)
MCHC RBC-ENTMCNC: 28.9 PG — SIGNIFICANT CHANGE UP (ref 27–34)
MCHC RBC-ENTMCNC: 32 GM/DL — SIGNIFICANT CHANGE UP (ref 32–36)
MCHC RBC-ENTMCNC: 32.2 GM/DL — SIGNIFICANT CHANGE UP (ref 32–36)
MCV RBC AUTO: 88.7 FL — SIGNIFICANT CHANGE UP (ref 80–100)
MCV RBC AUTO: 90.5 FL — SIGNIFICANT CHANGE UP (ref 80–100)
NRBC # BLD: 0 /100 WBCS — SIGNIFICANT CHANGE UP (ref 0–0)
NRBC # BLD: 0 /100 WBCS — SIGNIFICANT CHANGE UP (ref 0–0)
PHOSPHATE SERPL-MCNC: 5.7 MG/DL — HIGH (ref 2.5–4.5)
PLATELET # BLD AUTO: 164 K/UL — SIGNIFICANT CHANGE UP (ref 150–400)
PLATELET # BLD AUTO: 204 K/UL — SIGNIFICANT CHANGE UP (ref 150–400)
POTASSIUM SERPL-MCNC: 5 MMOL/L — SIGNIFICANT CHANGE UP (ref 3.5–5.3)
POTASSIUM SERPL-SCNC: 5 MMOL/L — SIGNIFICANT CHANGE UP (ref 3.5–5.3)
PROTHROM AB SERPL-ACNC: 10.5 SEC — SIGNIFICANT CHANGE UP (ref 10.5–13.4)
RBC # BLD: 3.01 M/UL — LOW (ref 4.2–5.8)
RBC # BLD: 3.49 M/UL — LOW (ref 4.2–5.8)
RBC # FLD: 13.3 % — SIGNIFICANT CHANGE UP (ref 10.3–14.5)
RBC # FLD: 13.5 % — SIGNIFICANT CHANGE UP (ref 10.3–14.5)
RH IG SCN BLD-IMP: POSITIVE — SIGNIFICANT CHANGE UP
RH IG SCN BLD-IMP: POSITIVE — SIGNIFICANT CHANGE UP
SODIUM SERPL-SCNC: 137 MMOL/L — SIGNIFICANT CHANGE UP (ref 135–145)
WBC # BLD: 7.4 K/UL — SIGNIFICANT CHANGE UP (ref 3.8–10.5)
WBC # BLD: 9.87 K/UL — SIGNIFICANT CHANGE UP (ref 3.8–10.5)
WBC # FLD AUTO: 7.4 K/UL — SIGNIFICANT CHANGE UP (ref 3.8–10.5)
WBC # FLD AUTO: 9.87 K/UL — SIGNIFICANT CHANGE UP (ref 3.8–10.5)

## 2023-04-06 PROCEDURE — 36821 AV FUSION DIRECT ANY SITE: CPT | Mod: LT

## 2023-04-06 DEVICE — SURGICEL FIBRILLAR 2 X 4": Type: IMPLANTABLE DEVICE | Site: LEFT | Status: FUNCTIONAL

## 2023-04-06 DEVICE — CLIP APPLIER ETHICON LIGACLIP 9 3/8" SMALL: Type: IMPLANTABLE DEVICE | Site: LEFT | Status: FUNCTIONAL

## 2023-04-06 DEVICE — SURGIFOAM PAD 8CM X 12.5CM X 10MM (100): Type: IMPLANTABLE DEVICE | Site: LEFT | Status: FUNCTIONAL

## 2023-04-06 RX ORDER — HYDROMORPHONE HYDROCHLORIDE 2 MG/ML
0.25 INJECTION INTRAMUSCULAR; INTRAVENOUS; SUBCUTANEOUS
Refills: 0 | Status: DISCONTINUED | OUTPATIENT
Start: 2023-04-06 | End: 2023-04-06

## 2023-04-06 RX ORDER — ONDANSETRON 8 MG/1
4 TABLET, FILM COATED ORAL ONCE
Refills: 0 | Status: DISCONTINUED | OUTPATIENT
Start: 2023-04-06 | End: 2023-04-06

## 2023-04-06 RX ADMIN — Medication 1 APPLICATION(S): at 17:09

## 2023-04-06 RX ADMIN — INSULIN GLARGINE 5 UNIT(S): 100 INJECTION, SOLUTION SUBCUTANEOUS at 22:01

## 2023-04-06 RX ADMIN — Medication 1: at 13:08

## 2023-04-06 RX ADMIN — HEPARIN SODIUM 5000 UNIT(S): 5000 INJECTION INTRAVENOUS; SUBCUTANEOUS at 05:47

## 2023-04-06 RX ADMIN — CARVEDILOL PHOSPHATE 12.5 MILLIGRAM(S): 80 CAPSULE, EXTENDED RELEASE ORAL at 05:46

## 2023-04-06 RX ADMIN — ATORVASTATIN CALCIUM 40 MILLIGRAM(S): 80 TABLET, FILM COATED ORAL at 22:02

## 2023-04-06 RX ADMIN — Medication 2000 UNIT(S): at 11:43

## 2023-04-06 RX ADMIN — Medication 1 APPLICATION(S): at 05:47

## 2023-04-06 RX ADMIN — CARVEDILOL PHOSPHATE 12.5 MILLIGRAM(S): 80 CAPSULE, EXTENDED RELEASE ORAL at 17:03

## 2023-04-06 RX ADMIN — Medication 1300 MILLIGRAM(S): at 17:02

## 2023-04-06 RX ADMIN — Medication 81 MILLIGRAM(S): at 11:43

## 2023-04-06 RX ADMIN — Medication 50 MICROGRAM(S): at 11:43

## 2023-04-06 RX ADMIN — AMLODIPINE BESYLATE 10 MILLIGRAM(S): 2.5 TABLET ORAL at 05:46

## 2023-04-06 RX ADMIN — Medication 1300 MILLIGRAM(S): at 11:43

## 2023-04-06 RX ADMIN — HEPARIN SODIUM 5000 UNIT(S): 5000 INJECTION INTRAVENOUS; SUBCUTANEOUS at 17:02

## 2023-04-06 NOTE — DISCHARGE NOTE PROVIDER - NSDCFUADDAPPT_GEN_ALL_CORE_FT
APPTS ARE READY TO BE MADE: [ ] YES    Best Family or Patient Contact (if needed):    Additional Information about above appointments (if needed):    1: Follow up with your nephrologist  2: Follow up with your primary medical doctor 1-2 weeks after discharge  3:     Other comments or requests:

## 2023-04-06 NOTE — DISCHARGE NOTE PROVIDER - HOSPITAL COURSE
asdf    72 y.o. M with PMH of HTN, HLD, DM2 and advanced CKD well known to me from prior admission presents for hyperkalemia noted on outpatient labs now admitted for persistent hyperkalemia. Patient was symptomatic on presentation with bitter taste in mouth and tremors. In the ED, patient was treated with Lokelma 10g PO x2, insulin, D50, Lasix, calcium gluconate, and albuterol.     Problem/Plan - 1:  ·  Problem: Hyperkalemia.   ·  Plan: persistent  s/p initial treatment in the Emergency Department  will repeat BMP now to assess level  continue to follow renal recommendations.     Problem/Plan - 2:  ·  Problem: Stage 5 chronic kidney disease not on chronic dialysis.   ·  Plan: management per renal  ? AVF prior to discharge.     Problem/Plan - 3:  ·  Problem: Diabetes.   ·  Plan: continue finger sticks with short acting insulin sliding scale  no oral meds  small dose Lantus 2 units for now.     Problem/Plan - 4:  ·  Problem: Hypertension.   ·  Plan: continue home meds with hold parameters.     Problem/Plan - 5:  ·  Problem: HLD (hyperlipidemia).   ·  Plan: continue statin  fasting lipid panel in AM    Nephrology    Pt. is a 72 y.o. M w/ PMHx of HTN, HLD, DM2 and advanced CKD well known to me from prior admission presents for hyperkalemia noted on outpatient labs. Nephrology consulted for the same. History provided by patient and daughter. Pt. was seen by Dr. Rees whom he was following as outpatient. Had blood work on 3/31 which showed potassium of 6.5. Repeat blood work here conformed potassium of 6.4. Pt. has been following with [facility] and was provided list of foods to abstain and minimize. Pt. states he has been eating tangerines daily at home in addition to peanut butter. He remains on stable Irbesartan. He had been weaned off diuretics for several months. His Sodium Bicarobonate was recently reduced.  He denies any urinary retention, chest pain. He endorses some tremors and bitter taste in his mouth. He has his LUE protected for AVF formation but has not had any other intervention to start dialysis yet.    72 y.o. M with PMH of HTN, HLD, DM2 and advanced CKD well known to me from prior admission presents for hyperkalemia noted on outpatient labs now admitted for persistent hyperkalemia         Problem/Plan - 1:  ·  Problem: Hyperkalemia.   ·  Plan: resolved   continue to follow renal recommendations  low K diet.     Problem/Plan - 2:  ·  Problem: Stage 5 chronic kidney disease not on chronic dialysis.   ·  Plan: creatinine baseline   s/p AVF no issues.     Problem/Plan - 3:  ·  Problem: Diabetes.   ·  Plan: continue finger sticks with short acting insulin sliding scale  restart home meds on discharge   discontinue Lantus.     Problem/Plan - 4:  ·  Problem: Hypertension.   ·  Plan: continue home meds on discharge.     Problem/Plan - 5:  ·  Problem: HLD (hyperlipidemia).   ·  Plan: continue statin  fasting lipid panel noted.       Additional Information:  Additional Information: stable for discharge  cleared by all services

## 2023-04-06 NOTE — DISCHARGE NOTE PROVIDER - CARE PROVIDER_API CALL
Cem Rees)  Internal Medicine; Nephrology; Preventive Medicine  [address] [place] [address], [place] [address]  Phone: [phone]  Fax: [phone]  Follow Up Time:

## 2023-04-06 NOTE — CHART NOTE - NSCHARTNOTEFT_GEN_A_CORE
71 y/o M s/p L AVF    HPI/ Interval Events: VIJAY since OR. AVSS. Pain controlled. Denies SOB, CP    Vital Signs Last 24 Hrs  T(C): 36.6 (06 Apr 2023 11:35), Max: 36.7 (06 Apr 2023 11:00)  T(F): 97.9 (06 Apr 2023 11:35), Max: 98.1 (06 Apr 2023 11:00)  HR: 60 (06 Apr 2023 11:35) (55 - 68)  BP: 121/51 (06 Apr 2023 11:35) (99/61 - 163/62)  BP(mean): 89 (06 Apr 2023 11:00) (73 - 89)  RR: 16 (06 Apr 2023 11:35) (16 - 18)  SpO2: 99% (06 Apr 2023 11:35) (98% - 100%)    Parameters below as of 06 Apr 2023 11:35  Patient On (Oxygen Delivery Method): room air    MEDICATIONS  (STANDING):  amLODIPine   Tablet 10 milliGRAM(s) Oral daily  AQUAPHOR (petrolatum Ointment) 1 Application(s) Topical two times a day  aspirin enteric coated 81 milliGRAM(s) Oral daily  atorvastatin 40 milliGRAM(s) Oral at bedtime  carvedilol 12.5 milliGRAM(s) Oral every 12 hours  cholecalciferol 2000 Unit(s) Oral daily  dextrose 5%. 1000 milliLiter(s) (50 mL/Hr) IV Continuous <Continuous>  dextrose 5%. 1000 milliLiter(s) (100 mL/Hr) IV Continuous <Continuous>  dextrose 50% Injectable 25 Gram(s) IV Push once  dextrose 50% Injectable 12.5 Gram(s) IV Push once  dextrose 50% Injectable 25 Gram(s) IV Push once  glucagon  Injectable 1 milliGRAM(s) IntraMuscular once  heparin   Injectable 5000 Unit(s) SubCutaneous every 12 hours  insulin glargine Injectable (LANTUS) 5 Unit(s) SubCutaneous at bedtime  insulin lispro (ADMELOG) corrective regimen sliding scale   SubCutaneous three times a day before meals  levothyroxine 50 MICROGram(s) Oral daily  sodium bicarbonate 1300 milliGRAM(s) Oral two times a day    MEDICATIONS  (PRN):  dextrose Oral Gel 15 Gram(s) Oral once PRN Blood Glucose LESS THAN 70 milliGRAM(s)/deciliter    PE  Gen: NAD  Resp: nonlabored on RA  Ext:  L AVF surgical site with dressing, no strikethrough, c/d/i. palpable thrill and +L radial pulse present. Motor and sensory function of LUE and hand intact  Neuro: A&Ox3    Assessment / Plan:    72 y.o. M with PMH of HTN, HLD, DM2 and advanced CKD well known to me from prior admission presents for hyperkalemia noted on outpatient labs now admitted for persistent hyperkalemia. Vascular surgery consulted for AVF creation. Patient currently not on HD, now several hours s/p L AVF, recovering uneventfully    Plan:  -care per primary  -prn pain control    Vascular Surgery   [id number]

## 2023-04-06 NOTE — DISCHARGE NOTE PROVIDER - NSDCCPCAREPLAN_GEN_ALL_CORE_FT
PRINCIPAL DISCHARGE DIAGNOSIS  Diagnosis: Hyperkalemia  Assessment and Plan of Treatment:       SECONDARY DISCHARGE DIAGNOSES  Diagnosis: Stage 5 chronic kidney disease not on chronic dialysis  Assessment and Plan of Treatment: Left AV fistula placed by vascular surgery team on 4/6/23. This will eventually be used to dialysis     PRINCIPAL DISCHARGE DIAGNOSIS  Diagnosis: Hyperkalemia  Assessment and Plan of Treatment: Resolved. Follow up with your nephrologist. Avoid excess potassium in your diet.      SECONDARY DISCHARGE DIAGNOSES  Diagnosis: Stage 5 chronic kidney disease not on chronic dialysis  Assessment and Plan of Treatment: Left AV fistula placed by vascular surgery team on 4/6/23. This will eventually be used for dialysis. Follow up with your primary medical doctor and your nephrologist

## 2023-04-06 NOTE — DISCHARGE NOTE PROVIDER - NSDCMRMEDTOKEN_GEN_ALL_CORE_FT
amLODIPine 10 mg oral tablet: 1 tab(s) orally once a day  aspirin 81 mg oral tablet: 1 tab(s) orally once a day  atorvastatin 40 mg oral tablet: 1 tab(s) orally once a day  carvedilol 12.5 mg oral tablet: 1 tab(s) orally 2 times a day   50 mcg (2000 intl units) oral capsule: 1 cap(s) orally once a week  glipiZIDE 5 mg oral tablet: 1 tab(s) orally once a day  irbesartan 150 mg oral tablet: 1 tab(s) orally once a day  Januvia 25 mg oral tablet: 1 tab(s) orally once a day  levothyroxine 50 mcg (0.05 mg) oral tablet: 1 tab(s) orally once a day  Lovaza 1000 mg oral capsule: 1 cap(s) orally 2 times a day note: pharmacy has 2 cap 2 times per day  sodium bicarbonate 650 mg oral tablet: 1 tab(s) orally 2 times a day note: pharmacy has 2 tabs twice aday

## 2023-04-07 ENCOUNTER — TRANSCRIPTION ENCOUNTER (OUTPATIENT)
Age: 73
End: 2023-04-07

## 2023-04-07 VITALS
DIASTOLIC BLOOD PRESSURE: 73 MMHG | SYSTOLIC BLOOD PRESSURE: 155 MMHG | WEIGHT: 171.74 LBS | TEMPERATURE: 98 F | OXYGEN SATURATION: 98 % | RESPIRATION RATE: 18 BRPM | HEART RATE: 70 BPM

## 2023-04-07 LAB — GLUCOSE BLDC GLUCOMTR-MCNC: 148 MG/DL — HIGH (ref 70–99)

## 2023-04-07 PROCEDURE — 99232 SBSQ HOSP IP/OBS MODERATE 35: CPT | Mod: GC

## 2023-04-07 RX ORDER — LOSARTAN POTASSIUM 100 MG/1
1 TABLET, FILM COATED ORAL
Qty: 0 | Refills: 0 | DISCHARGE

## 2023-04-07 RX ORDER — METOPROLOL TARTRATE 50 MG
1 TABLET ORAL
Qty: 0 | Refills: 0 | DISCHARGE

## 2023-04-07 RX ORDER — HYDRALAZINE HCL 50 MG
1 TABLET ORAL
Qty: 0 | Refills: 0 | DISCHARGE

## 2023-04-07 RX ORDER — GLYBURIDE-METFORMIN HYDROCHLORIDE 1.25; 25 MG/1; MG/1
1 TABLET ORAL
Qty: 0 | Refills: 0 | DISCHARGE

## 2023-04-07 RX ORDER — METFORMIN HYDROCHLORIDE 850 MG/1
1 TABLET ORAL
Qty: 0 | Refills: 0 | DISCHARGE

## 2023-04-07 RX ADMIN — Medication 50 MICROGRAM(S): at 06:19

## 2023-04-07 RX ADMIN — AMLODIPINE BESYLATE 10 MILLIGRAM(S): 2.5 TABLET ORAL at 06:07

## 2023-04-07 RX ADMIN — Medication 1 APPLICATION(S): at 06:07

## 2023-04-07 RX ADMIN — CARVEDILOL PHOSPHATE 12.5 MILLIGRAM(S): 80 CAPSULE, EXTENDED RELEASE ORAL at 06:06

## 2023-04-07 RX ADMIN — HEPARIN SODIUM 5000 UNIT(S): 5000 INJECTION INTRAVENOUS; SUBCUTANEOUS at 06:07

## 2023-04-07 RX ADMIN — Medication 1300 MILLIGRAM(S): at 06:07

## 2023-04-07 NOTE — PROGRESS NOTE ADULT - PROVIDER SPECIALTY LIST ADULT
The patient is a 61y year old Male complaining of dizziness.
Vascular Surgery
Internal Medicine
Internal Medicine
Nephrology
Internal Medicine

## 2023-04-07 NOTE — PROGRESS NOTE ADULT - PROBLEM SELECTOR PLAN 5
continue statin  fasting lipid panel noted
continue statin  fasting lipid panel noted
continue statin  fasting lipid panel in AM noted
continue statin  fasting lipid panel in AM noted
continue statin  fasting lipid panel noted
continue statin  fasting lipid panel noted

## 2023-04-07 NOTE — PROGRESS NOTE ADULT - SUBJECTIVE AND OBJECTIVE BOX
[facility] DIVISION OF KIDNEY DISEASES AND HYPERTENSION -- FOLLOW UP NOTE  --------------------------------------------------------------------------------  72 y.o. M w/ PMHx of HTN, HLD, DM2 and advanced CKD well known to me from prior admission presents for hyperkalemia noted on outpatient labs. Nephrology following for CKD stage 5.     24 hour events/subjective:  Pt. seen and examined this morning. Reports he has been urinating well. He denied any chest pain, shortness of breath, nausea, or vomiting.         PAST HISTORY  --------------------------------------------------------------------------------  No significant changes to PMH, PSH, FHx, SHx, unless otherwise noted    ALLERGIES & MEDICATIONS  --------------------------------------------------------------------------------  Allergies    No Known Allergies    Intolerances      Standing Inpatient Medications  amLODIPine   Tablet 10 milliGRAM(s) Oral daily  aspirin enteric coated 81 milliGRAM(s) Oral daily  atorvastatin 40 milliGRAM(s) Oral at bedtime  carvedilol 12.5 milliGRAM(s) Oral every 12 hours  cholecalciferol 2000 Unit(s) Oral daily  dextrose 5%. 1000 milliLiter(s) IV Continuous <Continuous>  dextrose 5%. 1000 milliLiter(s) IV Continuous <Continuous>  dextrose 50% Injectable 25 Gram(s) IV Push once  dextrose 50% Injectable 12.5 Gram(s) IV Push once  dextrose 50% Injectable 25 Gram(s) IV Push once  glucagon  Injectable 1 milliGRAM(s) IntraMuscular once  heparin   Injectable 5000 Unit(s) SubCutaneous every 12 hours  insulin glargine Injectable (LANTUS) 5 Unit(s) SubCutaneous at bedtime  insulin lispro (ADMELOG) corrective regimen sliding scale   SubCutaneous three times a day before meals  levothyroxine 50 MICROGram(s) Oral daily  sodium bicarbonate 1300 milliGRAM(s) Oral two times a day    PRN Inpatient Medications  dextrose Oral Gel 15 Gram(s) Oral once PRN      REVIEW OF SYSTEMS      All other systems were reviewed and are negative, except as noted.    VITALS/PHYSICAL EXAM  --------------------------------------------------------------------------------  T(C): 36.7 (04-04-23 @ 05:14), Max: 36.7 (04-04-23 @ 05:14)  HR: 63 (04-04-23 @ 05:14) (57 - 84)  BP: 141/64 (04-04-23 @ 05:14) (102/67 - 141/64)  RR: 18 (04-04-23 @ 05:14) (18 - 18)  SpO2: 98% (04-04-23 @ 05:14) (98% - 100%)  Wt(kg): --        04-03-23 @ 07:01  -  04-04-23 @ 07:00  --------------------------------------------------------  IN: 358 mL / OUT: 1150 mL / NET: -792 mL        Physical Exam:  	Gen: NAD  	HEENT: MMM  	Pulm: CTA B/L  	CV: S1S2  	Abd: Soft, +BS   	Ext: No LE edema B/L  	Neuro: Awake  	Skin: Warm and dry    LABS/STUDIES  --------------------------------------------------------------------------------    138  |  104  |  102  ----------------------------<  105      [04-03-23 @ 07:08]  5.2   |  21  |  5.42        Ca     9.0     [04-03-23 @ 07:08]      Mg     2.6     [04-03-23 @ 07:08]      Phos  6.6     [04-03-23 @ 07:08]      Creatinine Trend:  SCr 5.42 [04-03 @ 07:08]  SCr 5.47 [04-02 @ 06:49]  SCr 5.39 [04-01 @ 21:50]  SCr 5.24 [04-01 @ 17:38]  SCr 5.21 [04-01 @ 11:06]        Iron 78, TIBC 213, %sat 37      [04-02-23 @ 06:48]  Ferritin 741      [04-02-23 @ 06:48]  TSH 2.97      [04-02-23 @ 06:48]  Lipid: chol 109, , HDL 35, LDL --      [04-02-23 @ 06:48]    HBsAb <3.0      [04-01-23 @ 15:59]  HBsAg Nonreact      [04-01-23 @ 15:59]  HCV 0.23, Nonreact      [04-01-23 @ 15:59]    
Patient is a 72y old  Male who presents with a chief complaint of sent by outpatient renal for hyperkalemia (06 Apr 2023 14:15)      DATE OF SERVICE: 04-06-23 @ 14:24    SUBJECTIVE / OVERNIGHT EVENTS: overnight events noted    ROS:  Resp: No cough no sputum production  CVS: No chest pain no palpitations no orthopnea  GI: no N/V/D  : no dysuria, no hematuria  Neuro: no weakness no paresthesias  Heme: No petechiae no easy bruising  Msk: No joint pain no swelling  Skin: No rash no itching        MEDICATIONS  (STANDING):  amLODIPine   Tablet 10 milliGRAM(s) Oral daily  AQUAPHOR (petrolatum Ointment) 1 Application(s) Topical two times a day  aspirin enteric coated 81 milliGRAM(s) Oral daily  atorvastatin 40 milliGRAM(s) Oral at bedtime  carvedilol 12.5 milliGRAM(s) Oral every 12 hours  cholecalciferol 2000 Unit(s) Oral daily  dextrose 5%. 1000 milliLiter(s) (50 mL/Hr) IV Continuous <Continuous>  dextrose 5%. 1000 milliLiter(s) (100 mL/Hr) IV Continuous <Continuous>  dextrose 50% Injectable 25 Gram(s) IV Push once  dextrose 50% Injectable 12.5 Gram(s) IV Push once  dextrose 50% Injectable 25 Gram(s) IV Push once  glucagon  Injectable 1 milliGRAM(s) IntraMuscular once  heparin   Injectable 5000 Unit(s) SubCutaneous every 12 hours  insulin glargine Injectable (LANTUS) 5 Unit(s) SubCutaneous at bedtime  insulin lispro (ADMELOG) corrective regimen sliding scale   SubCutaneous three times a day before meals  levothyroxine 50 MICROGram(s) Oral daily  sodium bicarbonate 1300 milliGRAM(s) Oral two times a day    MEDICATIONS  (PRN):  dextrose Oral Gel 15 Gram(s) Oral once PRN Blood Glucose LESS THAN 70 milliGRAM(s)/deciliter        CAPILLARY BLOOD GLUCOSE      POCT Blood Glucose.: 181 mg/dL (06 Apr 2023 12:48)  POCT Blood Glucose.: 136 mg/dL (06 Apr 2023 10:08)  POCT Blood Glucose.: 113 mg/dL (06 Apr 2023 05:35)  POCT Blood Glucose.: 181 mg/dL (05 Apr 2023 21:29)  POCT Blood Glucose.: 159 mg/dL (05 Apr 2023 17:09)    I&O's Summary    05 Apr 2023 07:01  -  06 Apr 2023 07:00  --------------------------------------------------------  IN: 600 mL / OUT: 950 mL / NET: -350 mL    06 Apr 2023 07:01  -  06 Apr 2023 14:24  --------------------------------------------------------  IN: 0 mL / OUT: 300 mL / NET: -300 mL        Vital Signs Last 24 Hrs  T(C): 36.6 (06 Apr 2023 11:35), Max: 36.7 (06 Apr 2023 11:00)  T(F): 97.9 (06 Apr 2023 11:35), Max: 98.1 (06 Apr 2023 11:00)  HR: 60 (06 Apr 2023 11:35) (55 - 68)  BP: 121/51 (06 Apr 2023 11:35) (99/61 - 163/62)  BP(mean): 89 (06 Apr 2023 11:00) (73 - 89)  RR: 16 (06 Apr 2023 11:35) (16 - 18)  SpO2: 99% (06 Apr 2023 11:35) (98% - 100%)      PHYSICAL EXAM:  EYES: EOMI, PERRLA  NECK: Supple  CHEST/LUNG: clear  HEART: S1 S2; no murmurs   ABDOMEN: Soft, Nontender  EXTREMITIES:  no edema  NEUROLOGY: AO x 3 non-focal    LABS:                        8.6    7.40  )-----------( 164      ( 06 Apr 2023 04:14 )             26.7     04-06    137  |  108  |  96<H>  ----------------------------<  130<H>  5.0   |  18<L>  |  4.45<H>    Ca    8.4      06 Apr 2023 04:14  Phos  5.7     04-06  Mg     2.5     04-06      PT/INR - ( 06 Apr 2023 04:14 )   PT: 10.5 sec;   INR: 0.91 ratio         PTT - ( 06 Apr 2023 04:14 )  PTT:28.3 sec            All consultant(s) notes reviewed and care discussed with other providers        Contact Number, Dr Day [phone]
Patient is a 72y old  Male who presents with a chief complaint of sent by outpatient renal for hyperkalemia (07 Apr 2023 08:28)  seen earlier today    DATE OF SERVICE: 04-07-23    SUBJECTIVE / OVERNIGHT EVENTS: overnight events noted    ROS:  Resp: No cough no sputum production  CVS: No chest pain no palpitations no orthopnea  GI: no N/V/D  : no dysuria, no hematuria  Neuro: no weakness no paresthesias  Heme: No petechiae no easy bruising  Msk: No joint pain no swelling  Skin: No rash no itching        MEDICATIONS  (STANDING):  amLODIPine   Tablet 10 milliGRAM(s) Oral daily  AQUAPHOR (petrolatum Ointment) 1 Application(s) Topical two times a day  aspirin enteric coated 81 milliGRAM(s) Oral daily  atorvastatin 40 milliGRAM(s) Oral at bedtime  carvedilol 12.5 milliGRAM(s) Oral every 12 hours  cholecalciferol 2000 Unit(s) Oral daily  dextrose 5%. 1000 milliLiter(s) (50 mL/Hr) IV Continuous <Continuous>  dextrose 5%. 1000 milliLiter(s) (100 mL/Hr) IV Continuous <Continuous>  dextrose 50% Injectable 25 Gram(s) IV Push once  dextrose 50% Injectable 12.5 Gram(s) IV Push once  dextrose 50% Injectable 25 Gram(s) IV Push once  glucagon  Injectable 1 milliGRAM(s) IntraMuscular once  heparin   Injectable 5000 Unit(s) SubCutaneous every 12 hours  insulin glargine Injectable (LANTUS) 5 Unit(s) SubCutaneous at bedtime  insulin lispro (ADMELOG) corrective regimen sliding scale   SubCutaneous three times a day before meals  levothyroxine 50 MICROGram(s) Oral daily  sodium bicarbonate 1300 milliGRAM(s) Oral two times a day    MEDICATIONS  (PRN):  dextrose Oral Gel 15 Gram(s) Oral once PRN Blood Glucose LESS THAN 70 milliGRAM(s)/deciliter        CAPILLARY BLOOD GLUCOSE      POCT Blood Glucose.: 148 mg/dL (07 Apr 2023 08:54)  POCT Blood Glucose.: 196 mg/dL (06 Apr 2023 21:59)    I&O's Summary    06 Apr 2023 07:01  -  07 Apr 2023 07:00  --------------------------------------------------------  IN: 590 mL / OUT: 1000 mL / NET: -410 mL        Vital Signs Last 24 Hrs  T(C): 36.7 (07 Apr 2023 04:37), Max: 36.7 (06 Apr 2023 20:55)  T(F): 98.1 (07 Apr 2023 04:37), Max: 98.1 (07 Apr 2023 04:37)  HR: 70 (07 Apr 2023 04:37) (66 - 70)  BP: 155/73 (07 Apr 2023 04:37) (128/69 - 155/73)  BP(mean): --  RR: 18 (07 Apr 2023 04:37) (17 - 18)  SpO2: 98% (07 Apr 2023 04:37) (98% - 99%)    PHYSICAL EXAM:  EYES: EOMI, PERRLA  NECK: Supple  CHEST/LUNG: clear  HEART: S1 S2; no murmurs   ABDOMEN: Soft, Nontender  EXTREMITIES:  no edema  left arm AVF clean   NEUROLOGY: AO x 3 non-focal    LABS:                        10.1   9.87  )-----------( 204      ( 06 Apr 2023 15:48 )             31.6     04-06    137  |  108  |  96<H>  ----------------------------<  130<H>  5.0   |  18<L>  |  4.45<H>    Ca    8.4      06 Apr 2023 04:14  Phos  5.7     04-06  Mg     2.5     04-06      PT/INR - ( 06 Apr 2023 04:14 )   PT: 10.5 sec;   INR: 0.91 ratio         PTT - ( 06 Apr 2023 04:14 )  PTT:28.3 sec            All consultant(s) notes reviewed and care discussed with other providers        Contact Number, Dr Day [phone]
Pt w. CKD stage V needing long term HD access  will plan for AVF vs. AVG  Risks and benefits of the procedure were discussed with pt. he agrees to proceed. All questions were answered.  
Subjective:   Patient taken down to preop so unable to see this AM    Objective:  Vital Signs  T(C): 36.4 (04-06 @ 07:28), Max: 36.6 (04-05 @ 12:05)  HR: 63 (04-06 @ 07:28) (60 - 68)  BP: 163/62 (04-06 @ 07:28) (99/61 - 163/62)  RR: 16 (04-06 @ 07:28) (16 - 18)  SpO2: 100% (04-06 @ 07:28) (98% - 100%)  04-05-23 @ 07:01  -  04-06-23 @ 07:00  --------------------------------------------------------  IN:  Total IN: 0 mL    OUT:    Voided (mL): 950 mL  Total OUT: 950 mL    Total NET: -950 mL        Labs:                        8.6    7.40  )-----------( 164      ( 06 Apr 2023 04:14 )             26.7   04-06    137  |  108  |  96<H>  ----------------------------<  130<H>  5.0   |  18<L>  |  4.45<H>    Ca    8.4      06 Apr 2023 04:14  Phos  5.7     04-06  Mg     2.5     04-06      CAPILLARY BLOOD GLUCOSE      POCT Blood Glucose.: 113 mg/dL (06 Apr 2023 05:35)  POCT Blood Glucose.: 181 mg/dL (05 Apr 2023 21:29)  POCT Blood Glucose.: 159 mg/dL (05 Apr 2023 17:09)  POCT Blood Glucose.: 214 mg/dL (05 Apr 2023 12:50)  POCT Blood Glucose.: 123 mg/dL (05 Apr 2023 09:17)      Imaging:    
SURGERY DAILY PROGRESS NOTE:       SUBJECTIVE/ROS: Patient examined at bedside. No acute events overnight. Denies pain in his had, any sensory or motor deficits.          MEDICATIONS  (STANDING):  amLODIPine   Tablet 10 milliGRAM(s) Oral daily  AQUAPHOR (petrolatum Ointment) 1 Application(s) Topical two times a day  aspirin enteric coated 81 milliGRAM(s) Oral daily  atorvastatin 40 milliGRAM(s) Oral at bedtime  carvedilol 12.5 milliGRAM(s) Oral every 12 hours  cholecalciferol 2000 Unit(s) Oral daily  dextrose 5%. 1000 milliLiter(s) (50 mL/Hr) IV Continuous <Continuous>  dextrose 5%. 1000 milliLiter(s) (100 mL/Hr) IV Continuous <Continuous>  dextrose 50% Injectable 25 Gram(s) IV Push once  dextrose 50% Injectable 12.5 Gram(s) IV Push once  dextrose 50% Injectable 25 Gram(s) IV Push once  glucagon  Injectable 1 milliGRAM(s) IntraMuscular once  heparin   Injectable 5000 Unit(s) SubCutaneous every 12 hours  insulin glargine Injectable (LANTUS) 5 Unit(s) SubCutaneous at bedtime  insulin lispro (ADMELOG) corrective regimen sliding scale   SubCutaneous three times a day before meals  levothyroxine 50 MICROGram(s) Oral daily  sodium bicarbonate 1300 milliGRAM(s) Oral two times a day    MEDICATIONS  (PRN):  dextrose Oral Gel 15 Gram(s) Oral once PRN Blood Glucose LESS THAN 70 milliGRAM(s)/deciliter      OBJECTIVE:    Vital Signs Last 24 Hrs  T(C): 36.7 (2023 04:37), Max: 36.7 (2023 11:00)  T(F): 98.1 (2023 04:37), Max: 98.1 (2023 11:00)  HR: 70 (2023 04:37) (55 - 70)  BP: 155/73 (2023 04:37) (118/56 - 155/73)  BP(mean): 89 (2023 11:00) (80 - 89)  RR: 18 (2023 04:37) (16 - 18)  SpO2: 98% (2023 04:37) (98% - 100%)    Parameters below as of 2023 04:37  Patient On (Oxygen Delivery Method): room air            I&O's Detail    2023 07:01  -  2023 07:00  --------------------------------------------------------  IN:    Oral Fluid: 590 mL  Total IN: 590 mL    OUT:    Voided (mL): 1000 mL  Total OUT: 1000 mL    Total NET: -410 mL          Daily     Daily Weight in k.9 (2023 04:37)    LABS:                        10.1   9.87  )-----------( 204      ( 2023 15:48 )             31.6     04-06    137  |  108  |  96<H>  ----------------------------<  130<H>  5.0   |  18<L>  |  4.45<H>    Ca    8.4      2023 04:14  Phos  5.7     04-06  Mg     2.5     04-06      PT/INR - ( 2023 04:14 )   PT: 10.5 sec;   INR: 0.91 ratio         PTT - ( 2023 04:14 )  PTT:28.3 sec              PHYSICAL EXAM:  GEN: NAD  Pulm: unlabored breathing on RA  CV: radial pulse 2+, cap refil <2sec  LUE: thrill at the fistula site, radial pulse/ Motor and sensory intact     
[facility] DIVISION OF KIDNEY DISEASES AND HYPERTENSION -- FOLLOW UP NOTE  --------------------------------------------------------------------------------  72 y.o. M w/ PMHx of HTN, HLD, DM2 and advanced CKD well known to me from prior admission presents for hyperkalemia noted on outpatient labs. Nephrology following for CKD stage 5.   Pt underwent creation of L AVF on 4/6.     24 hour events/subjective:  Pt. seen and examined this morning. Reports he has been urinating well. He denied any chest pain, shortness of breath, nausea, or vomiting.   pt admitted to some mild pain at the site of the surgery      PAST HISTORY  --------------------------------------------------------------------------------  No significant changes to PMH, PSH, FHx, SHx, unless otherwise noted    ALLERGIES & MEDICATIONS  --------------------------------------------------------------------------------  Allergies    No Known Allergies    Intolerances      Standing Inpatient Medications  amLODIPine   Tablet 10 milliGRAM(s) Oral daily  AQUAPHOR (petrolatum Ointment) 1 Application(s) Topical two times a day  aspirin enteric coated 81 milliGRAM(s) Oral daily  atorvastatin 40 milliGRAM(s) Oral at bedtime  carvedilol 12.5 milliGRAM(s) Oral every 12 hours  cholecalciferol 2000 Unit(s) Oral daily  dextrose 5%. 1000 milliLiter(s) IV Continuous <Continuous>  dextrose 5%. 1000 milliLiter(s) IV Continuous <Continuous>  dextrose 50% Injectable 25 Gram(s) IV Push once  dextrose 50% Injectable 12.5 Gram(s) IV Push once  dextrose 50% Injectable 25 Gram(s) IV Push once  glucagon  Injectable 1 milliGRAM(s) IntraMuscular once  heparin   Injectable 5000 Unit(s) SubCutaneous every 12 hours  insulin glargine Injectable (LANTUS) 5 Unit(s) SubCutaneous at bedtime  insulin lispro (ADMELOG) corrective regimen sliding scale   SubCutaneous three times a day before meals  levothyroxine 50 MICROGram(s) Oral daily  sodium bicarbonate 1300 milliGRAM(s) Oral two times a day    PRN Inpatient Medications  dextrose Oral Gel 15 Gram(s) Oral once PRN      REVIEW OF SYSTEMS      All other systems were reviewed and are negative, except as noted.    VITALS/PHYSICAL EXAM  --------------------------------------------------------------------------------  T(C): 36.7 (04-07-23 @ 04:37), Max: 36.7 (04-06-23 @ 11:00)  HR: 70 (04-07-23 @ 04:37) (55 - 70)  BP: 155/73 (04-07-23 @ 04:37) (118/56 - 155/73)  RR: 18 (04-07-23 @ 04:37) (16 - 18)  SpO2: 98% (04-07-23 @ 04:37) (98% - 100%)  Wt(kg): --  Height (cm): 182.9 (04-06-23 @ 07:28)  Weight (kg): 78.9 (04-06-23 @ 07:28)  BMI (kg/m2): 23.6 (04-06-23 @ 07:28)  BSA (m2): 2.01 (04-06-23 @ 07:28)      04-06-23 @ 07:01  -  04-07-23 @ 07:00  --------------------------------------------------------  IN: 590 mL / OUT: 1000 mL / NET: -410 mL        Physical Exam:  	Gen: NAD  	HEENT: MMM  	Pulm: CTA B/L  	CV: S1S2  	Abd: Soft, +BS   	Ext: No LE edema B/L  	Neuro: Awake  	Skin: Warm and dry  	Vascular access: L AVF with bruit heard      LABS/STUDIES  --------------------------------------------------------------------------------              10.1   9.87  >-----------<  204      [04-06-23 @ 15:48]              31.6     137  |  108  |  96  ----------------------------<  130      [04-06-23 @ 04:14]  5.0   |  18  |  4.45        Ca     8.4     [04-06-23 @ 04:14]      Mg     2.5     [04-06-23 @ 04:14]      Phos  5.7     [04-06-23 @ 04:14]      PT/INR: PT 10.5 , INR 0.91       [04-06-23 @ 04:14]  PTT: 28.3       [04-06-23 @ 04:14]      Creatinine Trend:  SCr 4.45 [04-06 @ 04:14]  SCr 4.42 [04-05 @ 06:31]  SCr 4.91 [04-04 @ 06:48]  SCr 5.42 [04-03 @ 07:08]  SCr 5.47 [04-02 @ 06:49]        Iron 78, TIBC 213, %sat 37      [04-02-23 @ 06:48]  Ferritin 741      [04-02-23 @ 06:48]  TSH 2.97      [04-02-23 @ 06:48]  Lipid: chol 109, , HDL 35, LDL --      [04-02-23 @ 06:48]    HBsAb <3.0      [04-01-23 @ 15:59]  HBsAg Nonreact      [04-01-23 @ 15:59]  HCV 0.23, Nonreact      [04-01-23 @ 15:59]    
Patient is a 72y old  Male who presents with a chief complaint of sent by outpatient renal for hyperkalemia (04 Apr 2023 07:27)      DATE OF SERVICE: 04-04-23 @ 13:09    SUBJECTIVE / OVERNIGHT EVENTS: overnight events noted    ROS:  Resp: No cough no sputum production  CVS: No chest pain no palpitations no orthopnea  GI: no N/V/D  : no dysuria, no hematuria        MEDICATIONS  (STANDING):  amLODIPine   Tablet 10 milliGRAM(s) Oral daily  aspirin enteric coated 81 milliGRAM(s) Oral daily  atorvastatin 40 milliGRAM(s) Oral at bedtime  carvedilol 12.5 milliGRAM(s) Oral every 12 hours  cholecalciferol 2000 Unit(s) Oral daily  dextrose 5%. 1000 milliLiter(s) (50 mL/Hr) IV Continuous <Continuous>  dextrose 5%. 1000 milliLiter(s) (100 mL/Hr) IV Continuous <Continuous>  dextrose 50% Injectable 25 Gram(s) IV Push once  dextrose 50% Injectable 12.5 Gram(s) IV Push once  dextrose 50% Injectable 25 Gram(s) IV Push once  glucagon  Injectable 1 milliGRAM(s) IntraMuscular once  heparin   Injectable 5000 Unit(s) SubCutaneous every 12 hours  insulin glargine Injectable (LANTUS) 5 Unit(s) SubCutaneous at bedtime  insulin lispro (ADMELOG) corrective regimen sliding scale   SubCutaneous three times a day before meals  levothyroxine 50 MICROGram(s) Oral daily  sodium bicarbonate 1300 milliGRAM(s) Oral two times a day    MEDICATIONS  (PRN):  dextrose Oral Gel 15 Gram(s) Oral once PRN Blood Glucose LESS THAN 70 milliGRAM(s)/deciliter        CAPILLARY BLOOD GLUCOSE      POCT Blood Glucose.: 131 mg/dL (04 Apr 2023 08:49)  POCT Blood Glucose.: 165 mg/dL (03 Apr 2023 21:34)  POCT Blood Glucose.: 162 mg/dL (03 Apr 2023 17:15)    I&O's Summary    03 Apr 2023 07:01  -  04 Apr 2023 07:00  --------------------------------------------------------  IN: 358 mL / OUT: 1150 mL / NET: -792 mL    04 Apr 2023 07:01  -  04 Apr 2023 13:09  --------------------------------------------------------  IN: 240 mL / OUT: 650 mL / NET: -410 mL        Vital Signs Last 24 Hrs  T(C): 36.5 (04 Apr 2023 11:21), Max: 36.7 (04 Apr 2023 05:14)  T(F): 97.7 (04 Apr 2023 11:21), Max: 98.1 (04 Apr 2023 05:14)  HR: 57 (04 Apr 2023 11:21) (57 - 63)  BP: 105/58 (04 Apr 2023 11:21) (105/58 - 141/64)  BP(mean): 73 (04 Apr 2023 11:21) (73 - 73)  RR: 18 (04 Apr 2023 11:21) (18 - 18)  SpO2: 100% (04 Apr 2023 11:21) (98% - 100%)    PHYSICAL EXAM:  EYES: EOMI, PERRL  NECK: Supple  CHEST/LUNG: clear  HEART: S1 S2; no murmurs   ABDOMEN: Soft, Nontender  EXTREMITIES:  no edema  NEUROLOGY: AO x 3 non-focal      LABS:    04-04    139  |  106  |  95<H>  ----------------------------<  113<H>  5.2   |  20<L>  |  4.91<H>    Ca    9.0      04 Apr 2023 06:48  Phos  6.6     04-03  Mg     2.6     04-03                  All consultant(s) notes reviewed and care discussed with other providers        Contact Number, Dr Day [phone]
Patient is a 72y old  Male who presents with a chief complaint of sent by outpatient renal for hyperkalemia (03 Apr 2023 08:49)      DATE OF SERVICE: 04-03-23 @ 14:05    SUBJECTIVE / OVERNIGHT EVENTS: overnight events noted    ROS:  Resp: No cough no sputum production  CVS: No chest pain no palpitations no orthopnea  GI: no N/V/D  : no dysuria, no hematuria  Neuro: no weakness no paresthesias          MEDICATIONS  (STANDING):  amLODIPine   Tablet 10 milliGRAM(s) Oral daily  aspirin enteric coated 81 milliGRAM(s) Oral daily  atorvastatin 40 milliGRAM(s) Oral at bedtime  carvedilol 12.5 milliGRAM(s) Oral every 12 hours  cholecalciferol 2000 Unit(s) Oral daily  dextrose 5%. 1000 milliLiter(s) (50 mL/Hr) IV Continuous <Continuous>  dextrose 5%. 1000 milliLiter(s) (100 mL/Hr) IV Continuous <Continuous>  dextrose 50% Injectable 25 Gram(s) IV Push once  dextrose 50% Injectable 12.5 Gram(s) IV Push once  dextrose 50% Injectable 25 Gram(s) IV Push once  glucagon  Injectable 1 milliGRAM(s) IntraMuscular once  heparin   Injectable 5000 Unit(s) SubCutaneous every 12 hours  insulin glargine Injectable (LANTUS) 5 Unit(s) SubCutaneous at bedtime  insulin lispro (ADMELOG) corrective regimen sliding scale   SubCutaneous three times a day before meals  levothyroxine 50 MICROGram(s) Oral daily  sodium bicarbonate 1300 milliGRAM(s) Oral two times a day    MEDICATIONS  (PRN):  dextrose Oral Gel 15 Gram(s) Oral once PRN Blood Glucose LESS THAN 70 milliGRAM(s)/deciliter        CAPILLARY BLOOD GLUCOSE      POCT Blood Glucose.: 209 mg/dL (03 Apr 2023 13:09)  POCT Blood Glucose.: 120 mg/dL (03 Apr 2023 08:48)  POCT Blood Glucose.: 177 mg/dL (02 Apr 2023 22:37)  POCT Blood Glucose.: 110 mg/dL (02 Apr 2023 17:17)    I&O's Summary    02 Apr 2023 07:01  -  03 Apr 2023 07:00  --------------------------------------------------------  IN: 360 mL / OUT: 870 mL / NET: -510 mL    03 Apr 2023 07:01  -  03 Apr 2023 14:05  --------------------------------------------------------  IN: 358 mL / OUT: 650 mL / NET: -292 mL        Vital Signs Last 24 Hrs  T(C): 36.4 (03 Apr 2023 12:18), Max: 36.6 (03 Apr 2023 04:31)  T(F): 97.6 (03 Apr 2023 12:18), Max: 97.9 (03 Apr 2023 04:31)  HR: 84 (03 Apr 2023 12:18) (61 - 84)  BP: 102/67 (03 Apr 2023 12:18) (102/67 - 135/68)  BP(mean): --  RR: 18 (03 Apr 2023 12:18) (18 - 18)  SpO2: 98% (03 Apr 2023 12:18) (98% - 100%)    PHYSICAL EXAM:  GENERAL: in no apparent distress  HEAD:  Atraumatic, Normocephalic  EYES: EOMI, PERRLA, sclera clear  NECK: Supple, No JVD  CHEST/LUNG: clear b/l, no wheeze  HEART: S1 S2; no murmurs appreciated  ABDOMEN: Soft, Nontender, Bowel sounds present  EXTREMITIES:  No clubbing or cyanosis,  no edema  NEUROLOGY: AO x 3 non-focal  SKIN: No rashes or lesions    LABS:                        10.9   9.71  )-----------( 223      ( 02 Apr 2023 06:48 )             33.3     04-03    138  |  104  |  102<H>  ----------------------------<  105<H>  5.2   |  21<L>  |  5.42<H>    Ca    9.0      03 Apr 2023 07:08  Phos  6.6     04-03  Mg     2.6     04-03    TPro  6.9  /  Alb  3.8  /  TBili  0.4  /  DBili  x   /  AST  26  /  ALT  52<H>  /  AlkPhos  56  04-02                All consultant(s) notes reviewed and care discussed with other providers        Contact Number, Dr Day [phone]
[facility] DIVISION OF KIDNEY DISEASES AND HYPERTENSION -- FOLLOW UP NOTE  --------------------------------------------------------------------------------  72 y.o. M w/ PMHx of HTN, HLD, DM2 and advanced CKD well known to me from prior admission presents for hyperkalemia noted on outpatient labs. Nephrology following for CKD stage 5.     24 hour events/subjective:  Pt. seen and examined this morning. Reports he has been urinating well. Admitted to decreased PO intake and states the food does not taste the same anymore. He states sometimes he gets tremors in his hands.  He denied any chest pain, shortness of breath, nausea, or vomiting.       PAST HISTORY  --------------------------------------------------------------------------------  No significant changes to PMH, PSH, FHx, SHx, unless otherwise noted    ALLERGIES & MEDICATIONS  --------------------------------------------------------------------------------  Allergies    No Known Allergies    Intolerances      Standing Inpatient Medications  amLODIPine   Tablet 10 milliGRAM(s) Oral daily  aspirin enteric coated 81 milliGRAM(s) Oral daily  atorvastatin 40 milliGRAM(s) Oral at bedtime  carvedilol 12.5 milliGRAM(s) Oral every 12 hours  cholecalciferol 2000 Unit(s) Oral daily  dextrose 5%. 1000 milliLiter(s) IV Continuous <Continuous>  dextrose 5%. 1000 milliLiter(s) IV Continuous <Continuous>  dextrose 50% Injectable 25 Gram(s) IV Push once  dextrose 50% Injectable 12.5 Gram(s) IV Push once  dextrose 50% Injectable 25 Gram(s) IV Push once  glucagon  Injectable 1 milliGRAM(s) IntraMuscular once  heparin   Injectable 5000 Unit(s) SubCutaneous every 12 hours  insulin glargine Injectable (LANTUS) 5 Unit(s) SubCutaneous at bedtime  insulin lispro (ADMELOG) corrective regimen sliding scale   SubCutaneous three times a day before meals  levothyroxine 50 MICROGram(s) Oral daily  sodium bicarbonate 1300 milliGRAM(s) Oral two times a day    PRN Inpatient Medications  dextrose Oral Gel 15 Gram(s) Oral once PRN      REVIEW OF SYSTEMS    All other systems were reviewed and are negative, except as noted.    VITALS/PHYSICAL EXAM  --------------------------------------------------------------------------------  T(C): 36.6 (04-03-23 @ 04:31), Max: 36.6 (04-03-23 @ 04:31)  HR: 64 (04-03-23 @ 04:31) (60 - 64)  BP: 135/68 (04-03-23 @ 04:31) (131/62 - 135/68)  RR: 18 (04-03-23 @ 04:31) (18 - 19)  SpO2: 100% (04-03-23 @ 04:31) (99% - 100%)  Wt(kg): --        04-02-23 @ 07:01  -  04-03-23 @ 07:00  --------------------------------------------------------  IN: 360 mL / OUT: 870 mL / NET: -510 mL        Physical Exam:  	Gen: NAD  	HEENT: MMM  	Pulm: CTA B/L  	CV: S1S2  	Abd: Soft, +BS   	Ext: No LE edema B/L  	Neuro: Awake  	Skin: Warm and dry      LABS/STUDIES  --------------------------------------------------------------------------------              10.9   9.71  >-----------<  223      [04-02-23 @ 06:48]              33.3     138  |  104  |  102  ----------------------------<  105      [04-03-23 @ 07:08]  5.2   |  21  |  5.42        Ca     9.0     [04-03-23 @ 07:08]      Mg     2.6     [04-03-23 @ 07:08]      Phos  6.6     [04-03-23 @ 07:08]    TPro  6.9  /  Alb  3.8  /  TBili  0.4  /  DBili  x   /  AST  26  /  ALT  52  /  AlkPhos  56  [04-02-23 @ 06:49]      Creatinine Trend:  SCr 5.42 [04-03 @ 07:08]  SCr 5.47 [04-02 @ 06:49]  SCr 5.39 [04-01 @ 21:50]  SCr 5.24 [04-01 @ 17:38]  SCr 5.21 [04-01 @ 11:06]        Iron 78, TIBC 213, %sat 37      [04-02-23 @ 06:48]  Ferritin 741      [04-02-23 @ 06:48]  TSH 2.97      [04-02-23 @ 06:48]  Lipid: chol 109, , HDL 35, LDL --      [04-02-23 @ 06:48]    HBsAb <3.0      [04-01-23 @ 15:59]  HBsAg Nonreact      [04-01-23 @ 15:59]  HCV 0.23, Nonreact      [04-01-23 @ 15:59]    
Patient is a 72y old  Male who presents with a chief complaint of sent by outpatient renal for hyperkalemia (04 Apr 2023 13:09)      DATE OF SERVICE: 04-05-23 @ 13:23    SUBJECTIVE / OVERNIGHT EVENTS: overnight events noted    ROS:  Resp: No cough no sputum production  CVS: No chest pain no palpitations no orthopnea  GI: no N/V/D  "I feel fine'       MEDICATIONS  (STANDING):  amLODIPine   Tablet 10 milliGRAM(s) Oral daily  aspirin enteric coated 81 milliGRAM(s) Oral daily  atorvastatin 40 milliGRAM(s) Oral at bedtime  carvedilol 12.5 milliGRAM(s) Oral every 12 hours  cholecalciferol 2000 Unit(s) Oral daily  dextrose 5%. 1000 milliLiter(s) (50 mL/Hr) IV Continuous <Continuous>  dextrose 5%. 1000 milliLiter(s) (100 mL/Hr) IV Continuous <Continuous>  dextrose 50% Injectable 25 Gram(s) IV Push once  dextrose 50% Injectable 12.5 Gram(s) IV Push once  dextrose 50% Injectable 25 Gram(s) IV Push once  glucagon  Injectable 1 milliGRAM(s) IntraMuscular once  heparin   Injectable 5000 Unit(s) SubCutaneous every 12 hours  insulin glargine Injectable (LANTUS) 5 Unit(s) SubCutaneous at bedtime  insulin lispro (ADMELOG) corrective regimen sliding scale   SubCutaneous three times a day before meals  levothyroxine 50 MICROGram(s) Oral daily  sodium bicarbonate 1300 milliGRAM(s) Oral two times a day    MEDICATIONS  (PRN):  dextrose Oral Gel 15 Gram(s) Oral once PRN Blood Glucose LESS THAN 70 milliGRAM(s)/deciliter        CAPILLARY BLOOD GLUCOSE      POCT Blood Glucose.: 214 mg/dL (05 Apr 2023 12:50)  POCT Blood Glucose.: 123 mg/dL (05 Apr 2023 09:17)  POCT Blood Glucose.: 147 mg/dL (04 Apr 2023 21:26)  POCT Blood Glucose.: 184 mg/dL (04 Apr 2023 17:15)  POCT Blood Glucose.: 146 mg/dL (04 Apr 2023 13:40)    I&O's Summary    04 Apr 2023 07:01  -  05 Apr 2023 07:00  --------------------------------------------------------  IN: 660 mL / OUT: 1150 mL / NET: -490 mL    05 Apr 2023 07:01  -  05 Apr 2023 13:23  --------------------------------------------------------  IN: 240 mL / OUT: 450 mL / NET: -210 mL        Vital Signs Last 24 Hrs  T(C): 36.6 (05 Apr 2023 12:05), Max: 36.6 (05 Apr 2023 12:05)  T(F): 97.8 (05 Apr 2023 12:05), Max: 97.8 (05 Apr 2023 12:05)  HR: 60 (05 Apr 2023 12:05) (60 - 63)  BP: 142/62 (05 Apr 2023 12:05) (118/61 - 142/62)  BP(mean): --  RR: 18 (05 Apr 2023 12:05) (18 - 18)  SpO2: 100% (05 Apr 2023 12:05) (99% - 100%)    PHYSICAL EXAM:  EYES: EOMI, PERRLA  NECK: Supple  CHEST/LUNG: clear  HEART: S1 S2; no murmurs   ABDOMEN: Soft, Nontender  EXTREMITIES:  no edema  NEUROLOGY: AO x 3 non-focal    LABS:    04-05    139  |  106  |  94<H>  ----------------------------<  111<H>  5.3   |  19<L>  |  4.42<H>    Ca    8.5      05 Apr 2023 06:31                  All consultant(s) notes reviewed and care discussed with other providers        Contact Number, Dr Day [phone]
Patient is a 72y old  Male who presents with a chief complaint of sent by outpatient renal for hyperkalemia (01 Apr 2023 16:28)      DATE OF SERVICE: 04-02-23 @ 14:26    SUBJECTIVE / OVERNIGHT EVENTS: overnight events noted    ROS:  Resp: No cough no sputum production  CVS: No chest pain no palpitations no orthopnea  GI: no N/V/D  : no dysuria, no hematuria  Neuro: no weakness no paresthesias  Heme: No petechiae no easy bruising  Msk: No joint pain no swelling  Skin: No rash no itching        MEDICATIONS  (STANDING):  amLODIPine   Tablet 10 milliGRAM(s) Oral daily  aspirin enteric coated 81 milliGRAM(s) Oral daily  atorvastatin 40 milliGRAM(s) Oral at bedtime  carvedilol 12.5 milliGRAM(s) Oral every 12 hours  cholecalciferol 2000 Unit(s) Oral daily  dextrose 5%. 1000 milliLiter(s) (50 mL/Hr) IV Continuous <Continuous>  dextrose 5%. 1000 milliLiter(s) (100 mL/Hr) IV Continuous <Continuous>  dextrose 50% Injectable 25 Gram(s) IV Push once  dextrose 50% Injectable 12.5 Gram(s) IV Push once  dextrose 50% Injectable 25 Gram(s) IV Push once  glucagon  Injectable 1 milliGRAM(s) IntraMuscular once  heparin   Injectable 5000 Unit(s) SubCutaneous every 12 hours  insulin glargine Injectable (LANTUS) 2 Unit(s) SubCutaneous at bedtime  insulin lispro (ADMELOG) corrective regimen sliding scale   SubCutaneous three times a day before meals  levothyroxine 50 MICROGram(s) Oral daily  sodium bicarbonate 1300 milliGRAM(s) Oral two times a day    MEDICATIONS  (PRN):  dextrose Oral Gel 15 Gram(s) Oral once PRN Blood Glucose LESS THAN 70 milliGRAM(s)/deciliter        CAPILLARY BLOOD GLUCOSE      POCT Blood Glucose.: 211 mg/dL (02 Apr 2023 13:05)  POCT Blood Glucose.: 129 mg/dL (02 Apr 2023 08:55)  POCT Blood Glucose.: 152 mg/dL (01 Apr 2023 21:39)  POCT Blood Glucose.: 125 mg/dL (01 Apr 2023 18:35)    I&O's Summary    01 Apr 2023 07:01  -  02 Apr 2023 07:00  --------------------------------------------------------  IN: 0 mL / OUT: 700 mL / NET: -700 mL        Vital Signs Last 24 Hrs  T(C): 36.5 (02 Apr 2023 12:41), Max: 36.7 (01 Apr 2023 16:31)  T(F): 97.7 (02 Apr 2023 12:41), Max: 98.1 (01 Apr 2023 16:31)  HR: 60 (02 Apr 2023 12:41) (60 - 80)  BP: 131/62 (02 Apr 2023 12:41) (113/68 - 162/62)  BP(mean): --  RR: 19 (02 Apr 2023 12:41) (16 - 19)  SpO2: 100% (02 Apr 2023 12:41) (99% - 100%)    PHYSICAL EXAM:  GENERAL: in no apparent distress  HEAD:  Atraumatic, Normocephalic  EYES: EOMI, PERRLA, sclera clear  NECK: Supple, No JVD  CHEST/LUNG: clear b/l, no wheeze  HEART: S1 S2; no murmurs appreciated  ABDOMEN: Soft, Nontender, Bowel sounds present  EXTREMITIES:  No clubbing or cyanosis,  no edema  NEUROLOGY: AO x 3 non-focal  SKIN: No rashes or lesions    LABS:                        10.9   9.71  )-----------( 223      ( 02 Apr 2023 06:48 )             33.3     04-02    140  |  107  |  101<H>  ----------------------------<  105<H>  5.4<H>   |  19<L>  |  5.47<H>    Ca    9.4      02 Apr 2023 06:49  Phos  5.9     04-01  Mg     2.8     04-01    TPro  6.9  /  Alb  3.8  /  TBili  0.4  /  DBili  x   /  AST  26  /  ALT  52<H>  /  AlkPhos  56  04-02                All consultant(s) notes reviewed and care discussed with other providers        Contact Number, Dr Day [phone]

## 2023-04-07 NOTE — PROGRESS NOTE ADULT - PROBLEM SELECTOR PLAN 3
continue finger sticks with short acting insulin sliding scale  no oral meds  small dose Lantus increased to 5 units for now
continue finger sticks with short acting insulin sliding scale  restart home meds on discharge   discontinue Lantus
continue finger sticks with short acting insulin sliding scale  restart home meds on discharge
continue finger sticks with short acting insulin sliding scale  no oral meds  continue Lantus  5 units for now

## 2023-04-07 NOTE — PROGRESS NOTE ADULT - PROBLEM SELECTOR PLAN 2
[address], [address] History and Physical reviewed; I have examined the patient and there are no pertinent changes. Edwin Baugh MD, MD  
11:25 AM 8/30/2018 [facility], [address] [place] [address] 
[email or web address], [email or web address]. [facility] 
 
 

Pt. with hyperkalemia in setting of advanced kidney disease as well as ACE (now on hold; do not restart). Most recently Serum potassium WNL at 5.2. Monitor serum potassium.    If any questions, please feel free to contact me     Luca Kc  Nephrology Fellow  [facility] Pager: [phone]
Pt. with hyperkalemia in setting of advanced kidney disease as well as ACE (now on hold; do not restart). Most recently Serum potassium WNL at 5.0. Monitor serum potassium.    If any questions, please feel free to contact me     Luca Kc  Nephrology Fellow  [facility] Pager: [phone].
Pt. with hyperkalemia in setting of advanced kidney disease as well as ACE (now on hold; do not restart). Most recently Serum potassium WNL at 5.2. Monitor serum potassium.    If any questions, please feel free to contact me     Luca Kc  Nephrology Fellow  [facility] Pager: [phone]
creatinine baseline   s/p AVF no issues
no evidence of uremia no encephalopathy no edema no CHF  management per renal  patient amenable to AVF
management per renal  patient amenable to AVF  vascular consultation appreciated   patient is medically optimized for procedure Thu
creatinine baseline   s/p AVF no issues
management per renal  creatinine actually trending down  patient is medically optimized for AVF tomorrow
no evidence of uremia no encephalopathy no edema no CHF  management per renal  patient amenable to AVF  vascular consultation called   patient is medically optimized for procedure

## 2023-04-07 NOTE — PROGRESS NOTE ADULT - PROBLEM SELECTOR PROBLEM 2
Stage 5 chronic kidney disease not on chronic dialysis
Hyperkalemia
Stage 5 chronic kidney disease not on chronic dialysis

## 2023-04-07 NOTE — DISCHARGE NOTE NURSING/CASE MANAGEMENT/SOCIAL WORK - NSDCPEFALRISK_GEN_ALL_CORE
For information on Fall & Injury Prevention, visit: [email or web address] OR  [email or web address] OR  https://www.cdc.gov/steadi/patient.html unknown

## 2023-04-07 NOTE — PROGRESS NOTE ADULT - PROBLEM SELECTOR PLAN 1
Pt. with CKD stage 5 follows with Dr. Cem Rees as outpatient. Baseline SCr ranging between 5-5.5; renal function most recently at baseline. Labs reviewed, no acute indication for RRT at this time however explained to patient that due to his advanced kidney disease there is a likelihood he will require dialysis in the near future.   Pt underwent creation of L AVF on 4/6 to prepare for dialysis in the future.   Monitor labs and urine output. Avoid nephrotoxins. Dose medications as per eGFR.  Continue with PO sodium bicarbonate tabs; will also help with hyperkalemia.
persistent though much improved   now down to 5.4  repeat Lokelma 10 grams x 1   continue to follow renal recommendations
Pt. with CKD stage 5 follows with Dr. Cem Rees as outpatient. Baseline SCr ranging between 5-5.5; renal function currently at baseline. Labs reviewed, no acute indication for RRT at this time however explained to patient that due to his advanced kidney disease there is a likelihood he will require dialysis in the near future. Appreciate vascular surgery evaluation for AVf creation. Monitor labs and urine output. Avoid nephrotoxins. Dose medications as per eGFR.  Continue with PO sodium bicarbonate tabs; will also help with hyperkalemia.
Pt. with CKD stage 5 follows with Dr. Cem Rees as outpatient. Baseline SCr ranging between 5-5.5; renal function currently at baseline. Labs reviewed, no acute indication for RRT at this time however explained to patient that due to his advanced kidney disease there is a likelihood he will require dialysis in the near future. Pt has been hesitant about getting an AVF created, however after discussion he is amenable at this time. Recommend vascular surgery consult for evaluation. Monitor labs and urine output. Avoid nephrotoxins. Dose medications as per eGFR.  Continue with PO sodium bicarbonate tabs; will also help with hyperkalemia.
resolved   continue to follow renal recommendations  low K diet
resolved   continue to follow renal recommendations  low K diet
resolved   continue to follow renal recommendations  low K diet  discharge planning   cleared for discharge by renal
now down to normal   continue to follow renal recommendations  low K diet
resolved   continue to follow renal recommendations  low K diet

## 2023-04-07 NOTE — PROGRESS NOTE ADULT - REASON FOR ADMISSION
sent by outpatient renal for hyperkalemia

## 2023-04-07 NOTE — PROGRESS NOTE ADULT - ATTENDING COMMENTS
[facility] Units: 0 Show Periorbital Units: Yes Dilution (U/0.1 Cc): 2.5 Show Mentalis Units: No Consent: Written consent obtained. Risks include but not limited to lid/brow ptosis, bruising, swelling, diplopia, temporary effect, incomplete chemical denervation. Post-Care Instructions: Patient instructed to not lie down for 4 hours and limit physical activity for 24 hours. Detail Level: Zone #CKD 5   s.p avf  no urgent indication for hd, labs not done yet  #met acidosis- cont bicarb tabs  #hyperkalemia- cont lokelma as needed; holding off arb or spironolactone at this time; monitor k  #outpt Nephrologist- Dr Cem Rees

## 2023-04-07 NOTE — DISCHARGE NOTE NURSING/CASE MANAGEMENT/SOCIAL WORK - PATIENT PORTAL LINK FT
You can access the FollowMyHealth Patient Portal offered by [facility] by registering at the following website: [email or web address]. By joining [employer]’s FollowMyHealth portal, you will also be able to view your health information using other applications (apps) compatible with our system.

## 2023-04-07 NOTE — PROGRESS NOTE ADULT - ASSESSMENT
72 y.o. M with PMH of HTN, HLD, DM2 and advanced CKD well known to me from prior admission presents for hyperkalemia noted on outpatient labs now admitted for persistent hyperkalemia. Vascular surgery consulted for AVF creation. Patient currently not on HD.     Plan:  - OR for L AVF poss AVG today  - NPO  - Care per primary    Vascular Surgery  Please page for all questions [phone], not available on [employer]. 
72 y.o. M with PMH of HTN, HLD, DM2 and advanced CKD well known to me from prior admission presents for hyperkalemia noted on outpatient labs now admitted for persistent hyperkalemia s/p LAVF creation on 4/6    Plan:  - Please have pt follow up in 2-3 weeks with Dr. Jewell after discharge   - Vascular surgery will sign off, please call with any questions       Vascular Surgery  Please page for all questions [phone], not available on [employer] Teams. 
Pt. with CKD stage 5
72 y.o. M with PMH of HTN, HLD, DM2 and advanced CKD well known to me from prior admission presents for hyperkalemia noted on outpatient labs now admitted for persistent hyperkalemia  
Pt. with CKD stage 5
Pt. with CKD stage 5
72 y.o. M with PMH of HTN, HLD, DM2 and advanced CKD well known to me from prior admission presents for hyperkalemia noted on outpatient labs now admitted for persistent hyperkalemia  
72 y.o. M with PMH of HTN, HLD, DM2 and advanced CKD well known to me from prior admission presents for hyperkalemia noted on outpatient labs now admitted for persistent hyperkalemia

## 2023-04-07 NOTE — PROGRESS NOTE ADULT - PROBLEM SELECTOR PROBLEM 1
Hyperkalemia
Hyperkalemia
Stage 5 chronic kidney disease not on chronic dialysis
Hyperkalemia

## 2023-04-07 NOTE — PROGRESS NOTE ADULT - PROBLEM SELECTOR PLAN 4
continue home meds with hold parameters
continue home meds on discharge
continue home meds on discharge
continue home meds with hold parameters

## 2023-04-07 NOTE — PROGRESS NOTE ADULT - NSPROGADDITIONALINFOA_GEN_ALL_CORE
encounter 40 min including PE, progress note, discharge note, medication reconciliation and d/w ACP and patient and family
discussed with patient in detail, expresses understanding of treatment plans.
discussed with patient in detail, expresses understanding of treatment plans.
stable for discharge  cleared by all services
discussed with patient in detail, expresses understanding of treatment plans.

## 2023-04-10 PROBLEM — I10 ESSENTIAL (PRIMARY) HYPERTENSION: Chronic | Status: ACTIVE | Noted: 2022-12-21

## 2023-04-10 PROBLEM — E78.5 HYPERLIPIDEMIA, UNSPECIFIED: Chronic | Status: ACTIVE | Noted: 2022-12-21

## 2023-04-12 ENCOUNTER — APPOINTMENT (OUTPATIENT)
Dept: CARDIOLOGY | Facility: CLINIC | Age: 73
End: 2023-04-12
Payer: MEDICARE

## 2023-04-12 ENCOUNTER — NON-APPOINTMENT (OUTPATIENT)
Age: 73
End: 2023-04-12

## 2023-04-12 VITALS
DIASTOLIC BLOOD PRESSURE: 70 MMHG | SYSTOLIC BLOOD PRESSURE: 112 MMHG | HEART RATE: 58 BPM | OXYGEN SATURATION: 99 % | HEIGHT: 71 IN | WEIGHT: 176 LBS | BODY MASS INDEX: 24.64 KG/M2

## 2023-04-12 DIAGNOSIS — M79.89 OTHER SPECIFIED SOFT TISSUE DISORDERS: ICD-10-CM

## 2023-04-12 PROCEDURE — 99204 OFFICE O/P NEW MOD 45 MIN: CPT | Mod: 25

## 2023-04-12 PROCEDURE — 93000 ELECTROCARDIOGRAM COMPLETE: CPT

## 2023-04-19 ENCOUNTER — APPOINTMENT (OUTPATIENT)
Dept: VASCULAR SURGERY | Facility: CLINIC | Age: 73
End: 2023-04-19
Payer: MEDICARE

## 2023-04-19 VITALS
WEIGHT: 170 LBS | DIASTOLIC BLOOD PRESSURE: 66 MMHG | TEMPERATURE: 97.6 F | HEIGHT: 71 IN | HEART RATE: 61 BPM | SYSTOLIC BLOOD PRESSURE: 110 MMHG | BODY MASS INDEX: 23.8 KG/M2

## 2023-04-19 PROCEDURE — 99024 POSTOP FOLLOW-UP VISIT: CPT

## 2023-04-19 NOTE — REASON FOR VISIT
[Follow-Up: _____] : a [unfilled] follow-up visit [Family Member] : family member [FreeTextEntry1] : s/p left brachiocephalic fistula

## 2023-04-19 NOTE — ASSESSMENT
Patient was admitted from L&D and placed on Bubble CPAP. She remains on +5 and an FIO2 of 21.   [Arterial/Venous Disease] : arterial/venous disease [Other: _____] : [unfilled] [FreeTextEntry1] : Impression - chronic kidney disease not on HD s/p LUE brachiocephalic avf, bilateral leg swelling\par \par Plan\par Conservative medical management - leg elevation, exercise regimen, weight loss, diet control, calf muscle exercises, 15-20 mm hg or 20-30 mm hg knee high compression stockings (rx given)\par Left arm precautions - no BP, IV, or blood draws\par Return to office in 4 weeks with JOSE ultrasound to evaluate avf maturity

## 2023-04-19 NOTE — HISTORY OF PRESENT ILLNESS
[FreeTextEntry1] : Pt here for post op visit s/p left brachiocephalic fistula on 4/6/2023. Accompanied by son-in-law. Not on dialysis yet. \par \par Pt is doing well. LUE incision site c/d/i, healing well. Pt denies fever, chills, pain, swelling, numbness, tingling, or drainage. \par \par Pt complains of bilateral leg swelling for many years. Reports heaviness, achiness, and discomfort. Pt does not wear compression stockings or elevates his legs. Denies leg pain, long car rides/flights or injuries. Pt denies claudication, rest pain, or tissue loss. He's taking aspirin and atorvastatin. Walks with a cane for balance.\par \par PMH: chronic kidney disease, HTN, HLD, hypothyroidism, CHF\par \par Pt's nephrologist is Dr. Rees\par

## 2023-04-19 NOTE — PHYSICAL EXAM
[2+] : left 2+ [1+] : left 1+ [Ankle Swelling (On Exam)] : present [Ankle Swelling Bilaterally] : bilaterally  [] : bilaterally [Ankle Swelling On The Left] : moderate [No Rash or Lesion] : No rash or lesion [Alert] : alert [Oriented to Person] : oriented to person [Oriented to Place] : oriented to place [Oriented to Time] : oriented to time [Calm] : calm [Varicose Veins Of Lower Extremities] : not present [de-identified] : NAD [de-identified] : NC/AT [de-identified] : unlabored breathing [FreeTextEntry1] : LUE incision c/d/i, healing well\par no gross signs of infection\par palpable thrill over avf\par left hand warm and well perfused with good motor function\par brisk capillary refill < 2 sec\par no sign of steal syndrome\par bilateral lower extremities soft, warm and nontender\par bilateral leg swelling with venous stasis changes (dry flaky skin, hyperpigmentation in gator area and upper calf and hyperkeratosis)\par no wounds or ulcers [de-identified] : walks with a cane [de-identified] : LUE incision site well healed [de-identified] : iliana cranial nerves 2-12 iliana grossly intact [de-identified] : cooperative

## 2023-04-21 PROBLEM — M79.89 SWELLING OF BOTH LOWER EXTREMITIES: Status: ACTIVE | Noted: 2023-04-19

## 2023-04-27 ENCOUNTER — LABORATORY RESULT (OUTPATIENT)
Age: 73
End: 2023-04-27

## 2023-04-27 ENCOUNTER — APPOINTMENT (OUTPATIENT)
Dept: TRANSPLANT | Facility: CLINIC | Age: 73
End: 2023-04-27

## 2023-04-27 ENCOUNTER — NON-APPOINTMENT (OUTPATIENT)
Age: 73
End: 2023-04-27

## 2023-04-27 ENCOUNTER — APPOINTMENT (OUTPATIENT)
Dept: NEPHROLOGY | Facility: CLINIC | Age: 73
End: 2023-04-27
Payer: COMMERCIAL

## 2023-04-27 ENCOUNTER — APPOINTMENT (OUTPATIENT)
Dept: TRANSPLANT | Facility: CLINIC | Age: 73
End: 2023-04-27
Payer: COMMERCIAL

## 2023-04-27 VITALS
DIASTOLIC BLOOD PRESSURE: 70 MMHG | TEMPERATURE: 98 F | RESPIRATION RATE: 14 BRPM | BODY MASS INDEX: 24.36 KG/M2 | HEART RATE: 66 BPM | WEIGHT: 174 LBS | OXYGEN SATURATION: 100 % | SYSTOLIC BLOOD PRESSURE: 135 MMHG | HEIGHT: 71 IN

## 2023-04-27 PROCEDURE — 99072 ADDL SUPL MATRL&STAF TM PHE: CPT

## 2023-04-27 PROCEDURE — 99204 OFFICE O/P NEW MOD 45 MIN: CPT

## 2023-04-27 PROCEDURE — 99205 OFFICE O/P NEW HI 60 MIN: CPT

## 2023-04-27 NOTE — PHYSICAL EXAM
[General Appearance - Alert] : alert [General Appearance - In No Acute Distress] : in no acute distress [Sclera] : the sclera and conjunctiva were normal [PERRL With Normal Accommodation] : pupils were equal in size, round, and reactive to light [Extraocular Movements] : extraocular movements were intact [Outer Ear] : the ears and nose were normal in appearance [Oropharynx] : the oropharynx was normal [Neck Cervical Mass (___cm)] : no neck mass was observed [Neck Appearance] : the appearance of the neck was normal [Jugular Venous Distention Increased] : there was no jugular-venous distention [Thyroid Diffuse Enlargement] : the thyroid was not enlarged [Thyroid Nodule] : there were no palpable thyroid nodules [Auscultation Breath Sounds / Voice Sounds] : lungs were clear to auscultation bilaterally [Heart Rate And Rhythm] : heart rate was normal and rhythm regular [Heart Sounds] : normal S1 and S2 [Heart Sounds Gallop] : no gallops [Murmurs] : no murmurs [Heart Sounds Pericardial Friction Rub] : no pericardial rub [Full Pulse] : the pedal pulses are present [Edema] : there was no peripheral edema [Bowel Sounds] : normal bowel sounds [Abdomen Soft] : soft [Abdomen Tenderness] : non-tender [Abdomen Mass (___ Cm)] : no abdominal mass palpated [Abnormal Walk] : normal gait [Nail Clubbing] : no clubbing  or cyanosis of the fingernails [Musculoskeletal - Swelling] : no joint swelling seen [Motor Tone] : muscle strength and tone were normal [Skin Color & Pigmentation] : normal skin color and pigmentation [Skin Turgor] : normal skin turgor [] : no rash [Normal] : normal [Deep Tendon Reflexes (DTR)] : deep tendon reflexes were 2+ and symmetric [Sensation] : the sensory exam was normal to light touch and pinprick [No Focal Deficits] : no focal deficits [Oriented To Time, Place, And Person] : oriented to person, place, and time [Impaired Insight] : insight and judgment were intact [Affect] : the affect was normal

## 2023-04-27 NOTE — PLAN
[FreeTextEntry1] : 1. Advanced CKD:  Mr. IAIN NICHOLAS is an acceptable candidate for kidney transplantation but since he is preemptive, ideally should be listed as living donor only. \par 2.Cardiac risk: echo, stress test and cardiac evaluation \par 3. Cancer screening: colonoscopy, PSA\par 4. ID: Serology for acute and chronic viral infections. Screening for latent TB\par 5. Imaging: Renal/abdominal /chest /Iliac imaging\par 6.Diabetes Mellitus: check HbA1c \par 7.?Frailty , Issues with balance- would benefit from PT\par \par I have personally discussed the risks and benefits of transplantation and patient attended transplant education class where the following was disclosed:\par  \par Reviewed factors affecting survival and morbidity while on dialysis, the transplant wait list and reviewed jacob-operative and long-term risk factors affecting outcome in kidney transplantation. \par  \par One year SRTR outcomes for national and [facility] [place] were discussed in regards to patient survival and graft survival after transplantation. \par  \par Details of transplant surgery, including complications were discussed.\par Immunosuppression and complications including infection including life threatening sepsis and opportunistic infections, malignancy and new onset diabetes were discussed. \par  \par Benefits of live donor transplantation as well as variability in wait times across regions and multiple listing were discussed. \par KDPI >85% and PHS high risk criteria donors were discussed. \par HCV kidney transplantation was discussed.\par  \par Will proceed with completing/ updating work up and listing for transplant/ live donor transplant once work up is reviewed and found to be acceptable by multidisciplinary listing committee.\par

## 2023-04-27 NOTE — HISTORY OF PRESENT ILLNESS
[TextBox_42] : IAIN NICHOLAS is a 72 year old male who presents for kidney transplant evaluation. \par Cause of advanced : Diabetic nephropathy ( biopsy done in Dec 2022) \par Nephrologist: Dr Cem Rees  \par Preemptive candidate\par  \par PMH includes- \par CKD stage V with proteinuria\par Hypertension\par Diabetes mellitus\par Hyperlipidemia\par Hypothyroidism\par \par \par \par no h/o MI , CHF, CAD\par no h/o COPD, Asthma\par no h/o TB, HIV, Hepatitis  \par no h/o malignancy or bleeding disorders.No DVT/PE\par no h/o CVA\par no h/o blood transfusions . no h/o previous transplant\par \par \par Recent  hospitalization in Dec 2022 for volume overload and a recent admission for Hyperkalemia\par \par Surgical h/o : none\par Functional status: walks with a cane. has some issues with dizziness. 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 . has 2 daughters. quit smoking at age 29. No alcohol or illicit use.\par Family history: His 43 year old nephew is on dialysis. has a significant family h/o of DM \par \par \par \par

## 2023-04-28 ENCOUNTER — INPATIENT (INPATIENT)
Facility: HOSPITAL | Age: 73
LOS: 7 days | Discharge: HOME | DRG: 640 | End: 2023-05-06
Attending: INTERNAL MEDICINE | Admitting: INTERNAL MEDICINE
Payer: MEDICARE

## 2023-04-28 ENCOUNTER — NON-APPOINTMENT (OUTPATIENT)
Age: 73
End: 2023-04-28

## 2023-04-28 VITALS
HEART RATE: 65 BPM | WEIGHT: 169.98 LBS | OXYGEN SATURATION: 99 % | RESPIRATION RATE: 18 BRPM | DIASTOLIC BLOOD PRESSURE: 51 MMHG | HEIGHT: 72 IN | TEMPERATURE: 98 F | SYSTOLIC BLOOD PRESSURE: 140 MMHG

## 2023-04-28 DIAGNOSIS — N18.5 CHRONIC KIDNEY DISEASE, STAGE 5: ICD-10-CM

## 2023-04-28 DIAGNOSIS — E87.5 HYPERKALEMIA: ICD-10-CM

## 2023-04-28 LAB
ALBUMIN SERPL ELPH-MCNC: 3.3 G/DL — SIGNIFICANT CHANGE UP (ref 3.3–5)
ALBUMIN SERPL ELPH-MCNC: 3.9 G/DL — SIGNIFICANT CHANGE UP (ref 3.3–5)
ALBUMIN SERPL ELPH-MCNC: 4.4 G/DL
ALP BLD-CCNC: 82 U/L
ALP SERPL-CCNC: 63 U/L — SIGNIFICANT CHANGE UP (ref 40–120)
ALP SERPL-CCNC: 73 U/L — SIGNIFICANT CHANGE UP (ref 40–120)
ALT FLD-CCNC: 63 U/L — HIGH (ref 10–45)
ALT FLD-CCNC: 73 U/L — HIGH (ref 10–45)
ALT SERPL-CCNC: 73 U/L
ANION GAP SERPL CALC-SCNC: 10 MMOL/L — SIGNIFICANT CHANGE UP (ref 5–17)
ANION GAP SERPL CALC-SCNC: 12 MMOL/L — SIGNIFICANT CHANGE UP (ref 5–17)
ANION GAP SERPL CALC-SCNC: 13 MMOL/L — SIGNIFICANT CHANGE UP (ref 5–17)
ANION GAP SERPL CALC-SCNC: 15 MMOL/L
APTT BLD: 32.7 SEC — SIGNIFICANT CHANGE UP (ref 27.5–35.5)
AST SERPL-CCNC: 46 U/L — HIGH (ref 10–40)
AST SERPL-CCNC: 47 U/L
AST SERPL-CCNC: 51 U/L — HIGH (ref 10–40)
BASE EXCESS BLDV CALC-SCNC: -5.7 MMOL/L — LOW (ref -2–3)
BASOPHILS # BLD AUTO: 0.1 K/UL
BASOPHILS # BLD AUTO: 0.11 K/UL — SIGNIFICANT CHANGE UP (ref 0–0.2)
BASOPHILS NFR BLD AUTO: 1 %
BASOPHILS NFR BLD AUTO: 1 % — SIGNIFICANT CHANGE UP (ref 0–2)
BILIRUB SERPL-MCNC: 0.4 MG/DL
BILIRUB SERPL-MCNC: 0.4 MG/DL — SIGNIFICANT CHANGE UP (ref 0.2–1.2)
BILIRUB SERPL-MCNC: 0.5 MG/DL — SIGNIFICANT CHANGE UP (ref 0.2–1.2)
BUN SERPL-MCNC: 74 MG/DL — HIGH (ref 7–23)
BUN SERPL-MCNC: 78 MG/DL
BUN SERPL-MCNC: 80 MG/DL — HIGH (ref 7–23)
BUN SERPL-MCNC: 82 MG/DL — HIGH (ref 7–23)
BUN SERPL-MCNC: 82 MG/DL — HIGH (ref 7–23)
BUN SERPL-MCNC: 85 MG/DL — HIGH (ref 7–23)
C PEPTIDE SERPL-MCNC: 8.2 NG/ML
CA-I SERPL-SCNC: 1.21 MMOL/L — SIGNIFICANT CHANGE UP (ref 1.15–1.33)
CALCIUM SERPL-MCNC: 8.7 MG/DL — SIGNIFICANT CHANGE UP (ref 8.4–10.5)
CALCIUM SERPL-MCNC: 8.7 MG/DL — SIGNIFICANT CHANGE UP (ref 8.4–10.5)
CALCIUM SERPL-MCNC: 8.9 MG/DL — SIGNIFICANT CHANGE UP (ref 8.4–10.5)
CALCIUM SERPL-MCNC: 9.3 MG/DL
CHLORIDE BLDV-SCNC: 107 MMOL/L — SIGNIFICANT CHANGE UP (ref 96–108)
CHLORIDE SERPL-SCNC: 105 MMOL/L — SIGNIFICANT CHANGE UP (ref 96–108)
CHLORIDE SERPL-SCNC: 106 MMOL/L — SIGNIFICANT CHANGE UP (ref 96–108)
CHLORIDE SERPL-SCNC: 107 MMOL/L
CHLORIDE SERPL-SCNC: 107 MMOL/L — SIGNIFICANT CHANGE UP (ref 96–108)
CHLORIDE SERPL-SCNC: 109 MMOL/L — HIGH (ref 96–108)
CHLORIDE SERPL-SCNC: 110 MMOL/L — HIGH (ref 96–108)
CHOLEST SERPL-MCNC: 116 MG/DL
CMV IGG SERPL QL: >10 U/ML
CMV IGG SERPL-IMP: POSITIVE
CO2 BLDV-SCNC: 22 MMOL/L — SIGNIFICANT CHANGE UP (ref 22–26)
CO2 SERPL-SCNC: 16 MMOL/L — LOW (ref 22–31)
CO2 SERPL-SCNC: 17 MMOL/L
CO2 SERPL-SCNC: 18 MMOL/L — LOW (ref 22–31)
CO2 SERPL-SCNC: 18 MMOL/L — LOW (ref 22–31)
CO2 SERPL-SCNC: 19 MMOL/L — LOW (ref 22–31)
CO2 SERPL-SCNC: 23 MMOL/L — SIGNIFICANT CHANGE UP (ref 22–31)
COVID-19 SPIKE DOMAIN ANTIBODY INTERPRETATION: POSITIVE
CREAT SERPL-MCNC: 3.86 MG/DL — HIGH (ref 0.5–1.3)
CREAT SERPL-MCNC: 4.02 MG/DL — HIGH (ref 0.5–1.3)
CREAT SERPL-MCNC: 4.02 MG/DL — HIGH (ref 0.5–1.3)
CREAT SERPL-MCNC: 4.05 MG/DL
CREAT SERPL-MCNC: 4.07 MG/DL — HIGH (ref 0.5–1.3)
CREAT SERPL-MCNC: 4.26 MG/DL — HIGH (ref 0.5–1.3)
EBV EA AB SER IA-ACNC: 48.2 U/ML
EBV EA AB TITR SER IF: POSITIVE
EBV EA IGG SER QL IA: >600 U/ML
EBV EA IGG SER-ACNC: POSITIVE
EBV EA IGM SER IA-ACNC: NEGATIVE
EBV PATRN SPEC IB-IMP: NORMAL
EBV VCA IGG SER IA-ACNC: 530 U/ML
EBV VCA IGM SER QL IA: <10 U/ML
EGFR: 14 ML/MIN/1.73M2 — LOW
EGFR: 15 ML/MIN/1.73M2
EGFR: 15 ML/MIN/1.73M2 — LOW
EGFR: 16 ML/MIN/1.73M2 — LOW
EOSINOPHIL # BLD AUTO: 0.92 K/UL
EOSINOPHIL # BLD AUTO: 0.95 K/UL — HIGH (ref 0–0.5)
EOSINOPHIL NFR BLD AUTO: 9 % — HIGH (ref 0–6)
EOSINOPHIL NFR BLD AUTO: 9.5 %
EPSTEIN-BARR VIRUS CAPSID ANTIGEN IGG: POSITIVE
ESTIMATED AVERAGE GLUCOSE: 157 MG/DL
GAS PNL BLDV: 135 MMOL/L — LOW (ref 136–145)
GAS PNL BLDV: SIGNIFICANT CHANGE UP
GAS PNL BLDV: SIGNIFICANT CHANGE UP
GLUCOSE BLDC GLUCOMTR-MCNC: 144 MG/DL — HIGH (ref 70–99)
GLUCOSE BLDC GLUCOMTR-MCNC: 165 MG/DL — HIGH (ref 70–99)
GLUCOSE BLDC GLUCOMTR-MCNC: 167 MG/DL — HIGH (ref 70–99)
GLUCOSE BLDC GLUCOMTR-MCNC: 207 MG/DL — HIGH (ref 70–99)
GLUCOSE BLDC GLUCOMTR-MCNC: 267 MG/DL — HIGH (ref 70–99)
GLUCOSE BLDV-MCNC: 107 MG/DL — HIGH (ref 70–99)
GLUCOSE SERPL-MCNC: 111 MG/DL — HIGH (ref 70–99)
GLUCOSE SERPL-MCNC: 111 MG/DL — HIGH (ref 70–99)
GLUCOSE SERPL-MCNC: 112 MG/DL — HIGH (ref 70–99)
GLUCOSE SERPL-MCNC: 138 MG/DL — HIGH (ref 70–99)
GLUCOSE SERPL-MCNC: 166 MG/DL — HIGH (ref 70–99)
GLUCOSE SERPL-MCNC: 72 MG/DL
HBA1C MFR BLD HPLC: 7.1 %
HCO3 BLDV-SCNC: 21 MMOL/L — LOW (ref 22–29)
HCT VFR BLD CALC: 26.5 % — LOW (ref 39–50)
HCT VFR BLD CALC: 28.3 %
HCT VFR BLDA CALC: 26 % — LOW (ref 39–51)
HDLC SERPL-MCNC: 41 MG/DL
HGB BLD CALC-MCNC: 8.7 G/DL — LOW (ref 12.6–17.4)
HGB BLD-MCNC: 8.7 G/DL — LOW (ref 13–17)
HGB BLD-MCNC: 9.1 G/DL
HIV1+2 AB SPEC QL IA.RAPID: NONREACTIVE
HSV 1+2 IGG SER IA-IMP: NEGATIVE
HSV 1+2 IGG SER IA-IMP: POSITIVE
HSV1 IGG SER QL: 59.2 INDEX
HSV2 IGG SER QL: 0.06 INDEX
IMM GRANULOCYTES NFR BLD AUTO: 0.4 %
IMM GRANULOCYTES NFR BLD AUTO: 0.6 % — SIGNIFICANT CHANGE UP (ref 0–0.9)
INR BLD: 0.93 RATIO — SIGNIFICANT CHANGE UP (ref 0.88–1.16)
LACTATE BLDV-MCNC: 0.9 MMOL/L — SIGNIFICANT CHANGE UP (ref 0.5–2)
LDLC SERPL CALC-MCNC: 51 MG/DL
LYMPHOCYTES # BLD AUTO: 1.56 K/UL
LYMPHOCYTES # BLD AUTO: 1.66 K/UL — SIGNIFICANT CHANGE UP (ref 1–3.3)
LYMPHOCYTES # BLD AUTO: 15.7 % — SIGNIFICANT CHANGE UP (ref 13–44)
LYMPHOCYTES NFR BLD AUTO: 16.2 %
MAGNESIUM SERPL-MCNC: 2.6 MG/DL
MAGNESIUM SERPL-MCNC: 2.6 MG/DL — SIGNIFICANT CHANGE UP (ref 1.6–2.6)
MAN DIFF?: NORMAL
MCHC RBC-ENTMCNC: 30.2 PG — SIGNIFICANT CHANGE UP (ref 27–34)
MCHC RBC-ENTMCNC: 30.4 PG
MCHC RBC-ENTMCNC: 32.2 GM/DL
MCHC RBC-ENTMCNC: 32.8 GM/DL — SIGNIFICANT CHANGE UP (ref 32–36)
MCV RBC AUTO: 92 FL — SIGNIFICANT CHANGE UP (ref 80–100)
MCV RBC AUTO: 94.6 FL
MONOCYTES # BLD AUTO: 0.88 K/UL
MONOCYTES # BLD AUTO: 1.42 K/UL — HIGH (ref 0–0.9)
MONOCYTES NFR BLD AUTO: 13.5 % — SIGNIFICANT CHANGE UP (ref 2–14)
MONOCYTES NFR BLD AUTO: 9.1 %
NEUTROPHILS # BLD AUTO: 6.15 K/UL
NEUTROPHILS # BLD AUTO: 6.35 K/UL — SIGNIFICANT CHANGE UP (ref 1.8–7.4)
NEUTROPHILS NFR BLD AUTO: 60.2 % — SIGNIFICANT CHANGE UP (ref 43–77)
NEUTROPHILS NFR BLD AUTO: 63.8 %
NONHDLC SERPL-MCNC: 75 MG/DL
NRBC # BLD: 0 /100 WBCS — SIGNIFICANT CHANGE UP (ref 0–0)
PCO2 BLDV: 44 MMHG — SIGNIFICANT CHANGE UP (ref 42–55)
PH BLDV: 7.28 — LOW (ref 7.32–7.43)
PHOSPHATE SERPL-MCNC: 5.1 MG/DL
PHOSPHATE SERPL-MCNC: 5.1 MG/DL — HIGH (ref 2.5–4.5)
PLATELET # BLD AUTO: 197 K/UL — SIGNIFICANT CHANGE UP (ref 150–400)
PLATELET # BLD AUTO: 243 K/UL
PO2 BLDV: 32 MMHG — SIGNIFICANT CHANGE UP (ref 25–45)
POTASSIUM BLDV-SCNC: 6.8 MMOL/L — CRITICAL HIGH (ref 3.5–5.1)
POTASSIUM SERPL-MCNC: 5.7 MMOL/L — HIGH (ref 3.5–5.3)
POTASSIUM SERPL-MCNC: 5.8 MMOL/L — HIGH (ref 3.5–5.3)
POTASSIUM SERPL-MCNC: 6 MMOL/L — HIGH (ref 3.5–5.3)
POTASSIUM SERPL-MCNC: 6.1 MMOL/L — HIGH (ref 3.5–5.3)
POTASSIUM SERPL-MCNC: 6.8 MMOL/L — CRITICAL HIGH (ref 3.5–5.3)
POTASSIUM SERPL-SCNC: 5.7 MMOL/L — HIGH (ref 3.5–5.3)
POTASSIUM SERPL-SCNC: 5.8 MMOL/L — HIGH (ref 3.5–5.3)
POTASSIUM SERPL-SCNC: 6 MMOL/L — HIGH (ref 3.5–5.3)
POTASSIUM SERPL-SCNC: 6.1 MMOL/L — HIGH (ref 3.5–5.3)
POTASSIUM SERPL-SCNC: 6.2 MMOL/L
POTASSIUM SERPL-SCNC: 6.8 MMOL/L — CRITICAL HIGH (ref 3.5–5.3)
PROT SERPL-MCNC: 6.4 G/DL — SIGNIFICANT CHANGE UP (ref 6–8.3)
PROT SERPL-MCNC: 7.3 G/DL
PROT SERPL-MCNC: 7.5 G/DL — SIGNIFICANT CHANGE UP (ref 6–8.3)
PROTHROM AB SERPL-ACNC: 10.8 SEC — SIGNIFICANT CHANGE UP (ref 10.5–13.4)
PSA SERPL-MCNC: 4.55 NG/ML
RBC # BLD: 2.88 M/UL — LOW (ref 4.2–5.8)
RBC # BLD: 2.99 M/UL
RBC # FLD: 14.1 % — SIGNIFICANT CHANGE UP (ref 10.3–14.5)
RBC # FLD: 14.4 %
RUBV IGG FLD-ACNC: 32.6 INDEX
RUBV IGG SER-IMP: POSITIVE
SAO2 % BLDV: 60.1 % — LOW (ref 67–88)
SARS-COV-2 AB SERPL IA-ACNC: >250 U/ML
SODIUM SERPL-SCNC: 136 MMOL/L — SIGNIFICANT CHANGE UP (ref 135–145)
SODIUM SERPL-SCNC: 137 MMOL/L — SIGNIFICANT CHANGE UP (ref 135–145)
SODIUM SERPL-SCNC: 138 MMOL/L — SIGNIFICANT CHANGE UP (ref 135–145)
SODIUM SERPL-SCNC: 138 MMOL/L — SIGNIFICANT CHANGE UP (ref 135–145)
SODIUM SERPL-SCNC: 140 MMOL/L
SODIUM SERPL-SCNC: 141 MMOL/L — SIGNIFICANT CHANGE UP (ref 135–145)
T GONDII AB SER-IMP: NEGATIVE
T GONDII IGG SER QL: 5.6 IU/ML
T PALLIDUM AB SER QL IA: NEGATIVE
TRIGL SERPL-MCNC: 117 MG/DL
URATE SERPL-MCNC: 9.8 MG/DL
VZV AB TITR SER: POSITIVE
VZV IGG SER IF-ACNC: 3954 INDEX
WBC # BLD: 10.55 K/UL — HIGH (ref 3.8–10.5)
WBC # FLD AUTO: 10.55 K/UL — HIGH (ref 3.8–10.5)
WBC # FLD AUTO: 9.65 K/UL

## 2023-04-28 PROCEDURE — 99223 1ST HOSP IP/OBS HIGH 75: CPT | Mod: GC

## 2023-04-28 PROCEDURE — 99291 CRITICAL CARE FIRST HOUR: CPT

## 2023-04-28 RX ORDER — INSULIN HUMAN 100 [IU]/ML
5 INJECTION, SOLUTION SUBCUTANEOUS ONCE
Refills: 0 | Status: COMPLETED | OUTPATIENT
Start: 2023-04-28 | End: 2023-04-28

## 2023-04-28 RX ORDER — SODIUM ZIRCONIUM CYCLOSILICATE 10 G/10G
5 POWDER, FOR SUSPENSION ORAL ONCE
Refills: 0 | Status: COMPLETED | OUTPATIENT
Start: 2023-04-28 | End: 2023-04-28

## 2023-04-28 RX ORDER — BUMETANIDE 0.25 MG/ML
2 INJECTION INTRAMUSCULAR; INTRAVENOUS ONCE
Refills: 0 | Status: COMPLETED | OUTPATIENT
Start: 2023-04-28 | End: 2023-04-28

## 2023-04-28 RX ORDER — LEVOTHYROXINE SODIUM 125 MCG
50 TABLET ORAL DAILY
Refills: 0 | Status: DISCONTINUED | OUTPATIENT
Start: 2023-04-28 | End: 2023-05-06

## 2023-04-28 RX ORDER — SODIUM BICARBONATE 1 MEQ/ML
1300 SYRINGE (ML) INTRAVENOUS
Refills: 0 | Status: DISCONTINUED | OUTPATIENT
Start: 2023-04-28 | End: 2023-05-06

## 2023-04-28 RX ORDER — DEXTROSE 50 % IN WATER 50 %
50 SYRINGE (ML) INTRAVENOUS ONCE
Refills: 0 | Status: COMPLETED | OUTPATIENT
Start: 2023-04-28 | End: 2023-04-28

## 2023-04-28 RX ORDER — HEPARIN SODIUM 5000 [USP'U]/ML
5000 INJECTION INTRAVENOUS; SUBCUTANEOUS EVERY 12 HOURS
Refills: 0 | Status: DISCONTINUED | OUTPATIENT
Start: 2023-04-28 | End: 2023-05-06

## 2023-04-28 RX ORDER — DEXTROSE 50 % IN WATER 50 %
25 SYRINGE (ML) INTRAVENOUS ONCE
Refills: 0 | Status: DISCONTINUED | OUTPATIENT
Start: 2023-04-28 | End: 2023-05-06

## 2023-04-28 RX ORDER — AMLODIPINE BESYLATE 2.5 MG/1
10 TABLET ORAL DAILY
Refills: 0 | Status: DISCONTINUED | OUTPATIENT
Start: 2023-04-28 | End: 2023-05-06

## 2023-04-28 RX ORDER — CITRIC ACID/SODIUM CITRATE 300-500 MG
30 SOLUTION, ORAL ORAL THREE TIMES A DAY
Refills: 0 | Status: DISCONTINUED | OUTPATIENT
Start: 2023-04-28 | End: 2023-05-06

## 2023-04-28 RX ORDER — SODIUM CHLORIDE 9 MG/ML
1000 INJECTION, SOLUTION INTRAVENOUS
Refills: 0 | Status: DISCONTINUED | OUTPATIENT
Start: 2023-04-28 | End: 2023-05-06

## 2023-04-28 RX ORDER — CARVEDILOL PHOSPHATE 80 MG/1
12.5 CAPSULE, EXTENDED RELEASE ORAL EVERY 12 HOURS
Refills: 0 | Status: DISCONTINUED | OUTPATIENT
Start: 2023-04-28 | End: 2023-05-06

## 2023-04-28 RX ORDER — ATORVASTATIN CALCIUM 80 MG/1
40 TABLET, FILM COATED ORAL AT BEDTIME
Refills: 0 | Status: DISCONTINUED | OUTPATIENT
Start: 2023-04-28 | End: 2023-05-06

## 2023-04-28 RX ORDER — DEXTROSE 50 % IN WATER 50 %
15 SYRINGE (ML) INTRAVENOUS ONCE
Refills: 0 | Status: DISCONTINUED | OUTPATIENT
Start: 2023-04-28 | End: 2023-05-06

## 2023-04-28 RX ORDER — INSULIN LISPRO 100/ML
VIAL (ML) SUBCUTANEOUS
Refills: 0 | Status: DISCONTINUED | OUTPATIENT
Start: 2023-04-28 | End: 2023-05-06

## 2023-04-28 RX ORDER — CALCIUM GLUCONATE 100 MG/ML
1 VIAL (ML) INTRAVENOUS ONCE
Refills: 0 | Status: COMPLETED | OUTPATIENT
Start: 2023-04-28 | End: 2023-04-28

## 2023-04-28 RX ORDER — GLUCAGON INJECTION, SOLUTION 0.5 MG/.1ML
1 INJECTION, SOLUTION SUBCUTANEOUS ONCE
Refills: 0 | Status: DISCONTINUED | OUTPATIENT
Start: 2023-04-28 | End: 2023-05-06

## 2023-04-28 RX ORDER — ASPIRIN/CALCIUM CARB/MAGNESIUM 324 MG
81 TABLET ORAL DAILY
Refills: 0 | Status: DISCONTINUED | OUTPATIENT
Start: 2023-04-28 | End: 2023-05-06

## 2023-04-28 RX ORDER — SODIUM ZIRCONIUM CYCLOSILICATE 10 G/10G
10 POWDER, FOR SUSPENSION ORAL DAILY
Refills: 0 | Status: DISCONTINUED | OUTPATIENT
Start: 2023-04-29 | End: 2023-04-29

## 2023-04-28 RX ORDER — SODIUM ZIRCONIUM CYCLOSILICATE 10 G/10G
10 POWDER, FOR SUSPENSION ORAL ONCE
Refills: 0 | Status: COMPLETED | OUTPATIENT
Start: 2023-04-28 | End: 2023-04-28

## 2023-04-28 RX ORDER — DEXTROSE 50 % IN WATER 50 %
12.5 SYRINGE (ML) INTRAVENOUS ONCE
Refills: 0 | Status: DISCONTINUED | OUTPATIENT
Start: 2023-04-28 | End: 2023-05-06

## 2023-04-28 RX ORDER — ALBUTEROL 90 UG/1
10 AEROSOL, METERED ORAL ONCE
Refills: 0 | Status: COMPLETED | OUTPATIENT
Start: 2023-04-28 | End: 2023-04-28

## 2023-04-28 RX ADMIN — ALBUTEROL 10 MILLIGRAM(S): 90 AEROSOL, METERED ORAL at 21:00

## 2023-04-28 RX ADMIN — INSULIN HUMAN 5 UNIT(S): 100 INJECTION, SOLUTION SUBCUTANEOUS at 05:21

## 2023-04-28 RX ADMIN — BUMETANIDE 2 MILLIGRAM(S): 0.25 INJECTION INTRAMUSCULAR; INTRAVENOUS at 13:19

## 2023-04-28 RX ADMIN — Medication 30 MILLILITER(S): at 17:05

## 2023-04-28 RX ADMIN — SODIUM ZIRCONIUM CYCLOSILICATE 10 GRAM(S): 10 POWDER, FOR SUSPENSION ORAL at 21:00

## 2023-04-28 RX ADMIN — SODIUM ZIRCONIUM CYCLOSILICATE 5 GRAM(S): 10 POWDER, FOR SUSPENSION ORAL at 05:21

## 2023-04-28 RX ADMIN — INSULIN HUMAN 5 UNIT(S): 100 INJECTION, SOLUTION SUBCUTANEOUS at 19:53

## 2023-04-28 RX ADMIN — Medication 50 MILLILITER(S): at 19:54

## 2023-04-28 RX ADMIN — Medication 81 MILLIGRAM(S): at 13:19

## 2023-04-28 RX ADMIN — Medication 50 MILLILITER(S): at 05:23

## 2023-04-28 RX ADMIN — Medication 30 MILLILITER(S): at 21:20

## 2023-04-28 RX ADMIN — Medication 1300 MILLIGRAM(S): at 17:05

## 2023-04-28 RX ADMIN — CARVEDILOL PHOSPHATE 12.5 MILLIGRAM(S): 80 CAPSULE, EXTENDED RELEASE ORAL at 21:20

## 2023-04-28 RX ADMIN — Medication 100 GRAM(S): at 03:56

## 2023-04-28 RX ADMIN — ATORVASTATIN CALCIUM 40 MILLIGRAM(S): 80 TABLET, FILM COATED ORAL at 21:20

## 2023-04-28 RX ADMIN — BUMETANIDE 2 MILLIGRAM(S): 0.25 INJECTION INTRAMUSCULAR; INTRAVENOUS at 08:01

## 2023-04-28 RX ADMIN — HEPARIN SODIUM 5000 UNIT(S): 5000 INJECTION INTRAVENOUS; SUBCUTANEOUS at 17:06

## 2023-04-28 RX ADMIN — BUMETANIDE 2 MILLIGRAM(S): 0.25 INJECTION INTRAMUSCULAR; INTRAVENOUS at 21:03

## 2023-04-28 RX ADMIN — AMLODIPINE BESYLATE 10 MILLIGRAM(S): 2.5 TABLET ORAL at 17:06

## 2023-04-28 RX ADMIN — Medication 4: at 13:19

## 2023-04-28 NOTE — CONSULT NOTE ADULT - PROBLEM SELECTOR RECOMMENDATION 9
In the setting of advanced CKD and ARB use. Initial labs in ER showed serum potassium elevated at 6.8 (non hemolyzed). Pt received medical management- insulin, D50, Lokelma 5 mg . Repeat serum potassium is mildly elevated at 5.8. Recommend to give insulin, D50, Lokelma 10 mg OP daily and IV Bumex 2 mg once STAT. Repeat BMP in 4 hrs. Low potassium diet. Monitor serum potassium .

## 2023-04-28 NOTE — ED PROVIDER NOTE - PHYSICAL EXAMINATION
GENERAL: well appearing in no acute distress, non-toxic appearing  HEAD: normocephalic, atraumatic  HEENT: normal conjunctiva, oral mucosa moist, uvula midline, no tonsilar exudates, neck supple, no JVD  CARDIAC: regular rate and rhythm, normal S1S2, no appreciable murmurs  PULM: normal breath sounds, clear to ascultation bilaterally, no rales, rhonchi, wheezing  GI: abdomen nondistended, soft, nontender, no guarding, rebound tenderness  MSK: no peripheral edema, no calf tenderness b/l  SKIN: well-perfused, extremities warm, no visible rashes  PSYCH: appropriate mood and affect

## 2023-04-28 NOTE — H&P ADULT - ASSESSMENT
Pt. is a 72 year old Male with PMHx of advanced CKD, HTN, HLD, DM2 well known to our service from prior admission presents at [facility] on 4/27 for hyperkalemia noted on outpatient labs.   Initial labs in ER concerning for hyperkalemia.  renal eval   repeat k+ 5.8 after tx in er  f/u bmp  treatment as per hansel;l\hold/ace /arb  dvt proph  dm  fsg riss  c/w htn meds

## 2023-04-28 NOTE — ED PROVIDER NOTE - ATTENDING CONTRIBUTION TO CARE
Patient presents due to hyperkalemia on outpatient labs in the setting of known CKD.  Patient is completely asymptomatic in the ED.  His exam is unremarkable, he has benign vital signs.  EKG does show hyperacute T waves, for which she was given calcium while awaiting labs.  Labs came back confirming hyperkalemia of 6.8.  Patient was given hyperkalemic cocktail and will be admitted for further treatment of hyperkalemia, nephrology consultation.

## 2023-04-28 NOTE — PATIENT PROFILE ADULT - FALL HARM RISK - HARM RISK INTERVENTIONS
This nurse called [facility] @ [phone] to check on the status of his David. Per Zackary Benítez, there are issues with the insurance, causing him to have a high co-pay $2908.26/ month. Per [facility] the patient does not qualify for assistance due to is zip code. The patient was placed on a wait list for a rod and also was applying for Medicaid. His medicaid was approved on 11/14 but will not cover his part D co-pay. This nurse will notify the financial navigator.
Assistance with ambulation/Assistance OOB with selected safe patient handling equipment/Communicate Risk of Fall with Harm to all staff/Discuss with provider need for PT consult/Monitor gait and stability/Provide patient with walking aids - walker, cane, crutches/Reinforce activity limits and safety measures with patient and family/Tailored Fall Risk Interventions/Visual Cue: Yellow wristband and red socks/Bed in lowest position, wheels locked, appropriate side rails in place/Call bell, personal items and telephone in reach/Instruct patient to call for assistance before getting out of bed or chair/Non-slip footwear when patient is out of bed/[place] to call system/Physically safe environment - no spills, clutter or unnecessary equipment/Purposeful Proactive Rounding/Room/bathroom lighting operational, light cord in reach

## 2023-04-28 NOTE — H&P ADULT - NSHPLABSRESULTS_GEN_ALL_CORE
8.7    10.55 )-----------( 197      ( 28 Apr 2023 03:32 )             26.5       04-28    138  |  110<H>  |  80<H>  ----------------------------<  166<H>  5.8<H>   |  16<L>  |  4.02<H>    Ca    8.7      28 Apr 2023 06:26  Phos  5.1     04-28  Mg     2.6     04-28    TPro  6.4  /  Alb  3.3  /  TBili  0.4  /  DBili  x   /  AST  46<H>  /  ALT  63<H>  /  AlkPhos  63  04-28                  PT/INR - ( 28 Apr 2023 03:32 )   PT: 10.8 sec;   INR: 0.93 ratio         PTT - ( 28 Apr 2023 03:32 )  PTT:32.7 sec    Lactate Trend            CAPILLARY BLOOD GLUCOSE      POCT Blood Glucose.: 167 mg/dL (28 Apr 2023 06:19)

## 2023-04-28 NOTE — ED PROVIDER NOTE - NS ED ROS FT
General: denies fever, chills  CV: denies chest pain, palpitations  Resp: denies difficulty breathing, cough  Abdominal: denies nausea, vomiting, diarrhea, abdominal pain  : denies urinary pain or discharge  MSK: denies muscle aches, leg swelling  Neuro: denies headaches,   Skin: denies rashes, bruises

## 2023-04-28 NOTE — ED PROVIDER NOTE - OBJECTIVE STATEMENT
72 y.o. M with PMH of HTN, HLD, DM2 and advanced CKD not on HD well known to me from prior admission presents for hyperkalemia noted on outpatient labs now admitted for persistent hyperkalemia. creation of L AVF on 4/6. on outpatient sodium bicarb tabs. plan as per nephro dr. najera 4/7 was to plan for HD. sent in for elevated potassium 6.2 cr 4.05. pt asymptomatic

## 2023-04-28 NOTE — ED PROVIDER NOTE - CLINICAL SUMMARY MEDICAL DECISION MAKING FREE TEXT BOX
71 yo m here for high potassium 6.1 at outpatient labs, pt on renal transplant list for ckd stage 5. L AV fistula placeed 4/6. pt asymptomatic. ekg hyperacute T waves. will give calcium and admit for HD. not urgent pt not volume overloaded so will treat medically and admit medicine for HD tm.

## 2023-04-28 NOTE — H&P ADULT - HISTORY OF PRESENT ILLNESS
Pt. is a 72 year old Male with PMHx of advanced CKD, HTN, HLD, DM2 well known to our service from prior admission presents at [facility] on 4/27 for hyperkalemia noted on outpatient labs.   Initial labs in ER concerning for hyperkalemia.    . Pt underwent creation of L AVF on 4/6 in prepearatoin of long term hemodialysis  . Last Scr was 4.05 on 4/27  . Initial labs in ER showed serum potassium elevated at 6.8 (non hemolyzed).   Pt received medical management- insulin, D50, lokelma 5 mg .   Repeat serum potassium is mildly elevated at 5.8.

## 2023-04-28 NOTE — ED PROVIDER NOTE - PROGRESS NOTE DETAILS
Samantha PGY1: Patient signed out to me by day team. Nephrology fellow recommending bumex 2 mg IV, lokelma 10, and insulin D50. Patient's hyperkalemia has improved since receiving lokelma and insulin/D50. Will add on bumex 2 mg IV. Patient admitted to Dr. Estiven Hayes for hyperkalemia in the setting of CKD.

## 2023-04-28 NOTE — CONSULT NOTE ADULT - ATTENDING COMMENTS
Pt. is a 72 year old Male with PMHx of advanced CKD, HTN, HLD, DM2 well known to our service from prior admission presents at [facility] on 4/27 for hyperkalemia noted on outpatient labs. Initial labs in ER concerning for hyperkalemia. Nephrology consulted for advanced CKD and hyperkalemia. Received urgent rx --> K <6.  Alert, conversant and understands need for HD if conservative measures ineffective  1.  Hyperkalemia--med rx but increased HCO3 with loop diuretic will cause both beneficial transcellular flux and enhance excretion distally.  Goal is <5.5 otherwise additional lokelma required.  Trend A or VBG K+  2.  CKD5-- not overly uremic, no HD at present.  Limited w/u as indicated and r/o obstruction paramount since associated with high K.      discussed with med team  Jose Jolly MD  contact me on TEAMS

## 2023-04-28 NOTE — CONSULT NOTE ADULT - PROBLEM SELECTOR RECOMMENDATION 2
Pt with advanced CKD stage 5 being admitted for hyperkalemia. Pt follows with Dr. Rees. Pt made aware that he will need long term dialysis in near future. pt underwent lUE AVF creation during previous admission on 4/6. Scr on admission elevated at 4.02 today. Pt clinically non uremic and non oliguric. No urgent indication for HD for now. If repeat serum potassium remained elevated , will consider HD. Plan discussed with patient. Risks and benefits associated with RRT/HD explained at length. HD consent obtained and kept in patients chart. Recommend to start 1300 mg sodium bicarb TID. Monitor labs and urine output. Avoid any potential nephrotoxins. Dose medications as per eGFR.    If you have any questions, please feel free to contact me  Yair Jackson  Nephrology Fellow  [phone]/ [employer] Teams(Preferred)  (After 5pm or on weekends please page the on-call fellow).

## 2023-04-28 NOTE — ED ADULT NURSE NOTE - OBJECTIVE STATEMENT
71yo M w/ PMH HTN, HLD, DM2, CKD (not on HD) presents to ED c/o abnormal labs. Pt states he had outpatient labs done today and he was found to have high potassium to 6.2 and elevated Cr. 4.05. Pt denies any pain at this time. Denies chest pain, SOB, N/V/D, weakness, dizziness, lightheadedness, blood in stools, hematuria, dysuria, urinary frequency, fevers/chills, sick contacts. A&Ox3. Strong peripheral pulses. Neurologically intact and follows commands. Pulse motor and sensation present and equal to all 4 extremities. Abdomen soft, nondistended, nontender to palpation. NSR on cardiac monitor. Skin warm dry intact and normal for ethnicity. Ambulatory with steady gait in ED. Stretcher locked and in lowest position, appropriate side rails up. Pt instructed to notify RN if assistance is needed. 73yo M w/ PMH HTN, HLD, DM2, CKD (not on HD) presents to ED c/o abnormal labs. Pt states he had outpatient labs done today and he was found to have high potassium to 6.2 and elevated Cr. 4.05. Also notes having L AVF placed on 4/6, has not started HD. Pt denies any pain at this time. Denies chest pain, SOB, N/V/D, weakness, dizziness, lightheadedness, blood in stools, hematuria, dysuria, urinary frequency, fevers/chills, sick contacts. A&Ox3. Strong peripheral pulses. Neurologically intact and follows commands. Pulse motor and sensation present and equal to all 4 extremities. Abdomen soft, nondistended, nontender to palpation. NSR on cardiac monitor. Skin warm dry intact and normal for ethnicity. Ambulatory with steady gait in ED. Stretcher locked and in lowest position, appropriate side rails up. Pt instructed to notify RN if assistance is needed.

## 2023-04-29 DIAGNOSIS — D64.9 ANEMIA, UNSPECIFIED: ICD-10-CM

## 2023-04-29 LAB
ALBUMIN SERPL ELPH-MCNC: 3.2 G/DL — LOW (ref 3.3–5)
ALP SERPL-CCNC: 66 U/L — SIGNIFICANT CHANGE UP (ref 40–120)
ALT FLD-CCNC: 83 U/L — HIGH (ref 10–45)
ANION GAP SERPL CALC-SCNC: 13 MMOL/L — SIGNIFICANT CHANGE UP (ref 5–17)
AST SERPL-CCNC: 53 U/L — HIGH (ref 10–40)
BILIRUB SERPL-MCNC: 0.3 MG/DL — SIGNIFICANT CHANGE UP (ref 0.2–1.2)
BUN SERPL-MCNC: 87 MG/DL — HIGH (ref 7–23)
CALCIUM SERPL-MCNC: 8.7 MG/DL — SIGNIFICANT CHANGE UP (ref 8.4–10.5)
CHLORIDE SERPL-SCNC: 106 MMOL/L — SIGNIFICANT CHANGE UP (ref 96–108)
CO2 SERPL-SCNC: 21 MMOL/L — LOW (ref 22–31)
CREAT SERPL-MCNC: 4.48 MG/DL — HIGH (ref 0.5–1.3)
EGFR: 13 ML/MIN/1.73M2 — LOW
GLUCOSE BLDC GLUCOMTR-MCNC: 133 MG/DL — HIGH (ref 70–99)
GLUCOSE BLDC GLUCOMTR-MCNC: 134 MG/DL — HIGH (ref 70–99)
GLUCOSE BLDC GLUCOMTR-MCNC: 146 MG/DL — HIGH (ref 70–99)
GLUCOSE BLDC GLUCOMTR-MCNC: 213 MG/DL — HIGH (ref 70–99)
GLUCOSE SERPL-MCNC: 102 MG/DL — HIGH (ref 70–99)
HCT VFR BLD CALC: 23.9 % — LOW (ref 39–50)
HGB BLD-MCNC: 7.8 G/DL — LOW (ref 13–17)
MCHC RBC-ENTMCNC: 29.5 PG — SIGNIFICANT CHANGE UP (ref 27–34)
MCHC RBC-ENTMCNC: 32.6 GM/DL — SIGNIFICANT CHANGE UP (ref 32–36)
MCV RBC AUTO: 90.5 FL — SIGNIFICANT CHANGE UP (ref 80–100)
NRBC # BLD: 0 /100 WBCS — SIGNIFICANT CHANGE UP (ref 0–0)
PLATELET # BLD AUTO: 201 K/UL — SIGNIFICANT CHANGE UP (ref 150–400)
POTASSIUM SERPL-MCNC: 5.6 MMOL/L — HIGH (ref 3.5–5.3)
POTASSIUM SERPL-SCNC: 5.6 MMOL/L — HIGH (ref 3.5–5.3)
PROT SERPL-MCNC: 6.5 G/DL — SIGNIFICANT CHANGE UP (ref 6–8.3)
RBC # BLD: 2.64 M/UL — LOW (ref 4.2–5.8)
RBC # FLD: 14.1 % — SIGNIFICANT CHANGE UP (ref 10.3–14.5)
SODIUM SERPL-SCNC: 140 MMOL/L — SIGNIFICANT CHANGE UP (ref 135–145)
WBC # BLD: 8.76 K/UL — SIGNIFICANT CHANGE UP (ref 3.8–10.5)
WBC # FLD AUTO: 8.76 K/UL — SIGNIFICANT CHANGE UP (ref 3.8–10.5)

## 2023-04-29 PROCEDURE — 99233 SBSQ HOSP IP/OBS HIGH 50: CPT | Mod: GC

## 2023-04-29 RX ORDER — CHLORHEXIDINE GLUCONATE 213 G/1000ML
1 SOLUTION TOPICAL DAILY
Refills: 0 | Status: DISCONTINUED | OUTPATIENT
Start: 2023-04-29 | End: 2023-05-06

## 2023-04-29 RX ORDER — SODIUM ZIRCONIUM CYCLOSILICATE 10 G/10G
10 POWDER, FOR SUSPENSION ORAL
Refills: 0 | Status: COMPLETED | OUTPATIENT
Start: 2023-04-29 | End: 2023-05-02

## 2023-04-29 RX ADMIN — Medication 1300 MILLIGRAM(S): at 17:18

## 2023-04-29 RX ADMIN — Medication 1300 MILLIGRAM(S): at 05:32

## 2023-04-29 RX ADMIN — CHLORHEXIDINE GLUCONATE 1 APPLICATION(S): 213 SOLUTION TOPICAL at 10:59

## 2023-04-29 RX ADMIN — CARVEDILOL PHOSPHATE 12.5 MILLIGRAM(S): 80 CAPSULE, EXTENDED RELEASE ORAL at 17:18

## 2023-04-29 RX ADMIN — Medication 81 MILLIGRAM(S): at 11:01

## 2023-04-29 RX ADMIN — SODIUM ZIRCONIUM CYCLOSILICATE 10 GRAM(S): 10 POWDER, FOR SUSPENSION ORAL at 17:19

## 2023-04-29 RX ADMIN — HEPARIN SODIUM 5000 UNIT(S): 5000 INJECTION INTRAVENOUS; SUBCUTANEOUS at 05:31

## 2023-04-29 RX ADMIN — Medication 30 MILLILITER(S): at 21:27

## 2023-04-29 RX ADMIN — ATORVASTATIN CALCIUM 40 MILLIGRAM(S): 80 TABLET, FILM COATED ORAL at 21:27

## 2023-04-29 RX ADMIN — CARVEDILOL PHOSPHATE 12.5 MILLIGRAM(S): 80 CAPSULE, EXTENDED RELEASE ORAL at 05:33

## 2023-04-29 RX ADMIN — Medication 30 MILLILITER(S): at 14:13

## 2023-04-29 RX ADMIN — Medication 50 MICROGRAM(S): at 05:32

## 2023-04-29 RX ADMIN — AMLODIPINE BESYLATE 10 MILLIGRAM(S): 2.5 TABLET ORAL at 05:33

## 2023-04-29 RX ADMIN — Medication 30 MILLILITER(S): at 05:39

## 2023-04-29 RX ADMIN — HEPARIN SODIUM 5000 UNIT(S): 5000 INJECTION INTRAVENOUS; SUBCUTANEOUS at 17:18

## 2023-04-29 NOTE — PROGRESS NOTE ADULT - ASSESSMENT
Pt. is a 72 year old Male with PMHx of advanced CKD, HTN, HLD, DM2 well known to our service from prior admission presents at [facility] on 4/27 for hyperkalemia noted on outpatient labs.   Initial labs in ER concerning for hyperkalemia.  renal eval noted  f/u bmp noted  potassium much improved  treatment as per hansel;l\hold/ace /arb  dvt proph  dm  fsg riss  c/w htn meds   d/c when ok w/ renal

## 2023-04-29 NOTE — PROGRESS NOTE ADULT - SUBJECTIVE AND OBJECTIVE BOX
DATE OF SERVICE: 04-29-23 @ 07:21  CHIEF COMPLAINT:Patient is a 72y old  Male who presents with a chief complaint of   	        PAST MEDICAL & SURGICAL HISTORY:  Hypertension      Diabetes      Stage 5 chronic kidney disease not on chronic dialysis      HLD (hyperlipidemia)              REVIEW OF SYSTEMS:  CONSTITUTIONAL: No fever, weight loss, or fatigue  EYES: No eye pain, visual disturbances, or discharge  NECK: No pain or stiffness  RESPIRATORY: No cough, wheezing, chills or hemoptysis; No Shortness of Breath  CARDIOVASCULAR: No chest pain, palpitations, passing out, dizziness, or leg swelling  GASTROINTESTINAL: No abdominal or epigastric pain. No nausea, vomiting, or hematemesis; No diarrhea or constipation. No melena or hematochezia.  GENITOURINARY: No dysuria, frequency, hematuria, or incontinence  NEUROLOGICAL: No headaches,  Medications:  MEDICATIONS  (STANDING):  amLODIPine   Tablet 10 milliGRAM(s) Oral daily  aspirin enteric coated 81 milliGRAM(s) Oral daily  atorvastatin 40 milliGRAM(s) Oral at bedtime  carvedilol 12.5 milliGRAM(s) Oral every 12 hours  citric acid/sodium citrate Solution 30 milliLiter(s) Oral three times a day  dextrose 5%. 1000 milliLiter(s) (100 mL/Hr) IV Continuous <Continuous>  dextrose 5%. 1000 milliLiter(s) (50 mL/Hr) IV Continuous <Continuous>  dextrose 50% Injectable 25 Gram(s) IV Push once  dextrose 50% Injectable 12.5 Gram(s) IV Push once  dextrose 50% Injectable 25 Gram(s) IV Push once  glucagon  Injectable 1 milliGRAM(s) IntraMuscular once  heparin   Injectable 5000 Unit(s) SubCutaneous every 12 hours  insulin lispro (ADMELOG) corrective regimen sliding scale   SubCutaneous three times a day before meals  levothyroxine 50 MICROGram(s) Oral daily  sodium bicarbonate 1300 milliGRAM(s) Oral two times a day  sodium zirconium cyclosilicate 10 Gram(s) Oral daily    MEDICATIONS  (PRN):  dextrose Oral Gel 15 Gram(s) Oral once PRN Blood Glucose LESS THAN 70 milliGRAM(s)/deciliter    	    PHYSICAL EXAM:  T(C): 37.1 (04-29-23 @ 04:39), Max: 37.1 (04-29-23 @ 04:39)  HR: 63 (04-29-23 @ 04:39) (60 - 75)  BP: 116/55 (04-29-23 @ 04:39) (116/55 - 151/67)  RR: 18 (04-29-23 @ 04:39) (18 - 20)  SpO2: 96% (04-29-23 @ 04:39) (96% - 100%)  Wt(kg): --  I&O's Summary    28 Apr 2023 07:01  -  29 Apr 2023 07:00  --------------------------------------------------------  IN: 510 mL / OUT: 2430 mL / NET: -1920 mL        Appearance: Normal	  HEENT:   Normal oral mucosa, PERRL, EOMI	  Lymphatic: No lymphadenopathy  Cardiovascular: Normal S1 S2, No JVD, No murmurs, No edema  Respiratory: Lungs clear to auscultation	  Psychiatry: A & O x 3  Gastrointestinal:  Soft, Non-tender, + BS	  Skin: No rashes, No ecchymoses, No cyanosis	  Neurologic: Non-focal  Extremities: Normal range of motion, No clubbing, cyanosis or edema  Vascular: Peripheral pulses palpable 2+ bilaterally    TELEMETRY: 	    ECG:  	  RADIOLOGY:  OTHER: 	  	  LABS:	 	    CARDIAC MARKERS:                                7.8    8.76  )-----------( 201      ( 29 Apr 2023 04:40 )             23.9     04-29    140  |  106  |  87<H>  ----------------------------<  102<H>  5.6<H>   |  21<L>  |  4.48<H>    Ca    8.7      29 Apr 2023 04:40  Phos  5.1     04-28  Mg     2.6     04-28    TPro  6.5  /  Alb  3.2<L>  /  TBili  0.3  /  DBili  x   /  AST  53<H>  /  ALT  83<H>  /  AlkPhos  66  04-29    proBNP:   Lipid Profile:   HgA1c:   TSH:

## 2023-04-29 NOTE — PROGRESS NOTE ADULT - SUBJECTIVE AND OBJECTIVE BOX
[facility] Division of Kidney Diseases & Hypertension  FOLLOW UP NOTE  [phone]--------------------------------------------------------------------------------    HPI: Pt. is a 72 year old Male with PMHx of advanced CKD, HTN, HLD, DM2 well known to our service from prior admission presents at [facility] on 4/27 for hyperkalemia noted on outpatient labs. Initial labs in ER concerning for hyperkalemia. Pt. being seen for advanced CKD and hyperkalemia. Pt with h/o stage 5 CKD, and is being followed by Dr. Rees. Pt underwent creation of L AVF on 4/6 in preparation of long term hemodialysis. SCr remains elevated at 4.48 today. Serum potassium is mildly elevated at 5.6 today.    24 hour events/subjective: Pt. was seen and evaluated at bedside this morning. He reports feeling well, endorses no complaints. He denies any headaches, fevers/chills, chest pain, SOB, and LE edema.      PAST HISTORY  --------------------------------------------------------------------------------  No significant changes to PMH, PSH, FHx, SHx, unless otherwise noted    ALLERGIES & MEDICATIONS  --------------------------------------------------------------------------------  Allergies    No Known Allergies    Intolerances      Standing Inpatient Medications  amLODIPine   Tablet 10 milliGRAM(s) Oral daily  aspirin enteric coated 81 milliGRAM(s) Oral daily  atorvastatin 40 milliGRAM(s) Oral at bedtime  carvedilol 12.5 milliGRAM(s) Oral every 12 hours  chlorhexidine 2% Cloths 1 Application(s) Topical daily  citric acid/sodium citrate Solution 30 milliLiter(s) Oral three times a day  dextrose 5%. 1000 milliLiter(s) IV Continuous <Continuous>  dextrose 5%. 1000 milliLiter(s) IV Continuous <Continuous>  dextrose 50% Injectable 25 Gram(s) IV Push once  dextrose 50% Injectable 25 Gram(s) IV Push once  dextrose 50% Injectable 12.5 Gram(s) IV Push once  glucagon  Injectable 1 milliGRAM(s) IntraMuscular once  heparin   Injectable 5000 Unit(s) SubCutaneous every 12 hours  insulin lispro (ADMELOG) corrective regimen sliding scale   SubCutaneous three times a day before meals  levothyroxine 50 MICROGram(s) Oral daily  sodium bicarbonate 1300 milliGRAM(s) Oral two times a day  sodium zirconium cyclosilicate 10 Gram(s) Oral daily    PRN Inpatient Medications  dextrose Oral Gel 15 Gram(s) Oral once PRN      REVIEW OF SYSTEMS  --------------------------------------------------------------------------------  See HPI    VITALS/PHYSICAL EXAM  --------------------------------------------------------------------------------  T(C): 37.1 (04-29-23 @ 04:39), Max: 37.1 (04-29-23 @ 04:39)  HR: 63 (04-29-23 @ 04:39) (60 - 75)  BP: 116/55 (04-29-23 @ 04:39) (116/55 - 151/67)  RR: 18 (04-29-23 @ 04:39) (18 - 20)  SpO2: 96% (04-29-23 @ 04:39) (96% - 100%)  Wt(kg): --  Height (cm): 182.9 (04-28-23 @ 00:40)  Weight (kg): 77.1 (04-28-23 @ 00:40)  BMI (kg/m2): 23 (04-28-23 @ 00:40)  BSA (m2): 1.99 (04-28-23 @ 00:40)      04-28-23 @ 07:01  -  04-29-23 @ 07:00  --------------------------------------------------------  IN: 510 mL / OUT: 2430 mL / NET: -1920 mL    04-29-23 @ 07:01  -  04-29-23 @ 10:03  --------------------------------------------------------  IN: 300 mL / OUT: 0 mL / NET: 300 mL      Physical Exam:  Gen: NAD  HEENT: MMM  Pulm: CTA B/L  CV: S1S2  Abd: Soft, +BS   Ext: No LE edema B/L  Neuro: Awake  Skin: Warm and dry  Vascular access: RAVINDRA CALVO (immature)    LABS/STUDIES  --------------------------------------------------------------------------------              7.8    8.76  >-----------<  201      [04-29-23 @ 04:40]              23.9     140  |  106  |  87  ----------------------------<  102      [04-29-23 @ 04:40]  5.6   |  21  |  4.48        Ca     8.7     [04-29-23 @ 04:40]      Mg     2.6     [04-28-23 @ 03:32]      Phos  5.1     [04-28-23 @ 03:32]    TPro  6.5  /  Alb  3.2  /  TBili  0.3  /  DBili  x   /  AST  53  /  ALT  83  /  AlkPhos  66  [04-29-23 @ 04:40]    PT/INR: PT 10.8 , INR 0.93       [04-28-23 @ 03:32]  PTT: 32.7       [04-28-23 @ 03:32]    Creatinine Trend:  SCr 4.48 [04-29 @ 04:40]  SCr 4.26 [04-28 @ 23:06]  SCr 4.02 [04-28 @ 18:07]  SCr 3.86 [04-28 @ 10:53]  SCr 4.02 [04-28 @ 06:26]

## 2023-04-29 NOTE — PROGRESS NOTE ADULT - PROBLEM SELECTOR PLAN 2
Pt. with hyperkalemia In the setting of advanced CKD and ARB use. Serum potassium remains elevated but improved to 5.6 today. Recommend to increase Lokelma dose to 10 gm BID. Low potassium diet. Monitor serum potassium. Pt. with hyperkalemia In the setting of advanced CKD and ARB use. Serum potassium remains elevated but improved to 5.6 today. Recommend to increase Lokelma dose to 10 gm BID today. Low potassium diet. Monitor serum potassium.

## 2023-04-30 LAB
ALBUMIN SERPL ELPH-MCNC: 3.6 G/DL — SIGNIFICANT CHANGE UP (ref 3.3–5)
ALP SERPL-CCNC: 72 U/L — SIGNIFICANT CHANGE UP (ref 40–120)
ALT FLD-CCNC: 82 U/L — HIGH (ref 10–45)
ANION GAP SERPL CALC-SCNC: 15 MMOL/L — SIGNIFICANT CHANGE UP (ref 5–17)
AST SERPL-CCNC: 42 U/L — HIGH (ref 10–40)
BILIRUB SERPL-MCNC: 0.3 MG/DL — SIGNIFICANT CHANGE UP (ref 0.2–1.2)
BUN SERPL-MCNC: 83 MG/DL — HIGH (ref 7–23)
CALCIUM SERPL-MCNC: 8.6 MG/DL — SIGNIFICANT CHANGE UP (ref 8.4–10.5)
CHLORIDE SERPL-SCNC: 105 MMOL/L — SIGNIFICANT CHANGE UP (ref 96–108)
CO2 SERPL-SCNC: 20 MMOL/L — LOW (ref 22–31)
CREAT SERPL-MCNC: 4.26 MG/DL — HIGH (ref 0.5–1.3)
EGFR: 14 ML/MIN/1.73M2 — LOW
FERRITIN SERPL-MCNC: 777 NG/ML — HIGH (ref 30–400)
GLUCOSE BLDC GLUCOMTR-MCNC: 135 MG/DL — HIGH (ref 70–99)
GLUCOSE BLDC GLUCOMTR-MCNC: 158 MG/DL — HIGH (ref 70–99)
GLUCOSE BLDC GLUCOMTR-MCNC: 165 MG/DL — HIGH (ref 70–99)
GLUCOSE BLDC GLUCOMTR-MCNC: 181 MG/DL — HIGH (ref 70–99)
GLUCOSE SERPL-MCNC: 107 MG/DL — HIGH (ref 70–99)
HCT VFR BLD CALC: 24.2 % — LOW (ref 39–50)
HGB BLD-MCNC: 7.8 G/DL — LOW (ref 13–17)
IRON SATN MFR SERPL: 35 % — SIGNIFICANT CHANGE UP (ref 16–55)
IRON SATN MFR SERPL: 70 UG/DL — SIGNIFICANT CHANGE UP (ref 45–165)
MCHC RBC-ENTMCNC: 29.5 PG — SIGNIFICANT CHANGE UP (ref 27–34)
MCHC RBC-ENTMCNC: 32.2 GM/DL — SIGNIFICANT CHANGE UP (ref 32–36)
MCV RBC AUTO: 91.7 FL — SIGNIFICANT CHANGE UP (ref 80–100)
MRSA PCR RESULT.: SIGNIFICANT CHANGE UP
NRBC # BLD: 0 /100 WBCS — SIGNIFICANT CHANGE UP (ref 0–0)
PLATELET # BLD AUTO: 212 K/UL — SIGNIFICANT CHANGE UP (ref 150–400)
POTASSIUM SERPL-MCNC: 4.8 MMOL/L — SIGNIFICANT CHANGE UP (ref 3.5–5.3)
POTASSIUM SERPL-SCNC: 4.8 MMOL/L — SIGNIFICANT CHANGE UP (ref 3.5–5.3)
PROT SERPL-MCNC: 6.8 G/DL — SIGNIFICANT CHANGE UP (ref 6–8.3)
RBC # BLD: 2.64 M/UL — LOW (ref 4.2–5.8)
RBC # FLD: 14.1 % — SIGNIFICANT CHANGE UP (ref 10.3–14.5)
S AUREUS DNA NOSE QL NAA+PROBE: SIGNIFICANT CHANGE UP
SODIUM SERPL-SCNC: 140 MMOL/L — SIGNIFICANT CHANGE UP (ref 135–145)
TIBC SERPL-MCNC: 201 UG/DL — LOW (ref 220–430)
UIBC SERPL-MCNC: 130 UG/DL — SIGNIFICANT CHANGE UP (ref 110–370)
WBC # BLD: 9.52 K/UL — SIGNIFICANT CHANGE UP (ref 3.8–10.5)
WBC # FLD AUTO: 9.52 K/UL — SIGNIFICANT CHANGE UP (ref 3.8–10.5)

## 2023-04-30 PROCEDURE — 99232 SBSQ HOSP IP/OBS MODERATE 35: CPT | Mod: GC

## 2023-04-30 RX ORDER — CITRIC ACID/SODIUM CITRATE 300-500 MG
30 SOLUTION, ORAL ORAL
Qty: 2700 | Refills: 0
Start: 2023-04-30 | End: 2023-05-29

## 2023-04-30 RX ORDER — ERYTHROPOIETIN 10000 [IU]/ML
4000 INJECTION, SOLUTION INTRAVENOUS; SUBCUTANEOUS
Refills: 0 | Status: DISCONTINUED | OUTPATIENT
Start: 2023-04-30 | End: 2023-05-06

## 2023-04-30 RX ORDER — SODIUM BICARBONATE 1 MEQ/ML
2 SYRINGE (ML) INTRAVENOUS
Qty: 120 | Refills: 0
Start: 2023-04-30 | End: 2023-05-29

## 2023-04-30 RX ORDER — SODIUM ZIRCONIUM CYCLOSILICATE 10 G/10G
10 POWDER, FOR SUSPENSION ORAL
Qty: 200 | Refills: 0
Start: 2023-04-30 | End: 2023-05-19

## 2023-04-30 RX ADMIN — Medication 30 MILLILITER(S): at 13:54

## 2023-04-30 RX ADMIN — Medication 50 MICROGRAM(S): at 05:12

## 2023-04-30 RX ADMIN — CARVEDILOL PHOSPHATE 12.5 MILLIGRAM(S): 80 CAPSULE, EXTENDED RELEASE ORAL at 17:13

## 2023-04-30 RX ADMIN — Medication 1300 MILLIGRAM(S): at 17:12

## 2023-04-30 RX ADMIN — Medication 30 MILLILITER(S): at 05:13

## 2023-04-30 RX ADMIN — Medication 2: at 17:11

## 2023-04-30 RX ADMIN — SODIUM ZIRCONIUM CYCLOSILICATE 10 GRAM(S): 10 POWDER, FOR SUSPENSION ORAL at 06:14

## 2023-04-30 RX ADMIN — HEPARIN SODIUM 5000 UNIT(S): 5000 INJECTION INTRAVENOUS; SUBCUTANEOUS at 05:12

## 2023-04-30 RX ADMIN — ATORVASTATIN CALCIUM 40 MILLIGRAM(S): 80 TABLET, FILM COATED ORAL at 21:27

## 2023-04-30 RX ADMIN — CHLORHEXIDINE GLUCONATE 1 APPLICATION(S): 213 SOLUTION TOPICAL at 07:03

## 2023-04-30 RX ADMIN — HEPARIN SODIUM 5000 UNIT(S): 5000 INJECTION INTRAVENOUS; SUBCUTANEOUS at 17:12

## 2023-04-30 RX ADMIN — AMLODIPINE BESYLATE 10 MILLIGRAM(S): 2.5 TABLET ORAL at 05:12

## 2023-04-30 RX ADMIN — ERYTHROPOIETIN 4000 UNIT(S): 10000 INJECTION, SOLUTION INTRAVENOUS; SUBCUTANEOUS at 17:11

## 2023-04-30 RX ADMIN — Medication 1300 MILLIGRAM(S): at 05:12

## 2023-04-30 RX ADMIN — Medication 2: at 11:33

## 2023-04-30 RX ADMIN — Medication 81 MILLIGRAM(S): at 11:13

## 2023-04-30 RX ADMIN — Medication 30 MILLILITER(S): at 21:27

## 2023-04-30 RX ADMIN — CARVEDILOL PHOSPHATE 12.5 MILLIGRAM(S): 80 CAPSULE, EXTENDED RELEASE ORAL at 05:12

## 2023-04-30 RX ADMIN — SODIUM ZIRCONIUM CYCLOSILICATE 10 GRAM(S): 10 POWDER, FOR SUSPENSION ORAL at 17:12

## 2023-04-30 NOTE — DIETITIAN INITIAL EVALUATION ADULT - NS FNS DIET ORDER
Diet, Consistent Carbohydrate Renal w/Evening Snack:   For patients receiving Renal Replacement - No Protein Restr, No Conc K, No Conc Phos, Low Sodium (RENAL)  No Concentrated Potassium (04-28-23 @ 09:06) [Active]

## 2023-04-30 NOTE — DIETITIAN INITIAL EVALUATION ADULT - SIGNS/SYMPTOMS
24% weight loss x 4-5 months, moderate muscle/fat depletion verbalizes incomplete information, questions

## 2023-04-30 NOTE — DIETITIAN INITIAL EVALUATION ADULT - ENERGY INTAKE
Pt reports good appetite/PO intake, ~% of meals consumed daily since admit  - Amenable to receiving oral nutritional supplement to optimize PO intake: Suplena 1x/day   Adequate (%)

## 2023-04-30 NOTE — DIETITIAN INITIAL EVALUATION ADULT - DIET TYPE
1) Liberalize diet: consistent carbohydrate (with snack), low sodium, no concentrated K+.  2) Trial oral nutritional supplement: Suplena 1x/day 1) Continue diet as ordered/tolerated: consistent carbohydrate renal diet (with evening snack)  2) Trial oral nutritional supplement: Suplena 1x/day

## 2023-04-30 NOTE — PROGRESS NOTE ADULT - PROBLEM SELECTOR PLAN 2
Pt. with hyperkalemia In the setting of advanced CKD and ARB use. Serum potassium remains elevated but improved to 4.8 today  Keep lokelma at BID dosing.  Educated him re low K diet

## 2023-04-30 NOTE — DIETITIAN INITIAL EVALUATION ADULT - PHYSCIAL ASSESSMENT
Weight Hx Per:  - Source: Pt   - UBW: 225 pounds (12/2022)  - Reported weight changes: weight loss x 4-5 months    Current Admission Weights:  - Dosing weight: 170 pounds (04/28)    Weight Change:  - 24% weight loss x 5 months; clinically significant. Will continue to monitor weight trends as available/able.     IBW: 178 pounds   %IBW: 96%

## 2023-04-30 NOTE — DIETITIAN INITIAL EVALUATION ADULT - PERSON TAUGHT/METHOD
Discussed nutrition therapy recommendations for CKD 5 with emphasis on: concentrated K+/phos foods to limit/avoid, lower K+/phos options by food groups, modifications to current diet to reduce concentrated nutrient intake. Discussed suggestions for reducing sodium intake including: avoiding processed/packaged foods with high sodium content, tips for reducing sodium intake when eating out and at home./verbal instruction/written material/individual instruction/patient instructed

## 2023-04-30 NOTE — DIETITIAN INITIAL EVALUATION ADULT - ADD RECOMMEND
3) Encourage PO intake, obtain food preferences, provide feeding assistance as needed.   4) Add Nephro-graciela to optimize micronutrient intake.   5) Monitor nutritional intake/tolerance, weights, labs, hydration status, skin integrity, BM, GI symptoms.   6) Diet education reviewed, reinforce as needed.   7) New malnutrition notification sent.

## 2023-04-30 NOTE — DIETITIAN INITIAL EVALUATION ADULT - PERTINENT MEDS FT
MEDICATIONS  (STANDING):  amLODIPine   Tablet 10 milliGRAM(s) Oral daily  aspirin enteric coated 81 milliGRAM(s) Oral daily  atorvastatin 40 milliGRAM(s) Oral at bedtime  carvedilol 12.5 milliGRAM(s) Oral every 12 hours  chlorhexidine 2% Cloths 1 Application(s) Topical daily  citric acid/sodium citrate Solution 30 milliLiter(s) Oral three times a day  dextrose 5%. 1000 milliLiter(s) (100 mL/Hr) IV Continuous <Continuous>  dextrose 5%. 1000 milliLiter(s) (50 mL/Hr) IV Continuous <Continuous>  dextrose 50% Injectable 25 Gram(s) IV Push once  dextrose 50% Injectable 25 Gram(s) IV Push once  dextrose 50% Injectable 12.5 Gram(s) IV Push once  epoetin rose-epbx (RETACRIT) Injectable 4000 Unit(s) SubCutaneous every 7 days  glucagon  Injectable 1 milliGRAM(s) IntraMuscular once  heparin   Injectable 5000 Unit(s) SubCutaneous every 12 hours  insulin lispro (ADMELOG) corrective regimen sliding scale   SubCutaneous three times a day before meals  levothyroxine 50 MICROGram(s) Oral daily  sodium bicarbonate 1300 milliGRAM(s) Oral two times a day  sodium zirconium cyclosilicate 10 Gram(s) Oral two times a day    MEDICATIONS  (PRN):  dextrose Oral Gel 15 Gram(s) Oral once PRN Blood Glucose LESS THAN 70 milliGRAM(s)/deciliter

## 2023-04-30 NOTE — DIETITIAN INITIAL EVALUATION ADULT - REASON INDICATOR FOR ASSESSMENT
Consult ordered for: CKD 5 (not on dialysis) nutrition therapy recommendations in setting of hyperkalemia  Source: electronic medical record, Pt interview

## 2023-04-30 NOTE — DIETITIAN INITIAL EVALUATION ADULT - ETIOLOGY
decreased ability to consume adequate protein-energy in setting of increased physiological demand for nutrients  limited prior nutrition related education

## 2023-04-30 NOTE — PROGRESS NOTE ADULT - SUBJECTIVE AND OBJECTIVE BOX
[facility] DIVISION OF KIDNEY DISEASES AND HYPERTENSION -- FOLLOW UP NOTE  --------------------------------------------------------------------------------  Chief Complaint:    24 hour events/subjective:        PAST HISTORY  --------------------------------------------------------------------------------  No significant changes to PMH, PSH, FHx, SHx, unless otherwise noted    ALLERGIES & MEDICATIONS  --------------------------------------------------------------------------------  Allergies    No Known Allergies    Intolerances      Standing Inpatient Medications  amLODIPine   Tablet 10 milliGRAM(s) Oral daily  aspirin enteric coated 81 milliGRAM(s) Oral daily  atorvastatin 40 milliGRAM(s) Oral at bedtime  carvedilol 12.5 milliGRAM(s) Oral every 12 hours  chlorhexidine 2% Cloths 1 Application(s) Topical daily  citric acid/sodium citrate Solution 30 milliLiter(s) Oral three times a day  dextrose 5%. 1000 milliLiter(s) IV Continuous <Continuous>  dextrose 5%. 1000 milliLiter(s) IV Continuous <Continuous>  dextrose 50% Injectable 25 Gram(s) IV Push once  dextrose 50% Injectable 25 Gram(s) IV Push once  dextrose 50% Injectable 12.5 Gram(s) IV Push once  glucagon  Injectable 1 milliGRAM(s) IntraMuscular once  heparin   Injectable 5000 Unit(s) SubCutaneous every 12 hours  insulin lispro (ADMELOG) corrective regimen sliding scale   SubCutaneous three times a day before meals  levothyroxine 50 MICROGram(s) Oral daily  sodium bicarbonate 1300 milliGRAM(s) Oral two times a day  sodium zirconium cyclosilicate 10 Gram(s) Oral two times a day    PRN Inpatient Medications  dextrose Oral Gel 15 Gram(s) Oral once PRN      REVIEW OF SYSTEMS  --------------------------------------------------------------------------------  Gen: No weight changes, fatigue, fevers/chills, weakness  Skin: No rashes  Head/Eyes/Ears/Mouth: No headache; Normal hearing; Normal vision w/o blurriness; No sinus pain/discomfort, sore throat  Respiratory: No dyspnea, cough, wheezing, hemoptysis  CV: No chest pain, PND, orthopnea  GI: No abdominal pain, diarrhea, constipation, nausea, vomiting, melena, hematochezia  : No increased frequency, dysuria, hematuria, nocturia  MSK: No joint pain/swelling; no back pain; no edema  Neuro: No dizziness/lightheadedness, weakness, seizures, numbness, tingling  Heme: No easy bruising or bleeding  Endo: No heat/cold intolerance  Psych: No significant nervousness, anxiety, stress, depression    All other systems were reviewed and are negative, except as noted.    VITALS/PHYSICAL EXAM  --------------------------------------------------------------------------------  T(C): 36.8 (04-30-23 @ 05:11), Max: 36.8 (04-30-23 @ 05:11)  HR: 67 (04-30-23 @ 05:11) (56 - 68)  BP: 119/62 (04-30-23 @ 05:11) (119/62 - 136/56)  RR: 18 (04-30-23 @ 05:11) (18 - 18)  SpO2: 97% (04-30-23 @ 05:11) (97% - 100%)  Wt(kg): --        04-29-23 @ 07:01  -  04-30-23 @ 07:00  --------------------------------------------------------  IN: 1220 mL / OUT: 2150 mL / NET: -930 mL    04-30-23 @ 07:01  -  04-30-23 @ 10:35  --------------------------------------------------------  IN: 310 mL / OUT: 0 mL / NET: 310 mL      Physical Exam:  	Gen: NAD, well-appearing  	HEENT: PERRL, supple neck, clear oropharynx  	Pulm: CTA B/L  	CV: RRR, S1S2; no rub  	Back: No spinal or CVA tenderness; no sacral edema  	Abd: +BS, soft, nontender/nondistended  	: No suprapubic tenderness  	UE: Warm, FROM, no clubbing, intact strength; no edema; no asterixis  	LE: Warm, FROM, no clubbing, intact strength; no edema  	Neuro: No focal deficits, intact gait  	Psych: Normal affect and mood  	Skin: Warm, without rashes  	Vascular access:    LABS/STUDIES  --------------------------------------------------------------------------------              7.8    9.52  >-----------<  212      [04-30-23 @ 06:45]              24.2     140  |  105  |  83  ----------------------------<  107      [04-30-23 @ 06:44]  4.8   |  20  |  4.26        Ca     8.6     [04-30-23 @ 06:44]    TPro  6.8  /  Alb  3.6  /  TBili  0.3  /  DBili  x   /  AST  42  /  ALT  82  /  AlkPhos  72  [04-30-23 @ 06:44]          Creatinine Trend:  SCr 4.26 [04-30 @ 06:44]  SCr 4.48 [04-29 @ 04:40]  SCr 4.26 [04-28 @ 23:06]  SCr 4.02 [04-28 @ 18:07]  SCr 3.86 [04-28 @ 10:53]        Iron 70, TIBC 201, %sat 35      [04-30-23 @ 06:44]  Ferritin 777      [04-30-23 @ 06:45]  TSH 2.97      [04-02-23 @ 06:48]  Lipid: chol 109, , HDL 35, LDL --      [04-02-23 @ 06:48]    HBsAb <3.0      [04-01-23 @ 15:59]  HBsAg Nonreact      [04-01-23 @ 15:59]  HCV 0.23, Nonreact      [04-01-23 @ 15:59]

## 2023-04-30 NOTE — DIETITIAN INITIAL EVALUATION ADULT - PERTINENT LABORATORY DATA
04-30    140  |  105  |  83<H>  ----------------------------<  107<H>  4.8   |  20<L>  |  4.26<H>    Ca    8.6      30 Apr 2023 06:44    TPro  6.8  /  Alb  3.6  /  TBili  0.3  /  DBili  x   /  AST  42<H>  /  ALT  82<H>  /  AlkPhos  72  04-30  POCT Blood Glucose.: 165 mg/dL (04-30-23 @ 11:29)  A1C with Estimated Average Glucose Result: 7.7 % (04-02-23 @ 06:48)

## 2023-04-30 NOTE — DIETITIAN INITIAL EVALUATION ADULT - ORAL INTAKE PTA/DIET HISTORY
Pt reports good appetite/PO intake PTA, lives with wife who grocery shops and cooks.  - NKFA/intolerances reported. Therapeutic restrictions/modifications: low sodium, limits concentrated sweets.  - Micronutrient/Other supplementation: none. Protein-energy supplementation: none.

## 2023-04-30 NOTE — PROGRESS NOTE ADULT - ASSESSMENT
Pt. is a 72 year old Male with PMHx of advanced CKD, HTN, HLD, DM2 well known to our service from prior admission presents at [facility] on 4/27 for hyperkalemia noted on outpatient labs.   Initial labs in ER concerning for hyperkalemia.  renal eval noted  f/u bmp noted  potassium much improved  treatment as per hansel;l\hold/ace /arb  dvt proph  dm  fsg riss  c/w htn meds   d/c when ok w/ renal

## 2023-04-30 NOTE — DIETITIAN INITIAL EVALUATION ADULT - OTHER INFO
- GI: Constipation (last BM PTA), No other GI distress reported at time of RD visit. Bowel regimen: none.  - Renal: CKD5 (not on HD), admit with hyperkalemia. K+ WNL today 04/30, no updated Phos available. Ordered for Lokelma, Epoetin, Na Bicarbonate.   - Endo: DM2 (home medications: glipizide, Januvia), does not check Finger sticks. Hgba1c 7.7% (suboptimal control PTA). In-house glycemic control with SSI.   - CV: Abnormal lipid panel, continues on Lipitor

## 2023-04-30 NOTE — DIETITIAN INITIAL EVALUATION ADULT - REASON FOR ADMISSION
Per chart "Pt. is a 72 year old Male with PMHx of advanced CKD, HTN, HLD, DM2 well known to our service from prior admission presents at [facility] on 4/27 for hyperkalemia noted on outpatient labs."

## 2023-05-01 LAB
ANION GAP SERPL CALC-SCNC: 15 MMOL/L — SIGNIFICANT CHANGE UP (ref 5–17)
BUN SERPL-MCNC: 78 MG/DL — HIGH (ref 7–23)
CALCIUM SERPL-MCNC: 8.3 MG/DL — LOW (ref 8.4–10.5)
CHLORIDE SERPL-SCNC: 104 MMOL/L — SIGNIFICANT CHANGE UP (ref 96–108)
CO2 SERPL-SCNC: 23 MMOL/L — SIGNIFICANT CHANGE UP (ref 22–31)
CREAT SERPL-MCNC: 3.99 MG/DL — HIGH (ref 0.5–1.3)
EGFR: 15 ML/MIN/1.73M2 — LOW
GLUCOSE BLDC GLUCOMTR-MCNC: 127 MG/DL — HIGH (ref 70–99)
GLUCOSE BLDC GLUCOMTR-MCNC: 133 MG/DL — HIGH (ref 70–99)
GLUCOSE BLDC GLUCOMTR-MCNC: 149 MG/DL — HIGH (ref 70–99)
GLUCOSE BLDC GLUCOMTR-MCNC: 155 MG/DL — HIGH (ref 70–99)
GLUCOSE SERPL-MCNC: 110 MG/DL — HIGH (ref 70–99)
HCT VFR BLD CALC: 22.2 % — LOW (ref 39–50)
HCT VFR BLD CALC: 24.4 % — LOW (ref 39–50)
HGB BLD-MCNC: 7.3 G/DL — LOW (ref 13–17)
HGB BLD-MCNC: 7.9 G/DL — LOW (ref 13–17)
MCHC RBC-ENTMCNC: 29.7 PG — SIGNIFICANT CHANGE UP (ref 27–34)
MCHC RBC-ENTMCNC: 29.9 PG — SIGNIFICANT CHANGE UP (ref 27–34)
MCHC RBC-ENTMCNC: 32.4 GM/DL — SIGNIFICANT CHANGE UP (ref 32–36)
MCHC RBC-ENTMCNC: 32.9 GM/DL — SIGNIFICANT CHANGE UP (ref 32–36)
MCV RBC AUTO: 91 FL — SIGNIFICANT CHANGE UP (ref 80–100)
MCV RBC AUTO: 91.7 FL — SIGNIFICANT CHANGE UP (ref 80–100)
NRBC # BLD: 0 /100 WBCS — SIGNIFICANT CHANGE UP (ref 0–0)
NRBC # BLD: 0 /100 WBCS — SIGNIFICANT CHANGE UP (ref 0–0)
PLATELET # BLD AUTO: 196 K/UL — SIGNIFICANT CHANGE UP (ref 150–400)
PLATELET # BLD AUTO: 203 K/UL — SIGNIFICANT CHANGE UP (ref 150–400)
POTASSIUM SERPL-MCNC: 4.3 MMOL/L — SIGNIFICANT CHANGE UP (ref 3.5–5.3)
POTASSIUM SERPL-SCNC: 4.3 MMOL/L — SIGNIFICANT CHANGE UP (ref 3.5–5.3)
RBC # BLD: 2.44 M/UL — LOW (ref 4.2–5.8)
RBC # BLD: 2.66 M/UL — LOW (ref 4.2–5.8)
RBC # FLD: 13.8 % — SIGNIFICANT CHANGE UP (ref 10.3–14.5)
RBC # FLD: 14.2 % — SIGNIFICANT CHANGE UP (ref 10.3–14.5)
SODIUM SERPL-SCNC: 142 MMOL/L — SIGNIFICANT CHANGE UP (ref 135–145)
WBC # BLD: 10.68 K/UL — HIGH (ref 3.8–10.5)
WBC # BLD: 8.15 K/UL — SIGNIFICANT CHANGE UP (ref 3.8–10.5)
WBC # FLD AUTO: 10.68 K/UL — HIGH (ref 3.8–10.5)
WBC # FLD AUTO: 8.15 K/UL — SIGNIFICANT CHANGE UP (ref 3.8–10.5)

## 2023-05-01 RX ORDER — SODIUM ZIRCONIUM CYCLOSILICATE 10 G/10G
10 POWDER, FOR SUSPENSION ORAL
Qty: 600 | Refills: 0
Start: 2023-05-01 | End: 2023-05-30

## 2023-05-01 RX ADMIN — AMLODIPINE BESYLATE 10 MILLIGRAM(S): 2.5 TABLET ORAL at 05:11

## 2023-05-01 RX ADMIN — CHLORHEXIDINE GLUCONATE 1 APPLICATION(S): 213 SOLUTION TOPICAL at 11:32

## 2023-05-01 RX ADMIN — Medication 30 MILLILITER(S): at 13:28

## 2023-05-01 RX ADMIN — HEPARIN SODIUM 5000 UNIT(S): 5000 INJECTION INTRAVENOUS; SUBCUTANEOUS at 05:12

## 2023-05-01 RX ADMIN — SODIUM ZIRCONIUM CYCLOSILICATE 10 GRAM(S): 10 POWDER, FOR SUSPENSION ORAL at 15:32

## 2023-05-01 RX ADMIN — CARVEDILOL PHOSPHATE 12.5 MILLIGRAM(S): 80 CAPSULE, EXTENDED RELEASE ORAL at 17:28

## 2023-05-01 RX ADMIN — Medication 30 MILLILITER(S): at 21:06

## 2023-05-01 RX ADMIN — Medication 50 MICROGRAM(S): at 05:12

## 2023-05-01 RX ADMIN — ATORVASTATIN CALCIUM 40 MILLIGRAM(S): 80 TABLET, FILM COATED ORAL at 21:06

## 2023-05-01 RX ADMIN — Medication 30 MILLILITER(S): at 05:12

## 2023-05-01 RX ADMIN — Medication 1300 MILLIGRAM(S): at 05:11

## 2023-05-01 RX ADMIN — SODIUM ZIRCONIUM CYCLOSILICATE 10 GRAM(S): 10 POWDER, FOR SUSPENSION ORAL at 05:12

## 2023-05-01 RX ADMIN — Medication 81 MILLIGRAM(S): at 11:30

## 2023-05-01 RX ADMIN — CARVEDILOL PHOSPHATE 12.5 MILLIGRAM(S): 80 CAPSULE, EXTENDED RELEASE ORAL at 05:12

## 2023-05-01 RX ADMIN — Medication 1300 MILLIGRAM(S): at 17:28

## 2023-05-01 RX ADMIN — HEPARIN SODIUM 5000 UNIT(S): 5000 INJECTION INTRAVENOUS; SUBCUTANEOUS at 17:28

## 2023-05-01 NOTE — PROGRESS NOTE ADULT - ASSESSMENT
Pt. is a 72 year old Male with PMHx of advanced CKD, HTN, HLD, DM2 well known to our service from prior admission presents at [facility] on 4/27 for hyperkalemia noted on outpatient labs.   Initial labs in ER concerning for hyperkalemia.  renal eval noted  f/u bmp noted  potassium much improved  treatment as per renal  dvt proph  dm  fsg riss  c/w htn meds anemia  hg lower  guaiacs  heme eval

## 2023-05-01 NOTE — PROGRESS NOTE ADULT - SUBJECTIVE AND OBJECTIVE BOX
DATE OF SERVICE: 05-01-23 @ 12:10  CHIEF COMPLAINT:Patient is a 72y old  Male who presents with a chief complaint of Per chart "Pt. is a 72 year old Male with PMHx of advanced CKD, HTN, HLD, DM2 well known to our service from prior admission presents at [facility] on 4/27 for hyperkalemia noted on outpatient labs."     (30 Apr 2023 14:14)    	        PAST MEDICAL & SURGICAL HISTORY:  Hypertension      Diabetes      Stage 5 chronic kidney disease not on chronic dialysis      HLD (hyperlipidemia)              REVIEW OF SYSTEMS:  CONSTITUTIONAL: No fever, weight loss, or fatigue  EYES: No eye pain, visual disturbances, or discharge  NECK: No pain or stiffness  RESPIRATORY: No cough, wheezing, chills or hemoptysis; No Shortness of Breath  CARDIOVASCULAR: No chest pain, palpitations, passing out, dizziness, or leg swelling  GASTROINTESTINAL: No abdominal or epigastric pain. No nausea, vomiting, or hematemesis; No diarrhea or constipation. No melena or hematochezia.  GENITOURINARY: No dysuria, frequency, hematuria, or incontinence  NEUROLOGICAL: No headaches,    Medications:  MEDICATIONS  (STANDING):  amLODIPine   Tablet 10 milliGRAM(s) Oral daily  aspirin enteric coated 81 milliGRAM(s) Oral daily  atorvastatin 40 milliGRAM(s) Oral at bedtime  carvedilol 12.5 milliGRAM(s) Oral every 12 hours  chlorhexidine 2% Cloths 1 Application(s) Topical daily  citric acid/sodium citrate Solution 30 milliLiter(s) Oral three times a day  dextrose 5%. 1000 milliLiter(s) (100 mL/Hr) IV Continuous <Continuous>  dextrose 5%. 1000 milliLiter(s) (50 mL/Hr) IV Continuous <Continuous>  dextrose 50% Injectable 25 Gram(s) IV Push once  dextrose 50% Injectable 12.5 Gram(s) IV Push once  dextrose 50% Injectable 25 Gram(s) IV Push once  epoetin rose-epbx (RETACRIT) Injectable 4000 Unit(s) SubCutaneous every 7 days  glucagon  Injectable 1 milliGRAM(s) IntraMuscular once  heparin   Injectable 5000 Unit(s) SubCutaneous every 12 hours  insulin lispro (ADMELOG) corrective regimen sliding scale   SubCutaneous three times a day before meals  levothyroxine 50 MICROGram(s) Oral daily  sodium bicarbonate 1300 milliGRAM(s) Oral two times a day  sodium zirconium cyclosilicate 10 Gram(s) Oral two times a day    MEDICATIONS  (PRN):  dextrose Oral Gel 15 Gram(s) Oral once PRN Blood Glucose LESS THAN 70 milliGRAM(s)/deciliter    	    PHYSICAL EXAM:  T(C): 36.7 (05-01-23 @ 10:58), Max: 37 (04-30-23 @ 20:01)  HR: 61 (05-01-23 @ 10:58) (61 - 80)  BP: 135/62 (05-01-23 @ 10:58) (121/60 - 143/62)  RR: 18 (05-01-23 @ 10:58) (18 - 18)  SpO2: 99% (05-01-23 @ 10:58) (97% - 100%)  Wt(kg): --  I&O's Summary    30 Apr 2023 07:01  -  01 May 2023 07:00  --------------------------------------------------------  IN: 930 mL / OUT: 2500 mL / NET: -1570 mL    01 May 2023 07:01  -  01 May 2023 12:10  --------------------------------------------------------  IN: 360 mL / OUT: 0 mL / NET: 360 mL        Appearance: Normal	  HEENT:   Normal oral mucosa, PERRL, EOMI	  Lymphatic: No lymphadenopathy  Cardiovascular: Normal S1 S2, No JVD, No murmurs, No edema  Respiratory: Lungs clear to auscultation	  Psychiatry: A & O x  Gastrointestinal:  Soft, Non-tender, + BS	  Skin: No rashes, No ecchymoses, No cyanosis	  Neurologic: Non-focal  Extremities: Normal range of motion, No clubbing, cyanosis or edema  Vascular: Peripheral pulses palpable 2+ bilaterally    TELEMETRY: 	    ECG:  	  RADIOLOGY:  OTHER: 	  	  LABS:	 	    CARDIAC MARKERS:                                7.3    8.15  )-----------( 196      ( 01 May 2023 06:15 )             22.2     05-01    142  |  104  |  78<H>  ----------------------------<  110<H>  4.3   |  23  |  3.99<H>    Ca    8.3<L>      01 May 2023 06:14    TPro  6.8  /  Alb  3.6  /  TBili  0.3  /  DBili  x   /  AST  42<H>  /  ALT  82<H>  /  AlkPhos  72  04-30    proBNP:   Lipid Profile:   HgA1c:   TSH:

## 2023-05-01 NOTE — CONSULT NOTE ADULT - ASSESSMENT
APOLLO NICHOLAS is a 72y Male who presents with a chief complaint of hyperkalemia.    Anemia  ·	Patient with worsening hemoglobin, in the 7s, compared to the 9-10s last month.  ·	In the setting of known renal disease. Being followed by nephrology and is started on epoietin.  ·	Will order anemia workup and rule out nutritional deficits, hemolysis, malignancy.  ·	Recommend occult blood stool to rule out underlying gastrointestinal bleed given pace of decline over the past month.  ·	Monitor CBC and transfuse to maintain hemoglobin > 7    Chronic Kidney Disease  Hyperkalemia  ·	Nephrology following. Patient on sodium zirconium cyclosilicate.   ·	On sodium bicarbonate.  ·	No urgent indication for hemodialysis at this time.    Will continue to follow.    Dwight Orozco MD  Hematology/Oncology  O: [phone] APOLLO NICHOLAS is a 72y Male who presents with a chief complaint of hyperkalemia.    Anemia  ·	Patient with worsening hemoglobin, in the 7s, compared to the 9-10s last month. Patient reports having anemia for the last several years.   ·	In the setting of known renal disease. Being followed by nephrology and is started on epoietin.  ·	Will order anemia workup and rule out nutritional deficits, hemolysis, malignancy.  ·	Recommend occult blood stool to rule out underlying gastrointestinal bleed given pace of decline over the past month.  ·	Monitor CBC and transfuse to maintain hemoglobin > 7    Chronic Kidney Disease  Hyperkalemia  ·	Nephrology following. Patient on sodium zirconium cyclosilicate.   ·	On sodium bicarbonate.  ·	No urgent indication for hemodialysis at this time.    Will continue to follow.    Dwight Orozco MD  Hematology/Oncology  O: [phone]

## 2023-05-02 LAB
ANION GAP SERPL CALC-SCNC: 12 MMOL/L — SIGNIFICANT CHANGE UP (ref 5–17)
BLD GP AB SCN SERPL QL: NEGATIVE — SIGNIFICANT CHANGE UP
BUN SERPL-MCNC: 69 MG/DL — HIGH (ref 7–23)
CALCIUM SERPL-MCNC: 8 MG/DL — LOW (ref 8.4–10.5)
CHLORIDE SERPL-SCNC: 107 MMOL/L — SIGNIFICANT CHANGE UP (ref 96–108)
CO2 SERPL-SCNC: 23 MMOL/L — SIGNIFICANT CHANGE UP (ref 22–31)
CREAT SERPL-MCNC: 3.76 MG/DL — HIGH (ref 0.5–1.3)
EGFR: 16 ML/MIN/1.73M2 — LOW
FERRITIN SERPL-MCNC: 721 NG/ML — HIGH (ref 30–400)
GLUCOSE BLDC GLUCOMTR-MCNC: 145 MG/DL — HIGH (ref 70–99)
GLUCOSE BLDC GLUCOMTR-MCNC: 188 MG/DL — HIGH (ref 70–99)
GLUCOSE BLDC GLUCOMTR-MCNC: 233 MG/DL — HIGH (ref 70–99)
GLUCOSE BLDC GLUCOMTR-MCNC: 270 MG/DL — HIGH (ref 70–99)
GLUCOSE SERPL-MCNC: 111 MG/DL — HIGH (ref 70–99)
HAPTOGLOB SERPL-MCNC: <20 MG/DL — LOW (ref 34–200)
HCT VFR BLD CALC: 20.2 % — CRITICAL LOW (ref 39–50)
HCT VFR BLD CALC: 21.9 % — LOW (ref 39–50)
HEMOGLOBIN INTERPRETATION: SIGNIFICANT CHANGE UP
HGB A MFR BLD: 97.9 % — SIGNIFICANT CHANGE UP (ref 95.8–98)
HGB A2 MFR BLD: 2.1 % — SIGNIFICANT CHANGE UP (ref 2–3.2)
HGB BLD-MCNC: 6.5 G/DL — CRITICAL LOW (ref 13–17)
HGB BLD-MCNC: 7 G/DL — CRITICAL LOW (ref 13–17)
IGA FLD-MCNC: 214 MG/DL — SIGNIFICANT CHANGE UP (ref 84–499)
IGG FLD-MCNC: 1167 MG/DL — SIGNIFICANT CHANGE UP (ref 610–1660)
IGM SERPL-MCNC: 28 MG/DL — LOW (ref 35–242)
KAPPA LC SER QL IFE: 14.41 MG/DL — HIGH (ref 0.33–1.94)
KAPPA/LAMBDA FREE LIGHT CHAIN RATIO, SERUM: 1.21 RATIO — SIGNIFICANT CHANGE UP (ref 0.26–1.65)
LAMBDA LC SER QL IFE: 11.91 MG/DL — HIGH (ref 0.57–2.63)
LDH SERPL L TO P-CCNC: 363 U/L — HIGH (ref 50–242)
MAGNESIUM SERPL-MCNC: 2.2 MG/DL — SIGNIFICANT CHANGE UP (ref 1.6–2.6)
MCHC RBC-ENTMCNC: 29.5 PG — SIGNIFICANT CHANGE UP (ref 27–34)
MCHC RBC-ENTMCNC: 29.8 PG — SIGNIFICANT CHANGE UP (ref 27–34)
MCHC RBC-ENTMCNC: 32 GM/DL — SIGNIFICANT CHANGE UP (ref 32–36)
MCHC RBC-ENTMCNC: 32.2 GM/DL — SIGNIFICANT CHANGE UP (ref 32–36)
MCV RBC AUTO: 92.4 FL — SIGNIFICANT CHANGE UP (ref 80–100)
MCV RBC AUTO: 92.7 FL — SIGNIFICANT CHANGE UP (ref 80–100)
NRBC # BLD: 0 /100 WBCS — SIGNIFICANT CHANGE UP (ref 0–0)
NRBC # BLD: 0 /100 WBCS — SIGNIFICANT CHANGE UP (ref 0–0)
OB PNL STL: NEGATIVE — SIGNIFICANT CHANGE UP
PHOSPHATE SERPL-MCNC: 4.6 MG/DL — HIGH (ref 2.5–4.5)
PLATELET # BLD AUTO: 184 K/UL — SIGNIFICANT CHANGE UP (ref 150–400)
PLATELET # BLD AUTO: 187 K/UL — SIGNIFICANT CHANGE UP (ref 150–400)
POTASSIUM SERPL-MCNC: 4.1 MMOL/L — SIGNIFICANT CHANGE UP (ref 3.5–5.3)
POTASSIUM SERPL-SCNC: 4.1 MMOL/L — SIGNIFICANT CHANGE UP (ref 3.5–5.3)
RBC # BLD: 2.18 M/UL — LOW (ref 4.2–5.8)
RBC # BLD: 2.37 M/UL — LOW (ref 4.2–5.8)
RBC # FLD: 14 % — SIGNIFICANT CHANGE UP (ref 10.3–14.5)
RBC # FLD: 14.3 % — SIGNIFICANT CHANGE UP (ref 10.3–14.5)
RH IG SCN BLD-IMP: POSITIVE — SIGNIFICANT CHANGE UP
SODIUM SERPL-SCNC: 142 MMOL/L — SIGNIFICANT CHANGE UP (ref 135–145)
VIT B12 SERPL-MCNC: 697 PG/ML — SIGNIFICANT CHANGE UP (ref 232–1245)
WBC # BLD: 8.59 K/UL — SIGNIFICANT CHANGE UP (ref 3.8–10.5)
WBC # BLD: 9.72 K/UL — SIGNIFICANT CHANGE UP (ref 3.8–10.5)
WBC # FLD AUTO: 8.59 K/UL — SIGNIFICANT CHANGE UP (ref 3.8–10.5)
WBC # FLD AUTO: 9.72 K/UL — SIGNIFICANT CHANGE UP (ref 3.8–10.5)

## 2023-05-02 PROCEDURE — 99232 SBSQ HOSP IP/OBS MODERATE 35: CPT | Mod: GC

## 2023-05-02 PROCEDURE — 74176 CT ABD & PELVIS W/O CONTRAST: CPT | Mod: 26

## 2023-05-02 PROCEDURE — 83020 HEMOGLOBIN ELECTROPHORESIS: CPT | Mod: 26

## 2023-05-02 RX ORDER — ERYTHROPOIETIN 10000 [IU]/ML
4000 INJECTION, SOLUTION INTRAVENOUS; SUBCUTANEOUS ONCE
Refills: 0 | Status: COMPLETED | OUTPATIENT
Start: 2023-05-02 | End: 2023-05-02

## 2023-05-02 RX ORDER — SODIUM ZIRCONIUM CYCLOSILICATE 10 G/10G
10 POWDER, FOR SUSPENSION ORAL
Qty: 600 | Refills: 0
Start: 2023-05-02 | End: 2023-05-31

## 2023-05-02 RX ORDER — INSULIN LISPRO 100/ML
VIAL (ML) SUBCUTANEOUS AT BEDTIME
Refills: 0 | Status: DISCONTINUED | OUTPATIENT
Start: 2023-05-02 | End: 2023-05-06

## 2023-05-02 RX ADMIN — CHLORHEXIDINE GLUCONATE 1 APPLICATION(S): 213 SOLUTION TOPICAL at 11:16

## 2023-05-02 RX ADMIN — HEPARIN SODIUM 5000 UNIT(S): 5000 INJECTION INTRAVENOUS; SUBCUTANEOUS at 17:00

## 2023-05-02 RX ADMIN — Medication 4: at 17:21

## 2023-05-02 RX ADMIN — Medication 50 MICROGRAM(S): at 05:22

## 2023-05-02 RX ADMIN — Medication 81 MILLIGRAM(S): at 11:13

## 2023-05-02 RX ADMIN — Medication 1300 MILLIGRAM(S): at 05:22

## 2023-05-02 RX ADMIN — Medication 30 MILLILITER(S): at 05:23

## 2023-05-02 RX ADMIN — Medication 30 MILLILITER(S): at 13:05

## 2023-05-02 RX ADMIN — SODIUM ZIRCONIUM CYCLOSILICATE 10 GRAM(S): 10 POWDER, FOR SUSPENSION ORAL at 05:23

## 2023-05-02 RX ADMIN — HEPARIN SODIUM 5000 UNIT(S): 5000 INJECTION INTRAVENOUS; SUBCUTANEOUS at 05:23

## 2023-05-02 RX ADMIN — Medication 1300 MILLIGRAM(S): at 17:01

## 2023-05-02 RX ADMIN — ERYTHROPOIETIN 4000 UNIT(S): 10000 INJECTION, SOLUTION INTRAVENOUS; SUBCUTANEOUS at 10:20

## 2023-05-02 RX ADMIN — AMLODIPINE BESYLATE 10 MILLIGRAM(S): 2.5 TABLET ORAL at 05:22

## 2023-05-02 RX ADMIN — ATORVASTATIN CALCIUM 40 MILLIGRAM(S): 80 TABLET, FILM COATED ORAL at 21:25

## 2023-05-02 RX ADMIN — Medication 2: at 11:56

## 2023-05-02 RX ADMIN — Medication 1: at 21:26

## 2023-05-02 RX ADMIN — CARVEDILOL PHOSPHATE 12.5 MILLIGRAM(S): 80 CAPSULE, EXTENDED RELEASE ORAL at 05:22

## 2023-05-02 RX ADMIN — Medication 30 MILLILITER(S): at 21:25

## 2023-05-02 RX ADMIN — CARVEDILOL PHOSPHATE 12.5 MILLIGRAM(S): 80 CAPSULE, EXTENDED RELEASE ORAL at 17:00

## 2023-05-02 NOTE — PROGRESS NOTE ADULT - PROBLEM SELECTOR PLAN 2
Pt. with hyperkalemia In the setting of advanced CKD and ARB use. Serum potassium remains elevated but improved to 4.1 today  Keep lokelma at BID dosing.  Educated him re low K diet. Plese discharge with Low potassium diet instructions.

## 2023-05-02 NOTE — PROGRESS NOTE ADULT - SUBJECTIVE AND OBJECTIVE BOX
Patient seen and examined at bedside. pt feels well    MEDICATIONS  (STANDING):  amLODIPine   Tablet 10 milliGRAM(s) Oral daily  aspirin enteric coated 81 milliGRAM(s) Oral daily  atorvastatin 40 milliGRAM(s) Oral at bedtime  carvedilol 12.5 milliGRAM(s) Oral every 12 hours  chlorhexidine 2% Cloths 1 Application(s) Topical daily  citric acid/sodium citrate Solution 30 milliLiter(s) Oral three times a day  dextrose 5%. 1000 milliLiter(s) (100 mL/Hr) IV Continuous <Continuous>  dextrose 5%. 1000 milliLiter(s) (50 mL/Hr) IV Continuous <Continuous>  dextrose 50% Injectable 25 Gram(s) IV Push once  dextrose 50% Injectable 12.5 Gram(s) IV Push once  dextrose 50% Injectable 25 Gram(s) IV Push once  epoetin rose-epbx (RETACRIT) Injectable 4000 Unit(s) SubCutaneous every 7 days  glucagon  Injectable 1 milliGRAM(s) IntraMuscular once  heparin   Injectable 5000 Unit(s) SubCutaneous every 12 hours  insulin lispro (ADMELOG) corrective regimen sliding scale   SubCutaneous three times a day before meals  levothyroxine 50 MICROGram(s) Oral daily  sodium bicarbonate 1300 milliGRAM(s) Oral two times a day    MEDICATIONS  (PRN):  dextrose Oral Gel 15 Gram(s) Oral once PRN Blood Glucose LESS THAN 70 milliGRAM(s)/deciliter        Vital Signs Last 24 Hrs  T(C): 37.1 (02 May 2023 11:14), Max: 37.1 (02 May 2023 04:16)  T(F): 98.7 (02 May 2023 11:14), Max: 98.8 (02 May 2023 04:16)  HR: 70 (02 May 2023 11:14) (63 - 86)  BP: 135/66 (02 May 2023 11:14) (131/60 - 157/68)  BP(mean): --  RR: 18 (02 May 2023 11:14) (18 - 18)  SpO2: 97% (02 May 2023 11:14) (94% - 99%)    Parameters below as of 02 May 2023 11:14  Patient On (Oxygen Delivery Method): room air          PHYSICAL EXAM:     GENERAL:  Appears stated age, well-groomed  CHEST:  CTA b/l  HEART:  S1 s2+   ABDOMEN:  Soft, non-tender, non-distended  EXTEREMITIES:  no cyanosis,clubbing or edema  SKIN:  No rash/erythema/ecchymoses  LN: No palpable Lymphadenopathy    NEURO:  Alert, oriented, no asterixis                            6.5    8.59  )-----------( 184      ( 02 May 2023 06:13 )             20.2       05-02    142  |  107  |  69<H>  ----------------------------<  111<H>  4.1   |  23  |  3.76<H>    Ca    8.0<L>      02 May 2023 06:11  Phos  4.6     05-02  Mg     2.2     05-02

## 2023-05-02 NOTE — PROGRESS NOTE ADULT - SUBJECTIVE AND OBJECTIVE BOX
DATE OF SERVICE: 05-02-23 @ 12:08  CHIEF COMPLAINT:Patient is a 72y old  Male who presents with a chief complaint of Per chart "Pt. is a 72 year old Male with PMHx of advanced CKD, HTN, HLD, DM2 well known to our service from prior admission presents at [facility] on 4/27 for hyperkalemia noted on outpatient labs."     (30 Apr 2023 14:14)    	        PAST MEDICAL & SURGICAL HISTORY:  Hypertension      Diabetes      Stage 5 chronic kidney disease not on chronic dialysis      HLD (hyperlipidemia)              REVIEW OF SYSTEMS:  CONSTITUTIONAL: No fever, weight loss, or fatigue  EYES: No eye pain, visual disturbances, or discharge  NECK: No pain or stiffness  RESPIRATORY: No cough, wheezing, chills or hemoptysis; No Shortness of Breath  CARDIOVASCULAR: No chest pain, palpitations, passing out, dizziness, or leg swelling  GASTROINTESTINAL: No abdominal or epigastric pain. No nausea, vomiting, or hematemesis; No diarrhea or constipation. No melena or hematochezia.  GENITOURINARY: No dysuria, frequency, hematuria, or incontinence  NEUROLOGICAL: No headaches,     Medications:  MEDICATIONS  (STANDING):  amLODIPine   Tablet 10 milliGRAM(s) Oral daily  aspirin enteric coated 81 milliGRAM(s) Oral daily  atorvastatin 40 milliGRAM(s) Oral at bedtime  carvedilol 12.5 milliGRAM(s) Oral every 12 hours  chlorhexidine 2% Cloths 1 Application(s) Topical daily  citric acid/sodium citrate Solution 30 milliLiter(s) Oral three times a day  dextrose 5%. 1000 milliLiter(s) (100 mL/Hr) IV Continuous <Continuous>  dextrose 5%. 1000 milliLiter(s) (50 mL/Hr) IV Continuous <Continuous>  dextrose 50% Injectable 25 Gram(s) IV Push once  dextrose 50% Injectable 12.5 Gram(s) IV Push once  dextrose 50% Injectable 25 Gram(s) IV Push once  epoetin rose-epbx (RETACRIT) Injectable 4000 Unit(s) SubCutaneous every 7 days  glucagon  Injectable 1 milliGRAM(s) IntraMuscular once  heparin   Injectable 5000 Unit(s) SubCutaneous every 12 hours  insulin lispro (ADMELOG) corrective regimen sliding scale   SubCutaneous three times a day before meals  levothyroxine 50 MICROGram(s) Oral daily  sodium bicarbonate 1300 milliGRAM(s) Oral two times a day    MEDICATIONS  (PRN):  dextrose Oral Gel 15 Gram(s) Oral once PRN Blood Glucose LESS THAN 70 milliGRAM(s)/deciliter    	    PHYSICAL EXAM:  T(C): 37.1 (05-02-23 @ 11:14), Max: 37.1 (05-02-23 @ 04:16)  HR: 70 (05-02-23 @ 11:14) (63 - 86)  BP: 135/66 (05-02-23 @ 11:14) (131/60 - 157/68)  RR: 18 (05-02-23 @ 11:14) (18 - 18)  SpO2: 97% (05-02-23 @ 11:14) (94% - 99%)  Wt(kg): --  I&O's Summary    01 May 2023 07:01  -  02 May 2023 07:00  --------------------------------------------------------  IN: 1220 mL / OUT: 1000 mL / NET: 220 mL    02 May 2023 07:01  -  02 May 2023 12:08  --------------------------------------------------------  IN: 360 mL / OUT: 0 mL / NET: 360 mL        Appearance: Normal	  HEENT:   Normal oral mucosa, PERRL, EOMI	  Lymphatic: No lymphadenopathy  Cardiovascular: Normal S1 S2, No JVD, No murmurs, No edema  Respiratory: Lungs clear to auscultation	  Psychiatry: A & O   Gastrointestinal:  Soft, Non-tender, + BS	  Skin: No rashes, No ecchymoses, No cyanosis	  Neurologic: Non-focal  Extremities: Normal range of motion, No clubbing, cyanosis or edema  Vascular: Peripheral pulses palpable 2+ bilaterally    TELEMETRY: 	    ECG:  	  RADIOLOGY:  OTHER: 	  	  LABS:	 	    CARDIAC MARKERS:                                6.5    8.59  )-----------( 184      ( 02 May 2023 06:13 )             20.2     05-02    142  |  107  |  69<H>  ----------------------------<  111<H>  4.1   |  23  |  3.76<H>    Ca    8.0<L>      02 May 2023 06:11  Phos  4.6     05-02  Mg     2.2     05-02      proBNP:   Lipid Profile:   HgA1c:   TSH:

## 2023-05-02 NOTE — PROGRESS NOTE ADULT - PROBLEM SELECTOR PROBLEM 1
Stage 5 chronic kidney disease not on chronic dialysis

## 2023-05-02 NOTE — PROGRESS NOTE ADULT - PROBLEM/PLAN-2
DISPLAY PLAN FREE TEXT
Pt was here for a flu vaccine and a nurse BP check    Vaccine Information Statement(s) for was given today. This has been reviewed, questions answered, and verbal consent given by Patient for injection(s) and administration of Influenza (Inactivated).    1. Does the patient have a moderate to severe fever?  No  2. Has the patient had a serious reaction to a flu shot before?   No  3. Has the patient ever had Guillian [place] Syndrome within 6 weeks of a previous flu shot?  No  4. Does the patient have a serious allergy to eggs?  No  5. Is the patient less that 6 months of age?  No    Patient is eligible to receive the vaccine based on all questions being answered as 'No'.          Patient tolerated without incident. See immunization grid for documentation.  
DISPLAY PLAN FREE TEXT
DISPLAY PLAN FREE TEXT

## 2023-05-02 NOTE — PROGRESS NOTE ADULT - ASSESSMENT
Pt. is a 72 year old Male with PMHx of advanced CKD, HTN, HLD, DM2 well known to our service from prior admission presents at [facility] on 4/27 for hyperkalemia noted on outpatient labs.   Initial labs in ER  for hyperkalemia.  renal eval noted  f/u bmp noted  potassium much improved  treatment as per renal  dvt proph  dm  fsg riss  c/w htn meds anemia  hg lower  guaiac neg  heme eval noted  gi eval    ct a/p    discussed w/ pt at length   was refusing testing and blood draws

## 2023-05-02 NOTE — PROGRESS NOTE ADULT - PROBLEM SELECTOR PLAN 1
Pt with advanced CKD stage 5 being admitted for hyperkalemia. Pt follows with Dr. Rees. Pt made aware that he will need long term dialysis in near future. pt underwent lUE AVF creation during previous admission on 4/6. Scr on admission elevated at 4.02 (4/28), and increased further to 4.48 today. Pt clinically non uremic and non oliguric. No urgent indication for HD for now. Plan discussed with patient. Risks and benefits associated with RRT/HD explained at length. HD consent obtained and kept in patients chart. Continue with 1300 mg sodium bicarb TID. Monitor labs and urine output. Avoid any potential nephrotoxins. Dose medications as per eGFR.

## 2023-05-02 NOTE — CONSULT NOTE ADULT - ASSESSMENT
anemia  CKD    doubt GI blood loss  normal MCV and occult negative  anemia likely related to chronic disease and possibly hemolysis  reg diet  monitor cbc  await CT   will follow

## 2023-05-02 NOTE — PROGRESS NOTE ADULT - ASSESSMENT
APOLLO NICHOLAS is a 72y Male who presents with a chief complaint of hyperkalemia.    Anemia  ·	Patient with worsening hemoglobin, in the 7s, compared to the 9-10s last month. Patient reports having anemia for the last several years.   ·	In the setting of known renal disease. Being followed by nephrology and is started on epoietin.  ·	b12 normal. ferritin elevated, hg electro normal   ·	his ldh slightly high, hapto is low, bili normal . myeloma studies pending   ·	will repeat LDH and hapto also will get marty. unlikley hemolysis as bili is normal   ·	recommend GI work up as pt has been loosing weight   ·	getting ct abd     Chronic Kidney Disease  Hyperkalemia  ·	Nephrology following. Patient on sodium zirconium cyclosilicate.   ·	On sodium bicarbonate.  ·	No urgent indication for hemodialysis at this time.    Kelvin Connelly MD  HematologyOncology   O: [phone]

## 2023-05-02 NOTE — PROGRESS NOTE ADULT - PROBLEM SELECTOR PLAN 3
Pt. with anemia in the setting of ESRD. Hgb is low at 7.8 today.  pt. may benefit from NITA. Monitor hgb, goal: 10-11.  Fe sats are at goal  Will order NITA once a week
Pt. with anemia in the setting of ESRD. Hgb is low at 6.5 today.  pt. may benefit from NITA. Monitor hgb, goal: 10-11.  Fe sats are at goal. S/p 4k units Epo on 4/30. Transfusion as per Primary team. Keep HgB above 7.0. Will give another 4k units NITA today.    If you have any questions, please feel free to contact me  Sb Lilly  Nephrology Fellow  [phone]; Prefer [employer] TEAMS  (After 5pm or on weekends please page the on-call fellow)
Pt. with anemia in the setting of ESRD. Hgb is low at 7.8 today. Recommend to check iron studies, pt. may benefit from NITA. Monitor hgb, goal: 10-11.    If you have any questions, please feel free to contact me  Chano Kilgore  Nephrology Fellow  [phone] / [employer] Teams(Preferred)  (After 5pm or on weekends please page the on-call fellow)

## 2023-05-02 NOTE — CONSULT NOTE ADULT - SUBJECTIVE AND OBJECTIVE BOX
[facility]  Toni Germain PA-C  [address] [place] [id number] [phone]      Chief Complaint:  Patient is a 72y old  Male who presents with a chief complaint of Per chart "Pt. is a 72 year old Male with PMHx of advanced CKD, HTN, HLD, DM2 well known to our service from prior admission presents at [facility] on 4/27 for hyperkalemia noted on outpatient labs."     (30 Apr 2023 14:14)      HPI: Pt. is a 72 year old Male with PMHx of advanced CKD, HTN, HLD, DM2   patient admitted with hyperkalemia and advanced ckd  had fistula for anticipation of HD  was asked to eval for anemia  no overt gi bleeding     Allergies:  No Known Allergies      Medications:  amLODIPine   Tablet 10 milliGRAM(s) Oral daily  aspirin enteric coated 81 milliGRAM(s) Oral daily  atorvastatin 40 milliGRAM(s) Oral at bedtime  carvedilol 12.5 milliGRAM(s) Oral every 12 hours  chlorhexidine 2% Cloths 1 Application(s) Topical daily  citric acid/sodium citrate Solution 30 milliLiter(s) Oral three times a day  dextrose 5%. 1000 milliLiter(s) IV Continuous <Continuous>  dextrose 5%. 1000 milliLiter(s) IV Continuous <Continuous>  dextrose 50% Injectable 25 Gram(s) IV Push once  dextrose 50% Injectable 12.5 Gram(s) IV Push once  dextrose 50% Injectable 25 Gram(s) IV Push once  dextrose Oral Gel 15 Gram(s) Oral once PRN  epoetin rose-epbx (RETACRIT) Injectable 4000 Unit(s) SubCutaneous every 7 days  glucagon  Injectable 1 milliGRAM(s) IntraMuscular once  heparin   Injectable 5000 Unit(s) SubCutaneous every 12 hours  insulin lispro (ADMELOG) corrective regimen sliding scale   SubCutaneous three times a day before meals  levothyroxine 50 MICROGram(s) Oral daily  sodium bicarbonate 1300 milliGRAM(s) Oral two times a day      PMHX/PSHX:  Hypertension    Diabetes    Stage 5 chronic kidney disease not on chronic dialysis    HLD (hyperlipidemia)        Family history:      Social History:     ROS:     General:  no fevers, chills, night sweats, fatigue,   Eyes:  Good vision, no reported pain  ENT:  No sore throat, pain, runny nose, dysphagia  CV:  No pain, palpitations, hypo/hypertension  Resp:  No dyspnea, cough, tachypnea, wheezing  GI:  No pain, No nausea, No vomiting, No diarrhea, No constipation, No weight loss, No fever, No pruritis, No rectal bleeding, No tarry stools, No dysphagia,  :  No pain, bleeding, incontinence, nocturia  Muscle:  No pain, weakness  Neuro:  No weakness, tingling, memory problems  Psych:  No fatigue, insomnia, mood problems, depression  Endocrine:  No polyuria, polydipsia, cold/heat intolerance  Heme:  No petechiae, ecchymosis, easy bruisability  Skin:  No rash, tattoos, scars, edema      PHYSICAL EXAM:   Vital Signs:  Vital Signs Last 24 Hrs  T(C): 37.1 (02 May 2023 11:14), Max: 37.1 (02 May 2023 04:16)  T(F): 98.7 (02 May 2023 11:14), Max: 98.8 (02 May 2023 04:16)  HR: 70 (02 May 2023 11:14) (63 - 86)  BP: 135/66 (02 May 2023 11:14) (131/60 - 157/68)  BP(mean): --  RR: 18 (02 May 2023 11:14) (18 - 18)  SpO2: 97% (02 May 2023 11:14) (94% - 99%)    Parameters below as of 02 May 2023 11:14  Patient On (Oxygen Delivery Method): room air      Daily     Daily     GENERAL:  Appears stated age,   HEENT:  NC/AT,    CHEST:  Full & symmetric excursion,   HEART:  Regular rhythm  ABDOMEN:  Soft, non-tender, non-distended,   EXTEREMITIES:  no cyanosis,clubbing or edema  SKIN:  No rash  NEURO:  Alert,    LABS:                        7.0    9.72  )-----------( 187      ( 02 May 2023 12:34 )             21.9     05-02    142  |  107  |  69<H>  ----------------------------<  111<H>  4.1   |  23  |  3.76<H>    Ca    8.0<L>      02 May 2023 06:11  Phos  4.6     05-02  Mg     2.2     05-02                Imaging:          
CHIEF COMPLAINT  Hyperkalemia    HISTORY OF PRESENT ILLNESS  APOLLO NICHOLAS is a 72y Male who presents with a chief complaint of hyperkalemia.    Patient was admitted on April 28th after presenting to the Emergency Department with hyperkalemia noted on outpatient lab work. He had been evaluated recently for worsening kidney disease, and plans were made to initiate hemodialysis soon. Repeat potassium was 5.8 after treatment, and he was admitted for further evaluation.    PAST MEDICAL AND SURGICAL HISTORY  Diabetes Mellitus  Hyperlipidemia  Hypertension  Chronic Kidney Disease    FAMILY HISTORY  Non-contributory    SOCIAL HISTORY  Denies tobacco use    REVIEW OF SYSTEMS  A complete review of systems was performed; negative except per HPI    PHYSICAL EXAM  T(C): 36.7 (05-01-23 @ 10:58), Max: 37 (04-30-23 @ 20:01)  HR: 61 (05-01-23 @ 10:58) (61 - 80)  BP: 135/62 (05-01-23 @ 10:58) (121/60 - 143/62)  RR: 18 (05-01-23 @ 10:58) (18 - 18)  SpO2: 99% (05-01-23 @ 10:58) (97% - 100%)  Constitutional: alert, awake, in no acute distress  Eyes: PERRL, EOMI  HEENT: normocephalic, atraumatic  Neck: supple, non-tender  Cardiovascular: normal perfusion, no peripheral edema  Respiratory: normal respiratory efforts; no increased use of accessory muscles  Gastrointestinal: soft, non-tender  Musculoskeletal: normal range of motion, no deformities noted  Neurological: alert, CN II to XI grossly intact  Skin: warm, dry    LABORATORY DATA                        7.3    8.15  )-----------( 196      ( 01 May 2023 06:15 )             22.2     05-01    142  |  104  |  78<H>  ----------------------------<  110<H>  4.3   |  23  |  3.99<H>    Ca    8.3<L>      01 May 2023 06:14    TPro  6.8  /  Alb  3.6  /  TBili  0.3  /  DBili  x   /  AST  42<H>  /  ALT  82<H>  /  AlkPhos  72  04-30  
[facility] DIVISION OF KIDNEY DISEASES AND HYPERTENSION -- [phone]  -- INITIAL CONSULT NOTE  --------------------------------------------------------------------------------  HPI: Pt. is a 72 year old Male with PMHx of advanced CKD, HTN, HLD, DM2 well known to our service from prior admission presents at [facility] on 4/27 for hyperkalemia noted on outpatient labs. Initial labs in ER concerning for hyperkalemia. Nephrology consulted for advanced CKD and hyperkalemia.     Pt is CKD stage 5 , and is being followed by Dr. Rees. Pt underwent creation of L AVF on 4/6 in prepearatoin of long term hemodialysis. Last Scr was 4.05 on 4/27. Initial labs in ER showed serum potassium elevated at 6.8 (non hemolyzed). Pt received medical management- insulin, D50, lokelma 5 mg . Repeat serum potassium is mildly elevated at 5.8.     Pt seen and examined in ER. Pt denies any SOB, Cp, N/V, abdominal pain, diarrhea or constipation or dizziness.     PAST HISTORY  --------------------------------------------------------------------------------  PAST MEDICAL & SURGICAL HISTORY:  Hypertension  Diabetes  Stage 5 chronic kidney disease not on chronic dialysis  HLD (hyperlipidemia)    FAMILY HISTORY:    PAST SOCIAL HISTORY:    ALLERGIES & MEDICATIONS  --------------------------------------------------------------------------------  Allergies    No Known Allergies    Intolerances      Standing Inpatient Medications    PRN Inpatient Medications    REVIEW OF SYSTEMS  --------------------------------------------------------------------------------  Gen: No fevers/chills  Skin: No rashes  Head/Eyes/Ears: Normal hearing,   Respiratory: No dyspnea, cough  CV: No chest pain  GI: No abdominal pain, diarrhea  : No dysuria, hematuria  MSK: No  edema  Heme: No easy bruising or bleeding  Psych: No significant depression  All other systems were reviewed and are negative, except as noted.    VITALS/PHYSICAL EXAM  --------------------------------------------------------------------------------  T(C): 36.7 (04-28-23 @ 05:30), Max: 36.8 (04-28-23 @ 00:40)  HR: 68 (04-28-23 @ 05:30) (65 - 68)  BP: 143/66 (04-28-23 @ 05:30) (140/51 - 143/66)  RR: 17 (04-28-23 @ 05:30) (17 - 18)  SpO2: 99% (04-28-23 @ 05:30) (99% - 99%)  Wt(kg): --  Height (cm): 182.9 (04-28-23 @ 00:40)  Weight (kg): 77.1 (04-28-23 @ 00:40)  BMI (kg/m2): 23 (04-28-23 @ 00:40)  BSA (m2): 1.99 (04-28-23 @ 00:40)    Physical Exam:  	Gen: NAD  	HEENT: MMM  	Pulm: CTA B/L  	CV: S1S2  	Abd: Soft, +BS   	Ext: No LE edema B/L  	Neuro: Awake  	Skin: Warm and dry  	Vascular access: LUE AVF (immature)    LABS/STUDIES  --------------------------------------------------------------------------------              8.7    10.55 >-----------<  197      [04-28-23 @ 03:32]              26.5     138  |  110  |  80  ----------------------------<  166      [04-28-23 @ 06:26]  5.8   |  16  |  4.02        Ca     8.7     [04-28-23 @ 06:26]      Mg     2.6     [04-28-23 @ 03:32]      Phos  5.1     [04-28-23 @ 03:32]    TPro  6.4  /  Alb  3.3  /  TBili  0.4  /  DBili  x   /  AST  46  /  ALT  63  /  AlkPhos  63  [04-28-23 @ 06:26]    PT/INR: PT 10.8 , INR 0.93       [04-28-23 @ 03:32]  PTT: 32.7       [04-28-23 @ 03:32]    Creatinine Trend:  SCr 4.02 [04-28 @ 06:26]  SCr 4.07 [04-28 @ 03:32]  SCr 4.45 [04-06 @ 04:14]  SCr 4.42 [04-05 @ 06:31]  SCr 4.91 [04-04 @ 06:48]    Iron 78, TIBC 213, %sat 37      [04-02-23 @ 06:48]  Ferritin 741      [04-02-23 @ 06:48]  TSH 2.97      [04-02-23 @ 06:48]  Lipid: chol 109, , HDL 35, LDL --      [04-02-23 @ 06:48]    HBsAb <3.0      [04-01-23 @ 15:59]  HBsAg Nonreact      [04-01-23 @ 15:59]  HCV 0.23, Nonreact      [04-01-23 @ 15:59]

## 2023-05-02 NOTE — PROGRESS NOTE ADULT - SUBJECTIVE AND OBJECTIVE BOX
[facility] DIVISION OF KIDNEY DISEASES AND HYPERTENSION -- FOLLOW UP NOTE  --------------------------------------------------------------------------------  HPI: Pt. is a 72 year old Male with PMHx of advanced CKD, HTN, HLD, DM2 well known to our service from prior admission presents at [facility] on 4/27 for hyperkalemia noted on outpatient labs. Initial labs in ER concerning for hyperkalemia. Pt. being seen for advanced CKD and hyperkalemia. Pt with h/o stage 5 CKD, and is being followed by Dr. Rees. Pt underwent creation of L AVF on 4/6 in preparation of long term hemodialysis. SCr remains elevated at 4.48 today. Serum potassium is mildly elevated at 5.6 today.    24 hour events/subjective: Pt. was seen and evaluated at bedside this morning. He reports feeling well, endorses no complaints. States he spoke with dietician this admission.     PAST HISTORY  --------------------------------------------------------------------------------  No significant changes to PMH, PSH, FHx, SHx, unless otherwise noted    ALLERGIES & MEDICATIONS  --------------------------------------------------------------------------------  Allergies    No Known Allergies    Intolerances      Standing Inpatient Medications  amLODIPine   Tablet 10 milliGRAM(s) Oral daily  aspirin enteric coated 81 milliGRAM(s) Oral daily  atorvastatin 40 milliGRAM(s) Oral at bedtime  carvedilol 12.5 milliGRAM(s) Oral every 12 hours  chlorhexidine 2% Cloths 1 Application(s) Topical daily  citric acid/sodium citrate Solution 30 milliLiter(s) Oral three times a day  dextrose 5%. 1000 milliLiter(s) IV Continuous <Continuous>  dextrose 5%. 1000 milliLiter(s) IV Continuous <Continuous>  dextrose 50% Injectable 25 Gram(s) IV Push once  dextrose 50% Injectable 12.5 Gram(s) IV Push once  dextrose 50% Injectable 25 Gram(s) IV Push once  epoetin rose-epbx (RETACRIT) Injectable 4000 Unit(s) SubCutaneous every 7 days  glucagon  Injectable 1 milliGRAM(s) IntraMuscular once  heparin   Injectable 5000 Unit(s) SubCutaneous every 12 hours  insulin lispro (ADMELOG) corrective regimen sliding scale   SubCutaneous three times a day before meals  levothyroxine 50 MICROGram(s) Oral daily  sodium bicarbonate 1300 milliGRAM(s) Oral two times a day    PRN Inpatient Medications  dextrose Oral Gel 15 Gram(s) Oral once PRN      REVIEW OF SYSTEMS  --------------------------------------------------------------------------------  As per HPI    VITALS/PHYSICAL EXAM  --------------------------------------------------------------------------------  T(C): 36.4 (05-02-23 @ 07:10), Max: 37.1 (05-02-23 @ 04:16)  HR: 63 (05-02-23 @ 07:10) (61 - 86)  BP: 134/69 (05-02-23 @ 07:10) (134/69 - 157/68)  RR: 18 (05-02-23 @ 07:10) (18 - 18)  SpO2: 99% (05-02-23 @ 07:10) (94% - 99%)  Wt(kg): --        05-01-23 @ 07:01  -  05-02-23 @ 07:00  --------------------------------------------------------  IN: 1220 mL / OUT: 1000 mL / NET: 220 mL      Physical Exam:  Gen: NAD  HEENT: MMM  Pulm: CTA B/L  CV: S1S2  Abd: Soft, +BS   Ext: No LE edema B/L  Neuro: Awake  Skin: Warm and dry  Vascular access: LUE AVF (immature), thrills and bruit+      LABS/STUDIES  --------------------------------------------------------------------------------              6.5    8.59  >-----------<  184      [05-02-23 @ 06:13]              20.2     142  |  107  |  69  ----------------------------<  111      [05-02-23 @ 06:11]  4.1   |  23  |  3.76        Ca     8.0     [05-02-23 @ 06:11]      Mg     2.2     [05-02-23 @ 06:11]      Phos  4.6     [05-02-23 @ 06:11]                [05-02-23 @ 06:11]    Creatinine Trend:  SCr 3.76 [05-02 @ 06:11]  SCr 3.99 [05-01 @ 06:14]  SCr 4.26 [04-30 @ 06:44]  SCr 4.48 [04-29 @ 04:40]  SCr 4.26 [04-28 @ 23:06]        Iron 70, TIBC 201, %sat 35      [04-30-23 @ 06:44]  Ferritin 777      [04-30-23 @ 06:45]  TSH 2.97      [04-02-23 @ 06:48]  Lipid: chol 109, , HDL 35, LDL --      [04-02-23 @ 06:48]

## 2023-05-02 NOTE — PROGRESS NOTE ADULT - ATTENDING COMMENTS
Advanced CKD:   No symptoms of uremia or fluid overload  No acute indication to start HD right now  AVF in place  Acidosis improving  Hyperkalemia noted: can give lokelma BID today   Check iron stores. If iron replete will start NITA    Rest per Dr.Bhutta Hannah Edouard MD  O: [phone]  Contact me on teams
rock pinzon  nephrology attending   please contact me on TEAMS   Office- [phone]

## 2023-05-03 LAB
ALBUMIN SERPL ELPH-MCNC: 3.6 G/DL — SIGNIFICANT CHANGE UP (ref 3.3–5)
ALP SERPL-CCNC: 76 U/L — SIGNIFICANT CHANGE UP (ref 40–120)
ALT FLD-CCNC: 83 U/L — HIGH (ref 10–45)
ANION GAP SERPL CALC-SCNC: 13 MMOL/L — SIGNIFICANT CHANGE UP (ref 5–17)
ANISOCYTOSIS BLD QL: SLIGHT — SIGNIFICANT CHANGE UP
AST SERPL-CCNC: 48 U/L — HIGH (ref 10–40)
BASOPHILS # BLD AUTO: 0.19 K/UL — SIGNIFICANT CHANGE UP (ref 0–0.2)
BASOPHILS NFR BLD AUTO: 1.8 % — SIGNIFICANT CHANGE UP (ref 0–2)
BILIRUB DIRECT SERPL-MCNC: <0.1 MG/DL — SIGNIFICANT CHANGE UP (ref 0–0.3)
BILIRUB INDIRECT FLD-MCNC: >0.4 MG/DL — SIGNIFICANT CHANGE UP (ref 0.2–1)
BILIRUB SERPL-MCNC: 0.5 MG/DL — SIGNIFICANT CHANGE UP (ref 0.2–1.2)
BUN SERPL-MCNC: 62 MG/DL — HIGH (ref 7–23)
CALCIUM SERPL-MCNC: 8.5 MG/DL — SIGNIFICANT CHANGE UP (ref 8.4–10.5)
CHLORIDE SERPL-SCNC: 104 MMOL/L — SIGNIFICANT CHANGE UP (ref 96–108)
CO2 SERPL-SCNC: 24 MMOL/L — SIGNIFICANT CHANGE UP (ref 22–31)
CREAT SERPL-MCNC: 3.53 MG/DL — HIGH (ref 0.5–1.3)
EGFR: 18 ML/MIN/1.73M2 — LOW
ELLIPTOCYTES BLD QL SMEAR: SLIGHT — SIGNIFICANT CHANGE UP
EOSINOPHIL # BLD AUTO: 1.58 K/UL — HIGH (ref 0–0.5)
EOSINOPHIL NFR BLD AUTO: 14.9 % — HIGH (ref 0–6)
GLUCOSE BLDC GLUCOMTR-MCNC: 162 MG/DL — HIGH (ref 70–99)
GLUCOSE BLDC GLUCOMTR-MCNC: 169 MG/DL — HIGH (ref 70–99)
GLUCOSE BLDC GLUCOMTR-MCNC: 173 MG/DL — HIGH (ref 70–99)
GLUCOSE BLDC GLUCOMTR-MCNC: 182 MG/DL — HIGH (ref 70–99)
GLUCOSE BLDC GLUCOMTR-MCNC: 242 MG/DL — HIGH (ref 70–99)
GLUCOSE SERPL-MCNC: 116 MG/DL — HIGH (ref 70–99)
HAPTOGLOB SERPL-MCNC: <20 MG/DL — LOW (ref 34–200)
HCT VFR BLD CALC: 22.9 % — LOW (ref 39–50)
HGB BLD-MCNC: 7.4 G/DL — LOW (ref 13–17)
LDH SERPL L TO P-CCNC: 438 U/L — HIGH (ref 50–242)
LYMPHOCYTES # BLD AUTO: 1.68 K/UL — SIGNIFICANT CHANGE UP (ref 1–3.3)
LYMPHOCYTES # BLD AUTO: 15.8 % — SIGNIFICANT CHANGE UP (ref 13–44)
MACROCYTES BLD QL: SLIGHT — SIGNIFICANT CHANGE UP
MAGNESIUM SERPL-MCNC: 2.2 MG/DL — SIGNIFICANT CHANGE UP (ref 1.6–2.6)
MANUAL SMEAR VERIFICATION: SIGNIFICANT CHANGE UP
MCHC RBC-ENTMCNC: 29.6 PG — SIGNIFICANT CHANGE UP (ref 27–34)
MCHC RBC-ENTMCNC: 32.3 GM/DL — SIGNIFICANT CHANGE UP (ref 32–36)
MCV RBC AUTO: 91.6 FL — SIGNIFICANT CHANGE UP (ref 80–100)
MONOCYTES # BLD AUTO: 0.74 K/UL — SIGNIFICANT CHANGE UP (ref 0–0.9)
MONOCYTES NFR BLD AUTO: 7 % — SIGNIFICANT CHANGE UP (ref 2–14)
MYELOCYTES NFR BLD: 0.9 % — HIGH (ref 0–0)
NEUTROPHILS # BLD AUTO: 6.33 K/UL — SIGNIFICANT CHANGE UP (ref 1.8–7.4)
NEUTROPHILS NFR BLD AUTO: 59.6 % — SIGNIFICANT CHANGE UP (ref 43–77)
OVALOCYTES BLD QL SMEAR: SLIGHT — SIGNIFICANT CHANGE UP
PHOSPHATE SERPL-MCNC: 4.1 MG/DL — SIGNIFICANT CHANGE UP (ref 2.5–4.5)
PLAT MORPH BLD: NORMAL — SIGNIFICANT CHANGE UP
PLATELET # BLD AUTO: 210 K/UL — SIGNIFICANT CHANGE UP (ref 150–400)
POIKILOCYTOSIS BLD QL AUTO: SLIGHT — SIGNIFICANT CHANGE UP
POTASSIUM SERPL-MCNC: 3.8 MMOL/L — SIGNIFICANT CHANGE UP (ref 3.5–5.3)
POTASSIUM SERPL-SCNC: 3.8 MMOL/L — SIGNIFICANT CHANGE UP (ref 3.5–5.3)
PROT SERPL-MCNC: 5.7 G/DL — LOW (ref 6–8.3)
PROT SERPL-MCNC: 5.7 G/DL — LOW (ref 6–8.3)
PROT SERPL-MCNC: 7 G/DL — SIGNIFICANT CHANGE UP (ref 6–8.3)
RBC # BLD: 2.5 M/UL — LOW (ref 4.2–5.8)
RBC # FLD: 14.1 % — SIGNIFICANT CHANGE UP (ref 10.3–14.5)
RBC BLD AUTO: ABNORMAL
SCHISTOCYTES BLD QL AUTO: SLIGHT — SIGNIFICANT CHANGE UP
SODIUM SERPL-SCNC: 141 MMOL/L — SIGNIFICANT CHANGE UP (ref 135–145)
WBC # BLD: 10.62 K/UL — HIGH (ref 3.8–10.5)
WBC # FLD AUTO: 10.62 K/UL — HIGH (ref 3.8–10.5)

## 2023-05-03 RX ORDER — POLYETHYLENE GLYCOL 3350 17 G/17G
17 POWDER, FOR SOLUTION ORAL DAILY
Refills: 0 | Status: DISCONTINUED | OUTPATIENT
Start: 2023-05-03 | End: 2023-05-06

## 2023-05-03 RX ADMIN — Medication 2: at 11:36

## 2023-05-03 RX ADMIN — CHLORHEXIDINE GLUCONATE 1 APPLICATION(S): 213 SOLUTION TOPICAL at 11:36

## 2023-05-03 RX ADMIN — Medication 30 MILLILITER(S): at 15:01

## 2023-05-03 RX ADMIN — Medication 1300 MILLIGRAM(S): at 05:18

## 2023-05-03 RX ADMIN — HEPARIN SODIUM 5000 UNIT(S): 5000 INJECTION INTRAVENOUS; SUBCUTANEOUS at 17:35

## 2023-05-03 RX ADMIN — CARVEDILOL PHOSPHATE 12.5 MILLIGRAM(S): 80 CAPSULE, EXTENDED RELEASE ORAL at 17:35

## 2023-05-03 RX ADMIN — Medication 1300 MILLIGRAM(S): at 17:35

## 2023-05-03 RX ADMIN — ATORVASTATIN CALCIUM 40 MILLIGRAM(S): 80 TABLET, FILM COATED ORAL at 21:38

## 2023-05-03 RX ADMIN — HEPARIN SODIUM 5000 UNIT(S): 5000 INJECTION INTRAVENOUS; SUBCUTANEOUS at 05:18

## 2023-05-03 RX ADMIN — Medication 81 MILLIGRAM(S): at 11:39

## 2023-05-03 RX ADMIN — Medication 30 MILLILITER(S): at 21:38

## 2023-05-03 RX ADMIN — AMLODIPINE BESYLATE 10 MILLIGRAM(S): 2.5 TABLET ORAL at 05:18

## 2023-05-03 RX ADMIN — POLYETHYLENE GLYCOL 3350 17 GRAM(S): 17 POWDER, FOR SOLUTION ORAL at 15:01

## 2023-05-03 RX ADMIN — Medication 50 MICROGRAM(S): at 05:18

## 2023-05-03 RX ADMIN — Medication 2: at 17:36

## 2023-05-03 RX ADMIN — CARVEDILOL PHOSPHATE 12.5 MILLIGRAM(S): 80 CAPSULE, EXTENDED RELEASE ORAL at 05:18

## 2023-05-03 RX ADMIN — Medication 4: at 08:42

## 2023-05-03 RX ADMIN — Medication 30 MILLILITER(S): at 05:17

## 2023-05-03 NOTE — PROGRESS NOTE ADULT - SUBJECTIVE AND OBJECTIVE BOX
Patient seen and examined at bedside. no active complaints     MEDICATIONS  (STANDING):  amLODIPine   Tablet 10 milliGRAM(s) Oral daily  aspirin enteric coated 81 milliGRAM(s) Oral daily  atorvastatin 40 milliGRAM(s) Oral at bedtime  carvedilol 12.5 milliGRAM(s) Oral every 12 hours  chlorhexidine 2% Cloths 1 Application(s) Topical daily  citric acid/sodium citrate Solution 30 milliLiter(s) Oral three times a day  dextrose 5%. 1000 milliLiter(s) (100 mL/Hr) IV Continuous <Continuous>  dextrose 5%. 1000 milliLiter(s) (50 mL/Hr) IV Continuous <Continuous>  dextrose 50% Injectable 25 Gram(s) IV Push once  dextrose 50% Injectable 12.5 Gram(s) IV Push once  dextrose 50% Injectable 25 Gram(s) IV Push once  epoetin rose-epbx (RETACRIT) Injectable 4000 Unit(s) SubCutaneous every 7 days  glucagon  Injectable 1 milliGRAM(s) IntraMuscular once  heparin   Injectable 5000 Unit(s) SubCutaneous every 12 hours  insulin lispro (ADMELOG) corrective regimen sliding scale   SubCutaneous three times a day before meals  insulin lispro (ADMELOG) corrective regimen sliding scale   SubCutaneous at bedtime  levothyroxine 50 MICROGram(s) Oral daily  polyethylene glycol 3350 17 Gram(s) Oral daily  sodium bicarbonate 1300 milliGRAM(s) Oral two times a day    MEDICATIONS  (PRN):  dextrose Oral Gel 15 Gram(s) Oral once PRN Blood Glucose LESS THAN 70 milliGRAM(s)/deciliter        Vital Signs Last 24 Hrs  T(C): 36.9 (03 May 2023 11:24), Max: 37.5 (03 May 2023 04:11)  T(F): 98.4 (03 May 2023 11:24), Max: 99.5 (03 May 2023 04:11)  HR: 73 (03 May 2023 11:24) (73 - 85)  BP: 149/62 (03 May 2023 11:24) (144/65 - 152/68)  BP(mean): --  RR: 18 (03 May 2023 11:24) (18 - 18)  SpO2: 97% (03 May 2023 11:24) (96% - 97%)    Parameters below as of 03 May 2023 11:24  Patient On (Oxygen Delivery Method): room air          PHYSICAL EXAM:     GENERAL:  Appears stated age, well-groomed  HEENT:  NC/AT,  conjunctivae clear and pink  CHEST:  CTA b/l  HEART:  S1 s2+   ABDOMEN:  Soft, non-tender, non-distended  EXTEREMITIES:  no cyanosis,clubbing or edema  SKIN:  No rash/erythema/ecchymoses  LN: No palpable Lymphadenopathy    NEURO:  Alert, oriented, no asterixis                            7.4    10.62 )-----------( 210      ( 03 May 2023 06:31 )             22.9       05-03    141  |  104  |  62<H>  ----------------------------<  116<H>  3.8   |  24  |  3.53<H>    Ca    8.5      03 May 2023 06:27  Phos  4.1     05-03  Mg     2.2     05-03    TPro  7.0  /  Alb  3.6  /  TBili  0.5  /  DBili  <0.1  /  AST  48<H>  /  ALT  83<H>  /  AlkPhos  76  05-03

## 2023-05-03 NOTE — PHYSICAL THERAPY INITIAL EVALUATION ADULT - IMPAIRMENTS CONTRIBUTING IMPAIRED BED MOBILITY, REHAB EVAL
decrease endurance , balance good in sitting/decreased sensation/impaired sensory feedback/decreased strength

## 2023-05-03 NOTE — PHYSICAL THERAPY INITIAL EVALUATION ADULT - GAIT DEVIATIONS NOTED, PT EVAL
decreased carmelo/increased time in double stance/decreased step length/decreased stride length/decreased weight-shifting ability

## 2023-05-03 NOTE — PROGRESS NOTE ADULT - ASSESSMENT
APOLLO NICHOLAS is a 72y Male who presents with a chief complaint of hyperkalemia.    Anemia  ·	Patient with worsening hemoglobin, in the 7s, compared to the 9-10s last month. Patient reports having anemia for the last several years.   ·	In the setting of known renal disease. Being followed by nephrology and is started on epoietin.  ·	b12 normal. ferritin elevated, hg electro normal   ·	his ldh slightly high, hapto is low, bili normal . myeloma studies pending   ·	marty negative   ·	CT abd no bleeding   ·	GI consulted   ·	tibili and direct bili normal , ldh with elevation, hapto low. unclear if hemolysis at this point with normal bili very unlikely   ·	started procrit      Chronic Kidney Disease  Hyperkalemia  ·	Nephrology following. Patient on sodium zirconium cyclosilicate.   ·	On sodium bicarbonate.  ·	No urgent indication for hemodialysis at this time.    Kelvin Connelly MD  HematologyOncology   O: [phone]

## 2023-05-03 NOTE — PHYSICAL THERAPY INITIAL EVALUATION ADULT - ASSISTIVE DEVICE FOR TRANSFER: GAIT, REHAB EVAL
pt first amb 50 ft w/o ad pt intermittent min unsteady cg of 1 , pt then amb 50 ft x 2 with std cane occasional mild unsteady close supervision pt educ to have close supervision w/ std cane to amb and understood w/ vc

## 2023-05-03 NOTE — PHYSICAL THERAPY INITIAL EVALUATION ADULT - ADDITIONAL COMMENTS
pt lives in co op with spouse with 5 steps to enter with HR , independent adl's and amb independent with std cane PTA ; pt dtr Andrew Hahn ID as emergency contact [phone] no caregiver ID pt lives in co op with spouse with 5 steps to enter with HR then elevator per pt , independent adl's and amb independent with std cane PTA ; pt dtr Andrew Hahn ID as emergency contact [phone] no caregiver ID; pt report fell in Dec 2022 then a couple of weeks ago , pt educ re fall prevention , tendency to get up too quickly per pt , pt educated to wait with each change in position and understood , vss In session; pt has a tub shower

## 2023-05-03 NOTE — PHYSICAL THERAPY INITIAL EVALUATION ADULT - PERSONAL SAFETY AND JUDGMENT, REHAB EVAL
pt need vc to wait with change of position tendency to move to quickly need frequent vc  then self correct/at risk behaviors demonstrated

## 2023-05-03 NOTE — PHYSICAL THERAPY INITIAL EVALUATION ADULT - GROSSLY INTACT, SENSORY
except feet especially toes decrease sensation to light touch , h/o neuropathy per pt/Grossly Intact

## 2023-05-03 NOTE — PROGRESS NOTE ADULT - ASSESSMENT
Pt. is a 72 year old Male with PMHx of advanced CKD, HTN, HLD, DM2 well known to our service from prior admission presents at [facility] on 4/27 for hyperkalemia noted on outpatient labs.   Initial labs in ER  for hyperkalemia.  renal eval noted  f/u bmp noted  potassium much improved  treatment as per renal  dvt proph  dm  fsg riss  c/w htn meds anemia  hg stable  guaiac neg  heme eval noted  gi eval noted  outpt colonoscopy    ct a/p noted    d/c if ok w/ renal / heme

## 2023-05-03 NOTE — PHYSICAL THERAPY INITIAL EVALUATION ADULT - NSPTDISCHREC_GEN_A_CORE
to increase fxl mobility , strength, balance, endurance , fall prevention education continued and restoe to previous level of functional independence ; if pt decide not to accept HPT then OPT ; per pt wife can assist as needed/Home PT

## 2023-05-03 NOTE — PHYSICAL THERAPY INITIAL EVALUATION ADULT - GENERAL OBSERVATIONS, REHAB EVAL
pt received in bed top rails up and 1 siderail HOB 30 degrees pt report stiffness neck from being in bed has had x many months ;pt otherwise feels good per pt getting stronger per pt ; pt agreeable for PT  role explained

## 2023-05-03 NOTE — PHYSICAL THERAPY INITIAL EVALUATION ADULT - PLANNED THERAPY INTERVENTIONS, PT EVAL
GOAL stair train : pt will negotiate 5  steps with HR independent in 2 weeks/balance training/bed mobility training/gait training/strengthening/transfer training

## 2023-05-03 NOTE — PROGRESS NOTE ADULT - ASSESSMENT
anemia  CKD    doubt GI blood loss  normal MCV and occult negative  anemia likely related to chronic disease and possibly hemolysis  reg diet  monitor cbc  CT negative   would hold on colonoscopy for now  will follow   d/w pt     I reviewed the overnight course of events on the unit, re-confirming the patient history. I discussed the care with the patient and their family  The plan of care was discussed with the physician assistant and modifications were made to the notation where appropriate.   Differential diagnosis and plan of care discussed with patient after the evaluation  35 minutes spent on total encounter of which more than fifty percent of the encounter was spent counseling and/or coordinating care by the attending physician.  Advanced care planning was discussed with patient and family.  Advanced care planning forms were reviewed and discussed.  Risks, benefits and alternatives of gastroenterologic procedures were discussed in detail and all questions were answered.

## 2023-05-03 NOTE — PHYSICAL THERAPY INITIAL EVALUATION ADULT - LEVEL OF INDEPENDENCE: SUPINE/SIT, REHAB EVAL
vc to wait with change of position , balance good in sitting , pt able to don and doff shoes in sitting independent/independent

## 2023-05-03 NOTE — PROGRESS NOTE ADULT - SUBJECTIVE AND OBJECTIVE BOX
[facility]  Toni Germain PA-C  [address] [place] [address]  [phone]      INTERVAL HPI/OVERNIGHT EVENTS:  pt seen and examined, no new events  c/o constipation   no bleeding, hgb stable  CT negative     MEDICATIONS  (STANDING):  amLODIPine   Tablet 10 milliGRAM(s) Oral daily  aspirin enteric coated 81 milliGRAM(s) Oral daily  atorvastatin 40 milliGRAM(s) Oral at bedtime  carvedilol 12.5 milliGRAM(s) Oral every 12 hours  chlorhexidine 2% Cloths 1 Application(s) Topical daily  citric acid/sodium citrate Solution 30 milliLiter(s) Oral three times a day  dextrose 5%. 1000 milliLiter(s) (100 mL/Hr) IV Continuous <Continuous>  dextrose 5%. 1000 milliLiter(s) (50 mL/Hr) IV Continuous <Continuous>  dextrose 50% Injectable 25 Gram(s) IV Push once  dextrose 50% Injectable 12.5 Gram(s) IV Push once  dextrose 50% Injectable 25 Gram(s) IV Push once  epoetin rose-epbx (RETACRIT) Injectable 4000 Unit(s) SubCutaneous every 7 days  glucagon  Injectable 1 milliGRAM(s) IntraMuscular once  heparin   Injectable 5000 Unit(s) SubCutaneous every 12 hours  insulin lispro (ADMELOG) corrective regimen sliding scale   SubCutaneous three times a day before meals  insulin lispro (ADMELOG) corrective regimen sliding scale   SubCutaneous at bedtime  levothyroxine 50 MICROGram(s) Oral daily  polyethylene glycol 3350 17 Gram(s) Oral daily  sodium bicarbonate 1300 milliGRAM(s) Oral two times a day    MEDICATIONS  (PRN):  dextrose Oral Gel 15 Gram(s) Oral once PRN Blood Glucose LESS THAN 70 milliGRAM(s)/deciliter      Allergies    No Known Allergies    Intolerances        ROS:   General:  No wt loss, fevers, chills, night sweats, fatigue,   Eyes:  Good vision, no reported pain  ENT:  No sore throat, pain, runny nose, dysphagia  CV:  No pain, palpitations, hypo/hypertension  Resp:  No dyspnea, cough, tachypnea, wheezing  GI:  No pain, No nausea, No vomiting, No diarrhea, No constipation, No weight loss, No fever, No pruritis, No rectal bleeding, No tarry stools, No dysphagia,  :  No pain, bleeding, incontinence, nocturia  Muscle:  No pain, weakness  Neuro:  No weakness, tingling, memory problems  Psych:  No fatigue, insomnia, mood problems, depression  Endocrine:  No polyuria, polydipsia, cold/heat intolerance  Heme:  No petechiae, ecchymosis, easy bruisability  Skin:  No rash, tattoos, scars, edema      PHYSICAL EXAM:   Vital Signs:  Vital Signs Last 24 Hrs  T(C): 36.9 (03 May 2023 11:24), Max: 37.5 (03 May 2023 04:11)  T(F): 98.4 (03 May 2023 11:24), Max: 99.5 (03 May 2023 04:11)  HR: 73 (03 May 2023 11:24) (73 - 85)  BP: 149/62 (03 May 2023 11:24) (144/65 - 152/68)  BP(mean): --  RR: 18 (03 May 2023 11:24) (18 - 18)  SpO2: 97% (03 May 2023 11:24) (96% - 97%)    Parameters below as of 03 May 2023 11:24  Patient On (Oxygen Delivery Method): room air      Daily     Daily     GENERAL:  Appears stated age,   HEENT:  NC/AT,    CHEST:  Full & symmetric excursion,   HEART:  Regular rhythm,  ABDOMEN:  Soft, non-tender, non-distended,  EXTEREMITIES:  no cyanosis  SKIN:  No rash  NEURO:  Alert,       LABS:                        7.4    10.62 )-----------( 210      ( 03 May 2023 06:31 )             22.9     05-03    141  |  104  |  62<H>  ----------------------------<  116<H>  3.8   |  24  |  3.53<H>    Ca    8.5      03 May 2023 06:27  Phos  4.1     05-03  Mg     2.2     05-03    TPro  7.0  /  Alb  3.6  /  TBili  0.5  /  DBili  <0.1  /  AST  48<H>  /  ALT  83<H>  /  AlkPhos  76  05-03          RADIOLOGY & ADDITIONAL TESTS:    < from: CT Abdomen and Pelvis No Cont (05.02.23 @ 17:41) >    ACC: [id number] EXAM:  CT ABDOMEN AND PELVIS   ORDERED BY: AXEL JEAN     PROCEDURE DATE:  05/02/2023          INTERPRETATION:  CLINICAL INFORMATION: Anemia.    COMPARISON: None.    CONTRAST/COMPLICATIONS:  IV Contrast: NONE  Oral Contrast: NONE  Complications: None reported at time of study completion    PROCEDURE:  CT of the Abdomen and Pelvis was performed.  Sagittal and coronal reformats were performed.    FINDINGS:  Limited evaluation of the vasculature and viscera in the absence of   intravenous contrast.    LOWER CHEST: Bibasilar subsegmental atelectasis. Trace left pleural   effusion. Hypoattenuation of the blood pool relative to the myocardium,   consistent with anemia.    LIVER: Subcentimeter hypodense focus in the posterior right lobe, which   is too small to characterize.  BILE DUCTS: Normal caliber.  GALLBLADDER: Cholelithiasis.  SPLEEN: Within normal limits.  PANCREAS: Subcentimeter calcification in the pancreatic tail.  ADRENALS: Within normal limits.  KIDNEYS/URETERS: Right renal cysts. No hydronephrosis. No renal calculi.    BLADDER: Underdistended.  REPRODUCTIVE ORGANS: The prostate is enlarged.    BOWEL: Colonic diverticula without evidence for acute diverticulitis. No   bowel obstruction. Normal appendix.  PERITONEUM: No ascites.  VESSELS: Atherosclerotic changes.  RETROPERITONEUM/LYMPH NODES: No lymphadenopathy. No retroperitoneal   hematoma.  ABDOMINAL WALL: Mild generalized subcutaneous edema.  BONES: Degenerative changes.    IMPRESSION:  No retroperitoneal hematoma.    Colonic diverticulosis without evidence for acute diverticulitis.    Trace left pleural effusion.        --- End of Report ---

## 2023-05-03 NOTE — PROGRESS NOTE ADULT - SUBJECTIVE AND OBJECTIVE BOX
DATE OF SERVICE: 05-03-23 @ 15:08  CHIEF COMPLAINT:Patient is a 72y old  Male who presents with a chief complaint of Per chart "Pt. is a 72 year old Male with PMHx of advanced CKD, HTN, HLD, DM2 well known to our service from prior admission presents at [facility] on 4/27 for hyperkalemia noted on outpatient labs."     (30 Apr 2023 14:14)    	        PAST MEDICAL & SURGICAL HISTORY:  Hypertension      Diabetes      Stage 5 chronic kidney disease not on chronic dialysis      HLD (hyperlipidemia)              REVIEW OF SYSTEMS:  CONSTITUTIONAL: No fever, weight loss, or fatigue  EYES: No eye pain, visual disturbances, or discharge  NECK: No pain or stiffness  RESPIRATORY: No cough, wheezing, chills or hemoptysis; No Shortness of Breath  CARDIOVASCULAR: No chest pain, palpitations, passing out, dizziness, or leg swelling  GASTROINTESTINAL: No abdominal or epigastric pain. No nausea, vomiting, or hematemesis; No diarrhea or constipation. No melena or hematochezia.  GENITOURINARY: No dysuria, frequency, hematuria, or incontinence  NEUROLOGICAL: No headaches,     Medications:  MEDICATIONS  (STANDING):  amLODIPine   Tablet 10 milliGRAM(s) Oral daily  aspirin enteric coated 81 milliGRAM(s) Oral daily  atorvastatin 40 milliGRAM(s) Oral at bedtime  carvedilol 12.5 milliGRAM(s) Oral every 12 hours  chlorhexidine 2% Cloths 1 Application(s) Topical daily  citric acid/sodium citrate Solution 30 milliLiter(s) Oral three times a day  dextrose 5%. 1000 milliLiter(s) (100 mL/Hr) IV Continuous <Continuous>  dextrose 5%. 1000 milliLiter(s) (50 mL/Hr) IV Continuous <Continuous>  dextrose 50% Injectable 25 Gram(s) IV Push once  dextrose 50% Injectable 12.5 Gram(s) IV Push once  dextrose 50% Injectable 25 Gram(s) IV Push once  epoetin rose-epbx (RETACRIT) Injectable 4000 Unit(s) SubCutaneous every 7 days  glucagon  Injectable 1 milliGRAM(s) IntraMuscular once  heparin   Injectable 5000 Unit(s) SubCutaneous every 12 hours  insulin lispro (ADMELOG) corrective regimen sliding scale   SubCutaneous three times a day before meals  insulin lispro (ADMELOG) corrective regimen sliding scale   SubCutaneous at bedtime  levothyroxine 50 MICROGram(s) Oral daily  polyethylene glycol 3350 17 Gram(s) Oral daily  sodium bicarbonate 1300 milliGRAM(s) Oral two times a day    MEDICATIONS  (PRN):  dextrose Oral Gel 15 Gram(s) Oral once PRN Blood Glucose LESS THAN 70 milliGRAM(s)/deciliter    	    PHYSICAL EXAM:  T(C): 36.9 (05-03-23 @ 11:24), Max: 37.5 (05-03-23 @ 04:11)  HR: 73 (05-03-23 @ 11:24) (73 - 85)  BP: 149/62 (05-03-23 @ 11:24) (144/65 - 152/68)  RR: 18 (05-03-23 @ 11:24) (18 - 18)  SpO2: 97% (05-03-23 @ 11:24) (96% - 97%)  Wt(kg): --  I&O's Summary    02 May 2023 07:01  -  03 May 2023 07:00  --------------------------------------------------------  IN: 840 mL / OUT: 0 mL / NET: 840 mL    03 May 2023 07:01  -  03 May 2023 15:08  --------------------------------------------------------  IN: 480 mL / OUT: 0 mL / NET: 480 mL        Appearance: Normal	  HEENT:   Normal oral mucosa, PERRL, EOMI	  Lymphatic: No lymphadenopathy  Cardiovascular: Normal S1 S2, No JVD, No murmurs, No edema  Respiratory: Lungs clear to auscultation	  Psychiatry: A & O x 3, Mood & affect appropriate  Gastrointestinal:  Soft, Non-tender, + BS	  Skin: No rashes, No ecchymoses, No cyanosis	  Neurologic: Non-focal  Extremities: Normal range of motion, No clubbing, cyanosis or edema  Vascular: Peripheral pulses palpable 2+ bilaterally    TELEMETRY: 	    ECG:  	  RADIOLOGY:  OTHER: 	  	  LABS:	 	    CARDIAC MARKERS:                                7.4    10.62 )-----------( 210      ( 03 May 2023 06:31 )             22.9     05-03    141  |  104  |  62<H>  ----------------------------<  116<H>  3.8   |  24  |  3.53<H>    Ca    8.5      03 May 2023 06:27  Phos  4.1     05-03  Mg     2.2     05-03    TPro  7.0  /  Alb  3.6  /  TBili  0.5  /  DBili  <0.1  /  AST  48<H>  /  ALT  83<H>  /  AlkPhos  76  05-03    proBNP:   Lipid Profile:   HgA1c:   TSH:

## 2023-05-03 NOTE — PHYSICAL THERAPY INITIAL EVALUATION ADULT - IMPAIRMENTS CONTRIBUTING TO GAIT DEVIATIONS, PT EVAL
decrease endurance , intermittent min unsteady w/ std cane occasional close supervision ; w/o AD cg of 1 intermittent min unsteady/impaired balance/impaired postural control/decreased sensation/impaired sensory feedback/decreased strength

## 2023-05-03 NOTE — PHYSICAL THERAPY INITIAL EVALUATION ADULT - PERTINENT HX OF CURRENT PROBLEM, REHAB EVAL
72 year old Male with PMHx of advanced CKD, HTN, HLD, DM2  presents at [facility] on 4/27 for hyperkalemia noted on outpatient labs. Initial labs in ER concerning for hyperkalemia. Nephrology consulted for advanced CKD and hyperkalemia.   Pt is CKD stage 5 , and is being followed by Dr. Rees. Pt underwent creation of L AVF on 4/6 in prepearatoin of long term hemodialysis. Last Scr was 4.05 on 4/27. Initial labs in ER showed serum potassium elevated at 6.8 (non hemolyzed). Pt received medical management- insulin, D50, lokelma 5 mg . Repeat serum potassium is mildly elevated at 5.8. Feritin 721 ; CT ABD /PELVIS 5/2/23 : trace pleural effusion ,colonic diverticulosis; H/H 5/2/23 6.5/20.2 received 1 unit transfusion 5/3/23 H/H 7.4/22.9 , WBC 10.62 ; tele NSR w/ unsustained PVC 70's-80's 72 year old Male with PMHx of advanced CKD, HTN, HLD, DM2  presents at [facility] on 4/27 for hyperkalemia noted on outpatient labs. Initial labs in ER concerning for hyperkalemia. Nephrology consulted for advanced CKD and hyperkalemia.   Pt is CKD stage 5 , and is being followed by Dr. Rees. Pt underwent creation of L AVF on 4/6 in prepearatoin of long term hemodialysis. Last Scr was 4.05 on 4/27. Initial labs in ER showed serum potassium elevated at 6.8 (non hemolyzed). Pt received medical management- insulin, D50, lokelma 5 mg . Repeat serum potassium is mildly elevated at 5.8. Feritin 721 ; CT ABD /PELVIS 5/2/23 : trace pleural effusion ,colonic diverticulosis; H/H 5/2/23 6.5/20.2 ; 5/3/23  H/H 7.4/22.9 , WBC 10.62 ; tele NSR w/ unsustained PVC 70's-80's

## 2023-05-03 NOTE — PHYSICAL THERAPY INITIAL EVALUATION ADULT - IMPAIRED TRANSFERS: SIT/STAND, REHAB EVAL
decrease endurance , sit-stand vc to wait with change of position move too quickly to walk , pt felt ok after stand 1-2 min/decreased sensation/impaired sensory feedback/decreased strength

## 2023-05-04 LAB
ANION GAP SERPL CALC-SCNC: 12 MMOL/L — SIGNIFICANT CHANGE UP (ref 5–17)
BASOPHILS # BLD AUTO: 0.22 K/UL — HIGH (ref 0–0.2)
BASOPHILS NFR BLD AUTO: 1.8 % — SIGNIFICANT CHANGE UP (ref 0–2)
BUN SERPL-MCNC: 59 MG/DL — HIGH (ref 7–23)
CALCIUM SERPL-MCNC: 8.4 MG/DL — SIGNIFICANT CHANGE UP (ref 8.4–10.5)
CHLORIDE SERPL-SCNC: 103 MMOL/L — SIGNIFICANT CHANGE UP (ref 96–108)
CO2 SERPL-SCNC: 26 MMOL/L — SIGNIFICANT CHANGE UP (ref 22–31)
CREAT SERPL-MCNC: 3.76 MG/DL — HIGH (ref 0.5–1.3)
EGFR: 16 ML/MIN/1.73M2 — LOW
EOSINOPHIL # BLD AUTO: 0.32 K/UL — SIGNIFICANT CHANGE UP (ref 0–0.5)
EOSINOPHIL NFR BLD AUTO: 2.6 % — SIGNIFICANT CHANGE UP (ref 0–6)
GLUCOSE BLDC GLUCOMTR-MCNC: 143 MG/DL — HIGH (ref 70–99)
GLUCOSE BLDC GLUCOMTR-MCNC: 184 MG/DL — HIGH (ref 70–99)
GLUCOSE BLDC GLUCOMTR-MCNC: 195 MG/DL — HIGH (ref 70–99)
GLUCOSE BLDC GLUCOMTR-MCNC: 227 MG/DL — HIGH (ref 70–99)
GLUCOSE BLDC GLUCOMTR-MCNC: 241 MG/DL — HIGH (ref 70–99)
GLUCOSE SERPL-MCNC: 145 MG/DL — HIGH (ref 70–99)
HCT VFR BLD CALC: 20.7 % — CRITICAL LOW (ref 39–50)
HCT VFR BLD CALC: 25.3 % — LOW (ref 39–50)
HGB BLD-MCNC: 6.6 G/DL — CRITICAL LOW (ref 13–17)
HGB BLD-MCNC: 8 G/DL — LOW (ref 13–17)
LYMPHOCYTES # BLD AUTO: 19.1 % — SIGNIFICANT CHANGE UP (ref 13–44)
LYMPHOCYTES # BLD AUTO: 2.36 K/UL — SIGNIFICANT CHANGE UP (ref 1–3.3)
MAGNESIUM SERPL-MCNC: 2.1 MG/DL — SIGNIFICANT CHANGE UP (ref 1.6–2.6)
MANUAL SMEAR VERIFICATION: SIGNIFICANT CHANGE UP
MCHC RBC-ENTMCNC: 29.3 PG — SIGNIFICANT CHANGE UP (ref 27–34)
MCHC RBC-ENTMCNC: 29.5 PG — SIGNIFICANT CHANGE UP (ref 27–34)
MCHC RBC-ENTMCNC: 31.6 GM/DL — LOW (ref 32–36)
MCHC RBC-ENTMCNC: 31.9 GM/DL — LOW (ref 32–36)
MCV RBC AUTO: 92 FL — SIGNIFICANT CHANGE UP (ref 80–100)
MCV RBC AUTO: 93.4 FL — SIGNIFICANT CHANGE UP (ref 80–100)
MONOCYTES # BLD AUTO: 0.75 K/UL — SIGNIFICANT CHANGE UP (ref 0–0.9)
MONOCYTES NFR BLD AUTO: 6.1 % — SIGNIFICANT CHANGE UP (ref 2–14)
NEUTROPHILS # BLD AUTO: 8.69 K/UL — HIGH (ref 1.8–7.4)
NEUTROPHILS NFR BLD AUTO: 70.4 % — SIGNIFICANT CHANGE UP (ref 43–77)
NRBC # BLD: 0 /100 WBCS — SIGNIFICANT CHANGE UP (ref 0–0)
PHOSPHATE SERPL-MCNC: 3.7 MG/DL — SIGNIFICANT CHANGE UP (ref 2.5–4.5)
PLAT MORPH BLD: NORMAL — SIGNIFICANT CHANGE UP
PLATELET # BLD AUTO: 186 K/UL — SIGNIFICANT CHANGE UP (ref 150–400)
PLATELET # BLD AUTO: 189 K/UL — SIGNIFICANT CHANGE UP (ref 150–400)
POTASSIUM SERPL-MCNC: 4.3 MMOL/L — SIGNIFICANT CHANGE UP (ref 3.5–5.3)
POTASSIUM SERPL-SCNC: 4.3 MMOL/L — SIGNIFICANT CHANGE UP (ref 3.5–5.3)
RBC # BLD: 2.25 M/UL — LOW (ref 4.2–5.8)
RBC # BLD: 2.71 M/UL — LOW (ref 4.2–5.8)
RBC # FLD: 13.8 % — SIGNIFICANT CHANGE UP (ref 10.3–14.5)
RBC # FLD: 14.2 % — SIGNIFICANT CHANGE UP (ref 10.3–14.5)
RBC BLD AUTO: SIGNIFICANT CHANGE UP
SODIUM SERPL-SCNC: 141 MMOL/L — SIGNIFICANT CHANGE UP (ref 135–145)
WBC # BLD: 12.34 K/UL — HIGH (ref 3.8–10.5)
WBC # BLD: 12.51 K/UL — HIGH (ref 3.8–10.5)
WBC # FLD AUTO: 12.34 K/UL — HIGH (ref 3.8–10.5)
WBC # FLD AUTO: 12.51 K/UL — HIGH (ref 3.8–10.5)

## 2023-05-04 RX ORDER — LACTULOSE 10 G/15ML
30 SOLUTION ORAL ONCE
Refills: 0 | Status: COMPLETED | OUTPATIENT
Start: 2023-05-04 | End: 2023-05-04

## 2023-05-04 RX ORDER — SOD SULF/SODIUM/NAHCO3/KCL/PEG
1000 SOLUTION, RECONSTITUTED, ORAL ORAL EVERY 4 HOURS
Refills: 0 | Status: COMPLETED | OUTPATIENT
Start: 2023-05-04 | End: 2023-05-04

## 2023-05-04 RX ORDER — SODIUM ZIRCONIUM CYCLOSILICATE 10 G/10G
10 POWDER, FOR SUSPENSION ORAL
Qty: 600 | Refills: 0
Start: 2023-05-04 | End: 2023-06-02

## 2023-05-04 RX ADMIN — Medication 1300 MILLIGRAM(S): at 05:37

## 2023-05-04 RX ADMIN — CARVEDILOL PHOSPHATE 12.5 MILLIGRAM(S): 80 CAPSULE, EXTENDED RELEASE ORAL at 05:37

## 2023-05-04 RX ADMIN — Medication 30 MILLILITER(S): at 14:03

## 2023-05-04 RX ADMIN — ATORVASTATIN CALCIUM 40 MILLIGRAM(S): 80 TABLET, FILM COATED ORAL at 21:04

## 2023-05-04 RX ADMIN — Medication 30 MILLILITER(S): at 21:04

## 2023-05-04 RX ADMIN — LACTULOSE 30 GRAM(S): 10 SOLUTION ORAL at 11:56

## 2023-05-04 RX ADMIN — Medication 81 MILLIGRAM(S): at 11:53

## 2023-05-04 RX ADMIN — Medication 1000 MILLILITER(S): at 21:07

## 2023-05-04 RX ADMIN — AMLODIPINE BESYLATE 10 MILLIGRAM(S): 2.5 TABLET ORAL at 05:38

## 2023-05-04 RX ADMIN — Medication 1300 MILLIGRAM(S): at 17:35

## 2023-05-04 RX ADMIN — Medication 30 MILLILITER(S): at 05:37

## 2023-05-04 RX ADMIN — HEPARIN SODIUM 5000 UNIT(S): 5000 INJECTION INTRAVENOUS; SUBCUTANEOUS at 05:38

## 2023-05-04 RX ADMIN — Medication 10 MILLIGRAM(S): at 17:37

## 2023-05-04 RX ADMIN — Medication 10 MILLIGRAM(S): at 20:14

## 2023-05-04 RX ADMIN — Medication 50 MICROGRAM(S): at 05:37

## 2023-05-04 RX ADMIN — Medication 1000 MILLILITER(S): at 17:36

## 2023-05-04 RX ADMIN — Medication 2: at 08:42

## 2023-05-04 RX ADMIN — CHLORHEXIDINE GLUCONATE 1 APPLICATION(S): 213 SOLUTION TOPICAL at 11:56

## 2023-05-04 RX ADMIN — HEPARIN SODIUM 5000 UNIT(S): 5000 INJECTION INTRAVENOUS; SUBCUTANEOUS at 17:34

## 2023-05-04 RX ADMIN — Medication 4: at 11:54

## 2023-05-04 RX ADMIN — POLYETHYLENE GLYCOL 3350 17 GRAM(S): 17 POWDER, FOR SOLUTION ORAL at 11:53

## 2023-05-04 RX ADMIN — CARVEDILOL PHOSPHATE 12.5 MILLIGRAM(S): 80 CAPSULE, EXTENDED RELEASE ORAL at 17:37

## 2023-05-04 NOTE — PROGRESS NOTE ADULT - ASSESSMENT
anemia  CKD    doubt GI blood loss  normal MCV and occult negative  anemia likely related to chronic disease and possibly hemolysis  pt getting 1 unit PRBC  will plan for EGD/colonoscopy tomorrow   lactulose x 1  CLD  prep ordered  will follow   d/w pt   d/w NP    I reviewed the overnight course of events on the unit, re-confirming the patient history. I discussed the care with the patient and their family  The plan of care was discussed with the physician assistant and modifications were made to the notation where appropriate.   Differential diagnosis and plan of care discussed with patient after the evaluation  35 minutes spent on total encounter of which more than fifty percent of the encounter was spent counseling and/or coordinating care by the attending physician.  Advanced care planning was discussed with patient and family.  Advanced care planning forms were reviewed and discussed.  Risks, benefits and alternatives of gastroenterologic procedures were discussed in detail and all questions were answered.

## 2023-05-04 NOTE — PROGRESS NOTE ADULT - ASSESSMENT
Pt. is a 72 year old Male with PMHx of advanced CKD, HTN, HLD, DM2 well known to our service from prior admission presents at [facility] on 4/27 for hyperkalemia noted on outpatient labs.   Initial labs in ER  for hyperkalemia.  renal eval noted  f/u bmp noted  potassium much improved  treatment as per renal  dvt proph  dm  fsg riss  c/w htn meds anemia  hg lower  transfuse today   procrit as per heme/ renal  guaiac neg  heme eval noted  gi eval noted  outpt colonoscopy    ct a/p noted    d/c if ok w/ renal / heme

## 2023-05-04 NOTE — PROGRESS NOTE ADULT - ASSESSMENT
APOLLO NICHOLAS is a 72y Male who presents with a chief complaint of hyperkalemia.    Anemia  ·	Patient with worsening hemoglobin, in the 7s, compared to the 9-10s last month. Patient reports having anemia for the last several years.   ·	In the setting of known renal disease. Being followed by nephrology and is started on epoietin.  ·	b12 normal. ferritin elevated, hg electro normal   ·	his ldh slightly high, hapto is low, bili normal . myeloma studies pending   ·	marty negative   ·	GI following, CT abd no bleeding   ·	peripheral smear reviewed- microcytosis, clumping, and a small amount of schistocytes noted   ·	tibili and direct bili normal , ldh with elevation, hapto low. unclear if hemolysis at this point with normal bili very unlikely   ·	started procrit      Chronic Kidney Disease  Hyperkalemia  ·	Nephrology following. Patient on sodium zirconium cyclosilicate.   ·	On sodium bicarbonate.  ·	No urgent indication for hemodialysis at this time.    will follow and coordinate care  with [facility]    Nuvia Kirkland NP  Hematology/ Oncology  [facility]  [phone] (office)  [phone] (alt office)  Evenings and weekends please call MD on call or office

## 2023-05-04 NOTE — PHARMACOTHERAPY INTERVENTION NOTE - COMMENTS
Lokelma prescription was sent to [facility] again and the cost is $0 /month with patient's insurance plan together with a copay assistance card.    Bryan (Patrick Flowers) Des  Pharmacist  Available on [employer] Teams  Lokelma prescription was sent to [facility] again and the cost is $0/month with patient's insurance plan together with a copay assistance card.    Bryan (Patrick Flowers) Des  Pharmacist  Available on [employer] Teams

## 2023-05-04 NOTE — PROVIDER CONTACT NOTE (CRITICAL VALUE NOTIFICATION) - BACKGROUND
Patient admitted for hyperkalemia
Pt admitted with Hyperkalemia. H/o Anemia.
73 y/o male admitted for hyperkalemia, with a pmhx of HLD, HTN, DM and anemia

## 2023-05-04 NOTE — PROGRESS NOTE ADULT - SUBJECTIVE AND OBJECTIVE BOX
Pt. is a 72 year old Male with PMHx of advanced CKD, HTN, HLD, DM2 well known to our service from prior admission presents at [facility] on 4/27 for hyperkalemia noted on outpatient labs."(30 Apr 2023 14:14)    Patient seen and examined at bedside this morning. Patient resting comfortably, no new complaints offered. Appears anxious d/t worsening anemia and need for transfusion this morning.     MEDICATIONS  (STANDING):  amLODIPine   Tablet 10 milliGRAM(s) Oral daily  aspirin enteric coated 81 milliGRAM(s) Oral daily  atorvastatin 40 milliGRAM(s) Oral at bedtime  bisacodyl 10 milliGRAM(s) Oral every 4 hours  carvedilol 12.5 milliGRAM(s) Oral every 12 hours  chlorhexidine 2% Cloths 1 Application(s) Topical daily  citric acid/sodium citrate Solution 30 milliLiter(s) Oral three times a day  dextrose 5%. 1000 milliLiter(s) (100 mL/Hr) IV Continuous <Continuous>  dextrose 5%. 1000 milliLiter(s) (50 mL/Hr) IV Continuous <Continuous>  dextrose 50% Injectable 25 Gram(s) IV Push once  dextrose 50% Injectable 12.5 Gram(s) IV Push once  dextrose 50% Injectable 25 Gram(s) IV Push once  epoetin rose-epbx (RETACRIT) Injectable 4000 Unit(s) SubCutaneous every 7 days  glucagon  Injectable 1 milliGRAM(s) IntraMuscular once  heparin   Injectable 5000 Unit(s) SubCutaneous every 12 hours  insulin lispro (ADMELOG) corrective regimen sliding scale   SubCutaneous three times a day before meals  insulin lispro (ADMELOG) corrective regimen sliding scale   SubCutaneous at bedtime  levothyroxine 50 MICROGram(s) Oral daily  polyethylene glycol 3350 17 Gram(s) Oral daily  polyethylene glycol/electrolyte Solution 1000 milliLiter(s) Oral every 4 hours  sodium bicarbonate 1300 milliGRAM(s) Oral two times a day    MEDICATIONS  (PRN):  dextrose Oral Gel 15 Gram(s) Oral once PRN Blood Glucose LESS THAN 70 milliGRAM(s)/deciliter    Vital Signs Last 24 Hrs  T(C): 37.1 (04 May 2023 15:57), Max: 37.5 (04 May 2023 04:09)  T(F): 98.8 (04 May 2023 15:57), Max: 99.5 (04 May 2023 04:09)  HR: 64 (04 May 2023 15:57) (64 - 99)  BP: 129/62 (04 May 2023 15:57) (129/49 - 167/64)  BP(mean): --  RR: 17 (04 May 2023 15:57) (17 - 18)  SpO2: 99% (04 May 2023 15:57) (91% - 99%)    Parameters below as of 04 May 2023 15:57  Patient On (Oxygen Delivery Method): room air        PE  NAD  Awake, alert  Anicteric, MMM  RRR  CTAB  Abd soft, NT, ND  No c/c/e  No rash grossly  FROM                          6.6    12.34 )-----------( 189      ( 04 May 2023 06:49 )             20.7       05-04    141  |  103  |  59<H>  ----------------------------<  145<H>  4.3   |  26  |  3.76<H>    Ca    8.4      04 May 2023 06:49  Phos  3.7     05-04  Mg     2.1     05-04    TPro  7.0  /  Alb  3.6  /  TBili  0.5  /  DBili  <0.1  /  AST  48<H>  /  ALT  83<H>  /  AlkPhos  76  05-03

## 2023-05-04 NOTE — PROGRESS NOTE ADULT - NS ATTEND AMEND GEN_ALL_CORE FT
Uncertain etiology of his anemia, suspect mostly related to his progressive renal failure.  No evidence of iron/B12/folate deficiency, no myeloma.   His LDH is mildly elevated, haptoglobin is low.  Direct marty is negative, peripheral smear with minimal schistocytes.    PT/PTT are normal.  Await EGD/colonoscopy, his Hg has responded to transfusion.   Would increase the retacrit to 10,000 units TIW.

## 2023-05-04 NOTE — PROVIDER CONTACT NOTE (CRITICAL VALUE NOTIFICATION) - ASSESSMENT
/69 HR 63 Resp 18 Ox 93% Temp 97.6. No visible s/s of bleeding noted. Pt denies any SOB/CP/Dizziness at this time
Pt Aox3-4, on room air. Stand by assist and normal sinus on tele. No distress noted.
vitals stable, no s/s of bleeding

## 2023-05-04 NOTE — PROGRESS NOTE ADULT - SUBJECTIVE AND OBJECTIVE BOX
DATE OF SERVICE: 05-04-23 @ 17:35  CHIEF COMPLAINT:Patient is a 72y old  Male who presents with a chief complaint of Per chart "Pt. is a 72 year old Male with PMHx of advanced CKD, HTN, HLD, DM2 well known to our service from prior admission presents at [facility] on 4/27 for hyperkalemia noted on outpatient labs."     (30 Apr 2023 14:14)    	        PAST MEDICAL & SURGICAL HISTORY:  Hypertension      Diabetes      Stage 5 chronic kidney disease not on chronic dialysis      HLD (hyperlipidemia)              REVIEW OF SYSTEMS:  CONSTITUTIONAL: No fever, weight loss, or fatigue  EYES: No eye pain, visual disturbances, or discharge  NECK: No pain or stiffness  RESPIRATORY: No cough, wheezing, chills or hemoptysis; No Shortness of Breath  CARDIOVASCULAR: No chest pain, palpitations, passing out, dizziness, or leg swelling  GASTROINTESTINAL: No abdominal or epigastric pain. No nausea, vomiting, or hematemesis; No diarrhea or constipation. No melena or hematochezia.  GENITOURINARY: No dysuria, frequency, hematuria, or incontinence  NEUROLOGICAL: No headaches,    Medications:  MEDICATIONS  (STANDING):  amLODIPine   Tablet 10 milliGRAM(s) Oral daily  aspirin enteric coated 81 milliGRAM(s) Oral daily  atorvastatin 40 milliGRAM(s) Oral at bedtime  bisacodyl 10 milliGRAM(s) Oral every 4 hours  carvedilol 12.5 milliGRAM(s) Oral every 12 hours  chlorhexidine 2% Cloths 1 Application(s) Topical daily  citric acid/sodium citrate Solution 30 milliLiter(s) Oral three times a day  dextrose 5%. 1000 milliLiter(s) (100 mL/Hr) IV Continuous <Continuous>  dextrose 5%. 1000 milliLiter(s) (50 mL/Hr) IV Continuous <Continuous>  dextrose 50% Injectable 25 Gram(s) IV Push once  dextrose 50% Injectable 12.5 Gram(s) IV Push once  dextrose 50% Injectable 25 Gram(s) IV Push once  epoetin rose-epbx (RETACRIT) Injectable 4000 Unit(s) SubCutaneous every 7 days  glucagon  Injectable 1 milliGRAM(s) IntraMuscular once  heparin   Injectable 5000 Unit(s) SubCutaneous every 12 hours  insulin lispro (ADMELOG) corrective regimen sliding scale   SubCutaneous three times a day before meals  insulin lispro (ADMELOG) corrective regimen sliding scale   SubCutaneous at bedtime  levothyroxine 50 MICROGram(s) Oral daily  polyethylene glycol 3350 17 Gram(s) Oral daily  polyethylene glycol/electrolyte Solution 1000 milliLiter(s) Oral every 4 hours  sodium bicarbonate 1300 milliGRAM(s) Oral two times a day    MEDICATIONS  (PRN):  dextrose Oral Gel 15 Gram(s) Oral once PRN Blood Glucose LESS THAN 70 milliGRAM(s)/deciliter    	    PHYSICAL EXAM:  T(C): 37.4 (05-04-23 @ 16:40), Max: 37.5 (05-04-23 @ 04:09)  HR: 66 (05-04-23 @ 16:40) (64 - 99)  BP: 146/74 (05-04-23 @ 16:40) (129/49 - 167/64)  RR: 18 (05-04-23 @ 16:40) (17 - 18)  SpO2: 99% (05-04-23 @ 16:40) (91% - 99%)  Wt(kg): --  I&O's Summary    03 May 2023 07:01  -  04 May 2023 07:00  --------------------------------------------------------  IN: 720 mL / OUT: 0 mL / NET: 720 mL    04 May 2023 07:01  -  04 May 2023 17:35  --------------------------------------------------------  IN: 400 mL / OUT: 0 mL / NET: 400 mL        Appearance: Normal	  HEENT:   Normal oral mucosa, PERRL, EOMI	  Lymphatic: No lymphadenopathy  Cardiovascular: Normal S1 S2, No JVD, No murmurs, No edema  Respiratory: Lungs clear to auscultation	  Psychiatry: A & O x 3, Mood & affect appropriate  Gastrointestinal:  Soft, Non-tender, + BS	  Skin: No rashes, No ecchymoses, No cyanosis	  Neurologic: Non-focal  Extremities: Normal range of motion, No clubbing, cyanosis or edema  Vascular: Peripheral pulses palpable 2+ bilaterally    TELEMETRY: 	    ECG:  	  RADIOLOGY:  OTHER: 	  	  LABS:	 	    CARDIAC MARKERS:                                6.6    12.34 )-----------( 189      ( 04 May 2023 06:49 )             20.7     05-04    141  |  103  |  59<H>  ----------------------------<  145<H>  4.3   |  26  |  3.76<H>    Ca    8.4      04 May 2023 06:49  Phos  3.7     05-04  Mg     2.1     05-04    TPro  7.0  /  Alb  3.6  /  TBili  0.5  /  DBili  <0.1  /  AST  48<H>  /  ALT  83<H>  /  AlkPhos  76  05-03    proBNP:   Lipid Profile:   HgA1c:   TSH:

## 2023-05-04 NOTE — PROGRESS NOTE ADULT - SUBJECTIVE AND OBJECTIVE BOX
[facility]  Toni Germain PA-C  [address] [place] [address]  [phone]      INTERVAL HPI/OVERNIGHT EVENTS:  pt seen and examined, events noted  dorp in hgb noted  no bleeding     MEDICATIONS  (STANDING):  amLODIPine   Tablet 10 milliGRAM(s) Oral daily  aspirin enteric coated 81 milliGRAM(s) Oral daily  atorvastatin 40 milliGRAM(s) Oral at bedtime  carvedilol 12.5 milliGRAM(s) Oral every 12 hours  chlorhexidine 2% Cloths 1 Application(s) Topical daily  citric acid/sodium citrate Solution 30 milliLiter(s) Oral three times a day  dextrose 5%. 1000 milliLiter(s) (100 mL/Hr) IV Continuous <Continuous>  dextrose 5%. 1000 milliLiter(s) (50 mL/Hr) IV Continuous <Continuous>  dextrose 50% Injectable 25 Gram(s) IV Push once  dextrose 50% Injectable 12.5 Gram(s) IV Push once  dextrose 50% Injectable 25 Gram(s) IV Push once  epoetin rose-epbx (RETACRIT) Injectable 4000 Unit(s) SubCutaneous every 7 days  glucagon  Injectable 1 milliGRAM(s) IntraMuscular once  heparin   Injectable 5000 Unit(s) SubCutaneous every 12 hours  insulin lispro (ADMELOG) corrective regimen sliding scale   SubCutaneous three times a day before meals  insulin lispro (ADMELOG) corrective regimen sliding scale   SubCutaneous at bedtime  levothyroxine 50 MICROGram(s) Oral daily  polyethylene glycol 3350 17 Gram(s) Oral daily  sodium bicarbonate 1300 milliGRAM(s) Oral two times a day    MEDICATIONS  (PRN):  dextrose Oral Gel 15 Gram(s) Oral once PRN Blood Glucose LESS THAN 70 milliGRAM(s)/deciliter      Allergies    No Known Allergies    Intolerances        ROS:   General:  No wt loss, fevers, chills, night sweats, fatigue,   Eyes:  Good vision, no reported pain  ENT:  No sore throat, pain, runny nose, dysphagia  CV:  No pain, palpitations, hypo/hypertension  Resp:  No dyspnea, cough, tachypnea, wheezing  GI:  No pain, No nausea, No vomiting, No diarrhea, No constipation, No weight loss, No fever, No pruritis, No rectal bleeding, No tarry stools, No dysphagia,  :  No pain, bleeding, incontinence, nocturia  Muscle:  No pain, weakness  Neuro:  No weakness, tingling, memory problems  Psych:  No fatigue, insomnia, mood problems, depression  Endocrine:  No polyuria, polydipsia, cold/heat intolerance  Heme:  No petechiae, ecchymosis, easy bruisability  Skin:  No rash, tattoos, scars, edema      PHYSICAL EXAM:   Vital Signs Last 24 Hrs  T(C): 37.3 (04 May 2023 11:23), Max: 37.5 (04 May 2023 04:09)  T(F): 99.1 (04 May 2023 11:23), Max: 99.5 (04 May 2023 04:09)  HR: 70 (04 May 2023 11:23) (68 - 99)  BP: 146/57 (04 May 2023 11:23) (129/49 - 167/64)  BP(mean): --  RR: 18 (04 May 2023 11:23) (17 - 18)  SpO2: 93% (04 May 2023 11:23) (91% - 99%)    Parameters below as of 04 May 2023 11:23  Patient On (Oxygen Delivery Method): room air    Daily     Daily     GENERAL:  Appears stated age,   HEENT:  NC/AT,    CHEST:  Full & symmetric excursion,   HEART:  Regular rhythm,  ABDOMEN:  Soft, non-tender, non-distended,  EXTEREMITIES:  no cyanosis  SKIN:  No rash  NEURO:  Alert,       LABS:                        6.6    12.34 )-----------( 189      ( 04 May 2023 06:49 )             20.7   05-04    141  |  103  |  59<H>  ----------------------------<  145<H>  4.3   |  26  |  3.76<H>    Ca    8.4      04 May 2023 06:49  Phos  3.7     05-04  Mg     2.1     05-04    TPro  7.0  /  Alb  3.6  /  TBili  0.5  /  DBili  <0.1  /  AST  48<H>  /  ALT  83<H>  /  AlkPhos  76  05-03          RADIOLOGY & ADDITIONAL TESTS:    < from: CT Abdomen and Pelvis No Cont (05.02.23 @ 17:41) >    ACC: [id number] EXAM:  CT ABDOMEN AND PELVIS   ORDERED BY: AXEL JEAN     PROCEDURE DATE:  05/02/2023          INTERPRETATION:  CLINICAL INFORMATION: Anemia.    COMPARISON: None.    CONTRAST/COMPLICATIONS:  IV Contrast: NONE  Oral Contrast: NONE  Complications: None reported at time of study completion    PROCEDURE:  CT of the Abdomen and Pelvis was performed.  Sagittal and coronal reformats were performed.    FINDINGS:  Limited evaluation of the vasculature and viscera in the absence of   intravenous contrast.    LOWER CHEST: Bibasilar subsegmental atelectasis. Trace left pleural   effusion. Hypoattenuation of the blood pool relative to the myocardium,   consistent with anemia.    LIVER: Subcentimeter hypodense focus in the posterior right lobe, which   is too small to characterize.  BILE DUCTS: Normal caliber.  GALLBLADDER: Cholelithiasis.  SPLEEN: Within normal limits.  PANCREAS: Subcentimeter calcification in the pancreatic tail.  ADRENALS: Within normal limits.  KIDNEYS/URETERS: Right renal cysts. No hydronephrosis. No renal calculi.    BLADDER: Underdistended.  REPRODUCTIVE ORGANS: The prostate is enlarged.    BOWEL: Colonic diverticula without evidence for acute diverticulitis. No   bowel obstruction. Normal appendix.  PERITONEUM: No ascites.  VESSELS: Atherosclerotic changes.  RETROPERITONEUM/LYMPH NODES: No lymphadenopathy. No retroperitoneal   hematoma.  ABDOMINAL WALL: Mild generalized subcutaneous edema.  BONES: Degenerative changes.    IMPRESSION:  No retroperitoneal hematoma.    Colonic diverticulosis without evidence for acute diverticulitis.    Trace left pleural effusion.        --- End of Report ---

## 2023-05-05 LAB
% ALBUMIN: 52.5 % — SIGNIFICANT CHANGE UP
% ALPHA 1: 6.1 % — SIGNIFICANT CHANGE UP
% ALPHA 2: 9.8 % — SIGNIFICANT CHANGE UP
% BETA: 12.2 % — SIGNIFICANT CHANGE UP
% GAMMA: 19.4 % — SIGNIFICANT CHANGE UP
ALBUMIN SERPL ELPH-MCNC: 3 G/DL — LOW (ref 3.6–5.5)
ALBUMIN/GLOB SERPL ELPH: 1.1 RATIO — SIGNIFICANT CHANGE UP
ALPHA1 GLOB SERPL ELPH-MCNC: 0.3 G/DL — SIGNIFICANT CHANGE UP (ref 0.1–0.4)
ALPHA2 GLOB SERPL ELPH-MCNC: 0.6 G/DL — SIGNIFICANT CHANGE UP (ref 0.5–1)
ANION GAP SERPL CALC-SCNC: 16 MMOL/L — SIGNIFICANT CHANGE UP (ref 5–17)
B-GLOBULIN SERPL ELPH-MCNC: 0.7 G/DL — SIGNIFICANT CHANGE UP (ref 0.5–1)
BILIRUB DIRECT SERPL-MCNC: 0.1 MG/DL — SIGNIFICANT CHANGE UP (ref 0–0.3)
BILIRUB INDIRECT FLD-MCNC: 0.5 MG/DL — SIGNIFICANT CHANGE UP (ref 0.2–1)
BILIRUB SERPL-MCNC: 0.6 MG/DL — SIGNIFICANT CHANGE UP (ref 0.2–1.2)
BUN SERPL-MCNC: 52 MG/DL — HIGH (ref 7–23)
CALCIUM SERPL-MCNC: 8.8 MG/DL — SIGNIFICANT CHANGE UP (ref 8.4–10.5)
CHLORIDE SERPL-SCNC: 108 MMOL/L — SIGNIFICANT CHANGE UP (ref 96–108)
CO2 SERPL-SCNC: 23 MMOL/L — SIGNIFICANT CHANGE UP (ref 22–31)
CREAT SERPL-MCNC: 3.67 MG/DL — HIGH (ref 0.5–1.3)
EGFR: 17 ML/MIN/1.73M2 — LOW
GAMMA GLOBULIN: 1.1 G/DL — SIGNIFICANT CHANGE UP (ref 0.6–1.6)
GLUCOSE BLDC GLUCOMTR-MCNC: 125 MG/DL — HIGH (ref 70–99)
GLUCOSE BLDC GLUCOMTR-MCNC: 137 MG/DL — HIGH (ref 70–99)
GLUCOSE BLDC GLUCOMTR-MCNC: 196 MG/DL — HIGH (ref 70–99)
GLUCOSE BLDC GLUCOMTR-MCNC: 230 MG/DL — HIGH (ref 70–99)
GLUCOSE SERPL-MCNC: 114 MG/DL — HIGH (ref 70–99)
HAPTOGLOB SERPL-MCNC: <20 MG/DL — LOW (ref 34–200)
HCT VFR BLD CALC: 24.9 % — LOW (ref 39–50)
HGB BLD-MCNC: 8.3 G/DL — LOW (ref 13–17)
INTERPRETATION SERPL IFE-IMP: SIGNIFICANT CHANGE UP
LDH SERPL L TO P-CCNC: 438 U/L — HIGH (ref 50–242)
MCHC RBC-ENTMCNC: 30.9 PG — SIGNIFICANT CHANGE UP (ref 27–34)
MCHC RBC-ENTMCNC: 33.3 GM/DL — SIGNIFICANT CHANGE UP (ref 32–36)
MCV RBC AUTO: 92.6 FL — SIGNIFICANT CHANGE UP (ref 80–100)
NRBC # BLD: 0 /100 WBCS — SIGNIFICANT CHANGE UP (ref 0–0)
PLATELET # BLD AUTO: 185 K/UL — SIGNIFICANT CHANGE UP (ref 150–400)
POTASSIUM SERPL-MCNC: 4.2 MMOL/L — SIGNIFICANT CHANGE UP (ref 3.5–5.3)
POTASSIUM SERPL-SCNC: 4.2 MMOL/L — SIGNIFICANT CHANGE UP (ref 3.5–5.3)
PROT PATTERN SERPL ELPH-IMP: SIGNIFICANT CHANGE UP
RBC # BLD: 2.69 M/UL — LOW (ref 4.2–5.8)
RBC # BLD: 2.69 M/UL — LOW (ref 4.2–5.8)
RBC # FLD: 13.9 % — SIGNIFICANT CHANGE UP (ref 10.3–14.5)
RETICS #: 62.4 K/UL — SIGNIFICANT CHANGE UP (ref 25–125)
RETICS/RBC NFR: 2.3 % — SIGNIFICANT CHANGE UP (ref 0.5–2.5)
SODIUM SERPL-SCNC: 147 MMOL/L — HIGH (ref 135–145)
WBC # BLD: 11.73 K/UL — HIGH (ref 3.8–10.5)
WBC # FLD AUTO: 11.73 K/UL — HIGH (ref 3.8–10.5)

## 2023-05-05 PROCEDURE — 88305 TISSUE EXAM BY PATHOLOGIST: CPT | Mod: 26

## 2023-05-05 DEVICE — NET RETRV ROT ROTH 2.5MMX230CM: Type: IMPLANTABLE DEVICE | Status: FUNCTIONAL

## 2023-05-05 RX ORDER — PANTOPRAZOLE SODIUM 20 MG/1
40 TABLET, DELAYED RELEASE ORAL
Refills: 0 | Status: DISCONTINUED | OUTPATIENT
Start: 2023-05-05 | End: 2023-05-06

## 2023-05-05 RX ORDER — SODIUM CHLORIDE 9 MG/ML
500 INJECTION INTRAMUSCULAR; INTRAVENOUS; SUBCUTANEOUS
Refills: 0 | Status: DISCONTINUED | OUTPATIENT
Start: 2023-05-05 | End: 2023-05-05

## 2023-05-05 RX ORDER — LIDOCAINE HCL 20 MG/ML
4 VIAL (ML) INJECTION ONCE
Refills: 0 | Status: DISCONTINUED | OUTPATIENT
Start: 2023-05-05 | End: 2023-05-05

## 2023-05-05 RX ADMIN — ATORVASTATIN CALCIUM 40 MILLIGRAM(S): 80 TABLET, FILM COATED ORAL at 21:22

## 2023-05-05 RX ADMIN — Medication 50 MICROGRAM(S): at 05:32

## 2023-05-05 RX ADMIN — Medication 1300 MILLIGRAM(S): at 17:23

## 2023-05-05 RX ADMIN — CARVEDILOL PHOSPHATE 12.5 MILLIGRAM(S): 80 CAPSULE, EXTENDED RELEASE ORAL at 05:32

## 2023-05-05 RX ADMIN — Medication 30 MILLILITER(S): at 05:32

## 2023-05-05 RX ADMIN — Medication 81 MILLIGRAM(S): at 12:00

## 2023-05-05 RX ADMIN — Medication 1300 MILLIGRAM(S): at 05:32

## 2023-05-05 RX ADMIN — CARVEDILOL PHOSPHATE 12.5 MILLIGRAM(S): 80 CAPSULE, EXTENDED RELEASE ORAL at 17:24

## 2023-05-05 RX ADMIN — AMLODIPINE BESYLATE 10 MILLIGRAM(S): 2.5 TABLET ORAL at 05:32

## 2023-05-05 RX ADMIN — Medication 30 MILLILITER(S): at 15:16

## 2023-05-05 RX ADMIN — HEPARIN SODIUM 5000 UNIT(S): 5000 INJECTION INTRAVENOUS; SUBCUTANEOUS at 17:20

## 2023-05-05 RX ADMIN — PANTOPRAZOLE SODIUM 40 MILLIGRAM(S): 20 TABLET, DELAYED RELEASE ORAL at 17:21

## 2023-05-05 RX ADMIN — HEPARIN SODIUM 5000 UNIT(S): 5000 INJECTION INTRAVENOUS; SUBCUTANEOUS at 05:31

## 2023-05-05 RX ADMIN — Medication 30 MILLILITER(S): at 21:22

## 2023-05-05 RX ADMIN — Medication 4: at 17:20

## 2023-05-05 RX ADMIN — CHLORHEXIDINE GLUCONATE 1 APPLICATION(S): 213 SOLUTION TOPICAL at 12:04

## 2023-05-05 NOTE — PRE-ANESTHESIA EVALUATION ADULT - NSANTHOSAYNRD_GEN_A_CORE
No. RAGHAV screening performed.  STOP BANG Legend: 0-2 = LOW Risk; 3-4 = INTERMEDIATE Risk; 5-8 = HIGH Risk

## 2023-05-05 NOTE — PROGRESS NOTE ADULT - ASSESSMENT
Pt. is a 72 year old Male with PMHx of advanced CKD, HTN, HLD, DM2 well known to our service from prior admission presents at [facility] on 4/27 for hyperkalemia noted on outpatient labs.   Initial labs in ER  for hyperkalemia.  renal eval noted  f/u bmp noted  potassium much improved  treatment as per renal  dvt proph  dm  fsg riss  c/w htn meds anemia  hg stable  colon/egd today  procrit as per heme/ renal  guaiac neg  heme eval noted  gi eval noted    ct a/p noted    d/c if ok w/ renal / heme/gi

## 2023-05-05 NOTE — PROGRESS NOTE ADULT - SUBJECTIVE AND OBJECTIVE BOX
DATE OF SERVICE: 05-05-23 @ 12:51  CHIEF COMPLAINT:Patient is a 72y old  Male who presents with a chief complaint of Per chart "Pt. is a 72 year old Male with PMHx of advanced CKD, HTN, HLD, DM2 well known to our service from prior admission presents at [facility] on 4/27 for hyperkalemia noted on outpatient labs."     (30 Apr 2023 14:14)    	        PAST MEDICAL & SURGICAL HISTORY:  Hypertension      Diabetes      Stage 5 chronic kidney disease not on chronic dialysis      HLD (hyperlipidemia)          REVIEW OF SYSTEMS:  CONSTITUTIONAL: No fever, weight loss, or fatigue  EYES: No eye pain, visual disturbances, or discharge  NECK: No pain or stiffness  RESPIRATORY: No cough, wheezing, chills or hemoptysis; No Shortness of Breath  CARDIOVASCULAR: No chest pain, palpitations, passing out, dizziness, or leg swelling  GASTROINTESTINAL: No abdominal or epigastric pain. No nausea, vomiting, or hematemesis; No diarrhea or constipation. No melena or hematochezia.  GENITOURINARY: No dysuria, frequency, hematuria, or incontinence  NEUROLOGICAL: No headaches,     Medications:  MEDICATIONS  (STANDING):  amLODIPine   Tablet 10 milliGRAM(s) Oral daily  aspirin enteric coated 81 milliGRAM(s) Oral daily  atorvastatin 40 milliGRAM(s) Oral at bedtime  carvedilol 12.5 milliGRAM(s) Oral every 12 hours  chlorhexidine 2% Cloths 1 Application(s) Topical daily  citric acid/sodium citrate Solution 30 milliLiter(s) Oral three times a day  dextrose 5%. 1000 milliLiter(s) (100 mL/Hr) IV Continuous <Continuous>  dextrose 5%. 1000 milliLiter(s) (50 mL/Hr) IV Continuous <Continuous>  dextrose 50% Injectable 25 Gram(s) IV Push once  dextrose 50% Injectable 25 Gram(s) IV Push once  dextrose 50% Injectable 12.5 Gram(s) IV Push once  epoetin rose-epbx (RETACRIT) Injectable 4000 Unit(s) SubCutaneous every 7 days  glucagon  Injectable 1 milliGRAM(s) IntraMuscular once  heparin   Injectable 5000 Unit(s) SubCutaneous every 12 hours  insulin lispro (ADMELOG) corrective regimen sliding scale   SubCutaneous three times a day before meals  insulin lispro (ADMELOG) corrective regimen sliding scale   SubCutaneous at bedtime  levothyroxine 50 MICROGram(s) Oral daily  lidocaine 4% Injection for Nebulization 4 milliLiter(s) Nebulizer once  pantoprazole    Tablet 40 milliGRAM(s) Oral two times a day  polyethylene glycol 3350 17 Gram(s) Oral daily  sodium bicarbonate 1300 milliGRAM(s) Oral two times a day  sodium chloride 0.9%. 500 milliLiter(s) (75 mL/Hr) IV Continuous <Continuous>    MEDICATIONS  (PRN):  dextrose Oral Gel 15 Gram(s) Oral once PRN Blood Glucose LESS THAN 70 milliGRAM(s)/deciliter    	    PHYSICAL EXAM:  T(C): 36.6 (05-05-23 @ 09:52), Max: 37.4 (05-04-23 @ 16:40)  HR: 65 (05-05-23 @ 11:22) (61 - 70)  BP: 159/69 (05-05-23 @ 11:22) (123/60 - 181/74)  RR: 16 (05-05-23 @ 11:22) (13 - 18)  SpO2: 99% (05-05-23 @ 11:22) (96% - 100%)  Wt(kg): --  I&O's Summary    04 May 2023 07:01  -  05 May 2023 07:00  --------------------------------------------------------  IN: 1400 mL / OUT: 0 mL / NET: 1400 mL        Appearance: Normal	  HEENT:   Normal oral mucosa, PERRL, EOMI	  Lymphatic: No lymphadenopathy  Cardiovascular: Normal S1 S2, No JVD, No murmurs, No edema  Respiratory: Lungs clear to auscultation	  Psychiatry: A & O   Gastrointestinal:  Soft, Non-tender, + BS	  Skin: No rashes, No ecchymoses, No cyanosis	  Neurologic: Non-focal  Extremities: Normal range of motion, No clubbing, cyanosis or edema  Vascular: Peripheral pulses palpable 2+ bilaterally    TELEMETRY: 	    ECG:  	  RADIOLOGY:  OTHER: 	  	  LABS:	 	    CARDIAC MARKERS:                                8.3    11.73 )-----------( 185      ( 05 May 2023 06:24 )             24.9     05-05    147<H>  |  108  |  52<H>  ----------------------------<  114<H>  4.2   |  23  |  3.67<H>    Ca    8.8      05 May 2023 06:23  Phos  3.7     05-04  Mg     2.1     05-04    TPro  x   /  Alb  x   /  TBili  0.6  /  DBili  0.1  /  AST  x   /  ALT  x   /  AlkPhos  x   05-05    proBNP:   Lipid Profile:   HgA1c:   TSH:

## 2023-05-05 NOTE — PRE PROCEDURE NOTE - PRE PROCEDURE EVALUATION
Attending Physician:     Dr Arizmendi                       Procedure: EGD/colonoscopy     Indication for Procedure: anemia  ________________________________________________________  PAST MEDICAL & SURGICAL HISTORY:  Hypertension      Diabetes      Stage 5 chronic kidney disease not on chronic dialysis      HLD (hyperlipidemia)        ALLERGIES:  No Known Allergies    HOME MEDICATIONS:  amLODIPine 10 mg oral tablet: 1 tab(s) orally once a day  aspirin 81 mg oral tablet: 1 tab(s) orally once a day  atorvastatin 40 mg oral tablet: 1 tab(s) orally once a day  carvedilol 12.5 mg oral tablet: 1 tab(s) orally 2 times a day   50 mcg (2000 intl units) oral capsule: 1 cap(s) orally once a week  glipiZIDE 5 mg oral tablet: 1 tab(s) orally once a day  irbesartan 150 mg oral tablet: 1 tab(s) orally once a day  Januvia 25 mg oral tablet: 1 tab(s) orally once a day  levothyroxine 50 mcg (0.05 mg) oral tablet: 1 tab(s) orally once a day  sodium bicarbonate 650 mg oral tablet: 1 tab(s) orally 2 times a day note: pharmacy has 2 tabs twice aday  Vascepa 1 g oral capsule: 1 cap(s) orally 2 times a day Note: As per Pharmacy, 2 capsules orally 2 times a day after meals    AICD/PPM: [ ] yes   [x ] no    PERTINENT LAB DATA:                        8.3    11.73 )-----------( 185      ( 05 May 2023 06:24 )             24.9     05-05    147<H>  |  108  |  52<H>  ----------------------------<  114<H>  4.2   |  23  |  3.67<H>    Ca    8.8      05 May 2023 06:23  Phos  3.7     05-04  Mg     2.1     05-04    TPro  x   /  Alb  x   /  TBili  0.6  /  DBili  0.1  /  AST  x   /  ALT  x   /  AlkPhos  x   05-05                PHYSICAL EXAMINATION:    T(C): 36.8  HR: 65  BP: 152/62  RR: 17  SpO2: 98%    Constitutional: NAD    Neck:  No JVD  Respiratory: no respiratory distress   Cardiovascular: RRR  Extremities: No peripheral edema  Neurological: A/O x 3, no focal deficits        COMMENTS:    The patient is a suitable candidate for the planned procedure unless box checked [ ]  No, explain:

## 2023-05-06 ENCOUNTER — TRANSCRIPTION ENCOUNTER (OUTPATIENT)
Age: 73
End: 2023-05-06

## 2023-05-06 VITALS
RESPIRATION RATE: 18 BRPM | OXYGEN SATURATION: 96 % | DIASTOLIC BLOOD PRESSURE: 64 MMHG | HEART RATE: 70 BPM | TEMPERATURE: 98 F | SYSTOLIC BLOOD PRESSURE: 140 MMHG

## 2023-05-06 LAB
ANION GAP SERPL CALC-SCNC: 15 MMOL/L — SIGNIFICANT CHANGE UP (ref 5–17)
BUN SERPL-MCNC: 50 MG/DL — HIGH (ref 7–23)
CALCIUM SERPL-MCNC: 8.8 MG/DL — SIGNIFICANT CHANGE UP (ref 8.4–10.5)
CHLORIDE SERPL-SCNC: 104 MMOL/L — SIGNIFICANT CHANGE UP (ref 96–108)
CO2 SERPL-SCNC: 22 MMOL/L — SIGNIFICANT CHANGE UP (ref 22–31)
CREAT SERPL-MCNC: 3.91 MG/DL — HIGH (ref 0.5–1.3)
EGFR: 16 ML/MIN/1.73M2 — LOW
GLUCOSE BLDC GLUCOMTR-MCNC: 130 MG/DL — HIGH (ref 70–99)
GLUCOSE BLDC GLUCOMTR-MCNC: 180 MG/DL — HIGH (ref 70–99)
GLUCOSE BLDC GLUCOMTR-MCNC: 188 MG/DL — HIGH (ref 70–99)
GLUCOSE BLDC GLUCOMTR-MCNC: 226 MG/DL — HIGH (ref 70–99)
GLUCOSE SERPL-MCNC: 121 MG/DL — HIGH (ref 70–99)
HCT VFR BLD CALC: 23.5 % — LOW (ref 39–50)
HGB BLD-MCNC: 7.6 G/DL — LOW (ref 13–17)
MCHC RBC-ENTMCNC: 30.3 PG — SIGNIFICANT CHANGE UP (ref 27–34)
MCHC RBC-ENTMCNC: 32.3 GM/DL — SIGNIFICANT CHANGE UP (ref 32–36)
MCV RBC AUTO: 93.6 FL — SIGNIFICANT CHANGE UP (ref 80–100)
NRBC # BLD: 0 /100 WBCS — SIGNIFICANT CHANGE UP (ref 0–0)
PLATELET # BLD AUTO: 196 K/UL — SIGNIFICANT CHANGE UP (ref 150–400)
POTASSIUM SERPL-MCNC: 4 MMOL/L — SIGNIFICANT CHANGE UP (ref 3.5–5.3)
POTASSIUM SERPL-SCNC: 4 MMOL/L — SIGNIFICANT CHANGE UP (ref 3.5–5.3)
RBC # BLD: 2.51 M/UL — LOW (ref 4.2–5.8)
RBC # FLD: 13.7 % — SIGNIFICANT CHANGE UP (ref 10.3–14.5)
SODIUM SERPL-SCNC: 141 MMOL/L — SIGNIFICANT CHANGE UP (ref 135–145)
WBC # BLD: 10.85 K/UL — HIGH (ref 3.8–10.5)
WBC # FLD AUTO: 10.85 K/UL — HIGH (ref 3.8–10.5)

## 2023-05-06 RX ORDER — POLYETHYLENE GLYCOL 3350 17 G/17G
17 POWDER, FOR SOLUTION ORAL
Qty: 0 | Refills: 0 | DISCHARGE
Start: 2023-05-06

## 2023-05-06 RX ORDER — PANTOPRAZOLE SODIUM 20 MG/1
1 TABLET, DELAYED RELEASE ORAL
Qty: 60 | Refills: 0
Start: 2023-05-06 | End: 2023-06-04

## 2023-05-06 RX ORDER — ERYTHROPOIETIN 10000 [IU]/ML
4000 INJECTION, SOLUTION INTRAVENOUS; SUBCUTANEOUS
Qty: 0 | Refills: 0 | DISCHARGE
Start: 2023-05-06

## 2023-05-06 RX ADMIN — PANTOPRAZOLE SODIUM 40 MILLIGRAM(S): 20 TABLET, DELAYED RELEASE ORAL at 17:32

## 2023-05-06 RX ADMIN — HEPARIN SODIUM 5000 UNIT(S): 5000 INJECTION INTRAVENOUS; SUBCUTANEOUS at 17:32

## 2023-05-06 RX ADMIN — POLYETHYLENE GLYCOL 3350 17 GRAM(S): 17 POWDER, FOR SOLUTION ORAL at 11:36

## 2023-05-06 RX ADMIN — CARVEDILOL PHOSPHATE 12.5 MILLIGRAM(S): 80 CAPSULE, EXTENDED RELEASE ORAL at 05:14

## 2023-05-06 RX ADMIN — Medication 4: at 11:34

## 2023-05-06 RX ADMIN — Medication 30 MILLILITER(S): at 21:51

## 2023-05-06 RX ADMIN — Medication 1300 MILLIGRAM(S): at 17:32

## 2023-05-06 RX ADMIN — Medication 81 MILLIGRAM(S): at 11:36

## 2023-05-06 RX ADMIN — Medication 30 MILLILITER(S): at 05:15

## 2023-05-06 RX ADMIN — ATORVASTATIN CALCIUM 40 MILLIGRAM(S): 80 TABLET, FILM COATED ORAL at 21:51

## 2023-05-06 RX ADMIN — Medication 30 MILLILITER(S): at 13:43

## 2023-05-06 RX ADMIN — CARVEDILOL PHOSPHATE 12.5 MILLIGRAM(S): 80 CAPSULE, EXTENDED RELEASE ORAL at 17:32

## 2023-05-06 RX ADMIN — HEPARIN SODIUM 5000 UNIT(S): 5000 INJECTION INTRAVENOUS; SUBCUTANEOUS at 05:14

## 2023-05-06 RX ADMIN — PANTOPRAZOLE SODIUM 40 MILLIGRAM(S): 20 TABLET, DELAYED RELEASE ORAL at 05:14

## 2023-05-06 RX ADMIN — AMLODIPINE BESYLATE 10 MILLIGRAM(S): 2.5 TABLET ORAL at 05:14

## 2023-05-06 RX ADMIN — CHLORHEXIDINE GLUCONATE 1 APPLICATION(S): 213 SOLUTION TOPICAL at 11:40

## 2023-05-06 RX ADMIN — Medication 1300 MILLIGRAM(S): at 05:14

## 2023-05-06 RX ADMIN — Medication 2: at 17:31

## 2023-05-06 RX ADMIN — Medication 50 MICROGRAM(S): at 05:14

## 2023-05-06 NOTE — PROGRESS NOTE ADULT - ASSESSMENT
1. Normocytic Anemia    -- Largely sec to Anemia of CKD  -- Iron studies c/w Anemia of Chronic Inflammation  -- No B12/Folate Def. No Monoclonal protein  -- mild Scott Neg Hemolysis not a majr contributor to anemia. Would check urine hemosiderin  -- EGD/Colonoscopy w non bleeding gastric ulcer f/u path  -- transfuse prn Hgb < 7  -- cont weekly procrit per renal, would benefit from cont of EPO as outpt    Marycarmen Villagran MD

## 2023-05-06 NOTE — PROGRESS NOTE ADULT - SUBJECTIVE AND OBJECTIVE BOX
[facility]  Toni Germain PA-C  [address] [place] [address]  [phone]      INTERVAL HPI/OVERNIGHT EVENTS:  pt seen and examined, events noted   drop in hgb noted,no bleeding   s/p EGD/colonoscopy 5/5    MEDICATIONS  (STANDING):  amLODIPine   Tablet 10 milliGRAM(s) Oral daily  aspirin enteric coated 81 milliGRAM(s) Oral daily  atorvastatin 40 milliGRAM(s) Oral at bedtime  carvedilol 12.5 milliGRAM(s) Oral every 12 hours  chlorhexidine 2% Cloths 1 Application(s) Topical daily  citric acid/sodium citrate Solution 30 milliLiter(s) Oral three times a day  dextrose 5%. 1000 milliLiter(s) (100 mL/Hr) IV Continuous <Continuous>  dextrose 5%. 1000 milliLiter(s) (50 mL/Hr) IV Continuous <Continuous>  dextrose 50% Injectable 25 Gram(s) IV Push once  dextrose 50% Injectable 12.5 Gram(s) IV Push once  dextrose 50% Injectable 25 Gram(s) IV Push once  epoetin rose-epbx (RETACRIT) Injectable 4000 Unit(s) SubCutaneous every 7 days  glucagon  Injectable 1 milliGRAM(s) IntraMuscular once  heparin   Injectable 5000 Unit(s) SubCutaneous every 12 hours  insulin lispro (ADMELOG) corrective regimen sliding scale   SubCutaneous three times a day before meals  insulin lispro (ADMELOG) corrective regimen sliding scale   SubCutaneous at bedtime  levothyroxine 50 MICROGram(s) Oral daily  polyethylene glycol 3350 17 Gram(s) Oral daily  sodium bicarbonate 1300 milliGRAM(s) Oral two times a day    MEDICATIONS  (PRN):  dextrose Oral Gel 15 Gram(s) Oral once PRN Blood Glucose LESS THAN 70 milliGRAM(s)/deciliter      Allergies    No Known Allergies    Intolerances        ROS:   General:  No wt loss, fevers, chills, night sweats, fatigue,   Eyes:  Good vision, no reported pain  ENT:  No sore throat, pain, runny nose, dysphagia  CV:  No pain, palpitations, hypo/hypertension  Resp:  No dyspnea, cough, tachypnea, wheezing  GI:  No pain, No nausea, No vomiting, No diarrhea, No constipation, No weight loss, No fever, No pruritis, No rectal bleeding, No tarry stools, No dysphagia,  :  No pain, bleeding, incontinence, nocturia  Muscle:  No pain, weakness  Neuro:  No weakness, tingling, memory problems  Psych:  No fatigue, insomnia, mood problems, depression  Endocrine:  No polyuria, polydipsia, cold/heat intolerance  Heme:  No petechiae, ecchymosis, easy bruisability  Skin:  No rash, tattoos, scars, edema      PHYSICAL EXAM:   Vital Signs Last 24 Hrs  T(C): 36.5 (06 May 2023 11:21), Max: 37.1 (05 May 2023 20:07)  T(F): 97.7 (06 May 2023 11:21), Max: 98.7 (05 May 2023 20:07)  HR: 69 (06 May 2023 11:21) (69 - 82)  BP: 147/65 (06 May 2023 11:21) (147/65 - 161/63)  BP(mean): --  RR: 17 (06 May 2023 11:21) (17 - 18)  SpO2: 95% (06 May 2023 11:21) (95% - 98%)    Parameters below as of 06 May 2023 11:21  Patient On (Oxygen Delivery Method): room air        Daily     Daily     GENERAL:  Appears stated age,   HEENT:  NC/AT,    CHEST:  Full & symmetric excursion,   HEART:  Regular rhythm,  ABDOMEN:  Soft, non-tender, non-distended,  EXTEREMITIES:  no cyanosis  SKIN:  No rash  NEURO:  Alert,       LABS:                        7.6    10.85 )-----------( 196      ( 06 May 2023 07:05 )             23.5     05-06    141  |  104  |  50<H>  ----------------------------<  121<H>  4.0   |  22  |  3.91<H>    Ca    8.8      06 May 2023 07:05    TPro  x   /  Alb  x   /  TBili  0.6  /  DBili  0.1  /  AST  x   /  ALT  x   /  AlkPhos  x   05-05              RADIOLOGY & ADDITIONAL TESTS:    < from: CT Abdomen and Pelvis No Cont (05.02.23 @ 17:41) >    ACC: [id number] EXAM:  CT ABDOMEN AND PELVIS   ORDERED BY: AXEL JEAN     PROCEDURE DATE:  05/02/2023          INTERPRETATION:  CLINICAL INFORMATION: Anemia.    COMPARISON: None.    CONTRAST/COMPLICATIONS:  IV Contrast: NONE  Oral Contrast: NONE  Complications: None reported at time of study completion    PROCEDURE:  CT of the Abdomen and Pelvis was performed.  Sagittal and coronal reformats were performed.    FINDINGS:  Limited evaluation of the vasculature and viscera in the absence of   intravenous contrast.    LOWER CHEST: Bibasilar subsegmental atelectasis. Trace left pleural   effusion. Hypoattenuation of the blood pool relative to the myocardium,   consistent with anemia.    LIVER: Subcentimeter hypodense focus in the posterior right lobe, which   is too small to characterize.  BILE DUCTS: Normal caliber.  GALLBLADDER: Cholelithiasis.  SPLEEN: Within normal limits.  PANCREAS: Subcentimeter calcification in the pancreatic tail.  ADRENALS: Within normal limits.  KIDNEYS/URETERS: Right renal cysts. No hydronephrosis. No renal calculi.    BLADDER: Underdistended.  REPRODUCTIVE ORGANS: The prostate is enlarged.    BOWEL: Colonic diverticula without evidence for acute diverticulitis. No   bowel obstruction. Normal appendix.  PERITONEUM: No ascites.  VESSELS: Atherosclerotic changes.  RETROPERITONEUM/LYMPH NODES: No lymphadenopathy. No retroperitoneal   hematoma.  ABDOMINAL WALL: Mild generalized subcutaneous edema.  BONES: Degenerative changes.    IMPRESSION:  No retroperitoneal hematoma.    Colonic diverticulosis without evidence for acute diverticulitis.    Trace left pleural effusion.            < from: Upper Endoscopy (05.05.23 @ 09:51) >  [facility]  ____________________________________________________________________________________________________  Patient Name: Ghassan Angulo                        MRN: [id number]  Account Number: [id number]                     YOB: 1950  Room: Endoscopy Room 1                           Gender: Male  Attending MD: Omi Arizmendi MD                 Procedure Date No Time: 5/5/2023  ____________________________________________________________________________________________________     Procedure:           Upper GI endoscopy  Indications:         Iron deficiency anemia  Providers:           Omi Arizmendi MD  Medicines:           Monitored Anesthesia Care  Complications:       No immediate complications.  ____________________________________________________________________________________________________  Procedure:           After obtaining informed consent, the endoscope was passed under direct                        vision. Throughout the procedure,the patient's blood pressure, pulse, and                        oxygen saturations were monitored continuously. The was introduced through                        the mouth, and advanced to the second part of duodenum. The upper GI      endoscopy was accomplished without difficulty. The patient tolerated the                        procedure well.                                                                                                        Findings:       The examined esophagus was normal.       Few non-bleeding cratered gastric ulcers with no stigmata of bleeding were found in the        gastric antrum. Biopsies were taken with a cold forceps for histology.       The examined duodenum was normal.                                                                                    Impression:          - Normal esophagus.                       - Non-bleeding gastric ulcers with no stigmata of bleeding. Biopsied.                       - Normal examined duodenum.  Recommendation:      - Await pathology results.                       - Use Protonix (pantoprazole) 40 mg PO BID.                       - Perform a colonoscopy today.                                    Attending Participation:       I personally performed the entire procedure.                                                                                                            _________________  Omi Arizmendi MD  5/5/2023 11:01:29 AM  Number of Addenda: 0    Note Initiated On: 5/5/2023 9:51 AM    < end of copied text >  < from: Colonoscopy (05.05.23 @ 09:51) >  [facility]  ____________________________________________________________________________________________________  Patient Name: Ghassan Angulo                        MRN: [id number]  Account Number: [id number]                     YOB: 1950  Room: Endoscopy Room 1                           Gender: Male  Attending MD: Omi Arizmendi MD                 Procedure Date No Time: 5/5/2023  ____________________________________________________________________________________________________     Procedure:           Colonoscopy  Indications:         This is the patient's first colonoscopy, Iron deficiency anemia  Providers:           Omi Arizmendi MD  Medicines:           Monitored Anesthesia Care  Complications:    No immediate complications.  ____________________________________________________________________________________________________  Procedure:           Pre-Anesthesia Assessment:                       - Prior to the procedure, a History and Physical was performed, and patient                        medications, allergies and sensitivities were reviewed. The patient's                        tolerance of previous anesthesia was reviewed.                       After I obtained informed consent, the scope was passed under direct vision.                        Throughout the procedure, the patient's blood pressure, pulse, and oxygen                        saturations were monitored continuously. The Colonoscope was introduced        through the anus and advanced to the cecum, identified by appendiceal orifice                        and ileocecal valve. The colonoscopy was performed without difficulty. The                        patient tolerated the procedure well. The quality of the bowel preparation                        was good.                                                                                                        Findings:       Internal hemorrhoids were found during retroflexion. The hemorrhoids were moderate.       The exam was otherwise without abnormality.                                                                                                        Impression:          - Internal hemorrhoids.                       - The examinationwas otherwise normal.                       - No specimens collected.  Recommendation:      - [facility].                       - Resume regular diet.                                           Attending Participation:       I personally performed the entire procedure.                                                                                                            _________________  Omi Arizmendi MD  5/5/2023 11:03:25 AM  Number of Addenda: 0    Note Initiated On: 5/5/2023 9:51 AM    < end of copied text >       [facility]  Toni Germain PA-C  [address] [place] [address]  [phone]      INTERVAL HPI/OVERNIGHT EVENTS:  pt seen and examined, events noted   drop in hgb noted, no bleeding   s/p EGD/colonoscopy 5/5    MEDICATIONS  (STANDING):  amLODIPine   Tablet 10 milliGRAM(s) Oral daily  aspirin enteric coated 81 milliGRAM(s) Oral daily  atorvastatin 40 milliGRAM(s) Oral at bedtime  carvedilol 12.5 milliGRAM(s) Oral every 12 hours  chlorhexidine 2% Cloths 1 Application(s) Topical daily  citric acid/sodium citrate Solution 30 milliLiter(s) Oral three times a day  dextrose 5%. 1000 milliLiter(s) (100 mL/Hr) IV Continuous <Continuous>  dextrose 5%. 1000 milliLiter(s) (50 mL/Hr) IV Continuous <Continuous>  dextrose 50% Injectable 25 Gram(s) IV Push once  dextrose 50% Injectable 12.5 Gram(s) IV Push once  dextrose 50% Injectable 25 Gram(s) IV Push once  epoetin rose-epbx (RETACRIT) Injectable 4000 Unit(s) SubCutaneous every 7 days  glucagon  Injectable 1 milliGRAM(s) IntraMuscular once  heparin   Injectable 5000 Unit(s) SubCutaneous every 12 hours  insulin lispro (ADMELOG) corrective regimen sliding scale   SubCutaneous three times a day before meals  insulin lispro (ADMELOG) corrective regimen sliding scale   SubCutaneous at bedtime  levothyroxine 50 MICROGram(s) Oral daily  polyethylene glycol 3350 17 Gram(s) Oral daily  sodium bicarbonate 1300 milliGRAM(s) Oral two times a day    MEDICATIONS  (PRN):  dextrose Oral Gel 15 Gram(s) Oral once PRN Blood Glucose LESS THAN 70 milliGRAM(s)/deciliter      Allergies    No Known Allergies    Intolerances        ROS:   General:  No wt loss, fevers, chills, night sweats, fatigue,   Eyes:  Good vision, no reported pain  ENT:  No sore throat, pain, runny nose, dysphagia  CV:  No pain, palpitations, hypo/hypertension  Resp:  No dyspnea, cough, tachypnea, wheezing  GI:  No pain, No nausea, No vomiting, No diarrhea, No constipation, No weight loss, No fever, No pruritis, No rectal bleeding, No tarry stools, No dysphagia,  :  No pain, bleeding, incontinence, nocturia  Muscle:  No pain, weakness  Neuro:  No weakness, tingling, memory problems  Psych:  No fatigue, insomnia, mood problems, depression  Endocrine:  No polyuria, polydipsia, cold/heat intolerance  Heme:  No petechiae, ecchymosis, easy bruisability  Skin:  No rash, tattoos, scars, edema      PHYSICAL EXAM:   Vital Signs Last 24 Hrs  T(C): 36.5 (06 May 2023 11:21), Max: 37.1 (05 May 2023 20:07)  T(F): 97.7 (06 May 2023 11:21), Max: 98.7 (05 May 2023 20:07)  HR: 69 (06 May 2023 11:21) (69 - 82)  BP: 147/65 (06 May 2023 11:21) (147/65 - 161/63)  BP(mean): --  RR: 17 (06 May 2023 11:21) (17 - 18)  SpO2: 95% (06 May 2023 11:21) (95% - 98%)    Parameters below as of 06 May 2023 11:21  Patient On (Oxygen Delivery Method): room air        Daily     Daily     GENERAL:  Appears stated age,   HEENT:  NC/AT,    CHEST:  Full & symmetric excursion,   HEART:  Regular rhythm,  ABDOMEN:  Soft, non-tender, non-distended,  EXTEREMITIES:  no cyanosis  SKIN:  No rash  NEURO:  Alert,       LABS:                        7.6    10.85 )-----------( 196      ( 06 May 2023 07:05 )             23.5     05-06    141  |  104  |  50<H>  ----------------------------<  121<H>  4.0   |  22  |  3.91<H>    Ca    8.8      06 May 2023 07:05    TPro  x   /  Alb  x   /  TBili  0.6  /  DBili  0.1  /  AST  x   /  ALT  x   /  AlkPhos  x   05-05              RADIOLOGY & ADDITIONAL TESTS:    < from: CT Abdomen and Pelvis No Cont (05.02.23 @ 17:41) >    ACC: [id number] EXAM:  CT ABDOMEN AND PELVIS   ORDERED BY: AXEL JEAN     PROCEDURE DATE:  05/02/2023          INTERPRETATION:  CLINICAL INFORMATION: Anemia.    COMPARISON: None.    CONTRAST/COMPLICATIONS:  IV Contrast: NONE  Oral Contrast: NONE  Complications: None reported at time of study completion    PROCEDURE:  CT of the Abdomen and Pelvis was performed.  Sagittal and coronal reformats were performed.    FINDINGS:  Limited evaluation of the vasculature and viscera in the absence of   intravenous contrast.    LOWER CHEST: Bibasilar subsegmental atelectasis. Trace left pleural   effusion. Hypoattenuation of the blood pool relative to the myocardium,   consistent with anemia.    LIVER: Subcentimeter hypodense focus in the posterior right lobe, which   is too small to characterize.  BILE DUCTS: Normal caliber.  GALLBLADDER: Cholelithiasis.  SPLEEN: Within normal limits.  PANCREAS: Subcentimeter calcification in the pancreatic tail.  ADRENALS: Within normal limits.  KIDNEYS/URETERS: Right renal cysts. No hydronephrosis. No renal calculi.    BLADDER: Underdistended.  REPRODUCTIVE ORGANS: The prostate is enlarged.    BOWEL: Colonic diverticula without evidence for acute diverticulitis. No   bowel obstruction. Normal appendix.  PERITONEUM: No ascites.  VESSELS: Atherosclerotic changes.  RETROPERITONEUM/LYMPH NODES: No lymphadenopathy. No retroperitoneal   hematoma.  ABDOMINAL WALL: Mild generalized subcutaneous edema.  BONES: Degenerative changes.    IMPRESSION:  No retroperitoneal hematoma.    Colonic diverticulosis without evidence for acute diverticulitis.    Trace left pleural effusion.            < from: Upper Endoscopy (05.05.23 @ 09:51) >  [facility]  ____________________________________________________________________________________________________  Patient Name: Ghassan Angulo                        MRN: [id number]  Account Number: [id number]                     YOB: 1950  Room: Endoscopy Room 1                           Gender: Male  Attending MD: Omi Arizmendi MD                 Procedure Date No Time: 5/5/2023  ____________________________________________________________________________________________________     Procedure:           Upper GI endoscopy  Indications:         Iron deficiency anemia  Providers:           Omi Arizmendi MD  Medicines:           Monitored Anesthesia Care  Complications:       No immediate complications.  ____________________________________________________________________________________________________  Procedure:           After obtaining informed consent, the endoscope was passed under direct                        vision. Throughout the procedure,the patient's blood pressure, pulse, and                        oxygen saturations were monitored continuously. The was introduced through                        the mouth, and advanced to the second part of duodenum. The upper GI      endoscopy was accomplished without difficulty. The patient tolerated the                        procedure well.                                                                                                        Findings:       The examined esophagus was normal.       Few non-bleeding cratered gastric ulcers with no stigmata of bleeding were found in the        gastric antrum. Biopsies were taken with a cold forceps for histology.       The examined duodenum was normal.                                                                                    Impression:          - Normal esophagus.                       - Non-bleeding gastric ulcers with no stigmata of bleeding. Biopsied.                       - Normal examined duodenum.  Recommendation:      - Await pathology results.                       - Use Protonix (pantoprazole) 40 mg PO BID.                       - Perform a colonoscopy today.                                    Attending Participation:       I personally performed the entire procedure.                                                                                                            _________________  Omi Arizmendi MD  5/5/2023 11:01:29 AM  Number of Addenda: 0    Note Initiated On: 5/5/2023 9:51 AM    < end of copied text >  < from: Colonoscopy (05.05.23 @ 09:51) >  [facility]  ____________________________________________________________________________________________________  Patient Name: Ghassan Angulo                        MRN: [id number]  Account Number: [id number]                     YOB: 1950  Room: Endoscopy Room 1                           Gender: Male  Attending MD: Omi Arizmendi MD                 Procedure Date No Time: 5/5/2023  ____________________________________________________________________________________________________     Procedure:           Colonoscopy  Indications:         This is the patient's first colonoscopy, Iron deficiency anemia  Providers:           Omi Arizmendi MD  Medicines:           Monitored Anesthesia Care  Complications:    No immediate complications.  ____________________________________________________________________________________________________  Procedure:           Pre-Anesthesia Assessment:                       - Prior to the procedure, a History and Physical was performed, and patient                        medications, allergies and sensitivities were reviewed. The patient's                        tolerance of previous anesthesia was reviewed.                       After I obtained informed consent, the scope was passed under direct vision.                        Throughout the procedure, the patient's blood pressure, pulse, and oxygen                        saturations were monitored continuously. The Colonoscope was introduced        through the anus and advanced to the cecum, identified by appendiceal orifice                        and ileocecal valve. The colonoscopy was performed without difficulty. The                        patient tolerated the procedure well. The quality of the bowel preparation                        was good.                                                                                                        Findings:       Internal hemorrhoids were found during retroflexion. The hemorrhoids were moderate.       The exam was otherwise without abnormality.                                                                                                        Impression:          - Internal hemorrhoids.                       - The examinationwas otherwise normal.                       - No specimens collected.  Recommendation:      - [facility].                       - Resume regular diet.                                           Attending Participation:       I personally performed the entire procedure.                                                                                                            _________________  Omi Arizmendi MD  5/5/2023 11:03:25 AM  Number of Addenda: 0    Note Initiated On: 5/5/2023 9:51 AM    < end of copied text >

## 2023-05-06 NOTE — DISCHARGE NOTE PROVIDER - HOSPITAL COURSE
72 year old Male with PMHx of advanced CKD, HTN, HLD, DM2 well known to our service from prior admission presents at [facility] on 4/27 for hyperkalemia noted on outpatient labs.    Initial labs in ER  for hyperkalemia up to K 6.4.  Renal consulted s/p hyperkalemia cocktail  potassium much improved  No urgent need for HD as per nephrology  On sodium bicarb  On bicitra  Lokelma prescribed and prior auth obtained. As per nephrology, pt does not need to continue it on discharge, however should  prescription as prior auth was obtained. Discussed this with patient.    Anemia: normal MCV and occult negative. S/p PRBC transfusion while admitted. Anemia likely related to chronic disease and possibly hemolysis. S/p EGD/colonoscopy 5/5: Non-bleeding gastric ulcers with no stigmata of bleeding. Biopsied. Follow up pathology results with GI as an outpatient. Internal hemorrhoids. Protonix (pantoprazole) 40 mg PO BID. On weekly procrit per renal, would benefit from continuation of EPO as outpatient.    Medically cleared for discharge home. Discussed with medicine attending. 72 year old Male with PMHx of advanced CKD, HTN, HLD, DM2 well known to our service from prior admission presents at [facility] on 4/27 for hyperkalemia noted on outpatient labs.    Initial labs in ER  for hyperkalemia up to K 6.4.  Renal consulted s/p hyperkalemia cocktail  potassium much improved  No urgent need for HD as per nephrology  On sodium bicarb  On bicitra  Lokelma prescribed and prior auth obtained. As per nephrology, pt does not need to continue it on discharge, however should  prescription as prior auth was obtained. Discussed this with patient and daughter Mariposa.    Anemia: normal MCV and occult negative. S/p PRBC transfusion while admitted. Anemia likely related to chronic disease and possibly hemolysis. S/p EGD/colonoscopy 5/5: Non-bleeding gastric ulcers with no stigmata of bleeding. Biopsied. Follow up pathology results with GI as an outpatient. Internal hemorrhoids. Protonix (pantoprazole) 40 mg PO BID. On weekly procrit per renal, would benefit from continuation of EPO as outpatient.    Medically cleared for discharge home. Discussed with medicine attending.

## 2023-05-06 NOTE — PROGRESS NOTE ADULT - NUTRITIONAL ASSESSMENT
This patient has been assessed with a concern for Malnutrition and has been determined to have a diagnosis/diagnoses of Severe protein-calorie malnutrition.    This patient is being managed with:   Diet Consistent Carbohydrate Renal w/Evening Snack-  For patients receiving Renal Replacement - No Protein Restr No Conc K No Conc Phos Low Sodium (RENAL)  No Concentrated Potassium  Entered: May  5 2023 11:55AM  
This patient has been assessed with a concern for Malnutrition and has been determined to have a diagnosis/diagnoses of Severe protein-calorie malnutrition.    This patient is being managed with:   Diet Consistent Carbohydrate Renal w/Evening Snack-  For patients receiving Renal Replacement - No Protein Restr No Conc K No Conc Phos Low Sodium (RENAL)  No Concentrated Potassium  Entered: May  5 2023 11:55AM  
This patient has been assessed with a concern for Malnutrition and has been determined to have a diagnosis/diagnoses of Severe protein-calorie malnutrition.    This patient is being managed with:   Diet Consistent Carbohydrate Renal w/Evening Snack-  For patients receiving Renal Replacement - No Protein Restr No Conc K No Conc Phos Low Sodium (RENAL)  No Concentrated Potassium  Entered: Apr 28 2023  9:05AM  
This patient has been assessed with a concern for Malnutrition and has been determined to have a diagnosis/diagnoses of Severe protein-calorie malnutrition.    This patient is being managed with:   Diet NPO after Midnight-     NPO Start Date: 04-May-2023   NPO Start Time: 23:59  Except Medications  Entered: May  4 2023 12:09PM    Diet Clear Liquid-  Entered: May  4 2023 11:44AM

## 2023-05-06 NOTE — PROGRESS NOTE ADULT - SUBJECTIVE AND OBJECTIVE BOX
DATE OF SERVICE: 05-06-23 @ 10:03  CHIEF COMPLAINT:Patient is a 72y old  Male who presents with a chief complaint of Per chart "Pt. is a 72 year old Male with PMHx of advanced CKD, HTN, HLD, DM2 well known to our service from prior admission presents at [facility] on 4/27 for hyperkalemia noted on outpatient labs."     (30 Apr 2023 14:14)    	        PAST MEDICAL & SURGICAL HISTORY:  Hypertension      Diabetes      Stage 5 chronic kidney disease not on chronic dialysis      HLD (hyperlipidemia)              REVIEW OF SYSTEMS:    RESPIRATORY: No cough, wheezing, chills or hemoptysis; No Shortness of Breath  CARDIOVASCULAR: No chest pain, palpitations, passing out, dizziness, or leg swelling  GASTROINTESTINAL: No abdominal or epigastric pain. No nausea, vomiting, or hematemesis;   GENITOURINARY: No dysuria, frequency, hematuria,  NEUROLOGICAL: No headaches,     Medications:  MEDICATIONS  (STANDING):  amLODIPine   Tablet 10 milliGRAM(s) Oral daily  aspirin enteric coated 81 milliGRAM(s) Oral daily  atorvastatin 40 milliGRAM(s) Oral at bedtime  carvedilol 12.5 milliGRAM(s) Oral every 12 hours  chlorhexidine 2% Cloths 1 Application(s) Topical daily  citric acid/sodium citrate Solution 30 milliLiter(s) Oral three times a day  dextrose 5%. 1000 milliLiter(s) (50 mL/Hr) IV Continuous <Continuous>  dextrose 5%. 1000 milliLiter(s) (100 mL/Hr) IV Continuous <Continuous>  dextrose 50% Injectable 12.5 Gram(s) IV Push once  dextrose 50% Injectable 25 Gram(s) IV Push once  dextrose 50% Injectable 25 Gram(s) IV Push once  epoetin rose-epbx (RETACRIT) Injectable 4000 Unit(s) SubCutaneous every 7 days  glucagon  Injectable 1 milliGRAM(s) IntraMuscular once  heparin   Injectable 5000 Unit(s) SubCutaneous every 12 hours  insulin lispro (ADMELOG) corrective regimen sliding scale   SubCutaneous three times a day before meals  insulin lispro (ADMELOG) corrective regimen sliding scale   SubCutaneous at bedtime  levothyroxine 50 MICROGram(s) Oral daily  pantoprazole    Tablet 40 milliGRAM(s) Oral two times a day  polyethylene glycol 3350 17 Gram(s) Oral daily  sodium bicarbonate 1300 milliGRAM(s) Oral two times a day    MEDICATIONS  (PRN):  dextrose Oral Gel 15 Gram(s) Oral once PRN Blood Glucose LESS THAN 70 milliGRAM(s)/deciliter    	    PHYSICAL EXAM:  T(C): 36.6 (05-06-23 @ 04:00), Max: 37.1 (05-05-23 @ 20:07)  HR: 82 (05-06-23 @ 04:00) (61 - 82)  BP: 152/73 (05-06-23 @ 04:00) (123/60 - 161/63)  RR: 18 (05-06-23 @ 04:00) (16 - 18)  SpO2: 95% (05-06-23 @ 04:00) (95% - 100%)  Wt(kg): --  I&O's Summary    05 May 2023 07:01  -  06 May 2023 07:00  --------------------------------------------------------  IN: 760 mL / OUT: 0 mL / NET: 760 mL        Appearance: Normal	  HEENT:   Normal oral mucosa, PERRL, EOMI	  Lymphatic: No lymphadenopathy  Cardiovascular: Normal S1 S2, No JVD, No murmurs, No edema  Respiratory: Lungs clear to auscultation	  Psychiatry: A & O x 3, Mood & affect appropriate  Gastrointestinal:  Soft, Non-tender, + BS	  Skin: No rashes, No ecchymoses, No cyanosis	  Neurologic: Non-focal  Extremities: Normal range of motion, No clubbing, cyanosis or edema  Vascular: Peripheral pulses palpable 2+ bilaterally    TELEMETRY: 	    ECG:  	  RADIOLOGY:  OTHER: 	  	  LABS:	 	    CARDIAC MARKERS:                                7.6    10.85 )-----------( 196      ( 06 May 2023 07:05 )             23.5     05-06    141  |  104  |  50<H>  ----------------------------<  121<H>  4.0   |  22  |  3.91<H>    Ca    8.8      06 May 2023 07:05    TPro  x   /  Alb  x   /  TBili  0.6  /  DBili  0.1  /  AST  x   /  ALT  x   /  AlkPhos  x   05-05    proBNP:   Lipid Profile:   HgA1c:   TSH:

## 2023-05-06 NOTE — DISCHARGE NOTE PROVIDER - NSDCFUADDAPPT_GEN_ALL_CORE_FT
APPTS ARE READY TO BE MADE: [X] YES    Best Family or Patient Contact (if needed):    Additional Information about above appointments (if needed):    1:   2:   3:     Other comments or requests:    APPTS ARE READY TO BE MADE: [X] YES    Best Family or Patient Contact (if needed):    Additional Information about above appointments (if needed):    1:   2:   3:     Other comments or requests:     Patient was previously scheduled to see Dr. Nelson at 4:00PM on 5/10/23 at [id number] [place] [id number]    Patient is scheduled to see Dr. Rees at 3:00PM on 5/11/23 at [address] [facility], [place] [address]     APPTS ARE READY TO BE MADE: [X] YES    Best Family or Patient Contact (if needed):    Additional Information about above appointments (if needed):    1:   2:   3:     Other comments or requests:     Patient was previously scheduled to see Dr. Nelson at 4:00PM on 5/10/23 at [address] [place] [address]    Patient is scheduled to see Dr. Rees at 3:00PM on 5/11/23 at [address] [facility], [place] [address]    3 attempts were made to reach patient, which have been unsuccessful. 3 Voicemails have been left on 5/8, 5/9, and 5/10. Will await a call back from patient to coordinate follow up care.

## 2023-05-06 NOTE — DISCHARGE NOTE PROVIDER - CARE PROVIDERS DIRECT ADDRESSES
,[email or web address],DirectAddress_Unknown,DirectAddress_Unknown,DirectAddress_Unknown

## 2023-05-06 NOTE — DISCHARGE NOTE PROVIDER - NSDCMRMEDTOKEN_GEN_ALL_CORE_FT
amLODIPine 10 mg oral tablet: 1 tab(s) orally once a day  aspirin 81 mg oral tablet: 1 tab(s) orally once a day  atorvastatin 40 mg oral tablet: 1 tab(s) orally once a day  carvedilol 12.5 mg oral tablet: 1 tab(s) orally 2 times a day   50 mcg (2000 intl units) oral capsule: 1 cap(s) orally once a week  epoetin rose: 4,000 unit(s) subcutaneous every 7 days to be arranged by your outpatient nephrologist  glipiZIDE 5 mg oral tablet: 1 tab(s) orally once a day  Januvia 25 mg oral tablet: 1 tab(s) orally once a day  levothyroxine 50 mcg (0.05 mg) oral tablet: 1 tab(s) orally once a day  Lokelma 10 g oral powder for reconstitution: 10 gram(s) orally 2 times a day  pantoprazole 40 mg oral delayed release tablet: 1 tab(s) orally 2 times a day  polyethylene glycol 3350 oral powder for reconstitution: 17 gram(s) orally once a day  sodium bicarbonate 650 mg oral tablet: 2 tab(s) orally 2 times a day  sodium citrate-citric acid 500 mg-334 mg/5 mL oral solution: 30 milliliter(s) orally 3 times a day  Vascepa 1 g oral capsule: 1 cap(s) orally 2 times a day Note: As per Pharmacy, 2 capsules orally 2 times a day after meals   amLODIPine 10 mg oral tablet: 1 tab(s) orally once a day  aspirin 81 mg oral tablet: 1 tab(s) orally once a day  atorvastatin 40 mg oral tablet: 1 tab(s) orally once a day  basic metabolic panel in one week: please obtain basic metabolic panel in one week and follow up results with Dr. Cem Rees [phone]  carvedilol 12.5 mg oral tablet: 1 tab(s) orally 2 times a day   50 mcg (2000 intl units) oral capsule: 1 cap(s) orally once a week  epoetin rose: 4,000 unit(s) subcutaneous every 7 days to be arranged by your outpatient nephrologist  glipiZIDE 5 mg oral tablet: 1 tab(s) orally once a day  Januvia 25 mg oral tablet: 1 tab(s) orally once a day  levothyroxine 50 mcg (0.05 mg) oral tablet: 1 tab(s) orally once a day  outpatient physical therapy: as directed  pantoprazole 40 mg oral delayed release tablet: 1 tab(s) orally 2 times a day  polyethylene glycol 3350 oral powder for reconstitution: 17 gram(s) orally once a day  sodium bicarbonate 650 mg oral tablet: 2 tab(s) orally 2 times a day  sodium citrate-citric acid 500 mg-334 mg/5 mL oral solution: 30 milliliter(s) orally 3 times a day  Vascepa 1 g oral capsule: 1 cap(s) orally 2 times a day Note: As per Pharmacy, 2 capsules orally 2 times a day after meals

## 2023-05-06 NOTE — PROGRESS NOTE ADULT - ASSESSMENT
anemia  CKD    doubt GI blood loss  normal MCV and occult negative  anemia likely related to chronic disease and possibly hemolysis     s/p EGD/colonoscopy   lactulose x 1  reg diet    I reviewed the overnight course of events on the unit, re-confirming the patient history. I discussed the care with the patient and their family  The plan of care was discussed with the physician assistant and modifications were made to the notation where appropriate.   Differential diagnosis and plan of care discussed with patient after the evaluation  35 minutes spent on total encounter of which more than fifty percent of the encounter was spent counseling and/or coordinating care by the attending physician.  Advanced care planning was discussed with patient and family.  Advanced care planning forms were reviewed and discussed.  Risks, benefits and alternatives of gastroenterologic procedures were discussed in detail and all questions were answered.    anemia  CKD    normal MCV and occult negative  anemia likely related to chronic disease and possibly hemolysis  Diet as tolerated   doubt GI blood loss  s/p EGD/colonoscopy 5/5  Non-bleeding gastric ulcers with no stigmata of bleeding. Biopsied.  follow up pathology results   Internal hemorrhoids.  Protonix (pantoprazole) 40 mg PO BID  will follow up          I reviewed the overnight course of events on the unit, re-confirming the patient history. I discussed the care with the patient and their family  The plan of care was discussed with the physician assistant and modifications were made to the notation where appropriate.   Differential diagnosis and plan of care discussed with patient after the evaluation  35 minutes spent on total encounter of which more than fifty percent of the encounter was spent counseling and/or coordinating care by the attending physician.  Advanced care planning was discussed with patient and family.  Advanced care planning forms were reviewed and discussed.  Risks, benefits and alternatives of gastroenterologic procedures were discussed in detail and all questions were answered.

## 2023-05-06 NOTE — DISCHARGE NOTE PROVIDER - PROVIDER TOKENS
PROVIDER:[TOKEN:[[id number]],FOLLOWUP:[1 week]],PROVIDER:[TOKEN:[[id number]],FOLLOWUP:[1 week]],PROVIDER:[TOKEN:[[id number]]],PROVIDER:[TOKEN:[[id number]]]

## 2023-05-06 NOTE — DISCHARGE NOTE NURSING/CASE MANAGEMENT/SOCIAL WORK - NSDCPEFALRISK_GEN_ALL_CORE
For information on Fall & Injury Prevention, visit: [email or web address] OR  [email or web address] OR  https://www.cdc.gov/steadi/patient.html

## 2023-05-06 NOTE — DISCHARGE NOTE NURSING/CASE MANAGEMENT/SOCIAL WORK - PATIENT PORTAL LINK FT
You can access the FollowMyHealth Patient Portal offered by [facility] by registering at the following website: [email or web address]. By joining [employer]’s FollowMyHealth portal, you will also be able to view your health information using other applications (apps) compatible with our system.

## 2023-05-06 NOTE — PROGRESS NOTE ADULT - SUBJECTIVE AND OBJECTIVE BOX
Pt is seen and examined  pt is awake and sitting up eating breakfast  No new complaints  EGD/Colonoscopy w nonbleeding gastric ulcer s/p bx and mod hemmrhoids      PAST MEDICAL & SURGICAL HISTORY:  Hypertension      Diabetes      Stage 5 chronic kidney disease not on chronic dialysis      HLD (hyperlipidemia)          ROS:  Negative except for:    MEDICATIONS  (STANDING):  amLODIPine   Tablet 10 milliGRAM(s) Oral daily  aspirin enteric coated 81 milliGRAM(s) Oral daily  atorvastatin 40 milliGRAM(s) Oral at bedtime  carvedilol 12.5 milliGRAM(s) Oral every 12 hours  chlorhexidine 2% Cloths 1 Application(s) Topical daily  citric acid/sodium citrate Solution 30 milliLiter(s) Oral three times a day  dextrose 5%. 1000 milliLiter(s) (100 mL/Hr) IV Continuous <Continuous>  dextrose 5%. 1000 milliLiter(s) (50 mL/Hr) IV Continuous <Continuous>  dextrose 50% Injectable 25 Gram(s) IV Push once  dextrose 50% Injectable 12.5 Gram(s) IV Push once  dextrose 50% Injectable 25 Gram(s) IV Push once  epoetin rose-epbx (RETACRIT) Injectable 4000 Unit(s) SubCutaneous every 7 days  glucagon  Injectable 1 milliGRAM(s) IntraMuscular once  heparin   Injectable 5000 Unit(s) SubCutaneous every 12 hours  insulin lispro (ADMELOG) corrective regimen sliding scale   SubCutaneous three times a day before meals  insulin lispro (ADMELOG) corrective regimen sliding scale   SubCutaneous at bedtime  levothyroxine 50 MICROGram(s) Oral daily  pantoprazole    Tablet 40 milliGRAM(s) Oral two times a day  polyethylene glycol 3350 17 Gram(s) Oral daily  sodium bicarbonate 1300 milliGRAM(s) Oral two times a day    MEDICATIONS  (PRN):  dextrose Oral Gel 15 Gram(s) Oral once PRN Blood Glucose LESS THAN 70 milliGRAM(s)/deciliter      Allergies    No Known Allergies    Intolerances        Vital Signs Last 24 Hrs  T(C): 36.6 (06 May 2023 04:00), Max: 37.1 (05 May 2023 20:07)  T(F): 97.9 (06 May 2023 04:00), Max: 98.7 (05 May 2023 20:07)  HR: 82 (06 May 2023 04:00) (61 - 82)  BP: 152/73 (06 May 2023 04:00) (123/60 - 181/74)  BP(mean): 81 (05 May 2023 09:52) (81 - 81)  RR: 18 (06 May 2023 04:00) (13 - 18)  SpO2: 95% (06 May 2023 04:00) (95% - 100%)    Parameters below as of 06 May 2023 04:00  Patient On (Oxygen Delivery Method): room air        PHYSICAL EXAM    NC/AT In NAD  Chest Clear Bilat  CVS S1,S2+ RRR  Abd Soft, NT/ND, + BS  Ext No edema      LABS:                          7.6    10.85 )-----------( 196      ( 06 May 2023 07:05 )             23.5     Serial CBC's  05-06 @ 07:05  Hct-23.5 / Hgb-7.6 / Plat-196 / RBC-2.51 / WBC-10.85          Serial CBC's  05-05 @ 06:24  Hct-24.9 / Hgb-8.3 / Plat-185 / RBC-2.69 / WBC-11.73            05-06    141  |  104  |  50<H>  ----------------------------<  121<H>  4.0   |  22  |  3.91<H>    Ca    8.8      06 May 2023 07:05    TPro  x   /  Alb  x   /  TBili  0.6  /  DBili  0.1  /  AST  x   /  ALT  x   /  AlkPhos  x   05-05          WBC Count: 10.85 K/uL (05-06 @ 07:05)  Hemoglobin: 7.6 g/dL (05-06 @ 07:05)            RADIOLOGY & ADDITIONAL STUDIES:

## 2023-05-06 NOTE — PROGRESS NOTE ADULT - ASSESSMENT
Pt. is a 72 year old Male with PMHx of advanced CKD, HTN, HLD, DM2 well known to our service from prior admission presents at [facility] on 4/27 for hyperkalemia noted on outpatient labs.   Initial labs in ER  for hyperkalemia.  renal eval noted  f/u bmp noted  potassium much improved  treatment as per renal  dvt proph  dm  fsg riss  c/w htn meds anemia  hg stable  colon/egd done   results noted   procrit as per heme/ renal  guaiac neg  heme eval noted  gi eval noted    ct a/p noted  close f/u as outpt  d/c planning as cleared by all specialists

## 2023-05-06 NOTE — DISCHARGE NOTE PROVIDER - CARE PROVIDER_API CALL
Cem Rees)  Internal Medicine; Nephrology; Preventive Medicine  [address] [facility], [place] [address]  Phone: [phone]  Fax: [phone]  Follow Up Time: 1 week    DINO ARREOLA  Internal Medicine  [address] [facility] [address]  [place] [address]  Phone: [phone]  Fax: [phone]  Follow Up Time: 1 week    Omi Arizmendi (DO)  Gastroenterology  [address] [place] [address]  Phone: [phone]  Fax: [phone]  Follow Up Time:     Marycarmen Villagran)  Hematology; Medical Oncology  [address]  [place] [address]  Phone: [phone]  Fax: [phone]  Follow Up Time:

## 2023-05-06 NOTE — PROGRESS NOTE ADULT - PROVIDER SPECIALTY LIST ADULT
Heme/Onc
Internal Medicine
Internal Medicine
Nephrology
Gastroenterology
Gastroenterology
Heme/Onc
Internal Medicine
Gastroenterology
Heme/Onc
Heme/Onc
Internal Medicine
Nephrology
Nephrology

## 2023-05-06 NOTE — DISCHARGE NOTE PROVIDER - NSDCCPCAREPLAN_GEN_ALL_CORE_FT
PRINCIPAL DISCHARGE DIAGNOSIS  Diagnosis: Hyperkalemia  Assessment and Plan of Treatment: You were admitted with elevated potassium level.  Continue with Bicitra and Sodium bicarb.  You were prescribed Lokelma, which is a medication you required earlier in the admission. As per nephrology, you do not need to take Lokelma upon discharge as your potassium is much improved. However, nephrology recommends that you keep this prescription at home as prior authorization has been obtained by your insurance and you may need it in the future.  Your potassium has normalized to 4.0 on day of discharge.  Please follow up with your nephrologist within 1 week.  NO CONCENTRATED POTASSIUM DIET.      SECONDARY DISCHARGE DIAGNOSES  Diagnosis: Anemia  Assessment and Plan of Treatment: You were anemic this admission, requiring a blood transfusion.  Your hemoglobin has stabilized.  You had an endoscopy/colonscopy this admission which showed non-bleeding gastric ulcers with no stigmata of bleeding. Biopsied. Follow up pathology results with GI as an outpatient. Continue with Pantoprazole 40 mg twice a day. On weekly procrit per renal, would benefit from continuation of EPO as outpatient.  Please follow up with gastroenterology, heme/onc, nephrology, and primary care provider as an outpatient.    Diagnosis: Stage 5 chronic kidney disease not on chronic dialysis  Assessment and Plan of Treatment: Please follow up with nephrology within 1 week of discharge. NO CONCENTRATED POTASSIUM DIET.  Avoid taking (NSAIDs) - (ex: Ibuprofen, Advil, Celebrex, Naprosyn)  Avoid taking any nephrotoxic agents (can harm kidneys) - Intravenous contrast for diagnostic testing, combination cold medications.  Have all medications adjusted for your renal function by your Health Care Provider.  Blood pressure control is important.  Take all medication as prescribed.

## 2023-05-08 ENCOUNTER — APPOINTMENT (OUTPATIENT)
Dept: CT IMAGING | Facility: CLINIC | Age: 73
End: 2023-05-08
Payer: COMMERCIAL

## 2023-05-08 ENCOUNTER — OUTPATIENT (OUTPATIENT)
Dept: OUTPATIENT SERVICES | Facility: HOSPITAL | Age: 73
LOS: 1 days | End: 2023-05-08
Payer: COMMERCIAL

## 2023-05-08 ENCOUNTER — APPOINTMENT (OUTPATIENT)
Dept: CARDIOLOGY | Facility: CLINIC | Age: 73
End: 2023-05-08

## 2023-05-08 DIAGNOSIS — Z01.818 ENCOUNTER FOR OTHER PREPROCEDURAL EXAMINATION: ICD-10-CM

## 2023-05-08 LAB — SURGICAL PATHOLOGY STUDY: SIGNIFICANT CHANGE UP

## 2023-05-08 PROCEDURE — 71250 CT THORAX DX C-: CPT | Mod: 26

## 2023-05-08 PROCEDURE — 71250 CT THORAX DX C-: CPT

## 2023-05-09 NOTE — ED PROVIDER NOTE - EKG #1 DATE/TIME
Anesthesia Pre-Procedure Evaluation    Patient: Masha Spence   MRN: [id number] : 1984        Procedure : Epidural Catheter Placement           Past Medical History:   Diagnosis Date     Anemia      Gluten-sensitive enteropathy      Granuloma annulare      Rosacea      Varicella       Past Surgical History:   Procedure Laterality Date     CYST REMOVAL      fatty tissue cyst, benign, on neck, left side     fibrous papule of nose      removed by derm      Allergies   Allergen Reactions     Ciprofloxacin Other (See Comments) and Swelling     Sulfa Antibiotics      rashes      Social History     Tobacco Use     Smoking status: Never     Smokeless tobacco: Never   Vaping Use     Vaping status: Never Used   Substance Use Topics     Alcohol use: Not Currently      Wt Readings from Last 1 Encounters:   23 91.9 kg (202 lb 11.2 oz)        Anesthesia Evaluation            ROS/MED HX  ENT/Pulmonary: Comment:   Hx/o removal fibrous papule of nose   (-) tobacco use   Neurologic: Comment: Acute bilateral low back pain without sciatica       Cardiovascular:  - neg cardiovascular ROS  (-) PIH   METS/Exercise Tolerance:     Hematologic:  - neg hematologic  ROS   (+) no thrombocytopenia,     Musculoskeletal:  - neg musculoskeletal ROS     GI/Hepatic: Comment: Gluten-sensitive enteropathy      Renal/Genitourinary:  - neg Renal ROS     Endo:     (+) Obesity,     Psychiatric/Substance Use:  - neg psychiatric ROS     Infectious Disease:       Malignancy:  - neg malignancy ROS     Other:     (-) previous        Physical Exam    Airway        Mallampati: II   TM distance: > 3 FB   Neck ROM: full   Mouth opening: > 3 cm    Respiratory Devices and Support         Dental  no notable dental history         Cardiovascular   cardiovascular exam normal          Pulmonary   pulmonary exam normal                OUTSIDE LABS:  CBC:   Lab Results   Component Value Date    WBC 13.6 (H) 2023    WBC 7.8 10/20/2022    HGB  13.0 05/09/2023    HGB 12.0 04/07/2023    HCT 37.1 05/09/2023    HCT 37.9 10/20/2022     05/09/2023     10/20/2022     BMP:   Lab Results   Component Value Date     07/07/2021    POTASSIUM 3.5 07/07/2021    CHLORIDE 106 07/07/2021    CO2 27 07/07/2021    BUN 12 07/07/2021    CR 0.77 07/07/2021    GLC 87 07/07/2021     COAGS: No results found for: PTT, INR, FIBR  POC: No results found for: BGM, HCG, HCGS  HEPATIC:   Lab Results   Component Value Date    ALBUMIN 4.3 07/07/2021    PROTTOTAL 6.9 07/07/2021    ALT 29 07/07/2021    AST 16 07/07/2021    ALKPHOS 56 07/07/2021    BILITOTAL 0.4 07/07/2021     OTHER:   Lab Results   Component Value Date    A1C 5.0 07/07/2021    RAY 8.9 07/07/2021    TSH 1.51 06/10/2022       Anesthesia Plan    ASA Status:  2      Anesthesia Type: Epidural.              Consents    Anesthesia Plan(s) and associated risks, benefits, and realistic alternatives discussed. Questions answered and patient/representative(s) expressed understanding.    - Discussed:     - Discussed with:  Patient         Postoperative Care            Comments:                Janneth Melendrez MD   01-Apr-2023 06:57

## 2023-05-09 NOTE — CHART NOTE - NSCHARTNOTEFT_GEN_A_CORE
Left (1) message(s) for patient in regards to follow up care with callback information.
Medicine PA Note    Informed by RN that pt's Hgb/Hct is 6.6/20.7. Pt seen and assessed at bedside, in no acute distress. No active signs of bleeding. Likely related to anemia of chronic disease. Ordered for 1 U PRBC transfusion. Explained risks/benefits at length and all questions answered. D/w medicine attending.    TRUONG FunkC  [id number]
Left message for patient's daughter, Mariposa, in regards to follow up care with callback information.
MEDICINE PA NOTE:  Medicine Attending recommended 1 unit of blood transfusion to patient this AM for low Hgb of 6.5, which patient declined and opted to have CBC redrawn. Repeat CBC still with low Hgb of 7.0, and again pt declining blood transfusion while understanding risks associated.     Pt expressed that he would like to wait until tomorrow AM to reconsider.    BING Mancilla

## 2023-05-10 ENCOUNTER — APPOINTMENT (OUTPATIENT)
Dept: GASTROENTEROLOGY | Facility: CLINIC | Age: 73
End: 2023-05-10
Payer: COMMERCIAL

## 2023-05-10 VITALS
WEIGHT: 185 LBS | TEMPERATURE: 97.8 F | SYSTOLIC BLOOD PRESSURE: 126 MMHG | HEIGHT: 71 IN | RESPIRATION RATE: 16 BRPM | OXYGEN SATURATION: 97 % | DIASTOLIC BLOOD PRESSURE: 40 MMHG | BODY MASS INDEX: 25.9 KG/M2 | HEART RATE: 69 BPM

## 2023-05-10 DIAGNOSIS — Z86.79 PERSONAL HISTORY OF OTHER DISEASES OF THE CIRCULATORY SYSTEM: ICD-10-CM

## 2023-05-10 DIAGNOSIS — Z78.9 OTHER SPECIFIED HEALTH STATUS: ICD-10-CM

## 2023-05-10 DIAGNOSIS — D50.9 IRON DEFICIENCY ANEMIA, UNSPECIFIED: ICD-10-CM

## 2023-05-10 DIAGNOSIS — N18.9 CHRONIC KIDNEY DISEASE, UNSPECIFIED: ICD-10-CM

## 2023-05-10 DIAGNOSIS — Z86.39 PERSONAL HISTORY OF OTHER ENDOCRINE, NUTRITIONAL AND METABOLIC DISEASE: ICD-10-CM

## 2023-05-10 PROCEDURE — 99204 OFFICE O/P NEW MOD 45 MIN: CPT

## 2023-05-10 PROCEDURE — 99072 ADDL SUPL MATRL&STAF TM PHE: CPT

## 2023-05-10 RX ORDER — PANTOPRAZOLE 40 MG/1
40 TABLET, DELAYED RELEASE ORAL
Refills: 0 | Status: ACTIVE | COMMUNITY

## 2023-05-10 NOTE — HISTORY OF PRESENT ILLNESS
[FreeTextEntry1] : He is a 72 year old male who was recently admitted to the hospital due to weakness. He was found to be anemic and was transfused 1 unit of PRBCs  He denies melena or rectal bleeding. He underwent a colonoscopy and an endoscopy, both of which were negative. He is presently not on dialysis.\par \par \par His daughter was in the room.

## 2023-05-10 NOTE — PHYSICAL EXAM
[Normal Rate and Rhythm] : normal rate and rhythm [2+] : left 2+ [No Rash or Lesion] : No rash or lesion [Alert] : alert [Oriented to Person] : oriented to person [Oriented to Place] : oriented to place [Oriented to Time] : oriented to time [Calm] : calm [JVD] : no jugular venous distention  [de-identified] : NAD [de-identified] : NCAT [de-identified] : no resp effort

## 2023-05-10 NOTE — CONSULT LETTER
[Dear  ___] : Dear  [unfilled], [Consult Letter:] : I had the pleasure of evaluating your patient, [unfilled]. [( Thank you for referring [unfilled] for consultation for _____ )] : Thank you for referring [unfilled] for consultation for [unfilled] [Please see my note below.] : Please see my note below. [Sincerely,] : Sincerely, [Consult Closing:] : Thank you very much for allowing me to participate in the care of this patient.  If you have any questions, please do not hesitate to contact me. [FreeTextEntry3] : Jeff Maldonado MD\par \par Gastroenterology\par [facility]\par [facility]\par \par

## 2023-05-10 NOTE — HISTORY OF PRESENT ILLNESS
[FreeTextEntry1] : 72M with ESRD not yet on HD, referred by nephrologist for HD access creation.  Does not have pacemaker or catheter, R arm dominant. \par \par PMH- CHF, ESRD, DM, HTN\par PSH- denies \par Meds- ASA, statin, anti-DM, synthroid, anti-HTN\par

## 2023-05-10 NOTE — ASSESSMENT
[FreeTextEntry1] : 72M with ESRD not yet on HD.\par \par Duplex 3/8/23: LUE upper cephalic v. 2.7-3.2mm, basilic 4.2-5.2mm\par \par Plan for L  radiocephalic AVF AVF creation\par Protect LUE\par Medical/cardiology clearance\par risks of surgery including bleeding, infection, steal syndrome and AVF non maturation were discussed in details, all the questions were answered. \par pt. and family agree to proceed \par

## 2023-05-10 NOTE — ASSESSMENT
[FreeTextEntry1] : Since his colonoscopy and endoscopy were normal, we must rule out small bowel pathology\par \par I recommended  that he undergo a Capsule Endoscopy and explained the procedure to him and his daughter in detail.\par \par \par \par I spent 48 minutes with the patient and his daughter

## 2023-05-11 ENCOUNTER — APPOINTMENT (OUTPATIENT)
Dept: NEPHROLOGY | Facility: CLINIC | Age: 73
End: 2023-05-11
Payer: MEDICARE

## 2023-05-11 VITALS
SYSTOLIC BLOOD PRESSURE: 131 MMHG | DIASTOLIC BLOOD PRESSURE: 61 MMHG | BODY MASS INDEX: 25.9 KG/M2 | HEART RATE: 71 BPM | OXYGEN SATURATION: 98 % | TEMPERATURE: 97.3 F | HEIGHT: 71 IN | WEIGHT: 185 LBS

## 2023-05-11 PROCEDURE — 96372 THER/PROPH/DIAG INJ SC/IM: CPT

## 2023-05-11 PROCEDURE — 99215 OFFICE O/P EST HI 40 MIN: CPT | Mod: 25

## 2023-05-11 RX ORDER — DARBEPOETIN ALFA 60 UG/ML
60 SOLUTION INTRAVENOUS; SUBCUTANEOUS
Refills: 0 | Status: COMPLETED | OUTPATIENT
Start: 2023-05-11

## 2023-05-11 RX ADMIN — DARBEPOETIN ALFA 1 MCG/ML: 60 SOLUTION INTRAVENOUS; SUBCUTANEOUS at 00:00

## 2023-05-11 NOTE — HISTORY OF PRESENT ILLNESS
[FreeTextEntry1] : Contacts:\par 	Mariposa (daughter)\par 	Dr. Lalo Ta (endocrinology)\par 	Dr. Carol Ann Jewell (vascular)\par 	Dr. 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 Angulo is a 72 year old man with CKD stage V with proteinuria, hypertension, diabetes mellitus, hyperlipidemia, hypothyroidism, and recent hospitalization for worsening kidney function and hypervolemia (2023), here for kidney evaluation and management.\par \par Recent hospitalization from  to May 6, for hyperkalemia. He received PRBC transfusions for anemia while hospitalized. Also had EGD/colonoscopy there, found to have non-bleeding ulcer.\par \par Irbesartan 150mg daily was stopped. Prescribed Lokelma, which he now has at home, but hasn't yet taken.\par \par Has AVF on left arm.\par Continuing with transplant eval/workup.\par \par He is still tired, though doing alright. Eating so-so.\par \par \par ROS: All other systems were reviewed and are negative, except as per HPI.\par 	\par -------------------------------------------------------------------------------\par Social History:\par 	From [place], came to [place] 1960s\par 	Lives in [place] with wife\par 	Two daughters has 5 grandchild\par 	Works as  for [employer] service\par 	Smoked as teenager, stopped aged 29\par 	\par Family History:\par 	Father had stroked ( age 70s)\par 	Mother  age 93	\par 	No known history of renal disease, hematuria, proteinuria, ESRD, dialysis, transplant\par \par -------------------------------------------------------------------------------\par Allergies: NKDA\par \par Medications:\par 	Amlodipine 10mg daily\par 	Carvedilol 12.5mg two times per day\par 	Sodium bicarbonate 650mg two times per day\par 	Atorvastatin 40mg daily\par 	Vascepa 1g two times per day\par 	Aspirin 81mg daily\par 	Glipizide 5mg daily\par 	Januvia/sitagliptin 25mg daily\par 	Levothyroxine 50mcg daily\par 	Pantoprazole 40mg BID\par 	Ergocalciferol 50K units weekly\par 	\par -------------------------------------------------------------------------------\par Physical Exam:\par \par 	Gen: NAD\par 	HEENT: Supple neck\par 	Pulm: CTA\par 	CV: RRR; no rub\par 	Back: No spinal or CVA terndeness\par 	Abd: +BS, soft, nontender/nondistended\par 	UE: Warm, FROM, intact strength; no asterixis; LUE AVF\par 	LE: Warm, FROM, intact strength; some edema\par 	Neuro: No focal deficits\par 	Psych: Normal affect\par 	Skin: Warm, without rashes\par 	\par -------------------------------------------------------------------------------\par Labs/Studies:\par \par 	2023\par \par 		141 | 104 | 50\par 		------------------< 121 Ca 8.8 eGFR 16\par 		4   |  22 | 3.91\par 		\par 		Albumin 3.6\par \par 	2023 UACR 769 mg/g\par 	2022 UACR 6447 mg/g\par 	\par 	2023 UPCR 2.2 g/g\par 	2022 UPCR 11.8 g/g\par \par 	2023 CBC: WBC 10.9 / Hgb 7.6 / plt 196\par 	2023 Lipid: chol 116, , HDL 41, LDL 51\par 	2023 HbA1c 7.1\par 	2023 Vitamin D (25OH) 29\par 	2023 PTH 99\par \par \par 	2022 Kidney U/S\par 		- Right kidney: 11.4 cm. No hydronephrosis. Upper pole and lower pole cyst measuring 2.9 cm a 1.6 cm.\par 		- Left kidney: 12.4 cm. No renal mass, hydronephrosis or calculi.\par \par 	2022 Kidney Biopsy\par 		- Advanced diffuse and nodular diabetic glomerulosclerosis\par 		- Acute tubular injury with focal tubular necrosis\par 		- Chronic and focally active interstitial nephritis\par \par 		Summary of chronic changes:\par 		- Diffuse nodular glomerulosclerosis\par 		- Global glomerulosclerosis (30% of glomeruli)\par 		- Tubular atrophy and interstitial fibrosis (70% of the sampled	cortex)\par 		- Severe arterial and arteriolar sclerosis \par

## 2023-05-11 NOTE — ASSESSMENT
[FreeTextEntry1] : Mr. Angulo is a 72 year old man with CKD stage V with proteinuria, hypertension, diabetes mellitus, hyperlipidemia, hypothyroidism, and recent hospitalization for worsening kidney function and hypervolemia (12/2023), here for kidney evaluation and management.\par \par Mr. Angulo has advanced CKD due to diabetes, and his GFR is in the teens. We need to prepare to dialysis and/or transplant given the very high risk of end-stage renal disease this year.\par \par Blood pressure well-controlled.\par \par CKD IV/V with proteinuria due to diabetes\par - Lokelma at home in case needed\par - Hold irbesartan for now, though would ideally like to introduce at coming visits\par - Bicarbonate for metabolic acidosis (stop bicitra)\par - Continue ergocalciferol\par - Severe anemia; start Aranesp 60\par - Continue in CKD Healthy Transitions program (Yuridia Hannon)\par - Continue with transplant workup\par \par Hypertension\par - Continue\par 	Amlodipine 10mg daily\par 	Carvedilol 12.5mg BID\par - Continue off of diuretics for now\par \par Diabetes mellitus: Per endocrinology\par \par Hyperlipidemia: Continue statin and Vascepa\par \par *** EGD biopsy results show H pylori, will need to discuss with GI for treatment\par \par *** Aranesp 60mcg administered RUE without complication\par \par PLAN\par - Meds per below\par - Labs today and again in a couple of weeks\par - Aranesp today\par - Follow up 1 month\par \par \par \par Discussed importance of healthful habits, including physical activity/exercise and improvements in diet.\par \par I have spent a total of 40 minutes in which >50% was spent in discussion with patient regarding chronic kidney disease, hypertension, diet (including counseling on salt intake). \par \par \par MEDICATIONS\par 	Amlodipine 10mg daily\par 	Carvedilol 12.5mg two times per day\par 	Sodium bicarbonate 650mg two times per day\par 	Atorvastatin 40mg daily\par 	Vascepa 1g two times per day\par 	Aspirin 81mg daily\par 	Glipizide 5mg daily\par 	Januvia/sitagliptin 25mg daily\par 	Levothyroxine 50mcg daily\par 	Pantoprazole 40mg BID\par 	Ergocalciferol 50K units weekly\par

## 2023-05-17 ENCOUNTER — NON-APPOINTMENT (OUTPATIENT)
Age: 73
End: 2023-05-17

## 2023-05-17 LAB
ALBUMIN SERPL ELPH-MCNC: 3 G/DL
ANION GAP SERPL CALC-SCNC: 13 MMOL/L
BASOPHILS # BLD AUTO: 0.08 K/UL
BASOPHILS # BLD AUTO: 0.1 K/UL
BASOPHILS NFR BLD AUTO: 0.8 %
BASOPHILS NFR BLD AUTO: 1 %
BUN SERPL-MCNC: 54 MG/DL
CALCIUM SERPL-MCNC: 8.4 MG/DL
CHLORIDE SERPL-SCNC: 104 MMOL/L
CO2 SERPL-SCNC: 24 MMOL/L
CREAT SERPL-MCNC: 4.08 MG/DL
CYSTATIN C SERPL-MCNC: 3.43 MG/L
EGFR: 15 ML/MIN/1.73M2
EOSINOPHIL # BLD AUTO: 0.9 K/UL
EOSINOPHIL # BLD AUTO: 0.95 K/UL
EOSINOPHIL NFR BLD AUTO: 8.7 %
EOSINOPHIL NFR BLD AUTO: 9.8 %
GFR/BSA.PRED SERPLBLD CYS-BASED-ARV: 14 ML/MIN/1.73M2
GLUCOSE SERPL-MCNC: 119 MG/DL
HCT VFR BLD CALC: 22.4 %
HCT VFR BLD CALC: 25.2 %
HGB BLD-MCNC: 6.9 G/DL
HGB BLD-MCNC: 7.6 G/DL
IMM GRANULOCYTES NFR BLD AUTO: 0.6 %
IMM GRANULOCYTES NFR BLD AUTO: 0.6 %
LYMPHOCYTES # BLD AUTO: 1.12 K/UL
LYMPHOCYTES # BLD AUTO: 1.96 K/UL
LYMPHOCYTES NFR BLD AUTO: 10.8 %
LYMPHOCYTES NFR BLD AUTO: 20.2 %
MAN DIFF?: NORMAL
MAN DIFF?: NORMAL
MCHC RBC-ENTMCNC: 29.7 PG
MCHC RBC-ENTMCNC: 29.8 PG
MCHC RBC-ENTMCNC: 30.2 GM/DL
MCHC RBC-ENTMCNC: 30.8 GM/DL
MCV RBC AUTO: 96.6 FL
MCV RBC AUTO: 98.8 FL
MONOCYTES # BLD AUTO: 1.09 K/UL
MONOCYTES # BLD AUTO: 1.29 K/UL
MONOCYTES NFR BLD AUTO: 11.2 %
MONOCYTES NFR BLD AUTO: 12.5 %
NEUTROPHILS # BLD AUTO: 5.55 K/UL
NEUTROPHILS # BLD AUTO: 6.88 K/UL
NEUTROPHILS NFR BLD AUTO: 57.2 %
NEUTROPHILS NFR BLD AUTO: 66.6 %
PHOSPHATE SERPL-MCNC: 4.2 MG/DL
PLATELET # BLD AUTO: 252 K/UL
PLATELET # BLD AUTO: 387 K/UL
POTASSIUM SERPL-SCNC: 4.8 MMOL/L
RBC # BLD: 2.32 M/UL
RBC # BLD: 2.55 M/UL
RBC # FLD: 13.4 %
RBC # FLD: 13.7 %
SODIUM SERPL-SCNC: 141 MMOL/L
WBC # FLD AUTO: 10.33 K/UL
WBC # FLD AUTO: 9.71 K/UL

## 2023-05-22 ENCOUNTER — APPOINTMENT (OUTPATIENT)
Dept: NEPHROLOGY | Facility: CLINIC | Age: 73
End: 2023-05-22

## 2023-05-22 ENCOUNTER — APPOINTMENT (OUTPATIENT)
Dept: CARDIOLOGY | Facility: CLINIC | Age: 73
End: 2023-05-22
Payer: COMMERCIAL

## 2023-05-22 ENCOUNTER — APPOINTMENT (OUTPATIENT)
Dept: PODIATRY | Facility: CLINIC | Age: 73
End: 2023-05-22

## 2023-05-22 ENCOUNTER — NON-APPOINTMENT (OUTPATIENT)
Age: 73
End: 2023-05-22

## 2023-05-22 VITALS
OXYGEN SATURATION: 100 % | HEIGHT: 71 IN | SYSTOLIC BLOOD PRESSURE: 110 MMHG | BODY MASS INDEX: 24.08 KG/M2 | DIASTOLIC BLOOD PRESSURE: 40 MMHG | WEIGHT: 172 LBS | HEART RATE: 57 BPM

## 2023-05-22 DIAGNOSIS — I38 CHRONIC DIASTOLIC (CONGESTIVE) HEART FAILURE: ICD-10-CM

## 2023-05-22 DIAGNOSIS — I50.32 CHRONIC DIASTOLIC (CONGESTIVE) HEART FAILURE: ICD-10-CM

## 2023-05-22 PROCEDURE — 99205 OFFICE O/P NEW HI 60 MIN: CPT

## 2023-05-22 PROCEDURE — 93000 ELECTROCARDIOGRAM COMPLETE: CPT | Mod: NC

## 2023-05-22 RX ORDER — IRBESARTAN 75 MG/1
1 TABLET ORAL
Refills: 0 | DISCHARGE

## 2023-05-22 RX ORDER — OMEGA-3 ACID ETHYL ESTERS 1 G
1 CAPSULE ORAL
Refills: 0 | DISCHARGE

## 2023-05-22 RX ORDER — SODIUM BICARBONATE 1 MEQ/ML
1 SYRINGE (ML) INTRAVENOUS
Refills: 0 | DISCHARGE

## 2023-05-22 NOTE — PHYSICAL EXAM
[Elevated JVD ____cm] : elevated JVD ~Vcm [Normal Rate] : normal [Rhythm Regular] : regular [Normal S1] : normal S1 [Normal S2] : normal S2 [No Pitting Edema] : no pitting edema present [2+] : left 2+ [1+] : left 1+ [Normal] : alert and oriented, normal memory [de-identified] : left arm AV fistula vascular access, prominent bruit transmitted to subclavian, left carotid and precordium

## 2023-05-22 NOTE — REASON FOR VISIT
[Other: ____] : [unfilled] [Cardiac Failure] : cardiac failure [Hypertension] : hypertension [Family Member] : family member

## 2023-05-22 NOTE — PHYSICAL EXAM
[Well Developed] : well developed [Well Nourished] : well nourished [Normocephalic] : normocephalic [No Acute Distress] : no acute distress [Atraumatic] : atraumatic [Sclera Anicteric] : sclera anicteric [Good Dentition] : good dentition [Neck Supple] : neck supple [Clear to Auscultation] : clear to auscultation [Breathing Comfortably on RA] : breathing comfortably on room air [Normal Rate] : normal rate [Regular Rhythm] : regular rhythm [Soft] : soft [Non-tender] : non-tender [In Left Arm] : fistula/graft in left arm [] : right dorsalis pedis diminished [Alert] : alert [Responds to Questions Appropriately] : responds to questions appropriately [Appropriate] : appropriate [Oriented] : oriented [FreeTextEntry1] : No carotid bruits [TextBox_34] : No ulcers or edema [TextBox_86] : No adenopathy

## 2023-05-22 NOTE — REVIEW OF SYSTEMS
[Recent Weight Loss (___ Lbs)] : recent [unfilled] ~Ulb weight loss [Sclera anicteric] : sclera anicteric [Wears glasses] : wears glasses [Can Walk (___ Blocks)] : can walk [unfilled] blocks [Urine Output: ____] : Urine Output: [unfilled] [Joint Pain] : joint pain [Itching] : itching [Dizziness] : dizziness [Memory Loss] : memory loss [Unsteady Gait] : unsteady gait [Anemia] : anemia [Fever] : no fever [Chills] : no chills [Fatigue] : no fatigue [Night Sweats] : no night sweats [Recent Weight Gain (___ Lbs)] : no recent weight gain [Sore throat] : no sore throat [Pain/Stiffness] : no pain/stiffness [Rhinorrhea] : no rhinorrhea [Trauma] : no trauma [Double vision] : no double vision [Blurred Vision] : no blurred vision [Eye Pain] : no eye pain [Chest Pain] : no chest pain [Palpitations] : no palpitations [SOB] : no shortness of breath [Wheezing] : no wheezing [Cough] : no cough [Dyspnea on Exertion] : no dyspnea on exertion [Pleuritic Chest Pain] : no pleuritic chest pain [Abdominal Pain] : no abdominal pain [Nausea] : no nausea [Constipation] : no constipation [Diarrhea] : diarrhea [Vomiting] : no vomiting [Dysuria] : no dysuria [Frequency] : no frequency [Urgency] : no urinary urgency [Incontinence] : no incontinence [Hematuria] : no hematuria [UTI] : no UTI [Joint Stiffness] : no joint stiffness [Muscle Pain] : no muscle pain [Muscle Weakness] : no muscle weakness [Tattoos] : no tattoos [Skin Rash] : no skin rash [Hair Changes] : no hair changes [Headache] : no headache [Fainting] : no fainting [Confusion] : no confusion [Seizures] : no seizures [Suicidal] : not suicidal [Hallucinations] : no hallucinations [Anxiety] : no anxiety [Depression] : no depression [Adenopathy] : no adenopathy [Easy Bleeding] : no easy bleeding [Easy Bruising] : no easy bruising

## 2023-05-22 NOTE — REASON FOR VISIT
[Initial] : an initial visit for [End-Stage Renal Disease] : end-stage renal disease [Kidney Transplant Evaluation] : kidney transplant evaluation [FreeTextEntry2] : Cem Rees MD

## 2023-05-22 NOTE — CARDIOLOGY SUMMARY
[de-identified] : 5/22/2023 sinus rhythm, normal. [de-identified] : 12/21/2022 normal LV size and function, concentric LVH, normal valves, normal right heart.

## 2023-05-22 NOTE — HISTORY OF PRESENT ILLNESS
[Diabetes Mellitus] : Diabetes Mellitus [Hypertension] : Hypertension [Cardiac History] : cardiac history [Diabetes] : diabetes [Retinopathy] : retinopathy [Neuropathy] : neuropathy [Annual Foot Exams] : annual foot exams [Not Working] : Not working [70: Cares for self; unalbe to carry on normal activity or do active work.] : 70: Cares for self; unable to carry on normal activity or do active work. 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  Prior to that 1 ppd x 15 years\par Drinking: none\par Potential live donors: None at present

## 2023-05-22 NOTE — CONSULT LETTER
[Dear  ___] : Dear  [unfilled], [Consult Letter:] : I had the pleasure of evaluating your patient, [unfilled]. [Please see my note below.] : Please see my note below. [Consult Closing:] : Thank you very much for allowing me to participate in the care of this patient.  If you have any questions, please do not hesitate to contact me. [Sincerely,] : Sincerely, [FreeTextEntry2] : Cem Rees MD [FreeTextEntry3] : Puma Ball

## 2023-05-22 NOTE — DISCUSSION/SUMMARY
[Patient] : the patient [EKG obtained to assist in diagnosis and management of assessed problem(s)] : EKG obtained to assist in diagnosis and management of assessed problem(s) [Compensated] : compensated [Essential Hypertension] : essential hypertension [Responding to Treatment] : responding to treatment [de-identified] : preserved systolic function [FreeTextEntry2] : and son [FreeTextEntry1] : 72 year old man preemptive candidate for kidney transplant. Had congestive heart failure, but normal cardiac function, no valvular pathology and maintained compensated. Echocardiogram 5 months ago. Needs pharmacologic stress test with radionuclide perfusion imaging.

## 2023-05-22 NOTE — ASSESSMENT
[Good candidate] : a good candidate. We should proceed with our protocol for evaluation for kidney transplantation. [FreeTextEntry1] : DATA:\par I reviewed/recommended the following tests:\par 1-	CBC.  Hematocrit  28.3%  (04-)\par 2-	CMP.  Creatinine 4.05.  GFR  15  (04-)\par 3-	CMV  +    (04-)\par 4-	EBV   +   (04-)\par 5-	ABO blood type      (04-)\par 6-	CT scan of abdomen (requested)\par 7-	CT scan of pelvis (requested)\par 8-	Cardiac Stress Test (requested)\par 9-	Cardiac Echocardiogram (requested)\par 10-	Cardiac Consultation (requested with Dr. Sb Loredo)\par 11-	Panel Specific Antigen testing (requested)\par 12-	Hepatitis A testing (requested)\par 13-	Hepatitis B testing (requested)\par 14-	Hepatitis C testing (requested)\par 15-	I communicated my evaluation with referring Nephrologist (via letter).\par 16-	I discussed case and testing with our Transplant Nurse Practitioner Coordinator\par 17- I reviewed note by KAEL Gonzales (04-): "...on appropriate medical therapy..."\par 18- I reviewed note by KAEL Rees (03-): "Diabetic nephropathy (biopsy proven)"\par TREATMENT PLAN:\par 1-	Patient with end stage kidney disease.\par 2-	Referred for kidney transplantation based on GFR < 20 as per referring physician’s testing.\par 3-	GFR < 20 qualifies patient for kidney transplantation.\par 4-	I recommend kidney transplantation surgery as treatment based on my encounter with the patient and available testing reviewed.  Benefits (prolonged survival and enhanced quality of life) and risks (major surgical intervention that will require at least 3 days of hospitalization and placement of vascular and urinary catheters, increased incidence of infections and malignancies associated with immunosuppression, infections, urine leaks, bleeding, among many more) have been discussed by me as well as by other team members. \par 5-	Patient is aware that immunosuppression drug treatment will be required for life or for as long as the transplant is functioning.  Immunosuppression management will require intensive monitoring of levels and side effects, initially on a daily level as an inpatient and at least weekly as an outpatient.  \par 6-	Will proceed with our protocol evaluation.\par 7-	Patient will be presented to our selection committee for final approval.\par 8-	Risk factors include:\par - age\par - history of diabetes

## 2023-05-22 NOTE — HISTORY OF PRESENT ILLNESS
[FreeTextEntry1] : Mr. Ghassan Angulo is a 72 year old man presenting for cardiovascular evaluation as a preemptive candidate for kidney transplant. He has biopsy confirmed diabetic nephropathy and he has hypertension. There is no history of coronary artery disease, but had volume overload congestive heart failure with preserved ventricular systolic function attributed to kidney disease. Functional capacity limited, walks with cane. He is a former cigarette smoker, quit before age 30.

## 2023-05-24 ENCOUNTER — APPOINTMENT (OUTPATIENT)
Dept: ENDOCRINOLOGY | Facility: CLINIC | Age: 73
End: 2023-05-24
Payer: MEDICARE

## 2023-05-24 VITALS
HEIGHT: 71 IN | OXYGEN SATURATION: 97 % | BODY MASS INDEX: 24.82 KG/M2 | WEIGHT: 177.31 LBS | HEART RATE: 69 BPM | TEMPERATURE: 97.9 F | SYSTOLIC BLOOD PRESSURE: 122 MMHG | DIASTOLIC BLOOD PRESSURE: 82 MMHG

## 2023-05-24 PROBLEM — E78.5 HYPERLIPIDEMIA, UNSPECIFIED: Chronic | Status: ACTIVE | Noted: 2023-04-01

## 2023-05-24 PROBLEM — E11.9 TYPE 2 DIABETES MELLITUS WITHOUT COMPLICATIONS: Chronic | Status: ACTIVE | Noted: 2023-04-01

## 2023-05-24 PROBLEM — N18.5 CHRONIC KIDNEY DISEASE, STAGE 5: Chronic | Status: ACTIVE | Noted: 2023-04-01

## 2023-05-24 LAB — GLUCOSE BLDC GLUCOMTR-MCNC: 127

## 2023-05-24 PROCEDURE — 99214 OFFICE O/P EST MOD 30 MIN: CPT

## 2023-05-24 PROCEDURE — 82962 GLUCOSE BLOOD TEST: CPT

## 2023-05-24 NOTE — HISTORY OF PRESENT ILLNESS
[FreeTextEntry1] : 72 yearM here for f/up for Type 2 diabetes mellitus; CKD V.  \par Patient here with daughter. Was recently hospitalized for anemia, and got PRBC \par \par On glipizide 5mg po daily and januvia 25mg po daily \par \par  \par Screening \par Ophthalmology: follows\par Podiatry:  follows \par LDL: 61\par Microalbumin: follows with renal \par EGFR: 14, follows with renal\par \par Current diabetic medication regimen (verified with patient): \par Glipizide 5mg po daily \par Januvia 25mg po daily \par \par \par SMBG ranges (glucometer):  seldom checks \par \par POCT today: 127mg/dl \par \par No recent reported hypoglycemia\par \par

## 2023-05-24 NOTE — PHYSICAL EXAM
[Alert] : alert [Well Nourished] : well nourished [No Acute Distress] : no acute distress [Well Developed] : well developed [Normal Sclera/Conjunctiva] : normal sclera/conjunctiva [EOMI] : extra ocular movement intact [No Proptosis] : no proptosis [Normal Oropharynx] : the oropharynx was normal [Thyroid Not Enlarged] : the thyroid was not enlarged [No Respiratory Distress] : no respiratory distress [No Accessory Muscle Use] : no accessory muscle use [Clear to Auscultation] : lungs were clear to auscultation bilaterally [Normal S1, S2] : normal S1 and S2 [Normal Rate] : heart rate was normal [Regular Rhythm] : with a regular rhythm [No Edema] : no peripheral edema [Pedal Pulses Normal] : the pedal pulses are present [Normal Bowel Sounds] : normal bowel sounds [Not Tender] : non-tender [Not Distended] : not distended [Soft] : abdomen soft [Normal Anterior Cervical Nodes] : no anterior cervical lymphadenopathy [No Spinal Tenderness] : no spinal tenderness [Spine Straight] : spine straight [No Stigmata of Cushings Syndrome] : no stigmata of Cushings Syndrome [Normal Gait] : normal gait [Normal Strength/Tone] : muscle strength and tone were normal [No Rash] : no rash [Acanthosis Nigricans] : no acanthosis nigricans [Swelling] : swollen [Normal Reflexes] : deep tendon reflexes were 2+ and symmetric [No Tremors] : no tremors [Oriented x3] : oriented to person, place, and time

## 2023-05-26 ENCOUNTER — APPOINTMENT (OUTPATIENT)
Dept: UROLOGY | Facility: CLINIC | Age: 73
End: 2023-05-26
Payer: MEDICARE

## 2023-05-26 PROCEDURE — 99204 OFFICE O/P NEW MOD 45 MIN: CPT

## 2023-05-27 NOTE — ASSESSMENT
[FreeTextEntry1] : reviewed risks benefits and controversies of PSA testing.\par noted the issues with the etst itself especially high false positive rate.\par PSA barley elevated and normal for age BUT needs more testing to clear before transplant and why.\par will recheck as %free and TRUs to size and calculate PSA density

## 2023-05-27 NOTE — HISTORY OF PRESENT ILLNESS
[FreeTextEntry1] : referred for evaluation of elevated PSA.\par h/o ESRD and being evaluated for a kidney transplant\par noted to have PSA 4.5 - no prior PSAs.\par No FH prostate cancer\par has some increased frequency but nothing bothersome\par No h/o UTIs or hematuria

## 2023-05-28 LAB
ANION GAP SERPL CALC-SCNC: 14 MMOL/L
BUN SERPL-MCNC: 74 MG/DL
CALCIUM SERPL-MCNC: 9 MG/DL
CHLORIDE SERPL-SCNC: 111 MMOL/L
CO2 SERPL-SCNC: 17 MMOL/L
CREAT SERPL-MCNC: 4.46 MG/DL
EGFR: 13 ML/MIN/1.73M2
GLUCOSE SERPL-MCNC: 89 MG/DL
POTASSIUM SERPL-SCNC: 5.7 MMOL/L
PSA FREE FLD-MCNC: 29 %
PSA FREE SERPL-MCNC: 1.19 NG/ML
PSA SERPL-MCNC: 4.12 NG/ML
SODIUM SERPL-SCNC: 142 MMOL/L

## 2023-06-03 ENCOUNTER — NON-APPOINTMENT (OUTPATIENT)
Age: 73
End: 2023-06-03

## 2023-06-06 ENCOUNTER — NON-APPOINTMENT (OUTPATIENT)
Age: 73
End: 2023-06-06

## 2023-06-07 ENCOUNTER — APPOINTMENT (OUTPATIENT)
Dept: NEPHROLOGY | Facility: CLINIC | Age: 73
End: 2023-06-07
Payer: MEDICARE

## 2023-06-07 VITALS
HEIGHT: 71 IN | SYSTOLIC BLOOD PRESSURE: 143 MMHG | HEART RATE: 70 BPM | TEMPERATURE: 98 F | BODY MASS INDEX: 25.76 KG/M2 | WEIGHT: 184 LBS | DIASTOLIC BLOOD PRESSURE: 57 MMHG | OXYGEN SATURATION: 97 %

## 2023-06-07 DIAGNOSIS — D50.9 IRON DEFICIENCY ANEMIA, UNSPECIFIED: ICD-10-CM

## 2023-06-07 LAB
ALBUMIN SERPL ELPH-MCNC: 3.6 G/DL
ANION GAP SERPL CALC-SCNC: 13 MMOL/L
BUN SERPL-MCNC: 91 MG/DL
CALCIUM SERPL-MCNC: 8.4 MG/DL
CHLORIDE SERPL-SCNC: 111 MMOL/L
CO2 SERPL-SCNC: 17 MMOL/L
CREAT SERPL-MCNC: 5.25 MG/DL
CYSTATIN C SERPL-MCNC: 3.64 MG/L
EGFR: 11 ML/MIN/1.73M2
FERRITIN SERPL-MCNC: 688 NG/ML
GFR/BSA.PRED SERPLBLD CYS-BASED-ARV: 13 ML/MIN/1.73M2
GLUCOSE SERPL-MCNC: 133 MG/DL
IRON SATN MFR SERPL: 10 %
IRON SERPL-MCNC: 20 UG/DL
PHOSPHATE SERPL-MCNC: 4.4 MG/DL
POTASSIUM SERPL-SCNC: 5.3 MMOL/L
SODIUM SERPL-SCNC: 141 MMOL/L
TIBC SERPL-MCNC: 192 UG/DL
UIBC SERPL-MCNC: 173 UG/DL

## 2023-06-07 PROCEDURE — 96372 THER/PROPH/DIAG INJ SC/IM: CPT

## 2023-06-07 PROCEDURE — 99215 OFFICE O/P EST HI 40 MIN: CPT | Mod: 25

## 2023-06-07 RX ORDER — AMLODIPINE BESYLATE 5 MG/1
5 TABLET ORAL
Qty: 90 | Refills: 3 | Status: ACTIVE | COMMUNITY
Start: 2023-01-04 | End: 1900-01-01

## 2023-06-07 RX ORDER — HYDRALAZINE HYDROCHLORIDE 50 MG/1
50 TABLET ORAL
Qty: 270 | Refills: 3 | Status: DISCONTINUED | COMMUNITY
Start: 2023-01-04 | End: 2023-06-07

## 2023-06-07 RX ADMIN — FERUMOXYTOL 0 MG/17ML: 510 INJECTION INTRAVENOUS at 00:00

## 2023-06-07 NOTE — ASSESSMENT
[FreeTextEntry1] : Mr. Angulo is a 72 year old man with CKD stage V with proteinuria, hypertension, diabetes mellitus, hyperlipidemia, hypothyroidism, and recent hospitalization for worsening kidney function and hypervolemia (12/2023), here for kidney evaluation and management.\par \par Mr. Angulo has advanced CKD due to diabetes, and his GFR is in the teens. We need to prepare to dialysis and/or transplant given the very high risk of end-stage renal disease this year.\par \par Blood pressure well-controlled.\par \par CKD IV/V with proteinuria due to diabetes\par - Lokelma at home in case needed\par - Hold irbesartan for now given borderline hyperkalemia\par - Bicarbonate for metabolic acidosis; increase from 650mg BID to 1300mg BID\par - Continue ergocalciferol\par - Severe anemia; additional dose of Aranesp 60 + arrange for IV iron\par - Continue in CKD Healthy Transitions program (Yuridia Hannon)\par - Continue with transplant workup\par \par Hypertension\par - Decrease amlodipine 10mg to 5mg daily\par - Continue Carvedilol 12.5mg BID\par - START torsemide 20mg daily\par \par Diabetes mellitus: Per endocrinology\par \par Hyperlipidemia: Continue statin and Vascepa\par \par *** EGD biopsy results show H pylori, will try to contact GI again for treatment\par \par *** Aranesp 60mcg administered RUE without complication\par \par PLAN\par - Meds per below\par - Labs 2 weeks\par - Aranesp today\par - Arrange for IV iron\par - Follow up 6 weeks with labs prior\par \par \par \par Discussed importance of healthful habits, including physical activity/exercise and improvements in diet.\par \par I have spent a total of 40 minutes in which >50% was spent in discussion with patient regarding chronic kidney disease, hypertension, diet (including counseling on salt intake). \par \par \par Ghassan Angulo – MEDICATIONS (as of June 7)\par \par 	Torsemide 20mg daily\par 	Amlodipine 5mg daily\par 	Carvedilol 12.5mg two times per day\par 	Sodium bicarbonate 1300mg (two 650mg tabs) two times per day\par 	Atorvastatin 40mg daily\par 	Vascepa 1g two times per day\par 	Aspirin 81mg daily\par 	Glipizide 5mg daily\par 	Januvia/sitagliptin 25mg daily\par 	Levothyroxine 50mcg daily\par 	Pantoprazole 40mg two times per day\par 	Ergocalciferol 50K units weekly\par \par CHANGES\par 	Decrease amlodipine from 10mg to 5mg\par 	Increase sodium bicarbonate to 2 pills in morning and 2 pills in evening\par 	START torsemide 20mg daily\par

## 2023-06-07 NOTE — CONSULT LETTER
[Dear  ___] : Dear  [unfilled], [Courtesy Letter:] : I had the pleasure of seeing your patient, [unfilled], in my office today. [Please see my note below.] : Please see my note below. [Sincerely,] : Sincerely, [FreeTextEntry3] : Cem Rees MD\par Nephrology\par

## 2023-06-07 NOTE — HISTORY OF PRESENT ILLNESS
[FreeTextEntry1] : Contacts:\par 	Mariposa (daughter)\par 	Dr. Lalo Ta (endocrinology)\par 	Dr. Carol Ann Jewell (vascular)\par 	Dr. 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 Angulo is a 72 year old man with CKD stage V with proteinuria, hypertension, diabetes mellitus, hyperlipidemia, hypothyroidism, and recent hospitalization for worsening kidney function and hypervolemia (2023), here for kidney evaluation and management.\par \par Last saw May 11, received Aranesp. Persistently low hemoglobin. Seeing other doctors as part of pre-transplant evaluation.\par \par Currently taking antibiotics for a recent "sore throat" and right ear pain. Unsure of name.\par \par Has AVF on left arm.\par \par Not tired, feeling well. Notes some worsened leg edema.\par \par ROS: All other systems were reviewed and are negative, except as per HPI.\par 	\par -------------------------------------------------------------------------------\par Social History:\par 	From [place], came to [place] 1960[place]\par 	Lives in [place] with wife\par 	Two daughters has 5 grandchild\par 	Works as  for [employer] service\par 	Smoked as teenager, stopped aged 29\par 	\par Family History:\par 	Father had stroked ( age 70s)\par 	Mother  age 93	\par 	No known history of renal disease, hematuria, proteinuria, ESRD, dialysis, transplant\par \par -------------------------------------------------------------------------------\par Allergies: NKDA\par \par Medications:\par 	Amlodipine 10mg daily\par 	Carvedilol 12.5mg two times per day\par 	Sodium bicarbonate 650mg two times per day\par 	Atorvastatin 40mg daily\par 	Vascepa 1g two times per day\par 	Aspirin 81mg daily\par 	Glipizide 5mg daily\par 	Januvia/sitagliptin 25mg daily\par 	Levothyroxine 50mcg daily\par 	Pantoprazole 40mg BID\par 	Ergocalciferol 50K units weekly\par 	\par -------------------------------------------------------------------------------\par Physical Exam:\par \par 	Gen: NAD\par 	HEENT: Supple neck\par 	Pulm: CTA\par 	CV: RRR; no rub\par 	Back: No spinal or CVA terndeness\par 	Abd: +BS, soft, nontender/nondistended\par 	UE: Warm, FROM; no asterixis; LUE AVF\par 	LE: Warm, FROM; 1+ edema, worsened\par 	Neuro: No focal deficits\par 	Psych: Normal affect\par 	Skin: Warm, without rashes\par 	\par -------------------------------------------------------------------------------\par Labs/Studies:\par \par 	2023\par \par 		141 | 111 | 91\par 		------------------< 133 Ca 8.4 eGFR 11\par 		5.3 |  17 | 5.25\par 		\par 		Cystatin-C 3.64, eGFR 13\par 		Phosphorus 4.4\par 		Albumin 3.6\par \par 	2023 UACR 769 mg/g\par 	2022 UACR 6447 mg/g\par 	\par 	2023 UPCR 2.2 g/g\par 	2022 UPCR 11.8 g/g\par \par 	2023 H/H .7\par 	2023 iron 20, TIBC 192, sat 10%, ferritin 688\par 	2023 Lipid: chol 116, , HDL 41, LDL 51\par 	2023 HbA1c 7.1\par 	2023 Vitamin D (25OH) 29\par 	2023 PTH 99\par \par \par 	2022 Kidney U/S\par 		- Right kidney: 11.4 cm. No hydronephrosis. Upper pole and lower pole cyst measuring 2.9 cm a 1.6 cm.\par 		- Left kidney: 12.4 cm. No renal mass, hydronephrosis or calculi.\par \par 	2022 Kidney Biopsy\par 		- Advanced diffuse and nodular diabetic glomerulosclerosis\par 		- Acute tubular injury with focal tubular necrosis\par 		- Chronic and focally active interstitial nephritis\par \par 		Summary of chronic changes:\par 		- Diffuse nodular glomerulosclerosis\par 		- Global glomerulosclerosis (30% of glomeruli)\par 		- Tubular atrophy and interstitial fibrosis (70% of the sampled	cortex)\par 		- Severe arterial and arteriolar sclerosis

## 2023-06-12 ENCOUNTER — NON-APPOINTMENT (OUTPATIENT)
Age: 73
End: 2023-06-12

## 2023-06-20 RX ORDER — FERUMOXYTOL 510 MG/17ML
510 INJECTION INTRAVENOUS
Qty: 0 | Refills: 0 | Status: ACTIVE | OUTPATIENT
Start: 2023-06-20 | End: 1900-01-01

## 2023-06-21 ENCOUNTER — APPOINTMENT (OUTPATIENT)
Dept: GASTROENTEROLOGY | Facility: CLINIC | Age: 73
End: 2023-06-21

## 2023-06-21 RX ORDER — FERUMOXYTOL 510 MG/17ML
510 INJECTION INTRAVENOUS
Qty: 0 | Refills: 0 | Status: ACTIVE | OUTPATIENT
Start: 2023-06-07 | End: 1900-01-01

## 2023-06-28 ENCOUNTER — APPOINTMENT (OUTPATIENT)
Dept: VASCULAR SURGERY | Facility: CLINIC | Age: 73
End: 2023-06-28
Payer: MEDICARE

## 2023-06-28 VITALS
BODY MASS INDEX: 25.76 KG/M2 | HEART RATE: 62 BPM | SYSTOLIC BLOOD PRESSURE: 168 MMHG | TEMPERATURE: 97.6 F | WEIGHT: 184 LBS | HEIGHT: 71 IN | DIASTOLIC BLOOD PRESSURE: 67 MMHG

## 2023-06-28 PROCEDURE — 99213 OFFICE O/P EST LOW 20 MIN: CPT

## 2023-06-28 PROCEDURE — 93990 DOPPLER FLOW TESTING: CPT

## 2023-06-29 ENCOUNTER — APPOINTMENT (OUTPATIENT)
Dept: CARDIOLOGY | Facility: CLINIC | Age: 73
End: 2023-06-29
Payer: COMMERCIAL

## 2023-06-29 PROCEDURE — 78452 HT MUSCLE IMAGE SPECT MULT: CPT

## 2023-06-29 PROCEDURE — 93015 CV STRESS TEST SUPVJ I&R: CPT

## 2023-06-29 PROCEDURE — A9500: CPT

## 2023-06-29 NOTE — DATA REVIEWED
[FreeTextEntry1] : 6/28/2023 - LUE dialysis ultrasound\par left patent brachiocephalic avf w/o stenosis\par vf 981 ml/min, dprox 6.5 mm, dmid 4.7 mm, ddistal 4.9 mm

## 2023-06-29 NOTE — PHYSICAL EXAM
[2+] : left 2+ [1+] : left 1+ [Ankle Swelling (On Exam)] : present [Ankle Swelling Bilaterally] : bilaterally  [] : bilaterally [No Rash or Lesion] : No rash or lesion [Ankle Swelling On The Left] : moderate [Alert] : alert [Oriented to Person] : oriented to person [Oriented to Place] : oriented to place [Oriented to Time] : oriented to time [Calm] : calm [Varicose Veins Of Lower Extremities] : not present [de-identified] : NAD [de-identified] : NCAT [de-identified] : unlabored breathing [FreeTextEntry1] : LUE incision well healed\par palpable thrill over avf\par left hand warm and well perfused \par intact sensory and motor function\par brisk capillary refill < 2 sec\par bilateral lower extremities soft, warm and nontender\par bilateral leg swelling with venous stasis changes (dry flaky skin, hyperpigmentation in gator area and upper calf and hyperkeratosis)\par no wounds or ulcers [de-identified] : walks with a cane [de-identified] : LUE incision site well healed [de-identified] : iliana cranial nerves 2-12 iliana grossly intact [de-identified] : cooperative

## 2023-06-29 NOTE — HISTORY OF PRESENT ILLNESS
[FreeTextEntry1] : Pt here for post op visit s/p left brachiocephalic fistula on 4/6/2023. Accompanied by son-in-law. Not on dialysis yet. \par \par Pt is doing well. LUE incision site c/d/i, healing well. Pt denies fever, chills, pain, swelling, numbness, tingling, or drainage. \par \par Pt complains of bilateral leg swelling for many years. Reports heaviness, achiness, and discomfort. Pt does not wear compression stockings or elevates his legs. Denies leg pain, long car rides/flights or injuries. Pt denies claudication, rest pain, or tissue loss. He's taking aspirin and atorvastatin. Walks with a cane for balance.\par \par PMH: chronic kidney disease, HTN, HLD, hypothyroidism, CHF\par \par Pt's nephrologist is Dr. Rees\par  [de-identified] : 6/28/2023 - Pt presents to evaluate maturity of left brachiocephalic avf. Accompanied by daughter. LUE incision well healed.  Not on dialysis yet. No new complaints.

## 2023-06-29 NOTE — ASSESSMENT
[Arterial/Venous Disease] : arterial/venous disease [Other: _____] : [unfilled] [FreeTextEntry1] : Impression - chronic kidney disease not on HD s/p LUE brachiocephalic avf, bilateral leg swelling, avf maturing well\par \par Plan\par Conservative medical management - leg elevation, exercise regimen, weight loss, diet control, calf muscle exercises, 15-20 mm hg or 20-30 mm hg knee high compression stockings \par Left arm precautions - no BP, IV, or blood draws\par Return to office in 6 months Dec 2023 with left JOSE ultrasound to evaluate avf maturity

## 2023-07-01 ENCOUNTER — NON-APPOINTMENT (OUTPATIENT)
Age: 73
End: 2023-07-01

## 2023-07-03 ENCOUNTER — APPOINTMENT (OUTPATIENT)
Dept: RHEUMATOLOGY | Facility: CLINIC | Age: 73
End: 2023-07-03
Payer: MEDICARE

## 2023-07-03 VITALS
DIASTOLIC BLOOD PRESSURE: 58 MMHG | HEART RATE: 80 BPM | SYSTOLIC BLOOD PRESSURE: 141 MMHG | WEIGHT: 180 LBS | BODY MASS INDEX: 25.11 KG/M2 | TEMPERATURE: 98.2 F | OXYGEN SATURATION: 97 % | RESPIRATION RATE: 16 BRPM

## 2023-07-03 VITALS
HEART RATE: 60 BPM | RESPIRATION RATE: 16 BRPM | SYSTOLIC BLOOD PRESSURE: 145 MMHG | DIASTOLIC BLOOD PRESSURE: 60 MMHG | OXYGEN SATURATION: 97 %

## 2023-07-03 PROCEDURE — 96365 THER/PROPH/DIAG IV INF INIT: CPT

## 2023-07-03 RX ORDER — FERUMOXYTOL 510 MG/17ML
510 INJECTION INTRAVENOUS
Qty: 0 | Refills: 0 | Status: COMPLETED
Start: 2023-06-20

## 2023-07-03 NOTE — HISTORY OF PRESENT ILLNESS
[Denies] : Denies [No] : No [N/A] : N/A [Yes] : Yes [Right upper extremity] : Right upper extremity [24g] : 24g [Start Time: ___] : Medication Start Time: [unfilled] [End Time: ___] : Medication End Time: [unfilled] [IV discontinued. Intact. No signs or symptoms of IV complications noted. Time: ___] : IV discontinued. Intact. No signs or symptoms of IV complications noted. Time: [unfilled] [Patient  instructed to seek medical attention with signs and symptoms of adverse effects] : Patient  instructed to seek medical attention with signs and symptoms of adverse effects [Patient left unit in no acute distress] : Patient left unit in no acute distress [Medications administered as ordered and tolerated well.] : Medications administered as ordered and tolerated well. [de-identified] : Right basilic vein [de-identified] : Patient presents for 1:2 Feraheme infusion. Patient has hx of iron deficiency anemia. Patient reports to have mild fatigue, other than that no complaints or symptoms. Patient education and teaching materials provided to patient, patient consented in receiving today's infusion as with Dr. Rees/Dr. Yeh. Patient tolerated infusion well for 30 minutes, no immediate AEs noted. Patient observed for additional 30 minutes. Patient left unit ambulatory in [facility].

## 2023-07-11 ENCOUNTER — APPOINTMENT (OUTPATIENT)
Dept: RHEUMATOLOGY | Facility: CLINIC | Age: 73
End: 2023-07-11
Payer: MEDICARE

## 2023-07-11 ENCOUNTER — APPOINTMENT (OUTPATIENT)
Dept: GASTROENTEROLOGY | Facility: CLINIC | Age: 73
End: 2023-07-11

## 2023-07-11 VITALS — SYSTOLIC BLOOD PRESSURE: 129 MMHG | DIASTOLIC BLOOD PRESSURE: 70 MMHG | OXYGEN SATURATION: 100 % | HEART RATE: 78 BPM

## 2023-07-11 VITALS
RESPIRATION RATE: 16 BRPM | OXYGEN SATURATION: 99 % | DIASTOLIC BLOOD PRESSURE: 72 MMHG | HEART RATE: 66 BPM | TEMPERATURE: 98.3 F | SYSTOLIC BLOOD PRESSURE: 139 MMHG

## 2023-07-11 LAB
ALBUMIN SERPL ELPH-MCNC: 3.6 G/DL
ANION GAP SERPL CALC-SCNC: 11 MMOL/L
BUN SERPL-MCNC: 72 MG/DL
CALCIUM SERPL-MCNC: 8.5 MG/DL
CHLORIDE SERPL-SCNC: 108 MMOL/L
CO2 SERPL-SCNC: 23 MMOL/L
CREAT SERPL-MCNC: 4.68 MG/DL
CYSTATIN C SERPL-MCNC: 3.52 MG/L
EGFR: 13 ML/MIN/1.73M2
GFR/BSA.PRED SERPLBLD CYS-BASED-ARV: 14 ML/MIN/1.73M2
GLUCOSE SERPL-MCNC: 100 MG/DL
PHOSPHATE SERPL-MCNC: 4.6 MG/DL
POTASSIUM SERPL-SCNC: 5.4 MMOL/L
SODIUM SERPL-SCNC: 142 MMOL/L

## 2023-07-11 PROCEDURE — 96365 THER/PROPH/DIAG IV INF INIT: CPT

## 2023-07-12 RX ORDER — FERUMOXYTOL 510 MG/17ML
510 INJECTION INTRAVENOUS
Qty: 0 | Refills: 0 | Status: COMPLETED
Start: 2023-06-07

## 2023-07-12 NOTE — HISTORY OF PRESENT ILLNESS
[Denies] : Denies [No] : No [N/A] : N/A [Yes] : Yes [Right upper extremity] : Right upper extremity [24g] : 24g [Start Time: ___] : Medication Start Time: [unfilled] [End Time: ___] : Medication End Time: [unfilled] [IV discontinued. Intact. No signs or symptoms of IV complications noted. Time: ___] : IV discontinued. Intact. No signs or symptoms of IV complications noted. Time: [unfilled] [Patient  instructed to seek medical attention with signs and symptoms of adverse effects] : Patient  instructed to seek medical attention with signs and symptoms of adverse effects [Patient left unit in no acute distress] : Patient left unit in no acute distress [Medications administered as ordered and tolerated well.] : Medications administered as ordered and tolerated well. [de-identified] : right hand dorsal venous arch [de-identified] : Patient presents for 2:2 Feraheme infusion. Patient has hx of iron deficiency anemia. Patient reports tolerating first dose well, deniesa ny s/s of AE's. Today admits to discomfort right upper abdominal quadrant x3 days, mild fatigue, and occasional dizziness when standing. Patient admits to chx constimpation, voices concern over abdominal discomfort fearing that it's his liver. Patient advised to follow up with PMD. Patient verbalized understanding. Other than that no complaints or symptoms. Patient tolerated infusion well for 30 minutes, no immediate AE's noted. Patient observed for additional 30 minutes. PIV discontinued, upon exit patient stated that he is feeling cold and sweaty. Patient noted pale in pallor, and sweating profusely. Patient seated, VSS stable as recorded, patient provided with snack and tea. BS checked after snack, 82. Patient stated symptoms resolved s/p snack. Daughter Mariposa contacted, to  patient at waiting room. Patient advised to eat lunch and check his blood sugar regularly. Patient verbalized understanding left unit ambulatory in [facility].

## 2023-07-14 ENCOUNTER — APPOINTMENT (OUTPATIENT)
Dept: NEPHROLOGY | Facility: CLINIC | Age: 73
End: 2023-07-14
Payer: MEDICARE

## 2023-07-14 VITALS
TEMPERATURE: 97.5 F | OXYGEN SATURATION: 98 % | SYSTOLIC BLOOD PRESSURE: 126 MMHG | BODY MASS INDEX: 26.85 KG/M2 | HEART RATE: 65 BPM | WEIGHT: 191.8 LBS | HEIGHT: 71 IN | DIASTOLIC BLOOD PRESSURE: 61 MMHG

## 2023-07-14 PROCEDURE — 96372 THER/PROPH/DIAG INJ SC/IM: CPT

## 2023-07-19 ENCOUNTER — OUTPATIENT (OUTPATIENT)
Dept: OUTPATIENT SERVICES | Facility: HOSPITAL | Age: 73
LOS: 1 days | End: 2023-07-19
Payer: MEDICARE

## 2023-07-19 ENCOUNTER — APPOINTMENT (OUTPATIENT)
Dept: UROLOGY | Facility: CLINIC | Age: 73
End: 2023-07-19
Payer: MEDICARE

## 2023-07-19 DIAGNOSIS — R97.20 ELEVATED PROSTATE, SPECIFIC ANTIGEN [PSA]: ICD-10-CM

## 2023-07-19 DIAGNOSIS — R35.0 FREQUENCY OF MICTURITION: ICD-10-CM

## 2023-07-19 PROCEDURE — 76857 US EXAM PELVIC LIMITED: CPT

## 2023-07-19 PROCEDURE — 76857 US EXAM PELVIC LIMITED: CPT | Mod: 26

## 2023-07-19 PROCEDURE — 99212 OFFICE O/P EST SF 10 MIN: CPT

## 2023-07-19 NOTE — HISTORY OF PRESENT ILLNESS
[FreeTextEntry1] : referred for evaluation of elevated PSA.\par h/o ESRD and being evaluated for a kidney transplant\par noted to have PSA 4.5 - no prior PSAs.\par No FH prostate cancer\par has some increased frequency but nothing bothersome\par No h/o UTIs or hematuria \par \par 7/23 - PSA 4 with 29% free\par ULS prostate 33 so PSA density 0.12

## 2023-07-19 NOTE — ASSESSMENT
[FreeTextEntry1] : discussed findings - favorable %free and intermediate density - repeat PSA 6 months versus biopsy\par g f/u 6 months\par

## 2023-07-20 DIAGNOSIS — R97.20 ELEVATED PROSTATE SPECIFIC ANTIGEN [PSA]: ICD-10-CM

## 2023-07-25 ENCOUNTER — APPOINTMENT (OUTPATIENT)
Dept: NEPHROLOGY | Facility: CLINIC | Age: 73
End: 2023-07-25
Payer: MEDICARE

## 2023-07-25 VITALS
SYSTOLIC BLOOD PRESSURE: 159 MMHG | TEMPERATURE: 97.6 F | HEART RATE: 67 BPM | DIASTOLIC BLOOD PRESSURE: 68 MMHG | OXYGEN SATURATION: 97 % | HEIGHT: 71 IN

## 2023-07-25 VITALS — SYSTOLIC BLOOD PRESSURE: 146 MMHG | DIASTOLIC BLOOD PRESSURE: 64 MMHG

## 2023-07-25 PROCEDURE — 99215 OFFICE O/P EST HI 40 MIN: CPT

## 2023-07-25 NOTE — HISTORY OF PRESENT ILLNESS
[FreeTextEntry1] : Contacts:\par 	Mariposa (daughter)\par 	Dr. Lalo Ta (endocrinology)\par 	Dr. Carol Ann Jewell (vascular)\par 	Dr. Zana Simpson (podiatry)\par \par -------------------------------------------------------------------------------\par Problem List:\par 	Chronic kidney disease stage V with nephrotic-range proteinuria\par 		Diabetic nephropathy (biopsy proven)\par 	Hypertension with moderate concentric left ventricular hypertrophy\par 	Diabetes mellitus\par 	Hyperlipidemia\par 	Hypothyroidism\par \par -------------------------------------------------------------------------------\par HPI: [comes with son-in-law and grandson]\par Mr. Angulo is a 72 year old man with CKD stage V with proteinuria, hypertension, diabetes mellitus, hyperlipidemia, hypothyroidism, and recent hospitalization for worsening kidney function and hypervolemia (2023), here for kidney evaluation and management.\par \par Last saw early 2023, and started torsemide for worsening edema. He received Aranesp 100mcg in interim (on ). Saw urology for evaluation of elevated PSA. His cardiac workup is completed and he was felt to be a good candidate for kidney transplant from cardiology. Follow up with vascular as well, appears to have well-maturing LUE AVF.\par \par Saw a different GI, undewent what sounds like capsule endoscopy, which was reportedly normal. He was also treated with a cocktail for H. pylori.\par \par Doing alright otherwise. Some mild and tolerable lightheadedness. Not sleeping great (trouble staying asleep). Appetite is good.\par 	\par -------------------------------------------------------------------------------\par Social History:\par 	From [place], came to [place] 1960[place]\par 	Lives in [place] with wife\par 	Two daughters has 5 grandchild\par 	Works as  for [email or web address] service\par 	Smoked as teenager, stopped aged 29\par 	\par Family History:\par 	Father had stroked ( age 70s)\par 	Mother  age 93	\par 	No known history of renal disease, hematuria, proteinuria, ESRD, dialysis, transplant\par \par -------------------------------------------------------------------------------\par Allergies: NKDA\par \par Medications:\par 	Torsemide 20mg daily\par 	Amlodipine 5mg daily\par 	Carvedilol 12.5mg two times per day\par 	Sodium bicarbonate 650mg two times per day\par 	Atorvastatin 40mg daily\par 	Vascepa 1g two times per day\par 	Aspirin 81mg daily\par 	Glipizide 5mg daily\par 	Januvia/sitagliptin 25mg daily\par 	Levothyroxine 50mcg daily\par 	Omeprazole 20mg BID\par 	Ergocalciferol 50K units weekly\par 	\par -------------------------------------------------------------------------------\par Physical Exam:\par \par 	148/71\par 	145/56\par \par 	Gen: NAD\par 	HEENT: Supple neck\par 	Pulm: CTA\par 	CV: RRR; no rub\par 	Back: No spinal or CVA terndeness\par 	Abd: +BS, soft, nontender/nondistended\par 	UE: Warm, FROM; no asterixis; LUE brachiocephalic AVF\par 	LE: Warm, FROM; 1+ edema, worsened\par 	Neuro: No focal deficits\par 	Psych: Normal affect\par 	Skin: Warm, without rashes\par 	\par -------------------------------------------------------------------------------\par Labs/Studies:\par \par 	2023\par \par 		142 | 108 | 72\par 		------------------< 100 Ca 8.5 eGFR 13\par 		5.2 |  23 | 4.68\par 		\par 		Cystatin-C 3.52, eGFR 14\par 		Phosphorus 4.6\par 		Albumin 3.6\par \par 	2023 UACR 769 mg/g\par 	2022 UACR 6447 mg/g\par 	\par 	2023 UPCR 2.2 g/g\par 	2022 UPCR 11.8 g/g\par \par 	2023 H/H 7.5 / 24.8\par 	2023 iron 20, TIBC 192, sat 10%, ferritin 688\par 	2023 Lipid: chol 116, , HDL 41, LDL 51\par 	2023 HbA1c 7.1\par 	2023 Vitamin D (25OH) 29\par 	2023 PTH 99\par \par \par 	2022 Kidney U/S\par 		- Right kidney: 11.4 cm. No hydronephrosis. Upper pole and lower pole cyst measuring 2.9 cm a 1.6 cm.\par 		- Left kidney: 12.4 cm. No renal mass, hydronephrosis or calculi.\par \par 	2022 Kidney Biopsy\par 		- Advanced diffuse and nodular diabetic glomerulosclerosis\par 		- Acute tubular injury with focal tubular necrosis\par 		- Chronic and focally active interstitial nephritis\par \par 		Summary of chronic changes:\par 		- Diffuse nodular glomerulosclerosis\par 		- Global glomerulosclerosis (30% of glomeruli)\par 		- Tubular atrophy and interstitial fibrosis (70% of the sampled	cortex)\par 		- Severe arterial and arteriolar sclerosis

## 2023-07-25 NOTE — ASSESSMENT
[FreeTextEntry1] : Mr. Angulo is a 72 year old man with CKD stage V with proteinuria, hypertension, diabetes mellitus, hyperlipidemia, hypothyroidism, and recent hospitalization for worsening kidney function and hypervolemia (12/2023), here for kidney evaluation and management.\par \par Mr. Angulo has advanced CKD due to diabetes, and his GFR is in the teens. We need to prepare to dialysis and/or transplant given the very high risk of end-stage renal disease this year.\par \par Blood pressure elevated, though reasonable at this time.\par \par CKD IV/V with proteinuria due to diabetes\par - Lokelma at home in case needed\par - Hold irbesartan for now given borderline hyperkalemia\par - Bicarbonate for metabolic acidosis\par - Continue ergocalciferol\par - Severe anemia; s/p several Aranesp doses + IV iron\par - Continue in CKD Healthy Transitions program ([facility]ur[facility] Hannon)\par - Continue with transplant workup\par \par Hypertension\par - Continue amlodipine 5mg daily\par - Continue Carvedilol 12.5mg BID\par - Continue torsemide 20mg daily\par \par Diabetes mellitus: Per endocrinology\par \par Hyperlipidemia: Continue statin and Vascepa\par \par \par \par PLAN\par - No medication changes\par - Labs today\par - NITA/Aranesp plans per result of labs\par - Plan for follow up in 6 weeks\par \par \par Discussed importance of healthful habits, including physical activity/exercise and improvements in diet.\par \par I have spent a total of 40 minutes in which >50% was spent in discussion with patient regarding chronic kidney disease, hypertension, diet (including counseling on salt intake). \par

## 2023-07-27 LAB
25(OH)D3 SERPL-MCNC: 44 NG/ML
ALBUMIN SERPL ELPH-MCNC: 3.7 G/DL
ANION GAP SERPL CALC-SCNC: 15 MMOL/L
BUN SERPL-MCNC: 81 MG/DL
CALCIUM SERPL-MCNC: 8.4 MG/DL
CALCIUM SERPL-MCNC: 8.4 MG/DL
CHLORIDE SERPL-SCNC: 105 MMOL/L
CHOLEST SERPL-MCNC: 86 MG/DL
CO2 SERPL-SCNC: 18 MMOL/L
CREAT SERPL-MCNC: 5.05 MG/DL
CYSTATIN C SERPL-MCNC: 3.21 MG/L
EGFR: 11 ML/MIN/1.73M2
ESTIMATED AVERAGE GLUCOSE: 108 MG/DL
FERRITIN SERPL-MCNC: 1079 NG/ML
GFR/BSA.PRED SERPLBLD CYS-BASED-ARV: 16 ML/MIN/1.73M2
GLUCOSE SERPL-MCNC: 180 MG/DL
HBA1C MFR BLD HPLC: 5.4 %
HDLC SERPL-MCNC: 41 MG/DL
IRON SATN MFR SERPL: 29 %
IRON SERPL-MCNC: 54 UG/DL
LDLC SERPL CALC-MCNC: 31 MG/DL
NONHDLC SERPL-MCNC: 45 MG/DL
PARATHYROID HORMONE INTACT: 95 PG/ML
PHOSPHATE SERPL-MCNC: 4.7 MG/DL
POTASSIUM SERPL-SCNC: 4.4 MMOL/L
SODIUM SERPL-SCNC: 137 MMOL/L
TIBC SERPL-MCNC: 189 UG/DL
TRIGL SERPL-MCNC: 64 MG/DL
UIBC SERPL-MCNC: 135 UG/DL

## 2023-08-18 ENCOUNTER — APPOINTMENT (OUTPATIENT)
Dept: NEPHROLOGY | Facility: CLINIC | Age: 73
End: 2023-08-18
Payer: MEDICARE

## 2023-08-18 VITALS — SYSTOLIC BLOOD PRESSURE: 182 MMHG | HEART RATE: 59 BPM | DIASTOLIC BLOOD PRESSURE: 61 MMHG

## 2023-08-18 VITALS
OXYGEN SATURATION: 98 % | HEART RATE: 56 BPM | DIASTOLIC BLOOD PRESSURE: 73 MMHG | HEIGHT: 71 IN | SYSTOLIC BLOOD PRESSURE: 153 MMHG | TEMPERATURE: 96.9 F

## 2023-08-18 VITALS — SYSTOLIC BLOOD PRESSURE: 172 MMHG | HEART RATE: 72 BPM | DIASTOLIC BLOOD PRESSURE: 78 MMHG | OXYGEN SATURATION: 98 %

## 2023-08-18 PROCEDURE — ZZZZZ: CPT

## 2023-08-23 ENCOUNTER — NON-APPOINTMENT (OUTPATIENT)
Age: 73
End: 2023-08-23

## 2023-09-08 ENCOUNTER — NON-APPOINTMENT (OUTPATIENT)
Age: 73
End: 2023-09-08

## 2023-09-08 LAB
ALBUMIN SERPL ELPH-MCNC: 3.7 G/DL
ANION GAP SERPL CALC-SCNC: 17 MMOL/L
BUN SERPL-MCNC: 75 MG/DL
CALCIUM SERPL-MCNC: 8.4 MG/DL
CHLORIDE SERPL-SCNC: 102 MMOL/L
CO2 SERPL-SCNC: 22 MMOL/L
CREAT SERPL-MCNC: 4.24 MG/DL
CYSTATIN C SERPL-MCNC: 3.37 MG/L
EGFR: 14 ML/MIN/1.73M2
GFR/BSA.PRED SERPLBLD CYS-BASED-ARV: 15 ML/MIN/1.73M2
GLUCOSE SERPL-MCNC: 170 MG/DL
PHOSPHATE SERPL-MCNC: 5.5 MG/DL
POTASSIUM SERPL-SCNC: 3.9 MMOL/L
SODIUM SERPL-SCNC: 141 MMOL/L

## 2023-09-12 ENCOUNTER — APPOINTMENT (OUTPATIENT)
Dept: NEPHROLOGY | Facility: CLINIC | Age: 73
End: 2023-09-12
Payer: MEDICARE

## 2023-09-12 VITALS — SYSTOLIC BLOOD PRESSURE: 155 MMHG | DIASTOLIC BLOOD PRESSURE: 61 MMHG | HEART RATE: 59 BPM

## 2023-09-12 VITALS
TEMPERATURE: 98.1 F | HEIGHT: 71 IN | HEART RATE: 62 BPM | OXYGEN SATURATION: 100 % | WEIGHT: 186.29 LBS | SYSTOLIC BLOOD PRESSURE: 176 MMHG | BODY MASS INDEX: 26.08 KG/M2 | DIASTOLIC BLOOD PRESSURE: 66 MMHG

## 2023-09-12 PROCEDURE — 99215 OFFICE O/P EST HI 40 MIN: CPT

## 2023-09-12 RX ADMIN — DARBEPOETIN ALFA 1 MCG/ML: 100 SOLUTION INTRAVENOUS; SUBCUTANEOUS at 00:00

## 2023-09-15 ENCOUNTER — NON-APPOINTMENT (OUTPATIENT)
Age: 73
End: 2023-09-15

## 2023-09-18 ENCOUNTER — NON-APPOINTMENT (OUTPATIENT)
Age: 73
End: 2023-09-18

## 2023-10-02 ENCOUNTER — APPOINTMENT (OUTPATIENT)
Dept: CT IMAGING | Facility: IMAGING CENTER | Age: 73
End: 2023-10-02
Payer: COMMERCIAL

## 2023-10-02 ENCOUNTER — OUTPATIENT (OUTPATIENT)
Dept: OUTPATIENT SERVICES | Facility: HOSPITAL | Age: 73
LOS: 1 days | End: 2023-10-02
Payer: COMMERCIAL

## 2023-10-02 DIAGNOSIS — Z01.818 ENCOUNTER FOR OTHER PREPROCEDURAL EXAMINATION: ICD-10-CM

## 2023-10-02 PROCEDURE — 71250 CT THORAX DX C-: CPT | Mod: 26

## 2023-10-02 PROCEDURE — 71250 CT THORAX DX C-: CPT

## 2023-10-18 ENCOUNTER — NON-APPOINTMENT (OUTPATIENT)
Age: 73
End: 2023-10-18

## 2023-10-20 ENCOUNTER — APPOINTMENT (OUTPATIENT)
Dept: NEPHROLOGY | Facility: CLINIC | Age: 73
End: 2023-10-20
Payer: MEDICARE

## 2023-10-20 ENCOUNTER — APPOINTMENT (OUTPATIENT)
Dept: TRANSPLANT | Facility: CLINIC | Age: 73
End: 2023-10-20

## 2023-10-20 VITALS
HEIGHT: 71 IN | WEIGHT: 189.59 LBS | BODY MASS INDEX: 26.54 KG/M2 | DIASTOLIC BLOOD PRESSURE: 76 MMHG | OXYGEN SATURATION: 99 % | TEMPERATURE: 97.4 F | SYSTOLIC BLOOD PRESSURE: 161 MMHG | HEART RATE: 64 BPM

## 2023-10-20 PROCEDURE — 99215 OFFICE O/P EST HI 40 MIN: CPT

## 2023-10-30 ENCOUNTER — RX RENEWAL (OUTPATIENT)
Age: 73
End: 2023-10-30

## 2023-11-28 ENCOUNTER — APPOINTMENT (OUTPATIENT)
Dept: ENDOCRINOLOGY | Facility: CLINIC | Age: 73
End: 2023-11-28
Payer: MEDICARE

## 2023-11-28 ENCOUNTER — APPOINTMENT (OUTPATIENT)
Dept: TRANSPLANT | Facility: CLINIC | Age: 73
End: 2023-11-28

## 2023-11-28 VITALS
HEART RATE: 72 BPM | SYSTOLIC BLOOD PRESSURE: 110 MMHG | BODY MASS INDEX: 26.47 KG/M2 | TEMPERATURE: 98.4 F | HEIGHT: 71 IN | OXYGEN SATURATION: 96 % | WEIGHT: 189.06 LBS | DIASTOLIC BLOOD PRESSURE: 70 MMHG

## 2023-11-28 VITALS
TEMPERATURE: 97.9 F | OXYGEN SATURATION: 96 % | WEIGHT: 188 LBS | DIASTOLIC BLOOD PRESSURE: 70 MMHG | BODY MASS INDEX: 26.32 KG/M2 | SYSTOLIC BLOOD PRESSURE: 110 MMHG | HEIGHT: 71 IN | HEART RATE: 72 BPM

## 2023-11-28 LAB
GLUCOSE BLDC GLUCOMTR-MCNC: 148
HBA1C MFR BLD HPLC: 7

## 2023-11-28 PROCEDURE — 99214 OFFICE O/P EST MOD 30 MIN: CPT

## 2023-11-28 PROCEDURE — 82962 GLUCOSE BLOOD TEST: CPT

## 2023-11-28 PROCEDURE — 83036 HEMOGLOBIN GLYCOSYLATED A1C: CPT | Mod: QW

## 2023-11-28 RX ORDER — BLOOD SUGAR DIAGNOSTIC
STRIP MISCELLANEOUS TWICE DAILY
Qty: 180 | Refills: 2 | Status: ACTIVE | COMMUNITY
Start: 2023-01-31 | End: 1900-01-01

## 2023-11-28 RX ORDER — BLOOD-GLUCOSE METER
70 EACH MISCELLANEOUS
Qty: 360 | Refills: 2 | Status: ACTIVE | COMMUNITY
Start: 2023-01-31 | End: 1900-01-01

## 2023-11-28 RX ORDER — LANCETS 33 GAUGE
EACH MISCELLANEOUS
Qty: 180 | Refills: 2 | Status: ACTIVE | COMMUNITY
Start: 2023-01-31 | End: 1900-01-01

## 2023-11-30 ENCOUNTER — APPOINTMENT (OUTPATIENT)
Dept: NEPHROLOGY | Facility: CLINIC | Age: 73
End: 2023-11-30

## 2023-12-14 ENCOUNTER — TRANSCRIPTION ENCOUNTER (OUTPATIENT)
Age: 73
End: 2023-12-14

## 2023-12-14 RX ORDER — ERGOCALCIFEROL 1.25 MG/1
1.25 MG CAPSULE ORAL
Qty: 10 | Refills: 4 | Status: ACTIVE | COMMUNITY
Start: 2023-01-04 | End: 1900-01-01

## 2023-12-15 LAB
ANION GAP SERPL CALC-SCNC: 16 MMOL/L
BUN SERPL-MCNC: 114 MG/DL
CALCIUM SERPL-MCNC: 8.6 MG/DL
CALCIUM SERPL-MCNC: 8.6 MG/DL
CHLORIDE SERPL-SCNC: 105 MMOL/L
CHOLEST SERPL-MCNC: 120 MG/DL
CO2 SERPL-SCNC: 18 MMOL/L
CREAT SERPL-MCNC: 4.94 MG/DL
CYSTATIN C SERPL-MCNC: 4.08 MG/L
EGFR: 12 ML/MIN/1.73M2
GFR/BSA.PRED SERPLBLD CYS-BASED-ARV: 12 ML/MIN/1.73M2
GLUCOSE SERPL-MCNC: 132 MG/DL
HCT VFR BLD CALC: 28.2 %
HDLC SERPL-MCNC: 47 MG/DL
HGB BLD-MCNC: 9 G/DL
LDLC SERPL CALC-MCNC: 55 MG/DL
MCHC RBC-ENTMCNC: 29.7 PG
MCHC RBC-ENTMCNC: 31.9 GM/DL
MCV RBC AUTO: 93.1 FL
NONHDLC SERPL-MCNC: 72 MG/DL
PARATHYROID HORMONE INTACT: 147 PG/ML
PHOSPHATE SERPL-MCNC: 5.3 MG/DL
PLATELET # BLD AUTO: 155 K/UL
POTASSIUM SERPL-SCNC: 5.1 MMOL/L
RBC # BLD: 3.03 M/UL
RBC # FLD: 13.2 %
SODIUM SERPL-SCNC: 140 MMOL/L
TRIGL SERPL-MCNC: 92 MG/DL
WBC # FLD AUTO: 9.07 K/UL

## 2023-12-20 ENCOUNTER — APPOINTMENT (OUTPATIENT)
Dept: VASCULAR SURGERY | Facility: CLINIC | Age: 73
End: 2023-12-20
Payer: MEDICARE

## 2023-12-20 ENCOUNTER — APPOINTMENT (OUTPATIENT)
Dept: TRANSPLANT | Facility: CLINIC | Age: 73
End: 2023-12-20

## 2023-12-20 PROCEDURE — 93990 DOPPLER FLOW TESTING: CPT

## 2023-12-20 PROCEDURE — 99213 OFFICE O/P EST LOW 20 MIN: CPT

## 2023-12-20 NOTE — HISTORY OF PRESENT ILLNESS
[FreeTextEntry1] : Pt here for post op visit s/p left brachiocephalic fistula on 4/6/2023. Accompanied by son-in-law. Not on dialysis yet. \par  \par  Pt is doing well. LUE incision site c/d/i, healing well. Pt denies fever, chills, pain, swelling, numbness, tingling, or drainage. \par  \par  Pt complains of bilateral leg swelling for many years. Reports heaviness, achiness, and discomfort. Pt does not wear compression stockings or elevates his legs. Denies leg pain, long car rides/flights or injuries. Pt denies claudication, rest pain, or tissue loss. He's taking aspirin and atorvastatin. Walks with a cane for balance.\par  \par  PMH: chronic kidney disease, HTN, HLD, hypothyroidism, CHF\par  \par  Pt's nephrologist is Dr. Rees\par   [de-identified] : 12/20/2023 - Pt presents to evaluate maturity of left brachiocephalic avf which was done on 4/6/23. Accompanied by son in law. LUE incision well healed.  Not on dialysis yet. No new complaints.  Denies problems with lower extremities.

## 2023-12-20 NOTE — DATA REVIEWED
Subjective:      Patient ID: Kenzie Majano is a 58 y.o. female. HPI  Was very depressed and feeling down and started Abilify, is feeling more hopeful. Before the Abilify she was contemplating inpatient admission but now she feels it is not necessary. Caring for her daughter  Worries about her ex- who has Alzheimer's and is institutionalized  Has had SI but never any method   4 days ago fell in the dark , fell over a small table edge that she had knocked over. Went to [facility], CT scan was neg for any injuries    Never did split the Levemir to 15 u bid and hasn't been able to see dr. Rc Ngo yet. Worries a lot, some trouble sleeping  Has not been checking her blood sugars  Review of Systems    Objective:   Physical Exam    Assessment:    Depression and anxiety  Diabetes uncontrolled      Plan:     Imaging Results  - documented in this encounter    CT Abdomen and Pelvis With IV contrast (01/15/2020 1:19 PM EST)  CT Abdomen and Pelvis With IV contrast (01/15/2020 1:19 PM EST)   Specimen         CT Abdomen and Pelvis With IV contrast (01/15/2020 1:19 PM EST)   Impressions Performed At   IMPRESSION:        1.  No CT evidence of acute traumatic abnormality in the abdomen and pelvis.        Report Verified by: Nel Lindsey MD at 1/15/2020 1:46 PM EST   [facility]       CT Abdomen and Pelvis With IV contrast (01/15/2020 1:19 PM EST)   Narrative Performed At   EXAM: CT ABDOMEN AND PELVIS WITH IV CONTRAST        INDICATION: trauma, epigastric pain, ?evidence of bowel injury;         COMPARISON: 6/27/2017.        DATE: 1/15/2020        TECHNIQUE: CT of the abdomen and pelvis was performed after the administration of 150 mL of IOHEXOL 350 MG IODINE/ML INTRAVENOUS SOLUTION administered intravenously. Axial images were obtained with coronal and sagittal reconstructions.  The field-of-view was 49 cm.          FINDINGS:        The liver, spleen, pancreas, kidneys, and adrenal glands are normal in appearance.         The gallbladder is normal. There is no biliary ductal dilation.        A lap band remains in place, unchanged in position and orientation. The stomach, duodenum, small bowel, and appendix are within normal limits. There is scattered diverticulosis involving the colon. It is otherwise normal in appearance.        The mesentery is normal in appearance without evidence of hematoma or fluid collection. There is no evidence of pneumoperitoneum.        Vascular structures enhance normally. There is a circumaortic left renal vein.        The urinary bladder is normal. The uterus and adnexa are unremarkable.        Subcutaneous tissues are normal with no evidence of hematoma.        The visualized lung bases are clear.        There is degenerative disc disease at L5-S1.  No acute traumatic osseous abnormalities are identified.       [facility]       Make an appt with me for your diabetes  The Levemir 15 mg twice a day        Renetta Harper MD [FreeTextEntry1] : 6/28/2023 - LUE dialysis ultrasound left patent brachiocephalic avf w/o stenosis vf 981 ml/min, dprox 6.5 mm, dmid 4.7 mm, ddistal 4.9 mm  12/20/2023 LUE dialysis ultrasound left patent brachiocephalic avf w/o stenosis vf 1576 ml/min, diam prox 7.6 mm, diam mid 6.6 mm, diam distal 6.5 mm

## 2023-12-20 NOTE — ASSESSMENT
[FreeTextEntry1] : Impression - chronic kidney disease not on HD, left avf well mature ready for use when HD is needed   Plan Left arm precautions - no BP, IV, or blood draw Left avf ready for use for dialysis when needed Return to office prn [Arterial/Venous Disease] : arterial/venous disease

## 2023-12-20 NOTE — PHYSICAL EXAM
[2+] : left 2+ [1+] : left 1+ [Ankle Swelling (On Exam)] : present [Ankle Swelling Bilaterally] : bilaterally  [Ankle Swelling On The Right] : mild [Varicose Veins Of Lower Extremities] : not present [] : bilaterally [Ankle Swelling On The Left] : moderate [No Rash or Lesion] : No rash or lesion [Alert] : alert [Oriented to Person] : oriented to person [Oriented to Place] : oriented to place [Oriented to Time] : oriented to time [Calm] : calm [de-identified] : NAD [de-identified] : NCAT [de-identified] : unlabored breathing [FreeTextEntry1] : LUE incision well healed good palpable thrill over avf left hand warm and well perfused  intact sensory and motor function brisk capillary refill < 2 sec bilateral lower extremities soft, warm and nontender bilateral leg swelling with venous stasis changes (dry flaky skin, hyperpigmentation in gator area and upper calf and hyperkeratosis) no wounds or ulcers [de-identified] : walks with a cane [de-identified] : LUE incision site well healed [de-identified] : iliana cranial nerves 2-12 iliana grossly intact [de-identified] : cooperative

## 2024-01-11 ENCOUNTER — TRANSCRIPTION ENCOUNTER (OUTPATIENT)
Age: 74
End: 2024-01-11

## 2024-01-18 ENCOUNTER — APPOINTMENT (OUTPATIENT)
Dept: NEPHROLOGY | Facility: CLINIC | Age: 74
End: 2024-01-18
Payer: MEDICARE

## 2024-01-18 ENCOUNTER — APPOINTMENT (OUTPATIENT)
Dept: TRANSPLANT | Facility: CLINIC | Age: 74
End: 2024-01-18

## 2024-01-18 VITALS
TEMPERATURE: 97.9 F | WEIGHT: 176 LBS | HEIGHT: 71 IN | OXYGEN SATURATION: 99 % | HEART RATE: 61 BPM | SYSTOLIC BLOOD PRESSURE: 140 MMHG | BODY MASS INDEX: 24.64 KG/M2 | DIASTOLIC BLOOD PRESSURE: 48 MMHG

## 2024-01-18 PROCEDURE — 99215 OFFICE O/P EST HI 40 MIN: CPT

## 2024-01-18 NOTE — ASSESSMENT
[FreeTextEntry1] :  Mr. Angulo is a 73 year old man with CKD stage V with proteinuria, hypertension, diabetes mellitus, hyperlipidemia, hypothyroidism, and recent hospitalization for worsening kidney function and hypervolemia (12/2023), here for kidney evaluation and management.  Mr. Angulo has advanced CKD due to diabetes, and his GFR is in the teens. We are preparing for dialysis and transplant.  CKD IV/V with proteinuria due to diabetes - Continue Lokelma/SZC twice weekly (Monday/Thursday) - Continue irbesartan - Bicarbonate for metabolic acidosis - Continue ergocalciferol - Anemia s/p IV iron; continue Aranesp as needed - Continue in CKD Healthy Transitions program ([facility]ur[facility] Hannon) - Transplant status 1  Hypertension: Continue irbesartan, amlodipine, carvedilol, torsemide  Diabetes mellitus: - Mr. Angulo to follow with endocrinology  Hyperlipidemia: Continue statin and Vascepa   PLAN - Continue current medications - Labs today - Follow up about 6 weeks   Discussed importance of healthful habits, including physical activity/exercise and improvements in diet.  I spent a total of 40 minutes on this encounter.

## 2024-01-18 NOTE — HISTORY OF PRESENT ILLNESS
[FreeTextEntry1] : Contacts:  	Mariposa (daughter) 	Dr. Mara Higgins (PCP) 	Dr. Lalo Ta (endocrinology) 	Dr. Carol Ann Jewell (vascular) 	Dr. Zana Simpson (podiatry) 	Dr. Jacobson (gastroenterology)  ------------------------------------------------------------------------------- Problem List: Chronic kidney disease stage V with nephrotic-range proteinuria Diabetic nephropathy (biopsy proven) Hypertension with moderate concentric left ventricular hypertrophy Diabetes mellitus Hyperlipidemia Hypothyroidism Recent H. pylori s/p treatment  ------------------------------------------------------------------------------- HPI: [comes with son-in-law] Mr. Angulo is a 73 year old man with CKD stage V with proteinuria, hypertension, diabetes mellitus, hyperlipidemia, hypothyroidism, and recent hospitalization for worsening kidney function and hypervolemia (2023), here for kidney evaluation and management.  Last saw 2023. Doing fine since, some worsened tiredness in afternoons. Still eating well.   Otherwise without complaints.  ------------------------------------------------------------------------------- Social History: From [place], came to  1960s Lives in [place] with wife Two daughters has 5 grandchild Works as  for postal service Smoked as teenager, stopped aged 29  Family History: Father had stroked ( age 70s) Mother  age 93 No known history of renal disease, hematuria, proteinuria, ESRD, dialysis, transplant  ------------------------------------------------------------------------------- Allergies: NKDA  Medications:  	Lokelma twice weekly 	Torsemide 40mg daily 	Irbesartan 150mg daily 	Amlodipine 5mg daily 	Carvedilol 12.5mg two times per day 	Sodium bicarbonate 650mg two times per day 	Atorvastatin 40mg daily 	Vascepa 1g two times per day 	Aspirin 81mg daily 	Glipizide 2.5mg daily 	Januvia/sitagliptin 25mg daily 	Levothyroxine 50mcg daily 	Omeprazole 20mg BID 	Ergocalciferol 50K units weekly  ------------------------------------------------------------------------------- Physical Exam:  	Gen: NAD 	HEENT: Supple neck 	Pulm: CTA 	CV: RRR; no rub 	Back: No spinal or CVA terndeness 	Abd: +BS, soft 	UE: Warm, FROM; no asterixis; LUE brachiocephalic AVF 	LE: Warm, FROM; trace edema (improved) 	Neuro: No focal deficits 	Psych: Normal affect 	Skin: Warm, dry  ------------------------------------------------------------------------------- Labs/Studies:  	2023 	 	140 | 105 | 114 	------------------< 132 Ca 8.6 eGFR 12 	5.1 |  18 | 4.94 	 		Cystatin-C 4.08, eGFR 12 	 	2023 UACR 2207 mg/g 	2023 UACR 769 mg/g 	2022 UACR 6447 mg/g 	 	2023 UPCR 2.2 g/g 	2022 UPCR 11.8 g/g 	 	2023 H/H .2 	2023 LDL 55 	2023 HbA1c 7 	2023 Vitamin D (25OH) 50 	2023  (Ca 8.6) 	 	2022 Kidney U/S 	- Right kidney: 11.4 cm. No hydronephrosis. Upper pole and lower pole cyst measuring 2.9 cm a 1.6 cm. 	- Left kidney: 12.4 cm. No renal mass, hydronephrosis or calculi. 	 	2022 Kidney Biopsy 	- Advanced diffuse and nodular diabetic glomerulosclerosis 	- Acute tubular injury with focal tubular necrosis 	- Chronic and focally active interstitial nephritis 	 	Summary of chronic changes: 	- Diffuse nodular glomerulosclerosis 	- Global glomerulosclerosis (30% of glomeruli) 	- Tubular atrophy and interstitial fibrosis (70% of the sampled cortex) 	- Severe arterial and arteriolar sclerosis

## 2024-01-18 NOTE — CONSULT LETTER
[Dear  ___] : Dear  [unfilled], [Courtesy Letter:] : I had the pleasure of seeing your patient, [unfilled], in my office today. [Please see my note below.] : Please see my note below. [Sincerely,] : Sincerely, [FreeTextEntry3] : Cem Rees MD Nephrology [DrRonda  ___] : Dr. WELLS [DrRonda ___] : Dr. WELLS

## 2024-01-19 LAB
ALBUMIN SERPL ELPH-MCNC: 4.4 G/DL
ANION GAP SERPL CALC-SCNC: 17 MMOL/L
BUN SERPL-MCNC: 115 MG/DL
CALCIUM SERPL-MCNC: 8.5 MG/DL
CHLORIDE SERPL-SCNC: 98 MMOL/L
CHOLEST SERPL-MCNC: 107 MG/DL
CO2 SERPL-SCNC: 21 MMOL/L
CREAT SERPL-MCNC: 5.48 MG/DL
CYSTATIN C SERPL-MCNC: 4.48 MG/L
EGFR: 10 ML/MIN/1.73M2
ESTIMATED AVERAGE GLUCOSE: 146 MG/DL
FERRITIN SERPL-MCNC: 1040 NG/ML
GFR/BSA.PRED SERPLBLD CYS-BASED-ARV: 10 ML/MIN/1.73M2
GLUCOSE SERPL-MCNC: 129 MG/DL
HBA1C MFR BLD HPLC: 6.7 %
HCT VFR BLD CALC: 24.5 %
HDLC SERPL-MCNC: 41 MG/DL
HGB BLD-MCNC: 8.1 G/DL
IRON SATN MFR SERPL: 50 %
IRON SERPL-MCNC: 129 UG/DL
LDLC SERPL CALC-MCNC: 50 MG/DL
MCHC RBC-ENTMCNC: 30.2 PG
MCHC RBC-ENTMCNC: 33.1 GM/DL
MCV RBC AUTO: 91.4 FL
NONHDLC SERPL-MCNC: 66 MG/DL
PHOSPHATE SERPL-MCNC: 5.4 MG/DL
PLATELET # BLD AUTO: 168 K/UL
POTASSIUM SERPL-SCNC: 5 MMOL/L
RBC # BLD: 2.68 M/UL
RBC # FLD: 13.2 %
SODIUM SERPL-SCNC: 136 MMOL/L
TIBC SERPL-MCNC: 259 UG/DL
TRIGL SERPL-MCNC: 81 MG/DL
UIBC SERPL-MCNC: 129 UG/DL
WBC # FLD AUTO: 9.15 K/UL

## 2024-01-24 ENCOUNTER — APPOINTMENT (OUTPATIENT)
Dept: NEPHROLOGY | Facility: CLINIC | Age: 74
End: 2024-01-24
Payer: MEDICARE

## 2024-01-24 VITALS
HEART RATE: 57 BPM | HEIGHT: 71 IN | DIASTOLIC BLOOD PRESSURE: 62 MMHG | OXYGEN SATURATION: 98 % | TEMPERATURE: 98 F | SYSTOLIC BLOOD PRESSURE: 103 MMHG

## 2024-01-24 DIAGNOSIS — I50.22 CHRONIC SYSTOLIC (CONGESTIVE) HEART FAILURE: ICD-10-CM

## 2024-01-24 PROCEDURE — 96372 THER/PROPH/DIAG INJ SC/IM: CPT

## 2024-01-24 PROCEDURE — 99213 OFFICE O/P EST LOW 20 MIN: CPT | Mod: 25

## 2024-01-24 RX ORDER — DARBEPOETIN ALFA 100 UG/ML
100 SOLUTION INTRAVENOUS; SUBCUTANEOUS
Refills: 0 | Status: COMPLETED | OUTPATIENT
Start: 2024-01-24

## 2024-01-24 RX ADMIN — DARBEPOETIN ALFA 1 MCG/ML: 100 SOLUTION INTRAVENOUS; SUBCUTANEOUS at 00:00

## 2024-01-25 LAB
ALBUMIN SERPL ELPH-MCNC: 3.9 G/DL
ANION GAP SERPL CALC-SCNC: 17 MMOL/L
BUN SERPL-MCNC: 121 MG/DL
CALCIUM SERPL-MCNC: 8.3 MG/DL
CHLORIDE SERPL-SCNC: 99 MMOL/L
CO2 SERPL-SCNC: 20 MMOL/L
CREAT SERPL-MCNC: 5.69 MG/DL
EGFR: 10 ML/MIN/1.73M2
GLUCOSE SERPL-MCNC: 241 MG/DL
HCT VFR BLD CALC: 25.3 %
HGB BLD-MCNC: 8.1 G/DL
MCHC RBC-ENTMCNC: 31.8 PG
MCHC RBC-ENTMCNC: 32 GM/DL
MCV RBC AUTO: 99.2 FL
PHOSPHATE SERPL-MCNC: 5.5 MG/DL
PLATELET # BLD AUTO: 169 K/UL
POTASSIUM SERPL-SCNC: 4.8 MMOL/L
RBC # BLD: 2.55 M/UL
RBC # FLD: 13.4 %
SODIUM SERPL-SCNC: 137 MMOL/L
WBC # FLD AUTO: 8.69 K/UL

## 2024-01-28 NOTE — PHYSICAL EXAM
[General Appearance - Alert] : alert [General Appearance - In No Acute Distress] : in no acute distress [FreeTextEntry1] : appears lethargic [Sclera] : the sclera and conjunctiva were normal [Outer Ear] : the ears and nose were normal in appearance [Neck Appearance] : the appearance of the neck was normal [Auscultation Breath Sounds / Voice Sounds] : lungs were clear to auscultation bilaterally [Heart Rate And Rhythm] : heart rate was normal and rhythm regular [Heart Sounds] : normal S1 and S2 [Heart Sounds Gallop] : no gallops [Murmurs] : no murmurs [Heart Sounds Pericardial Friction Rub] : no pericardial rub [Edema] : there was no peripheral edema [Bowel Sounds] : normal bowel sounds [No CVA Tenderness] : no ~M costovertebral angle tenderness [Abdomen Soft] : soft [Involuntary Movements] : no involuntary movements were seen [___ (cm) Fistula] : [unfilled] (cm) fistula [Bruit] : a bruit was present [Thrill] : a thrill was present [Benign] : appears benign [] : no rash [No Focal Deficits] : no focal deficits [Oriented To Time, Place, And Person] : oriented to person, place, and time [Impaired Insight] : insight and judgment were intact [Affect] : the affect was normal

## 2024-01-28 NOTE — ASSESSMENT
[FreeTextEntry1] : CKD5 with proteinuria from DM.  Creatinine trend reviewed with patient; renal function progressive HTN: BP low today with c/o weakness. Pt instructed to hold Torsemide for now.  Volume status: appears dehydrated.  Proteinuria: Hold irbesartan for now.  Metabolic Acidosis: cont sodium bicarb 4 tabs daily Anemia Management:  Aranesp 100mcg SQ x 1 today Diabetes Importance of diabetes control stressed Continue kelma twice weekly (Monday/Thursday) Pt educated on uremic symptoms and advised to go to ER for any concerning symptoms including but not limited to shortness of breath, chest pain, excessive swelling, persistent nausea and vomiting or mental status changes and pt expresses understanding. CKD IV/V with proteinuria due to diabetes - Labs today

## 2024-01-28 NOTE — HISTORY OF PRESENT ILLNESS
[FreeTextEntry1] : APOLLO NICHOLAS is a 73 year male with a history of CKD stage V with proteinuria, hypertension, diabetes mellitus, hyperlipidemia, hypothyroidism, and recent hospitalization for worsening kidney function and hypervolemia (12/2023), here for anemia management.  Today pt reports he has not been feeling well for the past 3 days with c/o of weakness, shakiness and fatigue. He is here with his daughter who says he is having more trouble walking. Denies fever or chills and no sick contacts.

## 2024-01-28 NOTE — REVIEW OF SYSTEMS
[Fever] : no fever [Chills] : no chills [Feeling Poorly] : feeling poorly [Feeling Tired] : feeling tired [Recent Weight Loss (___ Lbs)] : recent [unfilled] ~Ulb weight loss [Chest Pain] : no chest pain [Lower Ext Edema] : no extremity edema [SOB on Exertion] : shortness of breath during exertion [Vomiting] : no vomiting [Constipation] : no constipation [Diarrhea] : no diarrhea [Difficulty Walking] : difficulty walking [Negative] : Heme/Lymph [de-identified] : Brittani DM

## 2024-01-28 NOTE — QUALITY MEASURES
[PTH level measured within last] : patient's PTH level measured within the last 12 months [Yes] : patient is an appropriate candidate for kidney transplantation evaluation [Patient has proteinuria greater than] : patient has proteinuria greater than 1 gm (spot urine/protein creatinine ratio or a 24hr collection)

## 2024-02-01 LAB
ALBUMIN SERPL ELPH-MCNC: 3.9 G/DL
ANION GAP SERPL CALC-SCNC: 11 MMOL/L
BUN SERPL-MCNC: 99 MG/DL
CALCIUM SERPL-MCNC: 8.8 MG/DL
CHLORIDE SERPL-SCNC: 109 MMOL/L
CO2 SERPL-SCNC: 21 MMOL/L
CREAT SERPL-MCNC: 4.63 MG/DL
EGFR: 13 ML/MIN/1.73M2
GLUCOSE SERPL-MCNC: 169 MG/DL
HCT VFR BLD CALC: 26.1 %
HGB BLD-MCNC: 8.2 G/DL
MCHC RBC-ENTMCNC: 30.5 PG
MCHC RBC-ENTMCNC: 31.4 GM/DL
MCV RBC AUTO: 97 FL
PHOSPHATE SERPL-MCNC: 4.2 MG/DL
PLATELET # BLD AUTO: 228 K/UL
POTASSIUM SERPL-SCNC: 5.2 MMOL/L
RBC # BLD: 2.69 M/UL
RBC # FLD: 15 %
SODIUM SERPL-SCNC: 141 MMOL/L
WBC # FLD AUTO: 9.56 K/UL

## 2024-02-02 ENCOUNTER — RX RENEWAL (OUTPATIENT)
Age: 74
End: 2024-02-02

## 2024-02-12 ENCOUNTER — LABORATORY RESULT (OUTPATIENT)
Age: 74
End: 2024-02-12

## 2024-02-15 RX ORDER — IRBESARTAN 150 MG/1
150 TABLET ORAL
Qty: 90 | Refills: 3 | Status: DISCONTINUED | COMMUNITY
Start: 2023-01-04 | End: 2024-02-15

## 2024-02-26 ENCOUNTER — TRANSCRIPTION ENCOUNTER (OUTPATIENT)
Age: 74
End: 2024-02-26

## 2024-02-26 RX ORDER — CARVEDILOL 12.5 MG/1
12.5 TABLET, FILM COATED ORAL
Qty: 180 | Refills: 3 | Status: ACTIVE | COMMUNITY
Start: 2023-01-04 | End: 1900-01-01

## 2024-02-29 ENCOUNTER — TRANSCRIPTION ENCOUNTER (OUTPATIENT)
Age: 74
End: 2024-02-29

## 2024-02-29 ENCOUNTER — APPOINTMENT (OUTPATIENT)
Dept: TRANSPLANT | Facility: CLINIC | Age: 74
End: 2024-02-29

## 2024-02-29 ENCOUNTER — APPOINTMENT (OUTPATIENT)
Dept: UROLOGY | Facility: CLINIC | Age: 74
End: 2024-02-29
Payer: MEDICARE

## 2024-02-29 DIAGNOSIS — R97.20 ELEVATED PROSTATE, SPECIFIC ANTIGEN [PSA]: ICD-10-CM

## 2024-02-29 PROCEDURE — G2211 COMPLEX E/M VISIT ADD ON: CPT | Mod: PD

## 2024-02-29 PROCEDURE — 99214 OFFICE O/P EST MOD 30 MIN: CPT

## 2024-02-29 RX ORDER — ATORVASTATIN CALCIUM 40 MG/1
40 TABLET, FILM COATED ORAL
Qty: 90 | Refills: 3 | Status: ACTIVE | COMMUNITY
Start: 2023-01-04 | End: 1900-01-01

## 2024-02-29 RX ORDER — LEVOTHYROXINE SODIUM 0.05 MG/1
50 TABLET ORAL DAILY
Qty: 90 | Refills: 3 | Status: ACTIVE | COMMUNITY
Start: 2023-01-04 | End: 1900-01-01

## 2024-03-01 ENCOUNTER — NON-APPOINTMENT (OUTPATIENT)
Age: 74
End: 2024-03-01

## 2024-03-01 ENCOUNTER — INPATIENT (INPATIENT)
Facility: HOSPITAL | Age: 74
LOS: 2 days | Discharge: HOME | DRG: 641 | End: 2024-03-04
Attending: INTERNAL MEDICINE | Admitting: INTERNAL MEDICINE
Payer: MEDICARE

## 2024-03-01 VITALS
DIASTOLIC BLOOD PRESSURE: 63 MMHG | WEIGHT: 184.97 LBS | SYSTOLIC BLOOD PRESSURE: 191 MMHG | TEMPERATURE: 98 F | OXYGEN SATURATION: 98 % | HEART RATE: 63 BPM | RESPIRATION RATE: 18 BRPM | HEIGHT: 71 IN

## 2024-03-01 DIAGNOSIS — E11.9 TYPE 2 DIABETES MELLITUS WITHOUT COMPLICATIONS: ICD-10-CM

## 2024-03-01 DIAGNOSIS — E87.5 HYPERKALEMIA: ICD-10-CM

## 2024-03-01 DIAGNOSIS — N18.5 CHRONIC KIDNEY DISEASE, STAGE 5: ICD-10-CM

## 2024-03-01 DIAGNOSIS — E78.5 HYPERLIPIDEMIA, UNSPECIFIED: ICD-10-CM

## 2024-03-01 DIAGNOSIS — I10 ESSENTIAL (PRIMARY) HYPERTENSION: ICD-10-CM

## 2024-03-01 LAB
ADD ON TEST-SPECIMEN IN LAB: SIGNIFICANT CHANGE UP
ALBUMIN SERPL ELPH-MCNC: 4.4 G/DL — SIGNIFICANT CHANGE UP (ref 3.3–5)
ALP SERPL-CCNC: 105 U/L — SIGNIFICANT CHANGE UP (ref 40–120)
ALT FLD-CCNC: 49 U/L — HIGH (ref 10–45)
ANION GAP SERPL CALC-SCNC: 10 MMOL/L
ANION GAP SERPL CALC-SCNC: 12 MMOL/L — SIGNIFICANT CHANGE UP (ref 5–17)
ANION GAP SERPL CALC-SCNC: 13 MMOL/L — SIGNIFICANT CHANGE UP (ref 5–17)
AST SERPL-CCNC: 28 U/L — SIGNIFICANT CHANGE UP (ref 10–40)
BASE EXCESS BLDV CALC-SCNC: -3.9 MMOL/L — LOW (ref -2–3)
BASOPHILS # BLD AUTO: 0.1 K/UL — SIGNIFICANT CHANGE UP (ref 0–0.2)
BASOPHILS NFR BLD AUTO: 1.1 % — SIGNIFICANT CHANGE UP (ref 0–2)
BILIRUB SERPL-MCNC: 0.4 MG/DL — SIGNIFICANT CHANGE UP (ref 0.2–1.2)
BUN SERPL-MCNC: 91 MG/DL
BUN SERPL-MCNC: 91 MG/DL — HIGH (ref 7–23)
BUN SERPL-MCNC: 93 MG/DL — HIGH (ref 7–23)
CA-I SERPL-SCNC: 1.22 MMOL/L — SIGNIFICANT CHANGE UP (ref 1.15–1.33)
CALCIUM SERPL-MCNC: 8.8 MG/DL
CALCIUM SERPL-MCNC: 8.8 MG/DL — SIGNIFICANT CHANGE UP (ref 8.4–10.5)
CALCIUM SERPL-MCNC: 8.9 MG/DL — SIGNIFICANT CHANGE UP (ref 8.4–10.5)
CHLORIDE BLDV-SCNC: 108 MMOL/L — SIGNIFICANT CHANGE UP (ref 96–108)
CHLORIDE SERPL-SCNC: 104 MMOL/L — SIGNIFICANT CHANGE UP (ref 96–108)
CHLORIDE SERPL-SCNC: 105 MMOL/L — SIGNIFICANT CHANGE UP (ref 96–108)
CHLORIDE SERPL-SCNC: 108 MMOL/L
CO2 BLDV-SCNC: 24 MMOL/L — SIGNIFICANT CHANGE UP (ref 22–26)
CO2 SERPL-SCNC: 19 MMOL/L — LOW (ref 22–31)
CO2 SERPL-SCNC: 20 MMOL/L — LOW (ref 22–31)
CO2 SERPL-SCNC: 22 MMOL/L
CREAT SERPL-MCNC: 4.57 MG/DL — HIGH (ref 0.5–1.3)
CREAT SERPL-MCNC: 4.62 MG/DL — HIGH (ref 0.5–1.3)
CREAT SERPL-MCNC: 4.76 MG/DL
EGFR: 12 ML/MIN/1.73M2
EGFR: 13 ML/MIN/1.73M2 — LOW
EGFR: 13 ML/MIN/1.73M2 — LOW
EOSINOPHIL # BLD AUTO: 0.39 K/UL — SIGNIFICANT CHANGE UP (ref 0–0.5)
EOSINOPHIL NFR BLD AUTO: 4.3 % — SIGNIFICANT CHANGE UP (ref 0–6)
GAS PNL BLDV: 137 MMOL/L — SIGNIFICANT CHANGE UP (ref 136–145)
GAS PNL BLDV: SIGNIFICANT CHANGE UP
GLUCOSE BLDC GLUCOMTR-MCNC: 168 MG/DL — HIGH (ref 70–99)
GLUCOSE BLDC GLUCOMTR-MCNC: 230 MG/DL — HIGH (ref 70–99)
GLUCOSE BLDC GLUCOMTR-MCNC: 67 MG/DL — LOW (ref 70–99)
GLUCOSE BLDC GLUCOMTR-MCNC: 80 MG/DL — SIGNIFICANT CHANGE UP (ref 70–99)
GLUCOSE BLDV-MCNC: 99 MG/DL — SIGNIFICANT CHANGE UP (ref 70–99)
GLUCOSE SERPL-MCNC: 153 MG/DL
GLUCOSE SERPL-MCNC: 196 MG/DL — HIGH (ref 70–99)
GLUCOSE SERPL-MCNC: 99 MG/DL — SIGNIFICANT CHANGE UP (ref 70–99)
HCO3 BLDV-SCNC: 23 MMOL/L — SIGNIFICANT CHANGE UP (ref 22–29)
HCT VFR BLD CALC: 29.1 % — LOW (ref 39–50)
HCT VFR BLDA CALC: 27 % — LOW (ref 39–51)
HGB BLD CALC-MCNC: 9 G/DL — LOW (ref 12.6–17.4)
HGB BLD-MCNC: 9.3 G/DL — LOW (ref 13–17)
IMM GRANULOCYTES NFR BLD AUTO: 0.4 % — SIGNIFICANT CHANGE UP (ref 0–0.9)
LACTATE BLDV-MCNC: 0.7 MMOL/L — SIGNIFICANT CHANGE UP (ref 0.5–2)
LYMPHOCYTES # BLD AUTO: 1.89 K/UL — SIGNIFICANT CHANGE UP (ref 1–3.3)
LYMPHOCYTES # BLD AUTO: 21 % — SIGNIFICANT CHANGE UP (ref 13–44)
MAGNESIUM SERPL-MCNC: 2.3 MG/DL — SIGNIFICANT CHANGE UP (ref 1.6–2.6)
MCHC RBC-ENTMCNC: 31.1 PG — SIGNIFICANT CHANGE UP (ref 27–34)
MCHC RBC-ENTMCNC: 32 GM/DL — SIGNIFICANT CHANGE UP (ref 32–36)
MCV RBC AUTO: 97.3 FL — SIGNIFICANT CHANGE UP (ref 80–100)
MONOCYTES # BLD AUTO: 0.97 K/UL — HIGH (ref 0–0.9)
MONOCYTES NFR BLD AUTO: 10.8 % — SIGNIFICANT CHANGE UP (ref 2–14)
NEUTROPHILS # BLD AUTO: 5.62 K/UL — SIGNIFICANT CHANGE UP (ref 1.8–7.4)
NEUTROPHILS NFR BLD AUTO: 62.4 % — SIGNIFICANT CHANGE UP (ref 43–77)
NRBC # BLD: 0 /100 WBCS — SIGNIFICANT CHANGE UP (ref 0–0)
NT-PROBNP SERPL-SCNC: 1344 PG/ML — HIGH (ref 0–300)
PCO2 BLDV: 47 MMHG — SIGNIFICANT CHANGE UP (ref 42–55)
PH BLDV: 7.29 — LOW (ref 7.32–7.43)
PHOSPHATE SERPL-MCNC: 4.3 MG/DL — SIGNIFICANT CHANGE UP (ref 2.5–4.5)
PLATELET # BLD AUTO: 201 K/UL — SIGNIFICANT CHANGE UP (ref 150–400)
PO2 BLDV: 24 MMHG — LOW (ref 25–45)
POTASSIUM BLDV-SCNC: 6.3 MMOL/L — CRITICAL HIGH (ref 3.5–5.1)
POTASSIUM SERPL-MCNC: 5.5 MMOL/L — HIGH (ref 3.5–5.3)
POTASSIUM SERPL-MCNC: 5.8 MMOL/L — HIGH (ref 3.5–5.3)
POTASSIUM SERPL-MCNC: 6 MMOL/L — HIGH (ref 3.5–5.3)
POTASSIUM SERPL-MCNC: 6.2 MMOL/L — CRITICAL HIGH (ref 3.5–5.3)
POTASSIUM SERPL-SCNC: 5.5 MMOL/L — HIGH (ref 3.5–5.3)
POTASSIUM SERPL-SCNC: 5.8 MMOL/L — HIGH (ref 3.5–5.3)
POTASSIUM SERPL-SCNC: 6 MMOL/L — HIGH (ref 3.5–5.3)
POTASSIUM SERPL-SCNC: 6.2 MMOL/L — CRITICAL HIGH (ref 3.5–5.3)
POTASSIUM SERPL-SCNC: 6.5 MMOL/L
PROT SERPL-MCNC: 7.8 G/DL — SIGNIFICANT CHANGE UP (ref 6–8.3)
PSA FREE FLD-MCNC: 39 %
PSA FREE SERPL-MCNC: 2.06 NG/ML
PSA SERPL-MCNC: 5.35 NG/ML
RBC # BLD: 2.99 M/UL — LOW (ref 4.2–5.8)
RBC # FLD: 13 % — SIGNIFICANT CHANGE UP (ref 10.3–14.5)
SAO2 % BLDV: 40.3 % — LOW (ref 67–88)
SODIUM SERPL-SCNC: 136 MMOL/L — SIGNIFICANT CHANGE UP (ref 135–145)
SODIUM SERPL-SCNC: 137 MMOL/L — SIGNIFICANT CHANGE UP (ref 135–145)
SODIUM SERPL-SCNC: 139 MMOL/L
TROPONIN T, HIGH SENSITIVITY RESULT: 114 NG/L — HIGH (ref 0–51)
TROPONIN T, HIGH SENSITIVITY RESULT: 119 NG/L — HIGH (ref 0–51)
WBC # BLD: 9.01 K/UL — SIGNIFICANT CHANGE UP (ref 3.8–10.5)
WBC # FLD AUTO: 9.01 K/UL — SIGNIFICANT CHANGE UP (ref 3.8–10.5)

## 2024-03-01 PROCEDURE — 99285 EMERGENCY DEPT VISIT HI MDM: CPT | Mod: FS,25,GC

## 2024-03-01 PROCEDURE — 71045 X-RAY EXAM CHEST 1 VIEW: CPT | Mod: 26

## 2024-03-01 PROCEDURE — 99223 1ST HOSP IP/OBS HIGH 75: CPT | Mod: GC

## 2024-03-01 PROCEDURE — 76937 US GUIDE VASCULAR ACCESS: CPT | Mod: 26

## 2024-03-01 PROCEDURE — 36000 PLACE NEEDLE IN VEIN: CPT

## 2024-03-01 RX ORDER — GLUCAGON INJECTION, SOLUTION 0.5 MG/.1ML
5 INJECTION, SOLUTION SUBCUTANEOUS ONCE
Refills: 0 | Status: DISCONTINUED | OUTPATIENT
Start: 2024-03-01 | End: 2024-03-01

## 2024-03-01 RX ORDER — SITAGLIPTIN 50 MG/1
1 TABLET, FILM COATED ORAL
Refills: 0 | DISCHARGE

## 2024-03-01 RX ORDER — ASPIRIN/CALCIUM CARB/MAGNESIUM 324 MG
81 TABLET ORAL DAILY
Refills: 0 | Status: DISCONTINUED | OUTPATIENT
Start: 2024-03-01 | End: 2024-03-04

## 2024-03-01 RX ORDER — CARVEDILOL PHOSPHATE 80 MG/1
1 CAPSULE, EXTENDED RELEASE ORAL
Refills: 0 | DISCHARGE

## 2024-03-01 RX ORDER — CITRIC ACID/SODIUM CITRATE 300-500 MG
30 SOLUTION, ORAL ORAL THREE TIMES A DAY
Refills: 0 | Status: DISCONTINUED | OUTPATIENT
Start: 2024-03-01 | End: 2024-03-04

## 2024-03-01 RX ORDER — CARVEDILOL PHOSPHATE 80 MG/1
12.5 CAPSULE, EXTENDED RELEASE ORAL EVERY 12 HOURS
Refills: 0 | Status: DISCONTINUED | OUTPATIENT
Start: 2024-03-01 | End: 2024-03-04

## 2024-03-01 RX ORDER — ICOSAPENT ETHYL 500 MG/1
2 CAPSULE, LIQUID FILLED ORAL
Refills: 0 | DISCHARGE

## 2024-03-01 RX ORDER — SODIUM BICARBONATE 1 MEQ/ML
650 SYRINGE (ML) INTRAVENOUS THREE TIMES A DAY
Refills: 0 | Status: DISCONTINUED | OUTPATIENT
Start: 2024-03-01 | End: 2024-03-02

## 2024-03-01 RX ORDER — PANTOPRAZOLE SODIUM 20 MG/1
40 TABLET, DELAYED RELEASE ORAL
Refills: 0 | Status: DISCONTINUED | OUTPATIENT
Start: 2024-03-01 | End: 2024-03-04

## 2024-03-01 RX ORDER — ATORVASTATIN CALCIUM 80 MG/1
1 TABLET, FILM COATED ORAL
Qty: 0 | Refills: 0 | DISCHARGE

## 2024-03-01 RX ORDER — AMLODIPINE BESYLATE 2.5 MG/1
1 TABLET ORAL
Refills: 0 | DISCHARGE

## 2024-03-01 RX ORDER — ACETAMINOPHEN 500 MG
650 TABLET ORAL EVERY 6 HOURS
Refills: 0 | Status: DISCONTINUED | OUTPATIENT
Start: 2024-03-01 | End: 2024-03-04

## 2024-03-01 RX ORDER — CALCIUM ACETATE 667 MG
667 TABLET ORAL
Refills: 0 | Status: DISCONTINUED | OUTPATIENT
Start: 2024-03-01 | End: 2024-03-04

## 2024-03-01 RX ORDER — DEXTROSE 50 % IN WATER 50 %
25 SYRINGE (ML) INTRAVENOUS ONCE
Refills: 0 | Status: DISCONTINUED | OUTPATIENT
Start: 2024-03-01 | End: 2024-03-04

## 2024-03-01 RX ORDER — ICOSAPENT ETHYL 500 MG/1
1 CAPSULE, LIQUID FILLED ORAL
Qty: 0 | Refills: 0 | DISCHARGE

## 2024-03-01 RX ORDER — ASPIRIN/CALCIUM CARB/MAGNESIUM 324 MG
1 TABLET ORAL
Qty: 0 | Refills: 0 | DISCHARGE

## 2024-03-01 RX ORDER — LEVOTHYROXINE SODIUM 125 MCG
1 TABLET ORAL
Refills: 0 | DISCHARGE

## 2024-03-01 RX ORDER — INSULIN GLARGINE 100 [IU]/ML
5 INJECTION, SOLUTION SUBCUTANEOUS AT BEDTIME
Refills: 0 | Status: DISCONTINUED | OUTPATIENT
Start: 2024-03-01 | End: 2024-03-03

## 2024-03-01 RX ORDER — SODIUM ZIRCONIUM CYCLOSILICATE 10 G/10G
10 POWDER, FOR SUSPENSION ORAL ONCE
Refills: 0 | Status: COMPLETED | OUTPATIENT
Start: 2024-03-01 | End: 2024-03-01

## 2024-03-01 RX ORDER — ICOSAPENT ETHYL 500 MG/1
1 CAPSULE, LIQUID FILLED ORAL
Refills: 0 | DISCHARGE

## 2024-03-01 RX ORDER — ASPIRIN/CALCIUM CARB/MAGNESIUM 324 MG
1 TABLET ORAL
Refills: 0 | DISCHARGE

## 2024-03-01 RX ORDER — SODIUM ZIRCONIUM CYCLOSILICATE 10 G/10G
10 POWDER, FOR SUSPENSION ORAL ONCE
Refills: 0 | Status: DISCONTINUED | OUTPATIENT
Start: 2024-03-01 | End: 2024-03-01

## 2024-03-01 RX ORDER — POLYETHYLENE GLYCOL 3350 17 G/17G
17 POWDER, FOR SOLUTION ORAL DAILY
Refills: 0 | Status: DISCONTINUED | OUTPATIENT
Start: 2024-03-01 | End: 2024-03-04

## 2024-03-01 RX ORDER — HYDRALAZINE HCL 50 MG
100 TABLET ORAL THREE TIMES A DAY
Refills: 0 | Status: DISCONTINUED | OUTPATIENT
Start: 2024-03-01 | End: 2024-03-04

## 2024-03-01 RX ORDER — LOSARTAN POTASSIUM 100 MG/1
50 TABLET, FILM COATED ORAL DAILY
Refills: 0 | Status: DISCONTINUED | OUTPATIENT
Start: 2024-03-01 | End: 2024-03-01

## 2024-03-01 RX ORDER — SODIUM CHLORIDE 9 MG/ML
1000 INJECTION, SOLUTION INTRAVENOUS
Refills: 0 | Status: DISCONTINUED | OUTPATIENT
Start: 2024-03-01 | End: 2024-03-04

## 2024-03-01 RX ORDER — INSULIN LISPRO 100/ML
2 VIAL (ML) SUBCUTANEOUS
Refills: 0 | Status: DISCONTINUED | OUTPATIENT
Start: 2024-03-01 | End: 2024-03-04

## 2024-03-01 RX ORDER — CALCIUM GLUCONATE 100 MG/ML
1 VIAL (ML) INTRAVENOUS ONCE
Refills: 0 | Status: COMPLETED | OUTPATIENT
Start: 2024-03-01 | End: 2024-03-01

## 2024-03-01 RX ORDER — ATORVASTATIN CALCIUM 80 MG/1
1 TABLET, FILM COATED ORAL
Refills: 0 | DISCHARGE

## 2024-03-01 RX ORDER — CALCIUM ACETATE 667 MG
667 TABLET ORAL
Refills: 0 | Status: DISCONTINUED | OUTPATIENT
Start: 2024-03-01 | End: 2024-03-01

## 2024-03-01 RX ORDER — FUROSEMIDE 40 MG
40 TABLET ORAL ONCE
Refills: 0 | Status: COMPLETED | OUTPATIENT
Start: 2024-03-01 | End: 2024-03-01

## 2024-03-01 RX ORDER — AMLODIPINE BESYLATE 2.5 MG/1
1 TABLET ORAL
Qty: 0 | Refills: 0 | DISCHARGE

## 2024-03-01 RX ORDER — AMLODIPINE BESYLATE 2.5 MG/1
10 TABLET ORAL DAILY
Refills: 0 | Status: DISCONTINUED | OUTPATIENT
Start: 2024-03-01 | End: 2024-03-04

## 2024-03-01 RX ORDER — CHOLECALCIFEROL (VITAMIN D3) 125 MCG
1 CAPSULE ORAL
Refills: 0 | DISCHARGE

## 2024-03-01 RX ORDER — DEXTROSE 50 % IN WATER 50 %
50 SYRINGE (ML) INTRAVENOUS ONCE
Refills: 0 | Status: COMPLETED | OUTPATIENT
Start: 2024-03-01 | End: 2024-03-01

## 2024-03-01 RX ORDER — INSULIN LISPRO 100/ML
VIAL (ML) SUBCUTANEOUS
Refills: 0 | Status: DISCONTINUED | OUTPATIENT
Start: 2024-03-01 | End: 2024-03-04

## 2024-03-01 RX ORDER — GLUCAGON INJECTION, SOLUTION 0.5 MG/.1ML
1 INJECTION, SOLUTION SUBCUTANEOUS ONCE
Refills: 0 | Status: DISCONTINUED | OUTPATIENT
Start: 2024-03-01 | End: 2024-03-04

## 2024-03-01 RX ORDER — DEXTROSE 50 % IN WATER 50 %
12.5 SYRINGE (ML) INTRAVENOUS ONCE
Refills: 0 | Status: DISCONTINUED | OUTPATIENT
Start: 2024-03-01 | End: 2024-03-04

## 2024-03-01 RX ORDER — INSULIN HUMAN 100 [IU]/ML
5 INJECTION, SOLUTION SUBCUTANEOUS ONCE
Refills: 0 | Status: COMPLETED | OUTPATIENT
Start: 2024-03-01 | End: 2024-03-01

## 2024-03-01 RX ORDER — DEXTROSE 50 % IN WATER 50 %
15 SYRINGE (ML) INTRAVENOUS ONCE
Refills: 0 | Status: DISCONTINUED | OUTPATIENT
Start: 2024-03-01 | End: 2024-03-04

## 2024-03-01 RX ORDER — ATORVASTATIN CALCIUM 80 MG/1
40 TABLET, FILM COATED ORAL AT BEDTIME
Refills: 0 | Status: DISCONTINUED | OUTPATIENT
Start: 2024-03-01 | End: 2024-03-04

## 2024-03-01 RX ORDER — INSULIN LISPRO 100/ML
VIAL (ML) SUBCUTANEOUS AT BEDTIME
Refills: 0 | Status: DISCONTINUED | OUTPATIENT
Start: 2024-03-01 | End: 2024-03-04

## 2024-03-01 RX ORDER — ERGOCALCIFEROL 1.25 MG/1
1 CAPSULE ORAL
Refills: 0 | DISCHARGE

## 2024-03-01 RX ORDER — LEVOTHYROXINE SODIUM 125 MCG
1 TABLET ORAL
Qty: 0 | Refills: 0 | DISCHARGE

## 2024-03-01 RX ORDER — FUROSEMIDE 40 MG
80 TABLET ORAL
Refills: 0 | Status: DISCONTINUED | OUTPATIENT
Start: 2024-03-01 | End: 2024-03-02

## 2024-03-01 RX ORDER — LEVOTHYROXINE SODIUM 125 MCG
25 TABLET ORAL DAILY
Refills: 0 | Status: DISCONTINUED | OUTPATIENT
Start: 2024-03-01 | End: 2024-03-04

## 2024-03-01 RX ADMIN — Medication 100 MILLIGRAM(S): at 22:55

## 2024-03-01 RX ADMIN — Medication 80 MILLIGRAM(S): at 17:53

## 2024-03-01 RX ADMIN — Medication 667 MILLIGRAM(S): at 16:55

## 2024-03-01 RX ADMIN — AMLODIPINE BESYLATE 10 MILLIGRAM(S): 2.5 TABLET ORAL at 17:53

## 2024-03-01 RX ADMIN — Medication 30 MILLILITER(S): at 22:02

## 2024-03-01 RX ADMIN — Medication 50 MILLILITER(S): at 13:23

## 2024-03-01 RX ADMIN — Medication 100 GRAM(S): at 11:18

## 2024-03-01 RX ADMIN — Medication 40 MILLIGRAM(S): at 13:24

## 2024-03-01 RX ADMIN — CARVEDILOL PHOSPHATE 12.5 MILLIGRAM(S): 80 CAPSULE, EXTENDED RELEASE ORAL at 17:53

## 2024-03-01 RX ADMIN — SODIUM ZIRCONIUM CYCLOSILICATE 10 GRAM(S): 10 POWDER, FOR SUSPENSION ORAL at 22:54

## 2024-03-01 RX ADMIN — INSULIN GLARGINE 5 UNIT(S): 100 INJECTION, SOLUTION SUBCUTANEOUS at 22:02

## 2024-03-01 RX ADMIN — INSULIN HUMAN 5 UNIT(S): 100 INJECTION, SOLUTION SUBCUTANEOUS at 13:24

## 2024-03-01 RX ADMIN — ATORVASTATIN CALCIUM 40 MILLIGRAM(S): 80 TABLET, FILM COATED ORAL at 22:01

## 2024-03-01 RX ADMIN — Medication 650 MILLIGRAM(S): at 22:03

## 2024-03-01 RX ADMIN — Medication 81 MILLIGRAM(S): at 17:52

## 2024-03-01 RX ADMIN — SODIUM ZIRCONIUM CYCLOSILICATE 10 GRAM(S): 10 POWDER, FOR SUSPENSION ORAL at 16:55

## 2024-03-01 NOTE — CONSULT NOTE ADULT - SUBJECTIVE AND OBJECTIVE BOX
[facility] DIVISION OF KIDNEY DISEASES AND HYPERTENSION -- [phone]  -- INITIAL CONSULT NOTE  --------------------------------------------------------------------------------  HPI: 73 year old Male with PMHx of advanced CKD, HTN, HLD, DM2 well known to our service from prior admission presents at [facility] for hyperkalemia noted on outpatient labs. Initial labs in ER concerning for hyperkalemia. Nephrology consulted for advanced CKD and hyperkalemia.     Pt is CKD stage 5, and is being followed by Dr. Rees. Pt underwent creation of L AVF on 4/6/23 in preparation of long term hemodialysis. SCr prior to admission of 4.6 on 2/29/24. Appears his SCr has been 4.6 - 5. Initial labs in ER showed serum potassium elevated at 6.3 (non hemolyzed). Pt received medical management- insulin, D50, lokelma 10 mg. Repeat serum potassium is mildly elevated at 5.5.    Pt seen and examined in ER. He states he stopped taking his Lokelma because he was "feeling good". Pt denies any SOB, Cp, N/V, abdominal pain, difficulty with urination. He had diarrhea 2 days ago.     PAST HISTORY  --------------------------------------------------------------------------------  PAST MEDICAL & SURGICAL HISTORY:  HTN (hypertension)  HLD (hyperlipidemia)  Diabetes  Stage 5 chronic kidney disease not on chronic dialysis  HLD (hyperlipidemia)    No significant past surgical history    FAMILY HISTORY:  Family history of CVA (Father, Sibling)  FH: type 2 diabetes (Father, Mother)    PAST SOCIAL HISTORY: No illicit drugs     ALLERGIES & MEDICATIONS  --------------------------------------------------------------------------------  Allergies  No Known Allergies    Intolerances    Standing Inpatient Medications  amLODIPine   Tablet 10 milliGRAM(s) Oral daily  aspirin enteric coated 81 milliGRAM(s) Oral daily  atorvastatin 40 milliGRAM(s) Oral at bedtime  calcium acetate 667 milliGRAM(s) Oral three times a day with meals  carvedilol 12.5 milliGRAM(s) Oral every 12 hours  citric acid/sodium citrate Solution 30 milliLiter(s) Oral three times a day  furosemide    Tablet 80 milliGRAM(s) Oral two times a day  hydrALAZINE 100 milliGRAM(s) Oral three times a day  levothyroxine 25 MICROGram(s) Oral daily  multivitamin 1 Tablet(s) Oral daily  pantoprazole    Tablet 40 milliGRAM(s) Oral before breakfast  polyethylene glycol 3350 17 Gram(s) Oral daily  sodium bicarbonate 650 milliGRAM(s) Oral three times a day  sodium zirconium cyclosilicate 10 Gram(s) Oral once    PRN Inpatient Medications  acetaminophen     Tablet .. 650 milliGRAM(s) Oral every 6 hours PRN    REVIEW OF SYSTEMS  --------------------------------------------------------------------------------  Gen: No fevers/chills  Head/Eyes/Ears: No HA  Respiratory: No dyspnea, cough  CV: No chest pain  GI: No abdominal pain, diarrhea  : No dysuria, hematuria  MSK: No edema  Skin: No rashes  Heme: No easy bruising or bleeding    All other systems were reviewed and are negative, except as noted.    VITALS/PHYSICAL EXAM  --------------------------------------------------------------------------------  T(C): 36.6 (03-01-24 @ 13:47), Max: 36.6 (03-01-24 @ 09:56)  HR: 78 (03-01-24 @ 13:47) (60 - 78)  BP: 159/45 (03-01-24 @ 13:47) (159/45 - 191/63)  RR: 15 (03-01-24 @ 13:47) (15 - 18)  SpO2: 99% (03-01-24 @ 13:47) (98% - 100%)  Wt(kg): --  Height (cm): 180.3 (03-01-24 @ 09:56)  Weight (kg): 83.9 (03-01-24 @ 09:56)  BMI (kg/m2): 25.8 (03-01-24 @ 09:56)  BSA (m2): 2.04 (03-01-24 @ 09:56)    Physical Exam:  	Gen: NAD  	HEENT: Anicteric  	Pulm: CTA B/L  	CV: S1S2+  	Abd: Soft, +BS    	Ext: No LE edema B/L  	Neuro: Awake  	Skin: Warm and dry              Dialysis access: [facility]    LABS/STUDIES  --------------------------------------------------------------------------------              9.3    9.01  >-----------<  201      [03-01-24 @ 11:49]              29.1     137  |  105  |  91  ----------------------------<  196      [03-01-24 @ 14:53]  5.5   |  20  |  4.57        Ca     8.8     [03-01-24 @ 14:53]      Mg     2.3     [03-01-24 @ 11:49]      Phos  4.3     [03-01-24 @ 11:49]    TPro  7.8  /  Alb  4.4  /  TBili  0.4  /  DBili  x   /  AST  28  /  ALT  49  /  AlkPhos  105  [03-01-24 @ 11:49]    Creatinine Trend:  SCr 4.57 [03-01 @ 14:53]  SCr 4.62 [03-01 @ 11:49]    HBsAb <3.0      [04-01-23 @ 15:59]  HBsAg Nonreact      [04-01-23 @ 15:59]  HCV 0.23, Nonreact      [04-01-23 @ 15:59]    PLA2R: GIOVANNY <1.8, IFA --      [12-24-22 @ 06:05]  Free Light Chains: kappa 14.41, lambda 11.91, ratio = 1.21      [05-02 @ 06:15]  Immunofixation Serum: No Monoclonal Band Identified    Reference Range: None Detected      [05-02-23 @ 06:15]  SPEP Interpretation: Normal Electrophoresis Pattern      [05-02-23 @ 06:15] [facility] DIVISION OF KIDNEY DISEASES AND HYPERTENSION -- [phone]  -- INITIAL CONSULT NOTE  --------------------------------------------------------------------------------  HPI: 73 year old Male with PMHx of advanced CKD, HTN, HLD, DM2 well known to our service from prior admission presents at [facility] for hyperkalemia noted on outpatient labs. Initial labs in ER concerning for hyperkalemia. Nephrology consulted for advanced CKD and hyperkalemia.     Pt is CKD stage 5, and is being followed by Dr. Rees. Pt underwent creation of L AVF on 4/6/23 in preparation of long term hemodialysis. SCr prior to admission of 4.6 on 2/29/24. Appears his SCr has been 4.6 - 5. Initial labs in ER showed serum potassium elevated at 6.3 (non hemolyzed). Pt received medical management- insulin, D50, lokelma 10g. Repeat serum potassium is mildly elevated at 5.5.    Pt seen and examined in ER. He states he stopped taking his Lokelma because he was "feeling good". Pt denies any SOB, Cp, N/V, abdominal pain, difficulty with urination. He had diarrhea 2 days ago.     PAST HISTORY  --------------------------------------------------------------------------------  PAST MEDICAL & SURGICAL HISTORY:  HTN (hypertension)  HLD (hyperlipidemia)  Diabetes  Stage 5 chronic kidney disease not on chronic dialysis  HLD (hyperlipidemia)    No significant past surgical history    FAMILY HISTORY:  Family history of CVA (Father, Sibling)  FH: type 2 diabetes (Father, Mother)    PAST SOCIAL HISTORY: No illicit drugs     ALLERGIES & MEDICATIONS  --------------------------------------------------------------------------------  Allergies  No Known Allergies    Intolerances    Standing Inpatient Medications  amLODIPine   Tablet 10 milliGRAM(s) Oral daily  aspirin enteric coated 81 milliGRAM(s) Oral daily  atorvastatin 40 milliGRAM(s) Oral at bedtime  calcium acetate 667 milliGRAM(s) Oral three times a day with meals  carvedilol 12.5 milliGRAM(s) Oral every 12 hours  citric acid/sodium citrate Solution 30 milliLiter(s) Oral three times a day  furosemide    Tablet 80 milliGRAM(s) Oral two times a day  hydrALAZINE 100 milliGRAM(s) Oral three times a day  levothyroxine 25 MICROGram(s) Oral daily  multivitamin 1 Tablet(s) Oral daily  pantoprazole    Tablet 40 milliGRAM(s) Oral before breakfast  polyethylene glycol 3350 17 Gram(s) Oral daily  sodium bicarbonate 650 milliGRAM(s) Oral three times a day  sodium zirconium cyclosilicate 10 Gram(s) Oral once    PRN Inpatient Medications  acetaminophen     Tablet .. 650 milliGRAM(s) Oral every 6 hours PRN    REVIEW OF SYSTEMS  --------------------------------------------------------------------------------  Gen: No fevers/chills  Head/Eyes/Ears: No HA  Respiratory: No dyspnea, cough  CV: No chest pain  GI: No abdominal pain, diarrhea  : No dysuria, hematuria  MSK: No edema  Skin: No rashes  Heme: No easy bruising or bleeding    All other systems were reviewed and are negative, except as noted.    VITALS/PHYSICAL EXAM  --------------------------------------------------------------------------------  T(C): 36.6 (03-01-24 @ 13:47), Max: 36.6 (03-01-24 @ 09:56)  HR: 78 (03-01-24 @ 13:47) (60 - 78)  BP: 159/45 (03-01-24 @ 13:47) (159/45 - 191/63)  RR: 15 (03-01-24 @ 13:47) (15 - 18)  SpO2: 99% (03-01-24 @ 13:47) (98% - 100%)  Wt(kg): --  Height (cm): 180.3 (03-01-24 @ 09:56)  Weight (kg): 83.9 (03-01-24 @ 09:56)  BMI (kg/m2): 25.8 (03-01-24 @ 09:56)  BSA (m2): 2.04 (03-01-24 @ 09:56)    Physical Exam:  	Gen: NAD  	HEENT: Anicteric  	Pulm: CTA B/L  	CV: S1S2+  	Abd: Soft, +BS    	Ext: No LE edema B/L  	Neuro: Awake  	Skin: Warm and dry              Dialysis access: [facility]    LABS/STUDIES  --------------------------------------------------------------------------------              9.3    9.01  >-----------<  201      [03-01-24 @ 11:49]              29.1     137  |  105  |  91  ----------------------------<  196      [03-01-24 @ 14:53]  5.5   |  20  |  4.57        Ca     8.8     [03-01-24 @ 14:53]      Mg     2.3     [03-01-24 @ 11:49]      Phos  4.3     [03-01-24 @ 11:49]    TPro  7.8  /  Alb  4.4  /  TBili  0.4  /  DBili  x   /  AST  28  /  ALT  49  /  AlkPhos  105  [03-01-24 @ 11:49]    Creatinine Trend:  SCr 4.57 [03-01 @ 14:53]  SCr 4.62 [03-01 @ 11:49]    HBsAb <3.0      [04-01-23 @ 15:59]  HBsAg Nonreact      [04-01-23 @ 15:59]  HCV 0.23, Nonreact      [04-01-23 @ 15:59]    PLA2R: GIOVNANY <1.8, IFA --      [12-24-22 @ 06:05]  Free Light Chains: kappa 14.41, lambda 11.91, ratio = 1.21      [05-02 @ 06:15]  Immunofixation Serum: No Monoclonal Band Identified    Reference Range: None Detected      [05-02-23 @ 06:15]  SPEP Interpretation: Normal Electrophoresis Pattern      [05-02-23 @ 06:15]

## 2024-03-01 NOTE — ED PROVIDER NOTE - ATTENDING CONTRIBUTION TO CARE
Attending MD Navarro:  I personally have seen and examined this patient.  Resident note reviewed and agree on plan of care and except where noted.  Please see my MDM for further details.

## 2024-03-01 NOTE — PATIENT PROFILE ADULT - FALL HARM RISK - HARM RISK INTERVENTIONS

## 2024-03-01 NOTE — H&P ADULT - PROBLEM SELECTOR PLAN 1
resolving  repeat K 5.5  continue [facility]  renal help appreciated  continue to follow recommendations  trend K daily  low K diet

## 2024-03-01 NOTE — ED ADULT NURSE NOTE - OBJECTIVE STATEMENT
Pt is a 72 y/o male pmhx of HTN, CKD (starting Dialysis "this month") and T2DM presenting for hyperkalemia. Pt has outpatient lab work done once a month, got a call today to go to ER because K was 6.5. Pt states he has not taken Lokelma in "4 to 5 days because I feel fine". Pt does endorses very mild chest pain x weeks. Pt presents alert & oriented x 4, calm, able to follow commands, speech clear. Breathing spontaneous & nonlabored. On cardiac monitor,  60's. Abdomen soft & nondistended. Strength 5/5 x 4 extremities, ambulates without assist. Strong peripheral pulses noted b/l, no edema noted. Skin warm, clean, dry & intact. Denies SOB, abdominal pain, back pain, n/v/d, fever, chills, changes in vision. Call bell within reach and pt instructed on how to use, bed in lowest position, side rails up, wheels locked. RN unable to obtain IV access, ED team made aware, USIV to be placed. Pt is a 72 y/o male pmhx of HTN, CKD (starting Dialysis "this month") and T2DM presenting for hyperkalemia. Pt has outpatient lab work done once a month, got a call today to go to ER because K was 6.5. Pt states he has not taken Lokelma in "4 to 5 days because I feel fine". Pt does endorses very mild chest pain x weeks. Pt took 10mg of Lokelma 30 min prior to arrival. Pt presents alert & oriented x 4, calm, able to follow commands, speech clear. Breathing spontaneous & nonlabored. On cardiac monitor,  60's. Abdomen soft & nondistended. Strength 5/5 x 4 extremities, ambulates without assist. Strong peripheral pulses noted b/l, no edema noted. Skin warm, clean, dry & intact. Denies SOB, abdominal pain, back pain, n/v/d, fever, chills, changes in vision. Call bell within reach and pt instructed on how to use, bed in lowest position, side rails up, wheels locked. RN unable to obtain IV access, ED team made aware, USIV to be placed. Pt is a 72 y/o male pmhx of HTN, CKD (starting Dialysis "this month") and T2DM presenting for hyperkalemia. Pt has outpatient lab work done once a month, got a call today to go to ER because K was 6.5. Pt states he has not taken Lokelma in "4 to 5 days because I feel fine". Pt does endorses very mild chest pain x weeks. Pt took 10g of Lokelma 30 min prior to arrival. Pt presents alert & oriented x 4, calm, able to follow commands, speech clear. Breathing spontaneous & nonlabored. On cardiac monitor,  60's. Abdomen soft & nondistended. Strength 5/5 x 4 extremities, ambulates without assist. Strong peripheral pulses noted b/l, no edema noted. Skin warm, clean, dry & intact. Denies SOB, abdominal pain, back pain, n/v/d, fever, chills, changes in vision. Call bell within reach and pt instructed on how to use, bed in lowest position, side rails up, wheels locked. RN unable to obtain IV access, ED team made aware, USIV to be placed.

## 2024-03-01 NOTE — ED PROVIDER NOTE - PROGRESS NOTE DETAILS
Vannessa Menchaca DO (PGY3): Patient signed out to me by early day team.  Patient's potassium 6.0.  Patient with peaked T waves on EKG.  Patient given calcium, insulin, dextrose, Lasix.  Nephrology to see patient in the ED. No indications for urgent dialysis at this time. Spoke with Dr. Day, will admit patient to medicine with telemetry under his service.

## 2024-03-01 NOTE — CONSULT NOTE ADULT - ASSESSMENT
Pt with CKD and hyperkalemia  73 year old Male with PMHx of advanced CKD, HTN, HLD, DM2 well known to our service from prior admission presents at [facility] for hyperkalemia noted on outpatient labs. Initial labs in ER concerning for hyperkalemia. Nephrology consulted for advanced CKD and hyperkalemia.

## 2024-03-01 NOTE — ED PROVIDER NOTE - NSICDXPASTMEDICALHX_GEN_ALL_CORE_FT
PAST MEDICAL HISTORY:  Diabetes     Diabetes     HLD (hyperlipidemia)     HLD (hyperlipidemia)     HTN (hypertension)     Hypertension     Stage 5 chronic kidney disease not on chronic dialysis

## 2024-03-01 NOTE — CONSULT NOTE ADULT - ATTENDING COMMENTS
73 year old Male with PMHx of advanced CKD, HTN, HLD, DM2 well known to our service from prior admission presents at [facility] for hyperkalemia noted on outpatient labs. Initial labs in ER concerning for hyperkalemia. Nephrology consulted for advanced CKD and hyperkalemia.   Pt is CKD stage 5, and is being followed by Dr. Rees. Pt underwent creation of L AVF on 4/6/23 in preparation of long term hemodialysis. SCr prior to admission of 4.6 on 2/29/24. Appears his SCr has been 4.6 - 5. Initial labs in ER showed serum potassium elevated at 6.3 (non hemolyzed). Pt received medical management- insulin, D50, lokelma 10g. Repeat serum potassium is mildly elevated at 5.5.  Pt seen and examined in ER. He states he stopped taking his Lokelma because he was "feeling good". Pt denies any SOB, Cp, N/V, abdominal pain, difficulty with urination. He had diarrhea 2 days ago. Alert conversant.    1.  Hyperkalemia--med rx.  Lokelma.  IF worsening EKG changes (bradycardia, QRS widening) glucagon 5mg IVP.  HCO3 tabs +/-infusion  2.  CKD 5--at baseline.  Not uremic.  NO HD at this time.  Check bladder scan   discussed with med team  Jose Jolly MD  contact me on TEAMS

## 2024-03-01 NOTE — ED ADULT NURSE NOTE - NS ED NURSE REPORT GIVEN TO FT
JOCELYN Mueller JOCELYN Mueller endorsed to RN to do rpeat  vitals in 30 min per ACP, verbalized understanding

## 2024-03-01 NOTE — CONSULT NOTE ADULT - PROBLEM SELECTOR RECOMMENDATION 2
Pt with advanced CKD stage 5 being admitted for hyperkalemia. Pt follows with Dr. Rees. Pt made aware that he will need long term dialysis in near future. pt underwent lUE AVF creation during previous admission on 4/6/23. Scr on admission elevated but stable at 4.6 today. Pt clinically non uremic and non oliguric. No urgent indication for HD for now. If repeat serum potassium remained elevated, will need to consider HD. Continue home sodium bicarbonate. Monitor labs and urine output. Avoid any potential nephrotoxins. Dose medications as per eGFR

## 2024-03-01 NOTE — H&P ADULT - NSHPREVIEWOFSYSTEMS_GEN_ALL_CORE
Gen: no loss of wt no loss of appetite  ENT: no dizziness no hearing loss  Ophth: no blurring of vision no loss of vision  Resp: No cough no sputum production  CVS: No chest pain no palpitations no orthopnea  GI: no nausea, vomiting or diarrhea   : no dysuria, hematuria  Endo: no polyuria no excessive sweating  Neuro: no weakness no paresthesias  Heme: No petechiae no easy bruising  Msk: No joint pain no swelling  Skin: No rash no itching   'I feel fine that's why I stopped the medicine for my potassium'

## 2024-03-01 NOTE — H&P ADULT - NSHPADDITIONALINFOADULT_GEN_ALL_CORE
discussed with patient in detail, expresses understanding of treatment plans.  76 minutes spent on total encounter. The necessity of the time spent during the encounter on this date of service was due to:     detailed physical exam, obtaining and reviewing history, physical examination, explaining the diagnosis, prognosis and treatment plan with the patient/family/caregiver. I also have spent the time ordering studies and testing, interpreting results, medicine reconciliation, and documentation as above

## 2024-03-01 NOTE — ED PROVIDER NOTE - CLINICAL SUMMARY MEDICAL DECISION MAKING FREE TEXT BOX
Attending MD Navarro: 72 yo male with HTN, HLD, Type 2 DM, CKD sp LUE AV fistula but has not initiated HD.  Presents for for hyperkalemia found on outpatient labs.  Patient endorsed he has not been taking his LOKELMA for the last few days.  No sign physical exam findings.  Plan: labs, EKG, VBG, nephrology consults.  Hyperkalemia treatment.       Attending MD Navarro:  I independently interpreted the patient's EKG which showed NSR at 63 with peaked Ts.

## 2024-03-01 NOTE — ED PROVIDER NOTE - CROS ED [facility] ALL NEG
12/7/17  Saw patient with Marge Figueroa NP. She is here for TC infusion. She is tolerating treatment well - still taking pain meds for bone pain. 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 She was advised to report worsening symptoms. May need to dose reduce next treatment if neuropathy worse. She will proceed to infusion today. Navigation will continue to follow.
- - -

## 2024-03-01 NOTE — ED PROVIDER NOTE - OBJECTIVE STATEMENT
72yo M with PMH HTN, HLD, DMT2, advanced CKD with recent LUE AV fistula placement (no dialysis yet), presenting with hyperkalemia on outpatient labs. Reports he did not take his lokelma for the last 4-5 days because he was feeling well, today called regarding elevated potassium and reports he took his lokelma 10g dose this morning. Reports intermittent L-sided CP, none currently. Denies any fever/chills, SOB, abdominal pain, n/v, LE pain/swelling, numbness/tingling, muscle pain.

## 2024-03-01 NOTE — H&P ADULT - PROBLEM SELECTOR PLAN 4
HbA1C  finger sticks   no oral meds  diabetic diet  continue finger sticks with short acting insulin sliding scale  low dose Lantus at night 5 units

## 2024-03-01 NOTE — H&P ADULT - PROBLEM SELECTOR PLAN 2
management per renal  no evidence of uremia of encephalopathy   defer initiation orthostatic hypotension hemodialysis to renal

## 2024-03-01 NOTE — H&P ADULT - HISTORY OF PRESENT ILLNESS
72yo M with PMH HTN, HLD, DMT2, advanced CKD with recent LUE AV fistula placement (no dialysis yet), presenting with hyperkalemia on outpatient labs. Reports he did not take his lokelma for the last 4-5 days because he was feeling well, today called regarding elevated potassium and reports he took his lokelma 10g dose this morning. Reports intermittent L-sided CP, none currently. Denies any fever/chills, SOB, abdominal pain, n/v, LE pain/swelling, numbness/tingling, muscle pain

## 2024-03-01 NOTE — ED ADULT NURSE REASSESSMENT NOTE - NS ED NURSE REASSESS COMMENT FT1
repeat trop sent to lab, vitals stable, admission pending
Pt repeat potassium 5.5. DOROTHEA Jin contacted and made aware, per NP 10g of Lokelma to be ordered.
Received handoff report from JOCELYN Johnson. Patient is resting comfortably in bed. Patient on cardiac monitor. Patient provided education of medication compliance. US IV placed by US team. Patient awaiting test results and disposition.

## 2024-03-01 NOTE — H&P ADULT - NSICDXPASTMEDICALHX_GEN_ALL_CORE_FT
PAST MEDICAL HISTORY:  Diabetes     HLD (hyperlipidemia)     HLD (hyperlipidemia)     HTN (hypertension)     Stage 5 chronic kidney disease not on chronic dialysis

## 2024-03-01 NOTE — H&P ADULT - ASSESSMENT
74yo M with PMH HTN, HLD, DMT2, advanced CKD with recent LUE AV fistula placement (no dialysis yet), presenting with hyperkalemia on outpatient labs admitted for treatment and management

## 2024-03-01 NOTE — CONSULT NOTE ADULT - PROBLEM SELECTOR RECOMMENDATION 9
In the setting of advanced CKD and medication non compliance. Initial labs in ER showed serum potassium elevated at 6.3 (non hemolyzed). Pt received medical management- insulin, D50, Lokelma 10mg. Repeat serum potassium is mildly elevated at 5.5. Recommend to give insulin, D50, Lokelma 10 mg TID for now. Can consider diuretics but appears euvolemic. Repeat BMP this evening. Low potassium diet. Monitor serum potassium . In the setting of advanced CKD and medication non compliance. Initial labs in ER showed serum potassium elevated at 6.3 (non hemolyzed). Pt received medical management- insulin, D50, Lokelma 10mg. Repeat serum potassium is mildly elevated at 5.5. Recommend to give insulin, D50, Lokelma 10g TID for now. Can consider diuretics but appears euvolemic. Repeat BMP this evening. Low potassium diet. Monitor serum potassium .

## 2024-03-02 LAB
A1C WITH ESTIMATED AVERAGE GLUCOSE RESULT: 5.8 % — HIGH (ref 4–5.6)
ANION GAP SERPL CALC-SCNC: 20 MMOL/L — HIGH (ref 5–17)
BUN SERPL-MCNC: 102 MG/DL — HIGH (ref 7–23)
CALCIUM SERPL-MCNC: 7.9 MG/DL — LOW (ref 8.4–10.5)
CHLORIDE SERPL-SCNC: 109 MMOL/L — HIGH (ref 96–108)
CO2 SERPL-SCNC: 13 MMOL/L — LOW (ref 22–31)
CREAT SERPL-MCNC: 5.54 MG/DL — HIGH (ref 0.5–1.3)
EGFR: 10 ML/MIN/1.73M2 — LOW
ESTIMATED AVERAGE GLUCOSE: 120 MG/DL — HIGH (ref 68–114)
FERRITIN SERPL-MCNC: 917 NG/ML — HIGH (ref 30–400)
FOLATE SERPL-MCNC: 7.3 NG/ML — SIGNIFICANT CHANGE UP
GLUCOSE BLDC GLUCOMTR-MCNC: 100 MG/DL — HIGH (ref 70–99)
GLUCOSE BLDC GLUCOMTR-MCNC: 114 MG/DL — HIGH (ref 70–99)
GLUCOSE BLDC GLUCOMTR-MCNC: 114 MG/DL — HIGH (ref 70–99)
GLUCOSE BLDC GLUCOMTR-MCNC: 124 MG/DL — HIGH (ref 70–99)
GLUCOSE BLDC GLUCOMTR-MCNC: 170 MG/DL — HIGH (ref 70–99)
GLUCOSE SERPL-MCNC: 141 MG/DL — HIGH (ref 70–99)
HCT VFR BLD CALC: 26.6 % — LOW (ref 39–50)
HGB BLD-MCNC: 8.2 G/DL — LOW (ref 13–17)
MCHC RBC-ENTMCNC: 30.7 PG — SIGNIFICANT CHANGE UP (ref 27–34)
MCHC RBC-ENTMCNC: 30.8 GM/DL — LOW (ref 32–36)
MCV RBC AUTO: 99.6 FL — SIGNIFICANT CHANGE UP (ref 80–100)
NRBC # BLD: 0 /100 WBCS — SIGNIFICANT CHANGE UP (ref 0–0)
PLATELET # BLD AUTO: 174 K/UL — SIGNIFICANT CHANGE UP (ref 150–400)
POTASSIUM SERPL-MCNC: 5.6 MMOL/L — HIGH (ref 3.5–5.3)
POTASSIUM SERPL-SCNC: 5.6 MMOL/L — HIGH (ref 3.5–5.3)
RBC # BLD: 2.67 M/UL — LOW (ref 4.2–5.8)
RBC # FLD: 13 % — SIGNIFICANT CHANGE UP (ref 10.3–14.5)
SODIUM SERPL-SCNC: 142 MMOL/L — SIGNIFICANT CHANGE UP (ref 135–145)
TSH SERPL-MCNC: 2.29 UIU/ML — SIGNIFICANT CHANGE UP (ref 0.27–4.2)
VIT B12 SERPL-MCNC: 732 PG/ML — SIGNIFICANT CHANGE UP (ref 232–1245)
WBC # BLD: 8.79 K/UL — SIGNIFICANT CHANGE UP (ref 3.8–10.5)
WBC # FLD AUTO: 8.79 K/UL — SIGNIFICANT CHANGE UP (ref 3.8–10.5)

## 2024-03-02 PROCEDURE — 99233 SBSQ HOSP IP/OBS HIGH 50: CPT | Mod: GC

## 2024-03-02 RX ORDER — SODIUM BICARBONATE 1 MEQ/ML
1300 SYRINGE (ML) INTRAVENOUS THREE TIMES A DAY
Refills: 0 | Status: DISCONTINUED | OUTPATIENT
Start: 2024-03-02 | End: 2024-03-04

## 2024-03-02 RX ORDER — SODIUM ZIRCONIUM CYCLOSILICATE 10 G/10G
10 POWDER, FOR SUSPENSION ORAL EVERY 8 HOURS
Refills: 0 | Status: DISCONTINUED | OUTPATIENT
Start: 2024-03-02 | End: 2024-03-03

## 2024-03-02 RX ADMIN — AMLODIPINE BESYLATE 10 MILLIGRAM(S): 2.5 TABLET ORAL at 05:10

## 2024-03-02 RX ADMIN — Medication 100 MILLIGRAM(S): at 21:29

## 2024-03-02 RX ADMIN — Medication 80 MILLIGRAM(S): at 05:10

## 2024-03-02 RX ADMIN — Medication 30 MILLILITER(S): at 05:09

## 2024-03-02 RX ADMIN — SODIUM ZIRCONIUM CYCLOSILICATE 10 GRAM(S): 10 POWDER, FOR SUSPENSION ORAL at 11:41

## 2024-03-02 RX ADMIN — CARVEDILOL PHOSPHATE 12.5 MILLIGRAM(S): 80 CAPSULE, EXTENDED RELEASE ORAL at 05:10

## 2024-03-02 RX ADMIN — PANTOPRAZOLE SODIUM 40 MILLIGRAM(S): 20 TABLET, DELAYED RELEASE ORAL at 05:10

## 2024-03-02 RX ADMIN — INSULIN GLARGINE 5 UNIT(S): 100 INJECTION, SOLUTION SUBCUTANEOUS at 22:50

## 2024-03-02 RX ADMIN — Medication 667 MILLIGRAM(S): at 12:31

## 2024-03-02 RX ADMIN — Medication 100 MILLIGRAM(S): at 05:09

## 2024-03-02 RX ADMIN — Medication 667 MILLIGRAM(S): at 10:08

## 2024-03-02 RX ADMIN — POLYETHYLENE GLYCOL 3350 17 GRAM(S): 17 POWDER, FOR SOLUTION ORAL at 12:31

## 2024-03-02 RX ADMIN — SODIUM ZIRCONIUM CYCLOSILICATE 10 GRAM(S): 10 POWDER, FOR SUSPENSION ORAL at 21:30

## 2024-03-02 RX ADMIN — Medication 1 TABLET(S): at 12:31

## 2024-03-02 RX ADMIN — Medication 2 UNIT(S): at 18:04

## 2024-03-02 RX ADMIN — Medication 2 UNIT(S): at 12:29

## 2024-03-02 RX ADMIN — ATORVASTATIN CALCIUM 40 MILLIGRAM(S): 80 TABLET, FILM COATED ORAL at 21:29

## 2024-03-02 RX ADMIN — Medication 25 MICROGRAM(S): at 05:10

## 2024-03-02 RX ADMIN — Medication 81 MILLIGRAM(S): at 12:30

## 2024-03-02 RX ADMIN — Medication 30 MILLILITER(S): at 15:07

## 2024-03-02 RX ADMIN — Medication 1300 MILLIGRAM(S): at 21:29

## 2024-03-02 RX ADMIN — Medication 1300 MILLIGRAM(S): at 15:12

## 2024-03-02 RX ADMIN — Medication 100 MILLIGRAM(S): at 15:08

## 2024-03-02 RX ADMIN — CARVEDILOL PHOSPHATE 12.5 MILLIGRAM(S): 80 CAPSULE, EXTENDED RELEASE ORAL at 18:06

## 2024-03-02 RX ADMIN — Medication 2 UNIT(S): at 10:14

## 2024-03-02 RX ADMIN — Medication 30 MILLILITER(S): at 22:50

## 2024-03-02 RX ADMIN — Medication 667 MILLIGRAM(S): at 18:06

## 2024-03-02 RX ADMIN — Medication 650 MILLIGRAM(S): at 05:17

## 2024-03-02 NOTE — LETTER BODY
[FreeTextEntry1] : Mara Higgins MD [address], [place] [id number] [phone]  Dear Dr. Higgins,  Reason for visit: Elevated PSA   This is a 73 year old man with elevated PSA. Patient returns for followup. His current PSA is 4.12. Patient has stage V renal insufficiency status post AV fistula formation. He is currently awaiting dialysis. Patient is also interested in kidney transplantation. Patient denies any interval complaints or difficulties. He denies any dysuria or gross hematuria. Patient denies any changes in health. The past medical history and family history and social history are unchanged. All other review of systems are negative. Patient denies any changes in medications. Medication list was reconciled.   On examination, the patient is in no acute distress. He is alert and oriented follows commands. He has normal mood and affect. He is normocephalic. Oral no thrush. Neck is supple. Respirations are unlabored. His abdomen is soft and nontender. Liver is nonpalpable. Bladder is nonpalpable. No CVA tenderness. Neurologically he is grossly intact. No peripheral edema. Skin without gross abnormality. He has normal male external genitalia. Normal meatus. Bilateral testes are descended intrascrotally and normal to palpation. On rectal examination, there is normal sphincter tone. The prostate is clinically benign without focal induration or nodularity.  He has a left arm AV fistula.   Assessment: Elevated PSA   I counseled the patient. I recommended patient undergo transperineal prostate biopsy to rule out prostate cancer.  He is not a candidate for prostate MRI given his renal sufficiency.  He is not on hemodialysis.. I counseled the patient regarding the procedure. The risks and benefits were discussed. Alternatives were given. I answered the patient questions. The patient will take the necessary preparations for the procedure. He will repeat PSA and BMP to establish baseline. The patient will follow-up as directed and will contact me with any questions or concerns. Thank you for the opportunity to participate in the care of this patient. I'll keep you updated on his progress.   Plan: Repeat PSA. Transperineal prostate biopsy. Follow up as directed.

## 2024-03-02 NOTE — LETTER BODY
[FreeTextEntry1] : Mara Higgins MD [address] [facility] [address], [place] [id number] [phone]  Dear Dr. Higgins,  Reason for visit: Elevated PSA   This is a 73 year old man with elevated PSA. Patient returns for followup. His current PSA is 4.12. Patient has stage V renal insufficiency status post AV fistula formation. He is currently awaiting dialysis. Patient is also interested in kidney transplantation. Patient denies any interval complaints or difficulties. He denies any dysuria or gross hematuria. Patient denies any changes in health. The past medical history and family history and social history are unchanged. All other review of systems are negative. Patient denies any changes in medications. Medication list was reconciled.   On examination, the patient is in no acute distress. He is alert and oriented follows commands. He has normal mood and affect. He is normocephalic. Oral no thrush. Neck is supple. Respirations are unlabored. His abdomen is soft and nontender. Liver is nonpalpable. Bladder is nonpalpable. No CVA tenderness. Neurologically he is grossly intact. No peripheral edema. Skin without gross abnormality. He has normal male external genitalia. Normal meatus. Bilateral testes are descended intrascrotally and normal to palpation. On rectal examination, there is normal sphincter tone. The prostate is clinically benign without focal induration or nodularity.  He has a left arm AV fistula.   Assessment: Elevated PSA   I counseled the patient. I recommended patient undergo transperineal prostate biopsy to rule out prostate cancer.  He is not a candidate for prostate MRI given his renal sufficiency.  He is not on hemodialysis.. I counseled the patient regarding the procedure. The risks and benefits were discussed. Alternatives were given. I answered the patient questions. The patient will take the necessary preparations for the procedure. He will repeat PSA and BMP to establish baseline. The patient will follow-up as directed and will contact me with any questions or concerns. Thank you for the opportunity to participate in the care of this patient. I'll keep you updated on his progress.   Plan: Repeat PSA. Transperineal prostate biopsy. Follow up as directed.

## 2024-03-02 NOTE — HISTORY OF PRESENT ILLNESS
[FreeTextEntry1] : Follow-up with PSA.  PSA 4.12.  Patient has renal insufficiency status post AV fistula formation.  He is currently awaiting the dialysis.  Patient is also interested in kidney transplantation.  Plan repeat PSA.  Transperineal prostate biopsy.  I counseled the patient regarding the procedure. The risks and benefits were discussed. Alternatives were given. I answered the patient questions. The patient will take the necessary preparations for the procedure. The patient will follow-up as directed and will contact me with any questions or concerns.   Please refer to URO Consult note

## 2024-03-02 NOTE — ADDENDUM
[FreeTextEntry1] : Entered by Graeme Quevedo, acting as scribe for Dr. Sb Michel. The documentation recorded by the scribe accurately reflects the service I personally performed and the decisions made by me.

## 2024-03-03 ENCOUNTER — TRANSCRIPTION ENCOUNTER (OUTPATIENT)
Age: 74
End: 2024-03-03

## 2024-03-03 LAB
ANION GAP SERPL CALC-SCNC: 15 MMOL/L — SIGNIFICANT CHANGE UP (ref 5–17)
BUN SERPL-MCNC: 102 MG/DL — HIGH (ref 7–23)
CALCIUM SERPL-MCNC: 8.2 MG/DL — LOW (ref 8.4–10.5)
CHLORIDE SERPL-SCNC: 103 MMOL/L — SIGNIFICANT CHANGE UP (ref 96–108)
CO2 SERPL-SCNC: 23 MMOL/L — SIGNIFICANT CHANGE UP (ref 22–31)
CREAT SERPL-MCNC: 5.41 MG/DL — HIGH (ref 0.5–1.3)
EGFR: 10 ML/MIN/1.73M2 — LOW
GLUCOSE BLDC GLUCOMTR-MCNC: 114 MG/DL — HIGH (ref 70–99)
GLUCOSE BLDC GLUCOMTR-MCNC: 169 MG/DL — HIGH (ref 70–99)
GLUCOSE BLDC GLUCOMTR-MCNC: 215 MG/DL — HIGH (ref 70–99)
GLUCOSE BLDC GLUCOMTR-MCNC: 98 MG/DL — SIGNIFICANT CHANGE UP (ref 70–99)
GLUCOSE SERPL-MCNC: 86 MG/DL — SIGNIFICANT CHANGE UP (ref 70–99)
HCT VFR BLD CALC: 24.9 % — LOW (ref 39–50)
HGB BLD-MCNC: 8.2 G/DL — LOW (ref 13–17)
MAGNESIUM SERPL-MCNC: 2.2 MG/DL — SIGNIFICANT CHANGE UP (ref 1.6–2.6)
MCHC RBC-ENTMCNC: 31.1 PG — SIGNIFICANT CHANGE UP (ref 27–34)
MCHC RBC-ENTMCNC: 32.9 GM/DL — SIGNIFICANT CHANGE UP (ref 32–36)
MCV RBC AUTO: 94.3 FL — SIGNIFICANT CHANGE UP (ref 80–100)
NRBC # BLD: 0 /100 WBCS — SIGNIFICANT CHANGE UP (ref 0–0)
PHOSPHATE SERPL-MCNC: 5.9 MG/DL — HIGH (ref 2.5–4.5)
PLATELET # BLD AUTO: 155 K/UL — SIGNIFICANT CHANGE UP (ref 150–400)
POTASSIUM SERPL-MCNC: 4.6 MMOL/L — SIGNIFICANT CHANGE UP (ref 3.5–5.3)
POTASSIUM SERPL-SCNC: 4.6 MMOL/L — SIGNIFICANT CHANGE UP (ref 3.5–5.3)
RBC # BLD: 2.64 M/UL — LOW (ref 4.2–5.8)
RBC # FLD: 13 % — SIGNIFICANT CHANGE UP (ref 10.3–14.5)
SODIUM SERPL-SCNC: 141 MMOL/L — SIGNIFICANT CHANGE UP (ref 135–145)
WBC # BLD: 8.03 K/UL — SIGNIFICANT CHANGE UP (ref 3.8–10.5)
WBC # FLD AUTO: 8.03 K/UL — SIGNIFICANT CHANGE UP (ref 3.8–10.5)

## 2024-03-03 PROCEDURE — 99233 SBSQ HOSP IP/OBS HIGH 50: CPT

## 2024-03-03 RX ORDER — SODIUM ZIRCONIUM CYCLOSILICATE 10 G/10G
10 POWDER, FOR SUSPENSION ORAL DAILY
Refills: 0 | Status: DISCONTINUED | OUTPATIENT
Start: 2024-03-04 | End: 2024-03-04

## 2024-03-03 RX ADMIN — Medication 100 MILLIGRAM(S): at 22:37

## 2024-03-03 RX ADMIN — CARVEDILOL PHOSPHATE 12.5 MILLIGRAM(S): 80 CAPSULE, EXTENDED RELEASE ORAL at 05:52

## 2024-03-03 RX ADMIN — Medication 30 MILLILITER(S): at 08:52

## 2024-03-03 RX ADMIN — Medication 2 UNIT(S): at 17:51

## 2024-03-03 RX ADMIN — Medication 2 UNIT(S): at 08:51

## 2024-03-03 RX ADMIN — AMLODIPINE BESYLATE 10 MILLIGRAM(S): 2.5 TABLET ORAL at 05:52

## 2024-03-03 RX ADMIN — SODIUM ZIRCONIUM CYCLOSILICATE 10 GRAM(S): 10 POWDER, FOR SUSPENSION ORAL at 05:51

## 2024-03-03 RX ADMIN — Medication 667 MILLIGRAM(S): at 17:51

## 2024-03-03 RX ADMIN — Medication 81 MILLIGRAM(S): at 12:26

## 2024-03-03 RX ADMIN — Medication 1300 MILLIGRAM(S): at 05:51

## 2024-03-03 RX ADMIN — Medication 667 MILLIGRAM(S): at 12:27

## 2024-03-03 RX ADMIN — CARVEDILOL PHOSPHATE 12.5 MILLIGRAM(S): 80 CAPSULE, EXTENDED RELEASE ORAL at 17:51

## 2024-03-03 RX ADMIN — Medication 30 MILLILITER(S): at 13:59

## 2024-03-03 RX ADMIN — Medication 100 MILLIGRAM(S): at 05:53

## 2024-03-03 RX ADMIN — POLYETHYLENE GLYCOL 3350 17 GRAM(S): 17 POWDER, FOR SOLUTION ORAL at 12:27

## 2024-03-03 RX ADMIN — Medication 2 UNIT(S): at 12:57

## 2024-03-03 RX ADMIN — Medication 100 MILLIGRAM(S): at 13:59

## 2024-03-03 RX ADMIN — Medication 667 MILLIGRAM(S): at 08:56

## 2024-03-03 RX ADMIN — Medication 25 MICROGRAM(S): at 05:52

## 2024-03-03 RX ADMIN — PANTOPRAZOLE SODIUM 40 MILLIGRAM(S): 20 TABLET, DELAYED RELEASE ORAL at 05:52

## 2024-03-03 RX ADMIN — ATORVASTATIN CALCIUM 40 MILLIGRAM(S): 80 TABLET, FILM COATED ORAL at 22:37

## 2024-03-03 RX ADMIN — Medication 1300 MILLIGRAM(S): at 13:58

## 2024-03-03 RX ADMIN — Medication 1300 MILLIGRAM(S): at 22:37

## 2024-03-03 RX ADMIN — Medication 1 TABLET(S): at 12:26

## 2024-03-03 RX ADMIN — Medication 30 MILLILITER(S): at 22:37

## 2024-03-03 RX ADMIN — Medication 1: at 12:58

## 2024-03-03 NOTE — DISCHARGE NOTE PROVIDER - CARE PROVIDER_API CALL
Mara Higgins  Internal Medicine  [address] [facility] [address] [place] [address]  Phone: [phone]  Fax: [phone]  Follow Up Time: 1 week    Cem Rees)  Nephrology  [address] [place] [address], [address]  [place] [address]  Phone: [phone]  Fax: [phone]  Established Patient  Follow Up Time: 1 week

## 2024-03-03 NOTE — DISCHARGE NOTE PROVIDER - NSDCCPCAREPLAN_GEN_ALL_CORE_FT
PRINCIPAL DISCHARGE DIAGNOSIS  Diagnosis: Hyperkalemia  Assessment and Plan of Treatment: Your potassium was elevated, now resolved. Continue taking Lokelma as directed.  Follow up with your PCP and Nephrologist.         SECONDARY DISCHARGE DIAGNOSES  Diagnosis: Stage 5 chronic kidney disease not on chronic dialysis  Assessment and Plan of Treatment: Avoid taking (NSAIDs) - (ex: Ibuprofen, Advil, Celebrex, Naprosyn)  Avoid taking any nephrotoxic agents (can harm kidneys) - Intravenous contrast for diagnostic testing, combination cold medications.  Have all medications adjusted for your renal function by your Health Care Provider.  Blood pressure control is important.  Take all medication as prescribed.       PRINCIPAL DISCHARGE DIAGNOSIS  Diagnosis: Hyperkalemia  Assessment and Plan of Treatment: Your potassium was elevated, now resolved. Continue taking Lokelma as directed.  Follow up with your PCP and Nephrologist.         SECONDARY DISCHARGE DIAGNOSES  Diagnosis: Stage 5 chronic kidney disease not on chronic dialysis  Assessment and Plan of Treatment: Avoid taking (NSAIDs) - (ex: Ibuprofen, Advil, Celebrex, Naprosyn)  Avoid taking any nephrotoxic agents (can harm kidneys) - Intravenous contrast for diagnostic testing, combination cold medications.  Have all medications adjusted for your renal function by your Health Care Provider.  Blood pressure control is important.  Take all medication as prescribed.      Diagnosis: HLD (hyperlipidemia)  Assessment and Plan of Treatment:     Diagnosis: Diabetes  Assessment and Plan of Treatment: resume homemeds    Diagnosis: HTN (hypertension)  Assessment and Plan of Treatment: continue home meds except losartan and torsemide

## 2024-03-03 NOTE — DISCHARGE NOTE PROVIDER - HOSPITAL COURSE
HPI:  72yo M with PMH HTN, HLD, DMT2, advanced CKD with recent LUE AV fistula placement (no dialysis yet), presenting with hyperkalemia on outpatient labs. Reports he did not take his lokelma for the last 4-5 days because he was feeling well, today called regarding elevated potassium and reports he took his lokelma 10g dose this morning. Reports intermittent L-sided CP, none currently. Denies any fever/chills, SOB, abdominal pain, n/v, LE pain/swelling, numbness/tingling, muscle pain (01 Mar 2024 14:18)    Hospital Course:  72yo M with PMH HTN, HLD, DMT2, advanced CKD with recent LUE AV fistula placement (no dialysis yet), presenting with hyperkalemia on outpatient labs admitted for treatment and management.  Nephrology consulted. Hyperkalemia i/s/o advanced CKD and medication non-compliance. Potassium on admission labs 6.3, received medical management- insulin, D50, Lokelma 10mg. Now improved.  Pt started on Loklema 10g TID, now QD. Recommended to increase PO Bicarb to 1300mg TID.  Pt w/ stage 5 CKD. Pt aware he will need long term HD in the near future - underwent LUE AVF creation on 4/6/23.  Creatinine stable. No evidence of uremia of encephalopathy. Per nephro, no urgent indication for HD for now.     Important Medication Changes and Reason:    Active or Pending Issues Requiring Follow-up:  Follow up with nephrology Dr. Rees    Advanced Directives:   [x ] Full code  [ ] DNR  [ ] Hospice    Discharge Diagnoses:  Hyperkalemia  CKD       HPI:  72yo M with PMH HTN, HLD, DMT2, advanced CKD with recent LUE AV fistula placement (no dialysis yet), presenting with hyperkalemia on outpatient labs. Reports he did not take his lokelma for the last 4-5 days because he was feeling well, today called regarding elevated potassium and reports he took his Lokelma 10g dose this morning. Reports intermittent L-sided CP, none currently. Denies any fever/chills, SOB, abdominal pain, n/v, LE pain/swelling, numbness/tingling, muscle pain (01 Mar 2024 14:18)    Hospital Course:  72yo M with PMH HTN, HLD, DMT2, advanced CKD with recent LUE AV fistula placement (no dialysis yet), presenting with hyperkalemia on outpatient labs admitted for treatment and management.  Nephrology consulted. Hyperkalemia i/s/o advanced CKD and medication non-compliance. Potassium on admission labs 6.3, received medical management- insulin, D50, Lokelma 10mg. Now improved.  Pt started on Loklema 10g TID, now QD. Recommended to increase PO Bicarb to 1300mg TID.  Pt w/ stage 5 CKD. Pt aware he will need long term HD in the near future - underwent LUE AVF creation on 4/6/23.  Creatinine stable. No evidence of uremia of encephalopathy. Per nephro, no urgent indication for HD for now.     Important Medication Changes and Reason:    Active or Pending Issues Requiring Follow-up:  Follow up with nephrology Dr. Rees    Advanced Directives:   [x ] Full code  [ ] DNR  [ ] Hospice    Discharge Diagnoses:  Hyperkalemia  CKD       HPI:  72yo M with PMH HTN, HLD, DMT2, advanced CKD with recent LUE AV fistula placement (no dialysis yet), presenting with hyperkalemia on outpatient labs. Reports he did not take his lokelma for the last 4-5 days because he was feeling well, today called regarding elevated potassium and reports he took his Lokelma 10g dose this morning. Reports intermittent L-sided CP, none currently. Denies any fever/chills, SOB, abdominal pain, n/v, LE pain/swelling, numbness/tingling, muscle pain (01 Mar 2024 14:18)    Hospital Course:  72yo M with PMH HTN, HLD, DMT2, advanced CKD with recent LUE AV fistula placement (no dialysis yet), presenting with hyperkalemia on outpatient labs admitted for treatment and management.  Nephrology consulted. Hyperkalemia i/s/o advanced CKD and medication non-compliance. Potassium on admission labs 6.3, received medical management- insulin, D50, Lokelma 10mg. Now improved.  Pt started on Loklema 10g TID, now QD. Recommended to increase PO Bicarb to 1300mg TID.  Pt w/ stage 5 CKD. Pt aware he will need long term HD in the near future - underwent LUE AVF creation on 4/6/23.  Creatinine stable. No evidence of uremia of encephalopathy. Per nephro, no urgent indication for HD for now.   Pt medically stable to be discharged home. Discharge discussed     Important Medication Changes and Reason:    Active or Pending Issues Requiring Follow-up:  Follow up with nephrology Dr. Rees    Advanced Directives:   [x ] Full code  [ ] DNR  [ ] Hospice    Discharge Diagnoses:  Hyperkalemia  CKD

## 2024-03-03 NOTE — DISCHARGE NOTE PROVIDER - PROVIDER TOKENS
PROVIDER:[TOKEN:[[id number]],FOLLOWUP:[1 week]],PROVIDER:[TOKEN:[[id number]],FOLLOWUP:[1 week],ESTABLISHEDPATIENT:[T]]

## 2024-03-03 NOTE — PHYSICAL THERAPY INITIAL EVALUATION ADULT - ADDITIONAL COMMENTS
Patient lives with spouse in private coop building with 5 RAMAN, no stairs inside.  PTA, pt was independent with all mobility with occasional use of cane if he felt like he needed it.

## 2024-03-03 NOTE — DISCHARGE NOTE PROVIDER - NSDCFUSCHEDAPPT_GEN_ALL_CORE_FT
Cem Rees  [facility]  NEPH[address]  Scheduled Appointment: 03/14/2024    Lalo Ta  [facility]  [address]CR[address]  Scheduled Appointment: 04/09/2024    Jane Mcconnell  [facility]  [address]  Scheduled Appointment: 04/25/2024

## 2024-03-03 NOTE — PHYSICAL THERAPY INITIAL EVALUATION ADULT - PERTINENT HX OF CURRENT PROBLEM, REHAB EVAL
74 y/o M with PMH HTN, HLD, DMT2, advanced CKD with recent LUE AV fistula placement (no dialysis yet), presenting with hyperkalemia on outpatient labs. Patient admitted for treatment and management.

## 2024-03-03 NOTE — DISCHARGE NOTE PROVIDER - NSDCMRMEDTOKEN_GEN_ALL_CORE_FT
amLODIPine 5 mg oral tablet: 1 tab(s) orally once a day  aspirin 81 mg oral delayed release tablet: 1 tab(s) orally once a day  atorvastatin 40 mg oral tablet: 1 tab(s) orally once a day  carvedilol 12.5 mg oral tablet: 1 tab(s) orally 2 times a day  ergocalciferol 1.25 mg (50,000 intl units) oral capsule: 1 cap(s) orally once a week  glipiZIDE 5 mg oral tablet: 1 tab(s) orally once a day  Januvia 25 mg oral tablet: 1 tab(s) orally once a day  levothyroxine 50 mcg (0.05 mg) oral capsule: 1 cap(s) orally  Lokelma 10 g oral powder for reconstitution: 10 gram(s) orally 2 times a week on Mondays and Thursdays  sodium bicarbonate 650 mg oral tablet: 1 tab(s) orally 2 times a day  torsemide 20 mg oral tablet: 1 tab(s) orally once a day  Vascepa 1 g oral capsule: 2 cap(s) orally once a day   amLODIPine 5 mg oral tablet: 1 tab(s) orally once a day  aspirin 81 mg oral delayed release tablet: 1 tab(s) orally once a day  atorvastatin 40 mg oral tablet: 1 tab(s) orally once a day  carvedilol 12.5 mg oral tablet: 1 tab(s) orally 2 times a day  ergocalciferol 1.25 mg (50,000 intl units) oral capsule: 1 cap(s) orally once a week  glipiZIDE 5 mg oral tablet: 1 tab(s) orally once a day  hydrALAZINE 100 mg oral tablet: 1 tab(s) orally 3 times a day  Januvia 25 mg oral tablet: 1 tab(s) orally once a day  levothyroxine 50 mcg (0.05 mg) oral capsule: 1 cap(s) orally  Lokelma 10 g oral powder for reconstitution: 10 gram(s) orally once a day  Multiple Vitamins oral tablet: 1 tab(s) orally once a day  sodium bicarbonate 650 mg oral tablet: 2 tab(s) orally 3 times a day  Vascepa 1 g oral capsule: 2 cap(s) orally once a day   amLODIPine 5 mg oral tablet: 1 tab(s) orally once a day  aspirin 81 mg oral delayed release tablet: 1 tab(s) orally once a day  atorvastatin 40 mg oral tablet: 1 tab(s) orally once a day  carvedilol 12.5 mg oral tablet: 1 tab(s) orally 2 times a day  ergocalciferol 1.25 mg (50,000 intl units) oral capsule: 1 cap(s) orally once a week  glipiZIDE 5 mg oral tablet: 1 tab(s) orally once a day  hydrALAZINE 100 mg oral tablet: 1 tab(s) orally 3 times a day  Januvia 25 mg oral tablet: 1 tab(s) orally once a day  levothyroxine 50 mcg (0.05 mg) oral capsule: 1 cap(s) orally  Lokelma 10 g oral powder for reconstitution: 10 gram(s) orally once a day  Multiple Vitamins oral tablet: 1 tab(s) orally once a day  Physical Therapy: for evaluation and treatment  sodium bicarbonate 650 mg oral tablet: 2 tab(s) orally 3 times a day  Vascepa 1 g oral capsule: 2 cap(s) orally once a day

## 2024-03-03 NOTE — DISCHARGE NOTE PROVIDER - CARE PROVIDERS DIRECT ADDRESSES
,DirectAddress_Unknown,[email or web address]

## 2024-03-04 ENCOUNTER — TRANSCRIPTION ENCOUNTER (OUTPATIENT)
Age: 74
End: 2024-03-04

## 2024-03-04 VITALS — WEIGHT: 185.19 LBS

## 2024-03-04 LAB
ANION GAP SERPL CALC-SCNC: 15 MMOL/L — SIGNIFICANT CHANGE UP (ref 5–17)
BUN SERPL-MCNC: 101 MG/DL — HIGH (ref 7–23)
CALCIUM SERPL-MCNC: 8.6 MG/DL — SIGNIFICANT CHANGE UP (ref 8.4–10.5)
CHLORIDE SERPL-SCNC: 105 MMOL/L — SIGNIFICANT CHANGE UP (ref 96–108)
CO2 SERPL-SCNC: 24 MMOL/L — SIGNIFICANT CHANGE UP (ref 22–31)
CREAT SERPL-MCNC: 5.58 MG/DL — HIGH (ref 0.5–1.3)
EGFR: 10 ML/MIN/1.73M2 — LOW
GLUCOSE BLDC GLUCOMTR-MCNC: 176 MG/DL — HIGH (ref 70–99)
GLUCOSE BLDC GLUCOMTR-MCNC: 185 MG/DL — HIGH (ref 70–99)
GLUCOSE BLDC GLUCOMTR-MCNC: 265 MG/DL — HIGH (ref 70–99)
GLUCOSE SERPL-MCNC: 109 MG/DL — HIGH (ref 70–99)
HCT VFR BLD CALC: 25.7 % — LOW (ref 39–50)
HGB BLD-MCNC: 8.2 G/DL — LOW (ref 13–17)
IRON SATN MFR SERPL: 42 % — SIGNIFICANT CHANGE UP (ref 16–55)
IRON SATN MFR SERPL: 94 UG/DL — SIGNIFICANT CHANGE UP (ref 45–165)
MAGNESIUM SERPL-MCNC: 2.4 MG/DL — SIGNIFICANT CHANGE UP (ref 1.6–2.6)
MCHC RBC-ENTMCNC: 30.7 PG — SIGNIFICANT CHANGE UP (ref 27–34)
MCHC RBC-ENTMCNC: 31.9 GM/DL — LOW (ref 32–36)
MCV RBC AUTO: 96.3 FL — SIGNIFICANT CHANGE UP (ref 80–100)
NRBC # BLD: 0 /100 WBCS — SIGNIFICANT CHANGE UP (ref 0–0)
PHOSPHATE SERPL-MCNC: 6.6 MG/DL — HIGH (ref 2.5–4.5)
PLATELET # BLD AUTO: 152 K/UL — SIGNIFICANT CHANGE UP (ref 150–400)
POTASSIUM SERPL-MCNC: 4.7 MMOL/L — SIGNIFICANT CHANGE UP (ref 3.5–5.3)
POTASSIUM SERPL-SCNC: 4.7 MMOL/L — SIGNIFICANT CHANGE UP (ref 3.5–5.3)
RBC # BLD: 2.67 M/UL — LOW (ref 4.2–5.8)
RBC # FLD: 13.2 % — SIGNIFICANT CHANGE UP (ref 10.3–14.5)
SODIUM SERPL-SCNC: 144 MMOL/L — SIGNIFICANT CHANGE UP (ref 135–145)
TIBC SERPL-MCNC: 222 UG/DL — SIGNIFICANT CHANGE UP (ref 220–430)
UIBC SERPL-MCNC: 128 UG/DL — SIGNIFICANT CHANGE UP (ref 110–370)
WBC # BLD: 8.92 K/UL — SIGNIFICANT CHANGE UP (ref 3.8–10.5)
WBC # FLD AUTO: 8.92 K/UL — SIGNIFICANT CHANGE UP (ref 3.8–10.5)

## 2024-03-04 PROCEDURE — 96375 TX/PRO/DX INJ NEW DRUG ADDON: CPT

## 2024-03-04 PROCEDURE — 36410 VNPNXR 3YR/> PHY/QHP DX/THER: CPT

## 2024-03-04 PROCEDURE — 82947 ASSAY GLUCOSE BLOOD QUANT: CPT

## 2024-03-04 PROCEDURE — 85018 HEMOGLOBIN: CPT

## 2024-03-04 PROCEDURE — 84484 ASSAY OF TROPONIN QUANT: CPT

## 2024-03-04 PROCEDURE — 82746 ASSAY OF FOLIC ACID SERUM: CPT

## 2024-03-04 PROCEDURE — 83735 ASSAY OF MAGNESIUM: CPT

## 2024-03-04 PROCEDURE — 83605 ASSAY OF LACTIC ACID: CPT

## 2024-03-04 PROCEDURE — 99285 EMERGENCY DEPT VISIT HI MDM: CPT | Mod: 25

## 2024-03-04 PROCEDURE — 83550 IRON BINDING TEST: CPT

## 2024-03-04 PROCEDURE — 84443 ASSAY THYROID STIM HORMONE: CPT

## 2024-03-04 PROCEDURE — 83036 HEMOGLOBIN GLYCOSYLATED A1C: CPT

## 2024-03-04 PROCEDURE — 96374 THER/PROPH/DIAG INJ IV PUSH: CPT

## 2024-03-04 PROCEDURE — 82607 VITAMIN B-12: CPT

## 2024-03-04 PROCEDURE — 85014 HEMATOCRIT: CPT

## 2024-03-04 PROCEDURE — 99232 SBSQ HOSP IP/OBS MODERATE 35: CPT | Mod: GC

## 2024-03-04 PROCEDURE — 80053 COMPREHEN METABOLIC PANEL: CPT

## 2024-03-04 PROCEDURE — 82803 BLOOD GASES ANY COMBINATION: CPT

## 2024-03-04 PROCEDURE — 85027 COMPLETE CBC AUTOMATED: CPT

## 2024-03-04 PROCEDURE — 83540 ASSAY OF IRON: CPT

## 2024-03-04 PROCEDURE — 82330 ASSAY OF CALCIUM: CPT

## 2024-03-04 PROCEDURE — 83880 ASSAY OF NATRIURETIC PEPTIDE: CPT

## 2024-03-04 PROCEDURE — 84100 ASSAY OF PHOSPHORUS: CPT

## 2024-03-04 PROCEDURE — 76937 US GUIDE VASCULAR ACCESS: CPT

## 2024-03-04 PROCEDURE — 85025 COMPLETE CBC W/AUTO DIFF WBC: CPT

## 2024-03-04 PROCEDURE — 36415 COLL VENOUS BLD VENIPUNCTURE: CPT

## 2024-03-04 PROCEDURE — 80048 BASIC METABOLIC PNL TOTAL CA: CPT

## 2024-03-04 PROCEDURE — 82962 GLUCOSE BLOOD TEST: CPT

## 2024-03-04 PROCEDURE — 71045 X-RAY EXAM CHEST 1 VIEW: CPT

## 2024-03-04 PROCEDURE — 84132 ASSAY OF SERUM POTASSIUM: CPT

## 2024-03-04 PROCEDURE — 97161 PT EVAL LOW COMPLEX 20 MIN: CPT

## 2024-03-04 PROCEDURE — 84295 ASSAY OF SERUM SODIUM: CPT

## 2024-03-04 PROCEDURE — 82435 ASSAY OF BLOOD CHLORIDE: CPT

## 2024-03-04 PROCEDURE — 82728 ASSAY OF FERRITIN: CPT

## 2024-03-04 RX ORDER — SODIUM BICARBONATE 1 MEQ/ML
1 SYRINGE (ML) INTRAVENOUS
Qty: 180 | Refills: 0 | DISCHARGE
Start: 2024-03-04 | End: 2024-04-02

## 2024-03-04 RX ORDER — SODIUM BICARBONATE 1 MEQ/ML
2 SYRINGE (ML) INTRAVENOUS
Qty: 180 | Refills: 0
Start: 2024-03-04 | End: 2024-04-02

## 2024-03-04 RX ORDER — HYDRALAZINE HCL 50 MG
1 TABLET ORAL
Qty: 0 | Refills: 0 | DISCHARGE
Start: 2024-03-04

## 2024-03-04 RX ORDER — SODIUM ZIRCONIUM CYCLOSILICATE 10 G/10G
10 POWDER, FOR SUSPENSION ORAL
Refills: 0 | DISCHARGE

## 2024-03-04 RX ORDER — SODIUM BICARBONATE 1 MEQ/ML
1 SYRINGE (ML) INTRAVENOUS
Refills: 0 | DISCHARGE

## 2024-03-04 RX ORDER — SODIUM ZIRCONIUM CYCLOSILICATE 10 G/10G
10 POWDER, FOR SUSPENSION ORAL
Qty: 300 | Refills: 2
Start: 2024-03-04 | End: 2024-06-01

## 2024-03-04 RX ADMIN — Medication 100 MILLIGRAM(S): at 06:24

## 2024-03-04 RX ADMIN — SODIUM ZIRCONIUM CYCLOSILICATE 10 GRAM(S): 10 POWDER, FOR SUSPENSION ORAL at 12:16

## 2024-03-04 RX ADMIN — Medication 1: at 09:23

## 2024-03-04 RX ADMIN — Medication 1: at 17:26

## 2024-03-04 RX ADMIN — Medication 667 MILLIGRAM(S): at 09:23

## 2024-03-04 RX ADMIN — Medication 1300 MILLIGRAM(S): at 06:24

## 2024-03-04 RX ADMIN — CARVEDILOL PHOSPHATE 12.5 MILLIGRAM(S): 80 CAPSULE, EXTENDED RELEASE ORAL at 17:27

## 2024-03-04 RX ADMIN — Medication 667 MILLIGRAM(S): at 17:27

## 2024-03-04 RX ADMIN — Medication 3: at 12:16

## 2024-03-04 RX ADMIN — Medication 2 UNIT(S): at 12:15

## 2024-03-04 RX ADMIN — Medication 2 UNIT(S): at 09:23

## 2024-03-04 RX ADMIN — Medication 100 MILLIGRAM(S): at 14:33

## 2024-03-04 RX ADMIN — CARVEDILOL PHOSPHATE 12.5 MILLIGRAM(S): 80 CAPSULE, EXTENDED RELEASE ORAL at 06:25

## 2024-03-04 RX ADMIN — Medication 30 MILLILITER(S): at 14:34

## 2024-03-04 RX ADMIN — Medication 2 UNIT(S): at 17:26

## 2024-03-04 RX ADMIN — Medication 1 TABLET(S): at 12:17

## 2024-03-04 RX ADMIN — POLYETHYLENE GLYCOL 3350 17 GRAM(S): 17 POWDER, FOR SOLUTION ORAL at 12:17

## 2024-03-04 RX ADMIN — Medication 30 MILLILITER(S): at 06:26

## 2024-03-04 RX ADMIN — Medication 667 MILLIGRAM(S): at 12:16

## 2024-03-04 RX ADMIN — PANTOPRAZOLE SODIUM 40 MILLIGRAM(S): 20 TABLET, DELAYED RELEASE ORAL at 07:17

## 2024-03-04 RX ADMIN — Medication 1300 MILLIGRAM(S): at 14:34

## 2024-03-04 RX ADMIN — AMLODIPINE BESYLATE 10 MILLIGRAM(S): 2.5 TABLET ORAL at 06:25

## 2024-03-04 RX ADMIN — Medication 81 MILLIGRAM(S): at 12:17

## 2024-03-04 RX ADMIN — Medication 25 MICROGRAM(S): at 06:25

## 2024-03-04 NOTE — PROGRESS NOTE ADULT - PROBLEM SELECTOR PLAN 1
resolved   discharge on Lokelma 10 gm qd  renal help appreciated  low K diet reinforced at length again  nutrition evaluation done  patient aware of foods to avoid
In the setting of advanced CKD and medication non compliance. Initial labs in ER showed serum potassium elevated at 6.3 (non hemolyzed). Pt received medical management- insulin, D50, Lokelma 10mg. Repeat serum potassium improved to 5.5 and today it is 4.6. Continue Lokelma 10g daily. Continue oral bicarb 1300mg TID. Low potassium diet. Monitor serum potassium.
In the setting of advanced CKD and medication non compliance. Initial labs in ER showed serum potassium elevated at 6.3 (non hemolyzed). Pt received medical management- insulin, D50, Lokelma 10mg. Repeat serum potassium improved to 5.5 and today it is 5.6. Recommend to give insulin, D50, Lokelma 10g TID for now. Increase the bicarb PO to 1300 TID. Monitor BMP. Low potassium diet. Monitor serum potassium.
In the setting of advanced CKD and medication non compliance. Initial labs in ER showed serum potassium elevated at 6.3 (non hemolyzed). Pt received medical management- insulin, D50, Lokelma 10mg. Repeat serum potassium improved to 5.5 and today it is 5.6. Recommend to give insulin, D50, Lokelma 10g TID for now. Increase the bicarb PO to 1300 TID. Monitor BMP. Low potassium diet. Monitor serum potassium.
persistent still repeat K 5.5  reorder Lokelma 10 gm ATC till normal   renal help appreciated  continue to follow recommendations  trend K daily  low K diet reinforced at length  patient with a handful of grapes at bedside  nutrition evaluation requested
resolved   change Lokelma 10 gm to qd   renal help appreciated  continue to follow recommendations  trend K daily  low K diet reinforced at length again  nutrition evaluation

## 2024-03-04 NOTE — DIETITIAN INITIAL EVALUATION ADULT - LITERATURE/VIDEOS GIVEN
Chronic Kidney Disease Stage 3 to 5 Nutrition Therapy  Sodium Content of Foods  Potassium Content of Foods  Phosphorus Content of Foods

## 2024-03-04 NOTE — PROGRESS NOTE ADULT - ASSESSMENT
73 year old Male with PMHx of advanced CKD, HTN, HLD, DM2 well known to our service from prior admission presents at [facility] for hyperkalemia noted on outpatient labs. Initial labs in ER concerning for hyperkalemia. Nephrology consulted for advanced CKD and hyperkalemia.
73 year old Male with PMHx of advanced CKD, HTN, HLD, DM2 well known to our service from prior admission presents at [facility] for hyperkalemia noted on outpatient labs. Initial labs in ER concerning for hyperkalemia. Nephrology consulted for advanced CKD and hyperkalemia.
73 year old Male with PMHx of advanced CKD, HTN, HLD, DM2 well known to our service from prior admission presents at [facility] for hyperkalemia noted on outpatient labs. Initial labs in ER concerning for hyperkalemia. Nephrology consulted for advanced CKD and hyperkalemia.
72yo M with PMH HTN, HLD, DMT2, advanced CKD with recent LUE AV fistula placement (no dialysis yet), presenting with hyperkalemia on outpatient labs admitted for treatment and management 

## 2024-03-04 NOTE — DIETITIAN INITIAL EVALUATION ADULT - ORAL INTAKE PTA/DIET HISTORY
Pt was eating well with no changes in appetite. Pt was not following therapeutic diet, eating a lot of take- out foods. Confirms no known food allergies. Denies Hx of chewing or swallowing issues. Denies GI distress. Denies oral nutrient supplement use. Was taking MVI and Vitamin D2 per H&P.

## 2024-03-04 NOTE — PROGRESS NOTE ADULT - SUBJECTIVE AND OBJECTIVE BOX
[facility] DIVISION OF KIDNEY DISEASES AND HYPERTENSION -- FOLLOW UP NOTE  --------------------------------------------------------------------------------  Chief Complaint:/subjective:  no complaints  no n/v/metallic taste    24 hour events:  lasix held, lokelma tiid given, bicarb tabs       PAST HISTORY  --------------------------------------------------------------------------------  No significant changes to PMH, PSH, FHx, SHx, unless otherwise noted    ALLERGIES & MEDICATIONS  --------------------------------------------------------------------------------  Allergies    No Known Allergies    Intolerances      Standing Inpatient Medications  amLODIPine   Tablet 10 milliGRAM(s) Oral daily  aspirin enteric coated 81 milliGRAM(s) Oral daily  atorvastatin 40 milliGRAM(s) Oral at bedtime  calcium acetate 667 milliGRAM(s) Oral three times a day with meals  carvedilol 12.5 milliGRAM(s) Oral every 12 hours  citric acid/sodium citrate Solution 30 milliLiter(s) Oral three times a day  dextrose 5%. 1000 milliLiter(s) IV Continuous <Continuous>  dextrose 5%. 1000 milliLiter(s) IV Continuous <Continuous>  dextrose 50% Injectable 25 Gram(s) IV Push once  dextrose 50% Injectable 12.5 Gram(s) IV Push once  dextrose 50% Injectable 25 Gram(s) IV Push once  glucagon  Injectable 1 milliGRAM(s) IntraMuscular once  hydrALAZINE 100 milliGRAM(s) Oral three times a day  insulin glargine Injectable (LANTUS) 5 Unit(s) SubCutaneous at bedtime  insulin lispro (ADMELOG) corrective regimen sliding scale   SubCutaneous three times a day before meals  insulin lispro (ADMELOG) corrective regimen sliding scale   SubCutaneous at bedtime  insulin lispro Injectable (ADMELOG) 2 Unit(s) SubCutaneous three times a day before meals  levothyroxine 25 MICROGram(s) Oral daily  multivitamin 1 Tablet(s) Oral daily  pantoprazole    Tablet 40 milliGRAM(s) Oral before breakfast  polyethylene glycol 3350 17 Gram(s) Oral daily  sodium bicarbonate 1300 milliGRAM(s) Oral three times a day  sodium zirconium cyclosilicate 10 Gram(s) Oral every 8 hours    PRN Inpatient Medications  acetaminophen     Tablet .. 650 milliGRAM(s) Oral every 6 hours PRN  dextrose Oral Gel 15 Gram(s) Oral once PRN      REVIEW OF SYSTEMS  --------------------------------------------------------------------------------  Gen: No weight changes, fatigue, fevers/chills, weakness  Skin: No rashes  Head/Eyes/Ears/Mouth: No headache;   Respiratory: No dyspnea, cough  CV: No chest pain, PND, orthopnea  GI: No abdominal pain, diarrhea, constipation, nausea, vomiting  : No increased frequency, dysuria, hematuria, nocturia  MSK: No joint pain/swelling; no back pain; no edema  Neuro: No dizziness/lightheadedness, weakness  Heme: No easy bruising or bleeding  Psych: No significant nervousness, anxiety, stress, depression    All other systems were reviewed and are negative, except as noted.    VITALS/PHYSICAL EXAM  --------------------------------------------------------------------------------  T(C): 36.5 (03-03-24 @ 09:04), Max: 36.7 (03-02-24 @ 13:29)  HR: 59 (03-03-24 @ 09:04) (58 - 67)  BP: 126/55 (03-03-24 @ 09:04) (111/62 - 164/66)  RR: 18 (03-03-24 @ 09:04) (17 - 18)  SpO2: 99% (03-03-24 @ 09:04) (99% - 100%)  Wt(kg): --  Adult Advanced Hemodynamics Last 24 Hrs  ABP: --  ABP(mean): --  CVP(mm Hg): --  CO: --  CI: --  PA: --  PA(mean): --  PCWP: --  SVR: --  SVRI: --  Height (cm): 180.3 (03-01-24 @ 09:56)  Weight (kg): 83.9 (03-01-24 @ 09:56)  BMI (kg/m2): 25.8 (03-01-24 @ 09:56)  BSA (m2): 2.04 (03-01-24 @ 09:56)      03-02-24 @ 07:01  -  03-03-24 @ 07:00  --------------------------------------------------------  IN: 1260 mL / OUT: 2700 mL / NET: -1440 mL    03-03-24 @ 07:01  -  03-03-24 @ 09:33  --------------------------------------------------------  IN: 350 mL / OUT: 0 mL / NET: 350 mL      Physical Exam:  	  Physical Exam:  Gen: NAD  HEENT: Anicteric  Pulm: CTA B/L  CV: S1S2+  Abd: Soft, +BS    Ext: No LE edema B/L  Neuro: Awake  Skin: Warm and dry   Dialysis access: LUE AVF thrill felt, bruit heard.  .   LABS/STUDIES  --------------------------------------------------------------------------------              8.2    8.03  >-----------<  155      [03-03-24 @ 07:58]              24.9     Hemoglobin: 8.2 g/dL (03-03-24 @ 07:58)  Hemoglobin: 8.2 g/dL (03-02-24 @ 06:52)    Platelet Count - Automated: 155 K/uL (03-03-24 @ 07:58)  Platelet Count - Automated: 174 K/uL (03-02-24 @ 06:52)    141  |  103  |  102  ----------------------------<  86      [03-03-24 @ 07:58]  4.6   |  23  |  5.41        Ca     8.2     [03-03-24 @ 07:58]      Mg     2.2     [03-03-24 @ 07:58]      Phos  5.9     [03-03-24 @ 07:58]    TPro  7.8  /  Alb  4.4  /  TBili  0.4  /  DBili  x   /  AST  28  /  ALT  49  /  AlkPhos  105  [03-01-24 @ 11:49]          Creatinine Trend:  SCr 5.41 [03-03 @ 07:58]  SCr 5.54 [03-02 @ 08:59]  SCr 4.57 [03-01 @ 14:53]  SCr 4.62 [03-01 @ 11:49]    Urinalysis - [03-03-24 @ 07:58]      Color  / Appearance  / SG  / pH       Gluc 86 / Ketone   / Bili  / Urobili        Blood  / Protein  / Leuk Est  / Nitrite       RBC  / WBC  / Hyaline  / Gran  / Sq Epi  / Non Sq Epi  / Bacteria       Iron 70, TIBC 201, %sat 35      [04-30-23 @ 06:44]  Ferritin 917      [03-02-24 @ 09:58]  TSH 2.29      [03-02-24 @ 09:58]  Lipid: chol 109, , HDL 35, LDL --      [04-02-23 @ 06:48]      
Patient is a 73y old  Male who presents with a chief complaint of hyperkalemia (01 Mar 2024 16:44)      DATE OF SERVICE: 03-02-24 @ 12:39    SUBJECTIVE / OVERNIGHT EVENTS: overnight events noted    ROS:  Resp: No cough no sputum production  CVS: No chest pain no palpitations no orthopnea  GI: no N/V/D  'I feel fine'       MEDICATIONS  (STANDING):  amLODIPine   Tablet 10 milliGRAM(s) Oral daily  aspirin enteric coated 81 milliGRAM(s) Oral daily  atorvastatin 40 milliGRAM(s) Oral at bedtime  calcium acetate 667 milliGRAM(s) Oral three times a day with meals  carvedilol 12.5 milliGRAM(s) Oral every 12 hours  citric acid/sodium citrate Solution 30 milliLiter(s) Oral three times a day  dextrose 5%. 1000 milliLiter(s) (50 mL/Hr) IV Continuous <Continuous>  dextrose 5%. 1000 milliLiter(s) (100 mL/Hr) IV Continuous <Continuous>  dextrose 50% Injectable 12.5 Gram(s) IV Push once  dextrose 50% Injectable 25 Gram(s) IV Push once  dextrose 50% Injectable 25 Gram(s) IV Push once  furosemide    Tablet 80 milliGRAM(s) Oral two times a day  glucagon  Injectable 1 milliGRAM(s) IntraMuscular once  hydrALAZINE 100 milliGRAM(s) Oral three times a day  insulin glargine Injectable (LANTUS) 5 Unit(s) SubCutaneous at bedtime  insulin lispro (ADMELOG) corrective regimen sliding scale   SubCutaneous three times a day before meals  insulin lispro (ADMELOG) corrective regimen sliding scale   SubCutaneous at bedtime  insulin lispro Injectable (ADMELOG) 2 Unit(s) SubCutaneous three times a day before meals  levothyroxine 25 MICROGram(s) Oral daily  multivitamin 1 Tablet(s) Oral daily  pantoprazole    Tablet 40 milliGRAM(s) Oral before breakfast  polyethylene glycol 3350 17 Gram(s) Oral daily  sodium bicarbonate 650 milliGRAM(s) Oral three times a day  sodium zirconium cyclosilicate 10 Gram(s) Oral every 8 hours    MEDICATIONS  (PRN):  acetaminophen     Tablet .. 650 milliGRAM(s) Oral every 6 hours PRN Temp greater or equal to 38C (100.4F), Mild Pain (1 - 3)  dextrose Oral Gel 15 Gram(s) Oral once PRN Blood Glucose LESS THAN 70 milliGRAM(s)/deciliter        CAPILLARY BLOOD GLUCOSE      POCT Blood Glucose.: 114 mg/dL (02 Mar 2024 12:26)  POCT Blood Glucose.: 100 mg/dL (02 Mar 2024 10:03)  POCT Blood Glucose.: 114 mg/dL (02 Mar 2024 08:30)  POCT Blood Glucose.: 168 mg/dL (01 Mar 2024 21:32)  POCT Blood Glucose.: 80 mg/dL (01 Mar 2024 18:02)  POCT Blood Glucose.: 67 mg/dL (01 Mar 2024 17:57)  POCT Blood Glucose.: 230 mg/dL (01 Mar 2024 14:32)  POCT Blood Glucose.: 84 mg/dL (01 Mar 2024 13:04)    I&O's Summary    01 Mar 2024 07:01  -  02 Mar 2024 07:00  --------------------------------------------------------  IN: 720 mL / OUT: 300 mL / NET: 420 mL    02 Mar 2024 07:01  -  02 Mar 2024 12:39  --------------------------------------------------------  IN: 300 mL / OUT: 700 mL / NET: -400 mL        Vital Signs Last 24 Hrs  T(C): 36.6 (02 Mar 2024 08:28), Max: 36.9 (01 Mar 2024 17:14)  T(F): 97.8 (02 Mar 2024 08:28), Max: 98.5 (02 Mar 2024 05:10)  HR: 63 (02 Mar 2024 08:28) (57 - 78)  BP: 137/53 (02 Mar 2024 08:28) (137/53 - 176/63)  BP(mean): 77 (01 Mar 2024 17:14) (77 - 77)  RR: 18 (02 Mar 2024 08:28) (14 - 18)  SpO2: 97% (02 Mar 2024 08:28) (97% - 100%)    PHYSICAL EXAM:  EYES: EOMI, PERRLA,  NECK: Supple, No JVD  CHEST/LUNG: clear   HEART: S1 S2; no murmurs   ABDOMEN: Soft, Nontender  EXTREMITIES:  no edema  NEUROLOGY: AO x 3 non-focal  SKIN: No rashes or lesions    LABS:                        8.2    8.79  )-----------( 174      ( 02 Mar 2024 06:52 )             26.6     03-02    142  |  109<H>  |  102<H>  ----------------------------<  141<H>  5.6<H>   |  13<L>  |  5.54<H>    Ca    7.9<L>      02 Mar 2024 08:59  Phos  4.3     03-01  Mg     2.3     03-01    TPro  7.8  /  Alb  4.4  /  TBili  0.4  /  DBili  x   /  AST  28  /  ALT  49<H>  /  AlkPhos  105  03-01          Urinalysis Basic - ( 02 Mar 2024 08:59 )    Color: x / Appearance: x / SG: x / pH: x  Gluc: 141 mg/dL / Ketone: x  / Bili: x / Urobili: x   Blood: x / Protein: x / Nitrite: x   Leuk Esterase: x / RBC: x / WBC x   Sq Epi: x / Non Sq Epi: x / Bacteria: x          All consultant(s) notes reviewed and care discussed with other providers        Contact Number, Dr Day [phone]
[facility] Division of Kidney Diseases & Hypertension  FOLLOW UP NOTE  [phone]--------------------------------------------------------------------------------  Chief Complaint:Hyperkalemia      24 hour events/subjective:  Pt was seen and examined at the bedside. No acute overnight events. No fresh complaints. He mentioned that he had 3 good BMs.  Pt denies SOB/ Constipation/ Diarrhea/ Nausea/ Vomiting/ abdominal pain/ chest pain/ tingling/ numbness.         PAST HISTORY  --------------------------------------------------------------------------------  No significant changes to PMH, PSH, FHx, SHx, unless otherwise noted    ALLERGIES & MEDICATIONS  --------------------------------------------------------------------------------  Allergies    No Known Allergies    Intolerances      Standing Inpatient Medications  amLODIPine   Tablet 10 milliGRAM(s) Oral daily  aspirin enteric coated 81 milliGRAM(s) Oral daily  atorvastatin 40 milliGRAM(s) Oral at bedtime  calcium acetate 667 milliGRAM(s) Oral three times a day with meals  carvedilol 12.5 milliGRAM(s) Oral every 12 hours  citric acid/sodium citrate Solution 30 milliLiter(s) Oral three times a day  dextrose 5%. 1000 milliLiter(s) IV Continuous <Continuous>  dextrose 5%. 1000 milliLiter(s) IV Continuous <Continuous>  dextrose 50% Injectable 25 Gram(s) IV Push once  dextrose 50% Injectable 12.5 Gram(s) IV Push once  dextrose 50% Injectable 25 Gram(s) IV Push once  furosemide    Tablet 80 milliGRAM(s) Oral two times a day  glucagon  Injectable 1 milliGRAM(s) IntraMuscular once  hydrALAZINE 100 milliGRAM(s) Oral three times a day  insulin glargine Injectable (LANTUS) 5 Unit(s) SubCutaneous at bedtime  insulin lispro (ADMELOG) corrective regimen sliding scale   SubCutaneous three times a day before meals  insulin lispro (ADMELOG) corrective regimen sliding scale   SubCutaneous at bedtime  insulin lispro Injectable (ADMELOG) 2 Unit(s) SubCutaneous three times a day before meals  levothyroxine 25 MICROGram(s) Oral daily  multivitamin 1 Tablet(s) Oral daily  pantoprazole    Tablet 40 milliGRAM(s) Oral before breakfast  polyethylene glycol 3350 17 Gram(s) Oral daily  sodium bicarbonate 650 milliGRAM(s) Oral three times a day  sodium zirconium cyclosilicate 10 Gram(s) Oral every 8 hours    PRN Inpatient Medications  acetaminophen     Tablet .. 650 milliGRAM(s) Oral every 6 hours PRN  dextrose Oral Gel 15 Gram(s) Oral once PRN      REVIEW OF SYSTEMS  --------------------------------------------------------------------------------  as above.    VITALS/PHYSICAL EXAM  --------------------------------------------------------------------------------  T(C): 36.6 (03-02-24 @ 08:28), Max: 36.9 (03-01-24 @ 17:14)  HR: 63 (03-02-24 @ 08:28) (57 - 78)  BP: 137/53 (03-02-24 @ 08:28) (137/53 - 176/63)  RR: 18 (03-02-24 @ 08:28) (14 - 18)  SpO2: 97% (03-02-24 @ 08:28) (97% - 100%)  Wt(kg): --  Height (cm): 180.3 (03-01-24 @ 09:56)  Weight (kg): 83.9 (03-01-24 @ 09:56)  BMI (kg/m2): 25.8 (03-01-24 @ 09:56)  BSA (m2): 2.04 (03-01-24 @ 09:56)      03-01-24 @ 07:01  -  03-02-24 @ 07:00  --------------------------------------------------------  IN: 720 mL / OUT: 300 mL / NET: 420 mL    03-02-24 @ 07:01  -  03-02-24 @ 13:17  --------------------------------------------------------  IN: 300 mL / OUT: 700 mL / NET: -400 mL      Physical Exam:  Gen: NAD  HEENT: Anicteric  Pulm: CTA B/L  CV: S1S2+  Abd: Soft, +BS    Ext: No LE edema B/L  Neuro: Awake  Skin: Warm and dry   Dialysis access: LUE AVF thrill felt, bruit heard. But access is mobile on palpation.       LABS/STUDIES  --------------------------------------------------------------------------------              8.2    8.79  >-----------<  174      [03-02-24 @ 06:52]              26.6     142  |  109  |  102  ----------------------------<  141      [03-02-24 @ 08:59]  5.6   |  13  |  5.54        Ca     7.9     [03-02-24 @ 08:59]      Mg     2.3     [03-01-24 @ 11:49]      Phos  4.3     [03-01-24 @ 11:49]    TPro  7.8  /  Alb  4.4  /  TBili  0.4  /  DBili  x   /  AST  28  /  ALT  49  /  AlkPhos  105  [03-01-24 @ 11:49]          Creatinine Trend:  SCr 5.54 [03-02 @ 08:59]  SCr 4.57 [03-01 @ 14:53]  SCr 4.62 [03-01 @ 11:49]    Urinalysis - [03-02-24 @ 08:59]      Color  / Appearance  / SG  / pH       Gluc 141 / Ketone   / Bili  / Urobili        Blood  / Protein  / Leuk Est  / Nitrite       RBC  / WBC  / Hyaline  / Gran  / Sq Epi  / Non Sq Epi  / Bacteria           
[facility] DIVISION OF KIDNEY DISEASES AND HYPERTENSION   FOLLOW UP NOTE  --------------------------------------------------------------------------------  Chief Complaint: Pt with hyperkalemia in setting of progressive CKD     24 hour events/subjective: Pt. was seen and examined today. He states he "feels good" and hopes to go home soon. Denied n/v/f/c/sob. He is urinating without difficulty.     PAST HISTORY  --------------------------------------------------------------------------------  No significant changes to PMH, PSH, FHx, SHx, unless otherwise noted    ALLERGIES & MEDICATIONS  --------------------------------------------------------------------------------  Allergies  No Known Allergies    Intolerances    Standing Inpatient Medications  amLODIPine   Tablet 10 milliGRAM(s) Oral daily  aspirin enteric coated 81 milliGRAM(s) Oral daily  atorvastatin 40 milliGRAM(s) Oral at bedtime  calcium acetate 667 milliGRAM(s) Oral three times a day with meals  carvedilol 12.5 milliGRAM(s) Oral every 12 hours  citric acid/sodium citrate Solution 30 milliLiter(s) Oral three times a day  dextrose 5%. 1000 milliLiter(s) IV Continuous <Continuous>  dextrose 5%. 1000 milliLiter(s) IV Continuous <Continuous>  dextrose 50% Injectable 25 Gram(s) IV Push once  dextrose 50% Injectable 25 Gram(s) IV Push once  dextrose 50% Injectable 12.5 Gram(s) IV Push once  glucagon  Injectable 1 milliGRAM(s) IntraMuscular once  hydrALAZINE 100 milliGRAM(s) Oral three times a day  insulin lispro (ADMELOG) corrective regimen sliding scale   SubCutaneous three times a day before meals  insulin lispro (ADMELOG) corrective regimen sliding scale   SubCutaneous at bedtime  insulin lispro Injectable (ADMELOG) 2 Unit(s) SubCutaneous three times a day before meals  levothyroxine 25 MICROGram(s) Oral daily  multivitamin 1 Tablet(s) Oral daily  pantoprazole    Tablet 40 milliGRAM(s) Oral before breakfast  polyethylene glycol 3350 17 Gram(s) Oral daily  sodium bicarbonate 1300 milliGRAM(s) Oral three times a day  sodium zirconium cyclosilicate 10 Gram(s) Oral daily    PRN Inpatient Medications  acetaminophen     Tablet .. 650 milliGRAM(s) Oral every 6 hours PRN  dextrose Oral Gel 15 Gram(s) Oral once PRN    REVIEW OF SYSTEMS  --------------------------------------------------------------------------------  All other systems were reviewed and are negative, except as noted.    VITALS/PHYSICAL EXAM  --------------------------------------------------------------------------------  T(C): 36.6 (03-04-24 @ 06:40), Max: 36.6 (03-03-24 @ 13:18)  HR: 71 (03-04-24 @ 06:40) (59 - 71)  BP: 161/57 (03-04-24 @ 06:40) (137/57 - 161/57)  RR: 17 (03-04-24 @ 06:40) (17 - 18)  SpO2: 98% (03-04-24 @ 06:40) (97% - 100%)  Wt(kg): --    03-03-24 @ 07:01  -  03-04-24 @ 07:00  --------------------------------------------------------  IN: 1190 mL / OUT: 0 mL / NET: 1190 mL    Physical Exam:  	Gen: NAD  	HEENT: Anicteric  	Pulm: CTA B/L  	CV: S1S2+  	Abd: Soft, +BS   	Ext: No LE edema B/L  	Neuro: Awake  	Skin: Warm and dry  	Dialysis access: LUE AVF with palpable thrill and bruit heard    LABS/STUDIES  --------------------------------------------------------------------------------              8.2    8.92  >-----------<  152      [03-04-24 @ 08:27]              25.7     141  |  103  |  102  ----------------------------<  86      [03-03-24 @ 07:58]  4.6   |  23  |  5.41        Ca     8.2     [03-03-24 @ 07:58]      Mg     2.2     [03-03-24 @ 07:58]      Phos  5.9     [03-03-24 @ 07:58]    Creatinine Trend:  SCr 5.41 [03-03 @ 07:58]  SCr 5.54 [03-02 @ 08:59]  SCr 4.57 [03-01 @ 14:53]  SCr 4.62 [03-01 @ 11:49]
Patient is a 73y old  Male who presents with a chief complaint of hyperkalemia (03 Mar 2024 09:32)      DATE OF SERVICE: 03-03-24 @ 11:02    SUBJECTIVE / OVERNIGHT EVENTS: overnight events noted    ROS:  Resp: No cough no sputum production  CVS: No chest pain no palpitations no orthopnea  GI: no N/V/D  'I feel fine'         MEDICATIONS  (STANDING):  amLODIPine   Tablet 10 milliGRAM(s) Oral daily  aspirin enteric coated 81 milliGRAM(s) Oral daily  atorvastatin 40 milliGRAM(s) Oral at bedtime  calcium acetate 667 milliGRAM(s) Oral three times a day with meals  carvedilol 12.5 milliGRAM(s) Oral every 12 hours  citric acid/sodium citrate Solution 30 milliLiter(s) Oral three times a day  dextrose 5%. 1000 milliLiter(s) (50 mL/Hr) IV Continuous <Continuous>  dextrose 5%. 1000 milliLiter(s) (100 mL/Hr) IV Continuous <Continuous>  dextrose 50% Injectable 12.5 Gram(s) IV Push once  dextrose 50% Injectable 25 Gram(s) IV Push once  dextrose 50% Injectable 25 Gram(s) IV Push once  glucagon  Injectable 1 milliGRAM(s) IntraMuscular once  hydrALAZINE 100 milliGRAM(s) Oral three times a day  insulin glargine Injectable (LANTUS) 5 Unit(s) SubCutaneous at bedtime  insulin lispro (ADMELOG) corrective regimen sliding scale   SubCutaneous three times a day before meals  insulin lispro (ADMELOG) corrective regimen sliding scale   SubCutaneous at bedtime  insulin lispro Injectable (ADMELOG) 2 Unit(s) SubCutaneous three times a day before meals  levothyroxine 25 MICROGram(s) Oral daily  multivitamin 1 Tablet(s) Oral daily  pantoprazole    Tablet 40 milliGRAM(s) Oral before breakfast  polyethylene glycol 3350 17 Gram(s) Oral daily  sodium bicarbonate 1300 milliGRAM(s) Oral three times a day  sodium zirconium cyclosilicate 10 Gram(s) Oral every 8 hours    MEDICATIONS  (PRN):  acetaminophen     Tablet .. 650 milliGRAM(s) Oral every 6 hours PRN Temp greater or equal to 38C (100.4F), Mild Pain (1 - 3)  dextrose Oral Gel 15 Gram(s) Oral once PRN Blood Glucose LESS THAN 70 milliGRAM(s)/deciliter        CAPILLARY BLOOD GLUCOSE      POCT Blood Glucose.: 98 mg/dL (03 Mar 2024 08:42)  POCT Blood Glucose.: 170 mg/dL (02 Mar 2024 21:53)  POCT Blood Glucose.: 124 mg/dL (02 Mar 2024 17:59)  POCT Blood Glucose.: 114 mg/dL (02 Mar 2024 12:26)    I&O's Summary    02 Mar 2024 07:01  -  03 Mar 2024 07:00  --------------------------------------------------------  IN: 1260 mL / OUT: 2700 mL / NET: -1440 mL    03 Mar 2024 07:01  -  03 Mar 2024 11:02  --------------------------------------------------------  IN: 350 mL / OUT: 0 mL / NET: 350 mL        Vital Signs Last 24 Hrs  T(C): 36.5 (03 Mar 2024 09:04), Max: 36.7 (02 Mar 2024 13:29)  T(F): 97.7 (03 Mar 2024 09:04), Max: 98 (02 Mar 2024 13:29)  HR: 59 (03 Mar 2024 09:04) (58 - 67)  BP: 126/55 (03 Mar 2024 09:04) (111/62 - 164/66)  BP(mean): --  RR: 18 (03 Mar 2024 09:04) (17 - 18)  SpO2: 99% (03 Mar 2024 09:04) (99% - 100%)    PHYSICAL EXAM:  CHEST/LUNG: clear   HEART: S1 S2; no murmurs   ABDOMEN: Soft, Nontender  EXTREMITIES:  no edema  NEUROLOGY: AO x 3 non-focal  SKIN: No rashes or lesions    LABS:                        8.2    8.03  )-----------( 155      ( 03 Mar 2024 07:58 )             24.9     03-03    141  |  103  |  102<H>  ----------------------------<  86  4.6   |  23  |  5.41<H>    Ca    8.2<L>      03 Mar 2024 07:58  Phos  5.9     03-03  Mg     2.2     03-03    TPro  7.8  /  Alb  4.4  /  TBili  0.4  /  DBili  x   /  AST  28  /  ALT  49<H>  /  AlkPhos  105  03-01          Urinalysis Basic - ( 03 Mar 2024 07:58 )    Color: x / Appearance: x / SG: x / pH: x  Gluc: 86 mg/dL / Ketone: x  / Bili: x / Urobili: x   Blood: x / Protein: x / Nitrite: x   Leuk Esterase: x / RBC: x / WBC x   Sq Epi: x / Non Sq Epi: x / Bacteria: x          All consultant(s) notes reviewed and care discussed with other providers        Contact Number, Dr Day [phone]
Patient is a 73y old  Male who presents with a chief complaint of hyperkalemia (04 Mar 2024 09:07)      DATE OF SERVICE: 03-04-24 @ 13:10    SUBJECTIVE / OVERNIGHT EVENTS: overnight events noted    ROS:  Resp: No cough no sputum production  CVS: No chest pain no palpitations no orthopnea  GI: no N/V/D  : no dysuria, no hematuria  'I feel fine'       MEDICATIONS  (STANDING):  amLODIPine   Tablet 10 milliGRAM(s) Oral daily  aspirin enteric coated 81 milliGRAM(s) Oral daily  atorvastatin 40 milliGRAM(s) Oral at bedtime  calcium acetate 667 milliGRAM(s) Oral three times a day with meals  carvedilol 12.5 milliGRAM(s) Oral every 12 hours  citric acid/sodium citrate Solution 30 milliLiter(s) Oral three times a day  dextrose 5%. 1000 milliLiter(s) (50 mL/Hr) IV Continuous <Continuous>  dextrose 5%. 1000 milliLiter(s) (100 mL/Hr) IV Continuous <Continuous>  dextrose 50% Injectable 25 Gram(s) IV Push once  dextrose 50% Injectable 12.5 Gram(s) IV Push once  dextrose 50% Injectable 25 Gram(s) IV Push once  glucagon  Injectable 1 milliGRAM(s) IntraMuscular once  hydrALAZINE 100 milliGRAM(s) Oral three times a day  insulin lispro (ADMELOG) corrective regimen sliding scale   SubCutaneous three times a day before meals  insulin lispro (ADMELOG) corrective regimen sliding scale   SubCutaneous at bedtime  insulin lispro Injectable (ADMELOG) 2 Unit(s) SubCutaneous three times a day before meals  levothyroxine 25 MICROGram(s) Oral daily  multivitamin 1 Tablet(s) Oral daily  pantoprazole    Tablet 40 milliGRAM(s) Oral before breakfast  polyethylene glycol 3350 17 Gram(s) Oral daily  sodium bicarbonate 1300 milliGRAM(s) Oral three times a day  sodium zirconium cyclosilicate 10 Gram(s) Oral daily    MEDICATIONS  (PRN):  acetaminophen     Tablet .. 650 milliGRAM(s) Oral every 6 hours PRN Temp greater or equal to 38C (100.4F), Mild Pain (1 - 3)  dextrose Oral Gel 15 Gram(s) Oral once PRN Blood Glucose LESS THAN 70 milliGRAM(s)/deciliter        CAPILLARY BLOOD GLUCOSE      POCT Blood Glucose.: 265 mg/dL (04 Mar 2024 12:12)  POCT Blood Glucose.: 185 mg/dL (04 Mar 2024 09:17)  POCT Blood Glucose.: 215 mg/dL (03 Mar 2024 21:28)  POCT Blood Glucose.: 114 mg/dL (03 Mar 2024 17:00)    I&O's Summary    03 Mar 2024 07:01  -  04 Mar 2024 07:00  --------------------------------------------------------  IN: 1190 mL / OUT: 0 mL / NET: 1190 mL    04 Mar 2024 07:01  -  04 Mar 2024 13:10  --------------------------------------------------------  IN: 240 mL / OUT: 0 mL / NET: 240 mL        Vital Signs Last 24 Hrs  T(C): 36.7 (04 Mar 2024 09:21), Max: 36.7 (04 Mar 2024 09:21)  T(F): 98 (04 Mar 2024 09:21), Max: 98 (04 Mar 2024 09:21)  HR: 59 (04 Mar 2024 09:21) (59 - 71)  BP: 124/58 (04 Mar 2024 09:21) (124/58 - 161/57)  BP(mean): --  RR: 18 (04 Mar 2024 09:21) (17 - 18)  SpO2: 99% (04 Mar 2024 09:21) (97% - 100%)    PHYSICAL EXAM:  CHEST/LUNG: clear   HEART: S1 S2; no murmurs   ABDOMEN: Soft, Nontender  EXTREMITIES:  no edema  NEUROLOGY: AO x 3 non-focal  SKIN: No rashes or lesions    LABS:                        8.2    8.92  )-----------( 152      ( 04 Mar 2024 08:27 )             25.7     03-04    144  |  105  |  101<H>  ----------------------------<  109<H>  4.7   |  24  |  5.58<H>    Ca    8.6      04 Mar 2024 08:27  Phos  6.6     03-04  Mg     2.4     03-04            Urinalysis Basic - ( 04 Mar 2024 08:27 )    Color: x / Appearance: x / SG: x / pH: x  Gluc: 109 mg/dL / Ketone: x  / Bili: x / Urobili: x   Blood: x / Protein: x / Nitrite: x   Leuk Esterase: x / RBC: x / WBC x   Sq Epi: x / Non Sq Epi: x / Bacteria: x          All consultant(s) notes reviewed and care discussed with other providers        Contact Number, Dr Day [phone]

## 2024-03-04 NOTE — DIETITIAN INITIAL EVALUATION ADULT - REASON INDICATOR FOR ASSESSMENT
Pt seen for consult for nutrition assessment/education. Information obtained from pt and electronic medical record. Chart reviewed, events noted.

## 2024-03-04 NOTE — DIETITIAN INITIAL EVALUATION ADULT - SIGNS/SYMPTOMS
pt asking for diet-related question and receptive to diet education on CKD abnormal renal indices and electrolytes

## 2024-03-04 NOTE — DIETITIAN INITIAL EVALUATION ADULT - PERTINENT LABORATORY DATA
03-04    144  |  105  |  101<H>  ----------------------------<  109<H>  4.7   |  24  |  5.58<H>    Ca    8.6      04 Mar 2024 08:27  Phos  6.6     03-04  Mg     2.4     03-04    CAPILLARY BLOOD GLUCOSE  POCT Blood Glucose.: 265 mg/dL (04 Mar 2024 12:12)  POCT Blood Glucose.: 185 mg/dL (04 Mar 2024 09:17)  POCT Blood Glucose.: 215 mg/dL (03 Mar 2024 21:28)  POCT Blood Glucose.: 114 mg/dL (03 Mar 2024 17:00)    A1C with Estimated Average Glucose Result: 5.8 % (03-02-24 @ 09:34)  A1C with Estimated Average Glucose Result: 7.7 % (04-02-23 @ 06:48)

## 2024-03-04 NOTE — DIETITIAN INITIAL EVALUATION ADULT - NSFNSADHERENCEPTAFT_GEN_A_CORE
-- DM: Pt was on Januvia and Glipizide to regulate blood glucose prior to admission. Pt states he does not check finger stick regularly. Most recent HbA1c of 5.8 on 3/2 indicates good glycemic control.  -- CKD not on hemodialysis: On Procrit, Calcium acetate, NaHCO3 and Bicitra per H&P. Pt states he was prescribed for Lokelma but he did not take the pill causing hyperkalemia upon admission   -- Also on Lasix per H&P

## 2024-03-04 NOTE — PROGRESS NOTE ADULT - PROBLEM SELECTOR PLAN 2
stable creatinine overnight  likely secondary to furosemide   continue to monitor  no evidence of uremia of encephalopathy
Pt with advanced CKD stage 5 being admitted for hyperkalemia. Pt follows with Dr. Rees. Pt made aware that he will need long term dialysis in near future. pt underwent LUE AVF creation during previous admission on 4/6/23. Scr on admission elevated at 4.6, increased to 5.54 and now remains elevated/stable at 5.4 today. Pt clinically non uremic and non oliguric. No urgent indication for HD for now. If repeat serum potassium remained elevated or if acidosis worsens, will need to consider HD. Increased bicarb tabs to 1300 TID. Vascular evaluation for the AVF as its still deep and access is mobile. Monitor labs and urine output. Avoid any potential nephrotoxins. Dose medications as per eGFR.
Pt with advanced CKD stage 5 being admitted for hyperkalemia. Pt follows with Dr. Rees. Pt made aware that he will need long term dialysis in near future. pt underwent lUE AVF creation during previous admission on 4/6/23. Scr on admission elevated at 4.6--->5.54 today. Pt clinically non uremic and non oliguric. No urgent indication for HD for now. If repeat serum potassium remained elevated or if acidosis worsens, will need to consider HD. Increase bicarb tabs to 1300 TID. Vascular evaluation for the AVF as its still deep and access is mobile. Monitor labs and urine output. Avoid any potential nephrotoxins. Dose medications as per eGFR.
Pt with advanced CKD stage 5 being admitted for hyperkalemia. Pt follows with Dr. Rees. Pt made aware that he will need long term dialysis in near future. pt underwent lUE AVF creation during previous admission on 4/6/23. Scr on admission elevated at 4.6--->5.54 today. Pt clinically non uremic and non oliguric. No urgent indication for HD for now. If repeat serum potassium remained elevated or if acidosis worsens, will need to consider HD. Increase bicarb tabs to 1300 TID. Vascular evaluation for the AVF as its still deep and access is mobile. Monitor labs and urine output. Avoid any potential nephrotoxins. Dose medications as per eGFR.
management per renal  worsening creatinine overnight  likely secondary to furosemide   continue to monitor  no evidence of uremia of encephalopathy   defer initiation orthostatic hypotension hemodialysis to renal
stable creatinine overnight  discussed with renal  discharge off furosemide as patient appears euvolemic  discontinue Losartan as well  follow up Dr Rees renal 1 week  no evidence of uremia of encephalopathy

## 2024-03-04 NOTE — PROGRESS NOTE ADULT - PROBLEM SELECTOR PLAN 3
continue home meds with hold parameters  continue
continue home meds with hold parameters  hold ARB
continue home meds with hold parameters  continue to hold ARB

## 2024-03-04 NOTE — DIETITIAN INITIAL EVALUATION ADULT - PERTINENT MEDS FT
MEDICATIONS  (STANDING):  amLODIPine   Tablet 10 milliGRAM(s) Oral daily  aspirin enteric coated 81 milliGRAM(s) Oral daily  atorvastatin 40 milliGRAM(s) Oral at bedtime  calcium acetate 667 milliGRAM(s) Oral three times a day with meals  carvedilol 12.5 milliGRAM(s) Oral every 12 hours  citric acid/sodium citrate Solution 30 milliLiter(s) Oral three times a day  dextrose 5%. 1000 milliLiter(s) (100 mL/Hr) IV Continuous <Continuous>  dextrose 5%. 1000 milliLiter(s) (50 mL/Hr) IV Continuous <Continuous>  dextrose 50% Injectable 25 Gram(s) IV Push once  dextrose 50% Injectable 12.5 Gram(s) IV Push once  dextrose 50% Injectable 25 Gram(s) IV Push once  glucagon  Injectable 1 milliGRAM(s) IntraMuscular once  hydrALAZINE 100 milliGRAM(s) Oral three times a day  insulin lispro (ADMELOG) corrective regimen sliding scale   SubCutaneous three times a day before meals  insulin lispro (ADMELOG) corrective regimen sliding scale   SubCutaneous at bedtime  insulin lispro Injectable (ADMELOG) 2 Unit(s) SubCutaneous three times a day before meals  levothyroxine 25 MICROGram(s) Oral daily  multivitamin 1 Tablet(s) Oral daily  pantoprazole    Tablet 40 milliGRAM(s) Oral before breakfast  polyethylene glycol 3350 17 Gram(s) Oral daily  sodium bicarbonate 1300 milliGRAM(s) Oral three times a day  sodium zirconium cyclosilicate 10 Gram(s) Oral daily    MEDICATIONS  (PRN):  acetaminophen     Tablet .. 650 milliGRAM(s) Oral every 6 hours PRN Temp greater or equal to 38C (100.4F), Mild Pain (1 - 3)  dextrose Oral Gel 15 Gram(s) Oral once PRN Blood Glucose LESS THAN 70 milliGRAM(s)/deciliter

## 2024-03-04 NOTE — DIETITIAN INITIAL EVALUATION ADULT - PHYSCIAL ASSESSMENT
Drug Dosing Weight  Height (cm): 180.3 (01 Mar 2024 09:56)  Weight (kg): 83.9 (01 Mar 2024 09:56)  BMI (kg/m2): 25.8 (01 Mar 2024 09:56)    Daily wt (standing or bed scale): none documented thus far   Wt obtained by RD: unable to assess as pt out of bed to recliner upon visit.     UBW: ~183-185lb per pt. denies history of wt loss PTA  Wt history from previous RD notes: 77.1kg (4/30/23)   Wt [facility]: 87kg (7/14/23), 83.9kg (5/11/23), 82.6kg (1/31/23)      **  No significant wt change per [facility] HIE, ? accuracy of wt on 4/30/23. RD will continue to monitor wt trends as available/able.     IBW: 172lb, 108% IBW

## 2024-03-04 NOTE — PROGRESS NOTE ADULT - ATTENDING COMMENTS
#CKD stage V  mild increase cr 3/1  stable today   bladder scan neg for  urinary retention  monitor cr trend  #hyperkalemia- add lokelma 10g daily  k stable today  #met acidosis-bicarb tabs 1300mg po tid, bicitra  monitor bicarb trend  #lasix on hold  #AVF- immature; needs vascular follow up but this can be done as outpt  d/w dr miller
#CKD stage V  mild increase cr today  check bladder scan r/o urinary retention  #hyperkalemia- lokelmatid  repeat k   #met acidosis-bicarb tabs 1300mg po tid  will follow  d/w dr miller
73 year old Male with PMHx of advanced CKD, HTN, HLD, DM2 well known to our service from prior admission presents at [facility] for hyperkalemia noted on outpatient labs. Initial labs in ER concerning for hyperkalemia. Nephrology consulted for advanced CKD and hyperkalemia.   Pt is CKD stage 5, and is being followed by Dr. Rees. Pt underwent creation of L AVF on 4/6/23 in preparation of long term hemodialysis. SCr prior to admission of 4.6 on 2/29/24. Appears his SCr has been 4.6 - 5. Initial labs in ER showed serum potassium elevated at 6.3 (non hemolyzed). Pt received medical management- insulin, D50, lokelma 10g. Repeat serum potassium is mildly elevated at 5.5.  Pt seen and examined in ER. He states he stopped taking his Lokelma because he was "feeling good". Pt denies any SOB, Cp, N/V, abdominal pain, difficulty with urination. He had diarrhea 2 days ago. Alert conversant and improved since admission.    1.  Hyperkalemia--med rx.  Lokelma.  IF worsening EKG changes (bradycardia, QRS widening) glucagon 5mg IVP.  HCO3 tabs +/-infusion.  NOW resolved.  Agree with NaHCO3, lokelma  2.  CKD 5--at baseline.  Not uremic.  NO HD at this time.  Trend bladder scan   3.  Hyperphosphatemia--binder titration 3.5-5.5  discussed with med team  Jose Jolly MD  contact me on TEAMS

## 2024-03-04 NOTE — DIETITIAN INITIAL EVALUATION ADULT - NSFNSGIIOFT_GEN_A_CORE
Drug Dosing Weight  Height (cm): 180.3 (01 Mar 2024 09:56)  Weight (kg): 83.9 (01 Mar 2024 09:56)  BMI (kg/m2): 25.8 (01 Mar 2024 09:56)    Daily wt (standing or bed scale): none documented thus far   Wt obtained by RD: unable to assess as pt out of bed to recliner upon visit.     UBW: , denies history of wt loss PTA, endorses wt loss due to medical condition/decreased PO intake.  Wt history from previous RD notes:  [facility]:     ** Noted w/ , RD will continue to monitor wt trends as available/able.     IBW:

## 2024-03-04 NOTE — DIETITIAN INITIAL EVALUATION ADULT - OTHER INFO
-- CKD not on hemodialysis with episodes of hyperkalemia s/p correction, now on Lokelma, NaHCO3, Bicitra, Phoslo  -- Micronutrient/Other supplementation: MVI    -- DM: Pt is on  Lispro 2 units before meals and ISS before meals and bedtime to regulate blood glucose while in house.   -- ordered for oral Synthroid   -- GI: ordered for Protonix, Miralax

## 2024-03-04 NOTE — PROGRESS NOTE ADULT - PROBLEM SELECTOR PROBLEM 2
Stage 5 chronic kidney disease not on chronic dialysis

## 2024-03-04 NOTE — DIETITIAN INITIAL EVALUATION ADULT - EDUCATION DIETARY MODIFICATIONS
Discussed renal diet, importance of monitoring intake of foods high in Sodium, Potassium and Phosphorus. monitoring fluid intake./(1) partially meets; needs review/practice/verbalization

## 2024-03-04 NOTE — DISCHARGE NOTE NURSING/CASE MANAGEMENT/SOCIAL WORK - PATIENT PORTAL LINK FT
You can access the FollowMyHealth Patient Portal offered by  by registering at the following website: [email or web address]. By joining [facility]’s FollowMyHealth portal, you will also be able to view your health information using other applications (apps) compatible with our system.

## 2024-03-04 NOTE — PROGRESS NOTE ADULT - PROBLEM SELECTOR PLAN 4
HbA1C  finger sticks   no oral meds  diabetic diet  continue finger sticks with short acting insulin sliding scale  low dose Lantus at night 5 units
HbA1C 5.8  resume home meds on discharge
HbA1C  finger sticks   no oral meds  diabetic diet  continue finger sticks with short acting insulin sliding scale  discontinue Lantus at night

## 2024-03-04 NOTE — DISCHARGE NOTE NURSING/CASE MANAGEMENT/SOCIAL WORK - NSDCPEFALRISK_GEN_ALL_CORE
For information on Fall & Injury Prevention, visit: [email or web address] OR  [email or web address] OR  https://www.cdc.gov/steadi/patient.html

## 2024-03-04 NOTE — DIETITIAN INITIAL EVALUATION ADULT - PROBLEM SELECTOR PLAN 1
resolving  repeat K 5.5  continue [facility]  renal help appreciated  continue to follow recommendations  trend K daily  low K diet

## 2024-03-05 LAB
FERRITIN SERPL-MCNC: 1143 NG/ML — HIGH (ref 30–400)
FOLATE SERPL-MCNC: 10.9 NG/ML — SIGNIFICANT CHANGE UP

## 2024-03-12 NOTE — DISCHARGE NOTE NURSING/CASE MANAGEMENT/SOCIAL WORK - NURSING SECTION COMPLETE
Patient/Caregiver provided printed discharge information.
1.6
Alert-The patient is alert, awake and responds to voice. The patient is oriented to time, place, and person. The triage nurse is able to obtain subjective information.

## 2024-03-14 ENCOUNTER — APPOINTMENT (OUTPATIENT)
Dept: TRANSPLANT | Facility: CLINIC | Age: 74
End: 2024-03-14

## 2024-03-14 ENCOUNTER — APPOINTMENT (OUTPATIENT)
Dept: NEPHROLOGY | Facility: CLINIC | Age: 74
End: 2024-03-14
Payer: MEDICARE

## 2024-03-14 VITALS
BODY MASS INDEX: 28.56 KG/M2 | DIASTOLIC BLOOD PRESSURE: 56 MMHG | HEIGHT: 71 IN | OXYGEN SATURATION: 97 % | WEIGHT: 204 LBS | SYSTOLIC BLOOD PRESSURE: 175 MMHG | HEART RATE: 80 BPM | TEMPERATURE: 97.8 F

## 2024-03-14 PROCEDURE — G2211 COMPLEX E/M VISIT ADD ON: CPT

## 2024-03-14 PROCEDURE — 99215 OFFICE O/P EST HI 40 MIN: CPT

## 2024-03-14 NOTE — HISTORY OF PRESENT ILLNESS
[FreeTextEntry1] : Contacts:  Mariposa (daughter)  Dr. Mara Higgins (PCP)  Dr. Lalo Ta (endocrinology)  Dr. Carol Ann Jewell (vascular)  Dr. Zana Simpson (podiatry)  Dr. Jacobson (gastroenterology)  ------------------------------------------------------------------------------- Problem List:  Chronic kidney disease stage V with nephrotic-range proteinuria  Diabetic nephropathy (biopsy proven)  Hypertension with moderate concentric left ventricular hypertrophy  Diabetes mellitus  Hyperlipidemia  Hypothyroidism   Recent H. pylori s/p treatment  ------------------------------------------------------------------------------- HPI: [comes with son-in-law] Mr. Angulo is a 73 year old man with CKD stage V with proteinuria, hypertension, diabetes mellitus, hyperlipidemia, hypothyroidism, and recent hospitalization for worsening kidney function and hypervolemia (2023), here for kidney evaluation and management.  Last saw 2024. Recent K 6.5, admitted to [facility]. In the hospital, it seems his irbesartan and torsemide were stopped; hydralazine was started; gliizide dose was increased; and bicarbonate dose was increased. The Lokelma was also changed to daily from twice weekly.  Doing fine since. Otherwise without complaints.  ------------------------------------------------------------------------------- Social History:  From [place], came to  1960s  Lives in [place] with wife  Two daughters has 5 grandchild  Works as  for [employer]  Smoked as teenager, stopped aged 29  Family History:  Father had stroked ( age 70s)  Mother  age 93  No known history of renal disease, hematuria, proteinuria, ESRD, dialysis, transplant  ------------------------------------------------------------------------------- Allergies: NKDA   Medications:  Amlodipine 5mg daily  Aspirin 81mg daily  Atorvastatin 40mg daily  Carvedilol 12.5mg two times per day  Lokelma 10g daily  Sodium bicarbonate 1300mg thre times per day  Hydralazine 100mg TID  Glipizide 5mg daily  Januvia/sitagliptin 25mg daily  Ergocalciferol 50K units weekly  Vascepa 1g two times per day  Levothyroxine 50mcg daily  ------------------------------------------------------------------------------- Physical Exam:   Gen: NAD  HEENT: Supple neck  Pulm: CTA  CV: RRR; no rub  Back: No spinal or CVA terndeness  Abd: +BS, soft  UE: Warm, FROM; no asterixis; LUE brachiocephalic AVF  LE: Warm, FROM; mild edema  Neuro: No focal deficits  Psych: Normal affect  Skin: Warm, dry  ------------------------------------------------------------------------------- Labs/Studies:   2024    144 | 105 | 101   ------------------< 109 Ca 8.6 eGFR 10   4.7 |  24 | 5.58    Phosphorus 6.6   Albumin 4.4   2023 UACR 2207 mg/g  2023 UACR 769 mg/g  2022 UACR 6447 mg/g   2023 UPCR 2.2 g/g  2022 UPCR 11.8 g/g   2024 H/H 8.2 / 25.7 (ferritin 1143)  2024 LDL 50  2024 HbA1c 5.8  2024 Vitamin D (25OH) 71  2024    2022 Kidney U/S  - Right kidney: 11.4 cm. No hydronephrosis. Upper pole and lower pole cyst measuring 2.9 cm a 1.6 cm.  - Left kidney: 12.4 cm. No renal mass, hydronephrosis or calculi.   2022 Kidney Biopsy  - Advanced diffuse and nodular diabetic glomerulosclerosis  - Acute tubular injury with focal tubular necrosis  - Chronic and focally active interstitial nephritis   Summary of chronic changes:  - Diffuse nodular glomerulosclerosis  - Global glomerulosclerosis (30% of glomeruli)  - Tubular atrophy and interstitial fibrosis (70% of the sampled cortex)  - Severe arterial and arteriolar sclerosis

## 2024-03-14 NOTE — ASSESSMENT
[FreeTextEntry1] :  Mr. Angulo is a 73 year old man with CKD stage V with proteinuria, hypertension, diabetes mellitus, hyperlipidemia, hypothyroidism, and recent hospitalization for worsening kidney function and hypervolemia (12/2023), here for kidney evaluation and management.  Mr. Angulo has advanced CKD due to diabetes, and his GFR is in the teens. We are preparing for dialysis and transplant.  Many changes made to his med regimen during hospitalization, need to reassess today.  CKD IV/V with proteinuria due to diabetes - Continue Lokelma/SZC daily - RESTART irbesartan (trial again of ARB now that on daily Lokelma) - Continue bicarbonate for metabolic acidosis, but lower dose - STOP ergocalciferol - Anemia s/p IV iron; continue Aranesp as needed -- plan for 2 weeks once BP improved - Continue in CKD Healthy Transitions program (Yuridia Hannon) - Transplant status 1  Hypertension: - STOP hydralazaine - RESTART irbesartan - RESTART torsemide - Continue amlodipine, carvedilol  Diabetes mellitus: - Mr. Angulo to follow with endocrinology, but reduce gliizide from 5 to 2.5  Hyperlipidemia: Continue statin and Vascepa   PLAN - Medication changes as below - Chemistry 1 week - Will try to arrange NITA about 2 weeks - Follow up about 6 weeks -- need to schedule after 3pm  Discussed importance of healthful habits, including physical activity/exercise and improvements in diet.  I spent a total of 40 minutes on this encounter.    Ghassan Angluo - medications as of March 14, 2024   Lokelma 10g daily  Amlodipine 5mg daily  Carvedilol 12.5mg two times per day  Irbesartan 150mg daily  Torsemide 20mg daily  Sodium bicarbonate 650mg two times per day  Glipizide 2.5mg daily  Januvia/sitagliptin 25mg daily  Atorvastatin 40mg daily  Aspirin 81mg daily  Vascepa 1g two times per day  Levothyroxine 50mcg daily

## 2024-03-14 NOTE — CONSULT LETTER
[Dear  ___] : Dear  [unfilled], [Please see my note below.] : Please see my note below. [Courtesy Letter:] : I had the pleasure of seeing your patient, [unfilled], in my office today. [Sincerely,] : Sincerely, [DrRonda  ___] : Dr. WELLS [FreeTextEntry3] : Cem Rees MD Nephrology [DrRonda ___] : Dr. WELLS

## 2024-03-22 ENCOUNTER — NON-APPOINTMENT (OUTPATIENT)
Age: 74
End: 2024-03-22

## 2024-03-28 ENCOUNTER — APPOINTMENT (OUTPATIENT)
Dept: NEPHROLOGY | Facility: CLINIC | Age: 74
End: 2024-03-28

## 2024-03-28 VITALS
SYSTOLIC BLOOD PRESSURE: 155 MMHG | OXYGEN SATURATION: 97 % | DIASTOLIC BLOOD PRESSURE: 65 MMHG | WEIGHT: 199 LBS | TEMPERATURE: 97 F | HEIGHT: 71 IN | HEART RATE: 70 BPM | BODY MASS INDEX: 27.86 KG/M2 | RESPIRATION RATE: 20 BRPM

## 2024-03-28 PROCEDURE — G2211 COMPLEX E/M VISIT ADD ON: CPT

## 2024-03-28 PROCEDURE — 99214 OFFICE O/P EST MOD 30 MIN: CPT | Mod: 1L

## 2024-03-28 PROCEDURE — 96372 THER/PROPH/DIAG INJ SC/IM: CPT

## 2024-03-28 RX ORDER — DARBEPOETIN ALFA 100 UG/ML
100 SOLUTION INTRAVENOUS; SUBCUTANEOUS
Refills: 0 | Status: COMPLETED | OUTPATIENT
Start: 2024-03-28

## 2024-03-28 RX ADMIN — DARBEPOETIN ALFA 1 MCG/ML: 100 SOLUTION INTRAVENOUS; SUBCUTANEOUS at 00:00

## 2024-03-28 NOTE — PHYSICAL EXAM
[General Appearance - Alert] : alert [General Appearance - In No Acute Distress] : in no acute distress [Neck Appearance] : the appearance of the neck was normal [Sclera] : the sclera and conjunctiva were normal [Outer Ear] : the ears and nose were normal in appearance [Bibasilar Rales/Crackles] : bibasilar rales [Heart Rate And Rhythm] : heart rate was normal and rhythm regular [Heart Sounds] : normal S1 and S2 [Murmurs] : no murmurs [___ +] : bilateral [unfilled]+ pretibial pitting edema [Bowel Sounds] : normal bowel sounds [Abdomen Soft] : soft [Abnormal Walk] : normal gait [Penis Abnormality] : the penis was normal [Bruit] : a bruit was present [___ (cm) Fistula] : [unfilled] (cm) fistula [Thrill] : a thrill was present [Benign] : appears benign [No Focal Deficits] : no focal deficits [] : no rash [Oriented To Time, Place, And Person] : oriented to person, place, and time [Impaired Insight] : insight and judgment were intact [Affect] : the affect was normal

## 2024-03-28 NOTE — REASON FOR VISIT
[Follow-Up] : a follow-up visit [Family Member] : family member [FreeTextEntry1] : CKD4 [Procedure: _________] : a [unfilled] procedure visit

## 2024-03-28 NOTE — REVIEW OF SYSTEMS
[Recent Weight Loss (___ Lbs)] : recent [unfilled] ~Ulb weight loss [As noted in HPI] : as noted in HPI [Shortness Of Breath] : shortness of breath [As Noted in HPI] : as noted in HPI [Lower Ext Edema] : lower extremity edema [SOB on Exertion] : shortness of breath during exertion [Negative] : Heme/Lymph

## 2024-03-28 NOTE — HISTORY OF PRESENT ILLNESS
[FreeTextEntry1] : APOLLO NICHOLAS is a 73-year male with a history of CKD stage V with proteinuria, hypertension, diabetes mellitus, hyperlipidemia and hypothyroidism. He reported to the office increased weight gain, swelling and SOB. Torsemide was increased to 40mg x 3 days which helped. He did labs on Monday 3/25 which showed hyperkalemia improved with lokelma daily. Creatinine 4.36 which is back to prior baseline.  Today pt appears more swollen again. Complaining of SOB especially when lying down. He also is here for aranesp.  Today pt admits to dietary indiscretion and weight gain.

## 2024-03-28 NOTE — ASSESSMENT
[FreeTextEntry1] : APOLLO NICHOLAS is a 73-year male with a history of CKD stage V with proteinuria, hypertension, diabetes mellitus, hyperlipidemia and hypothyroidism.  CKD5:  CKD likely caused by long standing DM and HTN.  Discussed with pt and daughter researching dialysis unit near his home for future planning.  Repeat labs including renal panel on April 8th HTN: BP acceptable  Metabolic Acidosis: continue 4 tabs daily Volume status: Increase Torsemide to 40mg daily. Limit fluid intake to 1.5 liters daily. Stressed low Na diet.  Continue daily weights. Report weight gain of 2-3 pounds overnight or 5 pounds in a week. Anemia Management:  Aranesp 100mcg SQ x 1 today Diabetes: Increased risk of hypoglycemia discussed in CKD.  Renal bone disease: check iPTH, phos, Vit D with labs on April 8th.   Patient educated on the progressive nature of CKD and aware of the potential need for renal replacement therapy in the future.  Follow up with Dr. Rees in May.

## 2024-04-08 ENCOUNTER — LABORATORY RESULT (OUTPATIENT)
Age: 74
End: 2024-04-08

## 2024-04-09 ENCOUNTER — APPOINTMENT (OUTPATIENT)
Dept: NEPHROLOGY | Facility: CLINIC | Age: 74
End: 2024-04-09
Payer: MEDICARE

## 2024-04-09 ENCOUNTER — APPOINTMENT (OUTPATIENT)
Dept: ENDOCRINOLOGY | Facility: CLINIC | Age: 74
End: 2024-04-09
Payer: MEDICARE

## 2024-04-09 VITALS
HEIGHT: 71 IN | WEIGHT: 199 LBS | OXYGEN SATURATION: 97 % | SYSTOLIC BLOOD PRESSURE: 144 MMHG | TEMPERATURE: 97.4 F | DIASTOLIC BLOOD PRESSURE: 66 MMHG | HEART RATE: 65 BPM | BODY MASS INDEX: 27.86 KG/M2

## 2024-04-09 VITALS — HEART RATE: 63 BPM | HEIGHT: 71 IN | OXYGEN SATURATION: 99 % | TEMPERATURE: 98.1 F | BODY MASS INDEX: 27.75 KG/M2

## 2024-04-09 DIAGNOSIS — E03.9 HYPOTHYROIDISM, UNSPECIFIED: ICD-10-CM

## 2024-04-09 LAB
ALBUMIN SERPL ELPH-MCNC: 3.7 G/DL
ANION GAP SERPL CALC-SCNC: 14 MMOL/L
APPEARANCE: CLEAR
BILIRUBIN URINE: NEGATIVE
BLOOD URINE: ABNORMAL
BUN SERPL-MCNC: 69 MG/DL
CALCIUM SERPL-MCNC: 8.1 MG/DL
CHLORIDE SERPL-SCNC: 107 MMOL/L
CO2 SERPL-SCNC: 24 MMOL/L
COLOR: YELLOW
CREAT SERPL-MCNC: 4.87 MG/DL
CREAT SPEC-SCNC: 76 MG/DL
CREAT/PROT UR: 5.2 RATIO
EGFR: 12 ML/MIN/1.73M2
FERRITIN SERPL-MCNC: 706 NG/ML
FOLATE SERPL-MCNC: 11 NG/ML
GLUCOSE BLDC GLUCOMTR-MCNC: 145
GLUCOSE QUALITATIVE U: 100 MG/DL
GLUCOSE SERPL-MCNC: 119 MG/DL
HCT VFR BLD CALC: 23.3 %
HGB BLD-MCNC: 7.3 G/DL
IRON SATN MFR SERPL: 18 %
IRON SERPL-MCNC: 44 UG/DL
KETONES URINE: NEGATIVE MG/DL
LEUKOCYTE ESTERASE URINE: NEGATIVE
MCHC RBC-ENTMCNC: 30.3 PG
MCHC RBC-ENTMCNC: 31.3 GM/DL
MCV RBC AUTO: 96.7 FL
NITRITE URINE: NEGATIVE
PH URINE: 6
PHOSPHATE SERPL-MCNC: 4.6 MG/DL
PLATELET # BLD AUTO: 198 K/UL
POTASSIUM SERPL-SCNC: 4 MMOL/L
PROT UR-MCNC: 392 MG/DL
PROTEIN URINE: 300 MG/DL
RBC # BLD: 2.41 M/UL
RBC # FLD: 14.5 %
SODIUM SERPL-SCNC: 145 MMOL/L
SPECIFIC GRAVITY URINE: 1.01
TIBC SERPL-MCNC: 246 UG/DL
UIBC SERPL-MCNC: 201 UG/DL
UROBILINOGEN URINE: 0.2 MG/DL
VIT B12 SERPL-MCNC: 703 PG/ML
WBC # FLD AUTO: 8.32 K/UL

## 2024-04-09 PROCEDURE — 96372 THER/PROPH/DIAG INJ SC/IM: CPT

## 2024-04-09 PROCEDURE — 82962 GLUCOSE BLOOD TEST: CPT

## 2024-04-09 PROCEDURE — 99214 OFFICE O/P EST MOD 30 MIN: CPT

## 2024-04-09 RX ORDER — GLIPIZIDE 5 MG/1
5 TABLET ORAL DAILY
Qty: 45 | Refills: 2 | Status: ACTIVE | COMMUNITY
Start: 2023-01-31 | End: 1900-01-01

## 2024-04-09 RX ORDER — SITAGLIPTIN 25 MG/1
25 TABLET, FILM COATED ORAL
Qty: 90 | Refills: 0 | Status: ACTIVE | COMMUNITY
Start: 2023-01-31 | End: 1900-01-01

## 2024-04-09 NOTE — HISTORY OF PRESENT ILLNESS
[FreeTextEntry1] : 73 yearM here for f/up for Type 2 diabetes mellitus with CKD V and hypothyroidism   Last A1c: 6.1%   Screening  Ophthalmology: follows Podiatry:  follows  LDL: 61 Microalbumin: follows with renal  EGFR: 14, follows with renal  Current diabetic medication regimen (verified with patient):  Glipizide 2.5mg po daily  Januvia 25mg po daily    SMBG ranges (glucometer):  not checked   POCT today: 145mg/dl   No recent reported hypoglycemia, may have had symptoms however unsure if related.    Hypothyroidism : Currently on LT4 50mcg po daily

## 2024-04-09 NOTE — REASON FOR VISIT
[Follow - Up] : a follow-up visit [DM Type 2] : DM Type 2 [Family Member] : family member [Hypothyroidism] : hypothyroidism

## 2024-04-10 VITALS — SYSTOLIC BLOOD PRESSURE: 150 MMHG | DIASTOLIC BLOOD PRESSURE: 70 MMHG

## 2024-04-10 RX ORDER — DARBEPOETIN ALFA 100 UG/ML
100 SOLUTION INTRAVENOUS; SUBCUTANEOUS
Refills: 0 | Status: COMPLETED | OUTPATIENT
Start: 2024-04-10

## 2024-04-10 RX ADMIN — DARBEPOETIN ALFA 1 MCG/ML: 100 SOLUTION INTRAVENOUS; SUBCUTANEOUS at 00:00

## 2024-04-10 NOTE — PROCEDURE
[FreeTextEntry1] : Pt reports he is feeling better with the increased dose of Torsemide. SOB and orthopnea has subsided. He is being more careful with his diet as well.  Hgb 7.3 /Hct 23.3 BP acceptable Aranesp 100mcg SQ x 1 Pt tolerated procedure well Labs on May 1st Next Appt Dr. Rees

## 2024-04-16 LAB
ALBUMIN SERPL ELPH-MCNC: 4.1 G/DL
ANION GAP SERPL CALC-SCNC: 14 MMOL/L
BUN SERPL-MCNC: 75 MG/DL
CALCIUM SERPL-MCNC: 8.5 MG/DL
CHLORIDE SERPL-SCNC: 105 MMOL/L
CO2 SERPL-SCNC: 22 MMOL/L
CREAT SERPL-MCNC: 4.36 MG/DL
EGFR: 14 ML/MIN/1.73M2
GLUCOSE SERPL-MCNC: 93 MG/DL
PHOSPHATE SERPL-MCNC: 4.7 MG/DL
POTASSIUM SERPL-SCNC: 4.4 MMOL/L
SODIUM SERPL-SCNC: 141 MMOL/L

## 2024-04-25 ENCOUNTER — APPOINTMENT (OUTPATIENT)
Dept: NEPHROLOGY | Facility: CLINIC | Age: 74
End: 2024-04-25
Payer: COMMERCIAL

## 2024-04-25 ENCOUNTER — NON-APPOINTMENT (OUTPATIENT)
Age: 74
End: 2024-04-25

## 2024-04-25 ENCOUNTER — LABORATORY RESULT (OUTPATIENT)
Age: 74
End: 2024-04-25

## 2024-04-25 DIAGNOSIS — Z01.818 ENCOUNTER FOR OTHER PREPROCEDURAL EXAMINATION: ICD-10-CM

## 2024-04-25 PROCEDURE — 99215 OFFICE O/P EST HI 40 MIN: CPT

## 2024-04-26 PROBLEM — Z01.818 PRE-TRANSPLANT EVALUATION FOR KIDNEY TRANSPLANT: Status: ACTIVE | Noted: 2023-04-27

## 2024-04-26 LAB
ABO + RH PNL BLD: NORMAL
ALBUMIN SERPL ELPH-MCNC: 4 G/DL
ALP BLD-CCNC: 115 U/L
ALT SERPL-CCNC: 33 U/L
ANION GAP SERPL CALC-SCNC: 15 MMOL/L
APPEARANCE: CLEAR
AST SERPL-CCNC: 26 U/L
BILIRUB SERPL-MCNC: 0.4 MG/DL
BILIRUBIN URINE: NEGATIVE
BLOOD URINE: ABNORMAL
BUN SERPL-MCNC: 83 MG/DL
C PEPTIDE SERPL-MCNC: 6 NG/ML
CALCIUM SERPL-MCNC: 8.6 MG/DL
CHLORIDE SERPL-SCNC: 108 MMOL/L
CHOLEST SERPL-MCNC: 132 MG/DL
CK SERPL-CCNC: 210 U/L
CMV IGG SERPL QL: >10 U/ML
CMV IGG SERPL-IMP: POSITIVE
CO2 SERPL-SCNC: 20 MMOL/L
COLOR: YELLOW
COVID-19 SPIKE DOMAIN ANTIBODY INTERPRETATION: POSITIVE
CREAT SERPL-MCNC: 4.7 MG/DL
CREAT SPEC-SCNC: 66 MG/DL
CREAT/PROT UR: 5.7 RATIO
CRP SERPL-MCNC: <3 MG/L
EBV EA AB SER IA-ACNC: 33.5 U/ML
EBV EA AB TITR SER IF: POSITIVE
EBV EA IGG SER QL IA: >600 U/ML
EBV EA IGG SER-ACNC: POSITIVE
EBV EA IGM SER IA-ACNC: NEGATIVE
EBV PATRN SPEC IB-IMP: NORMAL
EBV VCA IGG SER IA-ACNC: 410 U/ML
EBV VCA IGM SER QL IA: <10 U/ML
EGFR: 12 ML/MIN/1.73M2
EPSTEIN-BARR VIRUS CAPSID ANTIGEN IGG: POSITIVE
ERYTHROCYTE [SEDIMENTATION RATE] IN BLOOD BY WESTERGREN METHOD: 63 MM/HR
ESTIMATED AVERAGE GLUCOSE: 108 MG/DL
GLUCOSE QUALITATIVE U: 100 MG/DL
GLUCOSE SERPL-MCNC: 76 MG/DL
HAV IGM SER QL: NONREACTIVE
HBA1C MFR BLD HPLC: 5.4 %
HBV CORE IGG+IGM SER QL: NONREACTIVE
HBV SURFACE AB SER QL: NONREACTIVE
HBV SURFACE AB SERPL IA-ACNC: <3 MIU/ML
HBV SURFACE AG SER QL: NONREACTIVE
HCT VFR BLD CALC: 30 %
HCV AB SER QL: NONREACTIVE
HCV S/CO RATIO: 0.18 S/CO
HDLC SERPL-MCNC: 52 MG/DL
HEPATITIS A IGG ANTIBODY: REACTIVE
HGB BLD-MCNC: 9.3 G/DL
HIV1+2 AB SPEC QL IA.RAPID: NONREACTIVE
HSV 1+2 IGG SER IA-IMP: NEGATIVE
HSV 1+2 IGG SER IA-IMP: POSITIVE
HSV 1+2 IGG SER IA-IMP: POSITIVE
HSV1 IGG SER QL: >62.2 INDEX
HSV1 IGG SER QL: >62.2 INDEX
HSV2 IGG SER QL: 0.05 INDEX
KETONES URINE: NEGATIVE MG/DL
LDLC SERPL CALC-MCNC: 65 MG/DL
LEUKOCYTE ESTERASE URINE: NEGATIVE
MAGNESIUM SERPL-MCNC: 2.5 MG/DL
MCHC RBC-ENTMCNC: 29.9 PG
MCHC RBC-ENTMCNC: 31 GM/DL
MCV RBC AUTO: 96.5 FL
NITRITE URINE: NEGATIVE
NONHDLC SERPL-MCNC: 80 MG/DL
PH URINE: 6.5
PHOSPHATE SERPL-MCNC: 5.5 MG/DL
PLATELET # BLD AUTO: 215 K/UL
POTASSIUM SERPL-SCNC: 4.8 MMOL/L
PROT SERPL-MCNC: 6.9 G/DL
PROT UR-MCNC: 374 MG/DL
PROTEIN URINE: 300 MG/DL
PSA SERPL-MCNC: 5.94 NG/ML
RBC # BLD: 3.11 M/UL
RBC # FLD: 14.1 %
RUBV IGG FLD-ACNC: >33 INDEX
RUBV IGG SER-IMP: POSITIVE
SARS-COV-2 AB SERPL IA-ACNC: >250 U/ML
SODIUM SERPL-SCNC: 143 MMOL/L
SPECIFIC GRAVITY URINE: 1.01
T GONDII AB SER-IMP: NEGATIVE
T GONDII IGG SER QL: <3 IU/ML
T PALLIDUM AB SER QL IA: NEGATIVE
T3 SERPL-MCNC: 80 NG/DL
T4 FREE SERPL-MCNC: 1.3 NG/DL
TRIGL SERPL-MCNC: 77 MG/DL
TSH SERPL-ACNC: 3 UIU/ML
URATE SERPL-MCNC: 12.4 MG/DL
UROBILINOGEN URINE: 0.2 MG/DL
VZV AB TITR SER: POSITIVE
VZV IGG SER IF-ACNC: 3882 INDEX
WBC # FLD AUTO: 8.13 K/UL

## 2024-04-26 NOTE — HISTORY OF PRESENT ILLNESS
[TextBox_42] : INTERVAL EVENTS SINCE LAST VISIT- none   ON TUNG YU is a 72 year old male who presents for kidney transplant evaluation.  Cause of advanced : Diabetic nephropathy ( biopsy done in Dec 2022)  Nephrologist: Dr Cem Rees   Preemptive candidate   PMH includes-  CKD stage V with proteinuria Hypertension Diabetes mellitus Hyperlipidemia Hypothyroidism  no h/o MI , CHF, CAD no h/o COPD, Asthma no h/o TB, HIV, Hepatitis   no h/o malignancy or bleeding disorders. No DVT/PE no h/o CVA no h/o blood transfusions . no h/o previous transplant  Recent  hospitalization in Dec 2022 for volume overload and a recent admission for Hyperkalemia  Surgical h/o : none Functional status: walks with a cane. has some issues with dizziness. can walk 2-3 blocks  Social history: retired, used to wrok in the post office for 40 years. . has 2 daughters. quit smoking at age 29. No alcohol or illicit use. Family history: His 43 year old nephew is on dialysis. has a significant family h/o of DM

## 2024-04-26 NOTE — INPATIENT CERTIFICATION FOR MEDICARE PATIENTS - NS ICMP CERT SIG IND
Colonoscopy performed today 4/26/2024.  See procedure report(s) under procedure tab.  
I certify as stated above.

## 2024-04-26 NOTE — PLAN
[FreeTextEntry1] : Mr. IAIN NICHOLAS is an acceptable candidate for kidney transplantation.  Last seen 4/2023 Listed 9/18/2023 Dialysis Pre-emptive ABO O will check titer today PRA 0 % will repeat today  Most recent testing Cardiac - Dr. Lindo ECHO 12/21/2022- EF 69% normal MV, minimal regurgitation. Aortic valve not well visualized, appears to be calcified trileaflet with normal opening. Mild aortic regurgitation. moderate concentric LVH.  Stress 6/29/2023- 1. Myocardial Perfusion: Probably Normal. 2. The stress left ventricular EF% is 59 %. The stress end diastolic volume is 139 ml and systolic volume is 57 ml.  **NEEDS UPDATED ECHO**  Radiology Chest Xray 3/1/2024- clear lungs  CT Chest 10/2023- Interval resolution of ill-defined sub-3 mm nodules in both lungs, presumably inflammatory or infectious in etiology. No suspicious pulmonary nodule. Gallstones with questionable wall thickening which may be related to the partially contracted state or sequela of chronic cholecystitis.  CT A/P w/o IVC 5/2/2023- No retroperitoneal hematoma. Colonic diverticulosis w/o evidence of active diverticulitis. Trace left pleural effusion.  Cancer Screening PSA 3/2024- 5.35 *Followed by [facility] Dr. Michel** Colonoscopy 5/2023- hemorrhoids, WNL.    I have personally discussed the risks and benefits of transplantation and patient attended transplant education class where the following was disclosed:   Reviewed factors affecting survival and morbidity while on dialysis, the transplant wait list and reviewed jacob-operative and long-term risk factors affecting outcome in kidney transplantation.    One year SRTR outcomes for national and [facility] were discussed in regards to patient survival and graft survival after transplantation.    Details of transplant surgery, including complications were discussed. Immunosuppression and complications including infection including life threatening sepsis and opportunistic infections, malignancy and new onset diabetes were discussed.    Benefits of live donor transplantation as well as variability in wait times across regions and multiple listing were discussed.  KDPI >85% and PHS high risk criteria donors were discussed.  HCV kidney transplantation was discussed.   Will proceed with completing/ updating work up and listing for transplant/ live donor transplant once work up is reviewed and found to be acceptable by multidisciplinary listing committee.

## 2024-04-29 LAB
M TB IFN-G BLD-IMP: NEGATIVE
QUANTIFERON TB PLUS MITOGEN MINUS NIL: 8.57 IU/ML
QUANTIFERON TB PLUS NIL: 0.04 IU/ML
QUANTIFERON TB PLUS TB1 MINUS NIL: 0 IU/ML
QUANTIFERON TB PLUS TB2 MINUS NIL: 0 IU/ML

## 2024-04-30 ENCOUNTER — TRANSCRIPTION ENCOUNTER (OUTPATIENT)
Age: 74
End: 2024-04-30

## 2024-04-30 RX ORDER — SODIUM ZIRCONIUM CYCLOSILICATE 10 G/10G
10 POWDER, FOR SUSPENSION ORAL
Qty: 1 | Refills: 11 | Status: ACTIVE | COMMUNITY
Start: 2023-08-18 | End: 1900-01-01

## 2024-04-30 RX ORDER — ASPIRIN 81 MG/1
81 TABLET, CHEWABLE ORAL
Qty: 90 | Refills: 3 | Status: ACTIVE | COMMUNITY
Start: 2023-01-04

## 2024-05-06 RX ORDER — IRBESARTAN 150 MG/1
150 TABLET ORAL
Qty: 90 | Refills: 3 | Status: ACTIVE | COMMUNITY
Start: 2024-05-06 | End: 1900-01-01

## 2024-05-08 ENCOUNTER — OUTPATIENT (OUTPATIENT)
Dept: OUTPATIENT SERVICES | Facility: HOSPITAL | Age: 74
LOS: 1 days | End: 2024-05-08
Payer: COMMERCIAL

## 2024-05-08 ENCOUNTER — APPOINTMENT (OUTPATIENT)
Dept: CT IMAGING | Facility: CLINIC | Age: 74
End: 2024-05-08
Payer: COMMERCIAL

## 2024-05-08 ENCOUNTER — APPOINTMENT (OUTPATIENT)
Dept: NEPHROLOGY | Facility: CLINIC | Age: 74
End: 2024-05-08
Payer: MEDICARE

## 2024-05-08 VITALS — SYSTOLIC BLOOD PRESSURE: 148 MMHG | HEART RATE: 59 BPM | DIASTOLIC BLOOD PRESSURE: 54 MMHG

## 2024-05-08 VITALS
HEART RATE: 61 BPM | DIASTOLIC BLOOD PRESSURE: 56 MMHG | SYSTOLIC BLOOD PRESSURE: 159 MMHG | HEIGHT: 71 IN | OXYGEN SATURATION: 99 % | TEMPERATURE: 98.1 F

## 2024-05-08 DIAGNOSIS — E11.29 TYPE 2 DIABETES MELLITUS WITH OTHER DIABETIC KIDNEY COMPLICATION: ICD-10-CM

## 2024-05-08 DIAGNOSIS — E78.5 HYPERLIPIDEMIA, UNSPECIFIED: ICD-10-CM

## 2024-05-08 DIAGNOSIS — N25.81 SECONDARY HYPERPARATHYROIDISM OF RENAL ORIGIN: ICD-10-CM

## 2024-05-08 DIAGNOSIS — D63.1 ANEMIA IN CHRONIC KIDNEY DISEASE: ICD-10-CM

## 2024-05-08 DIAGNOSIS — N18.5 TYPE 2 DIABETES MELLITUS WITH DIABETIC CHRONIC KIDNEY DISEASE: ICD-10-CM

## 2024-05-08 DIAGNOSIS — R80.9 TYPE 2 DIABETES MELLITUS WITH OTHER DIABETIC KIDNEY COMPLICATION: ICD-10-CM

## 2024-05-08 DIAGNOSIS — E11.22 TYPE 2 DIABETES MELLITUS WITH DIABETIC CHRONIC KIDNEY DISEASE: ICD-10-CM

## 2024-05-08 DIAGNOSIS — Z01.818 ENCOUNTER FOR OTHER PREPROCEDURAL EXAMINATION: ICD-10-CM

## 2024-05-08 DIAGNOSIS — E87.5 HYPERKALEMIA: ICD-10-CM

## 2024-05-08 PROCEDURE — 74176 CT ABD & PELVIS W/O CONTRAST: CPT | Mod: 26

## 2024-05-08 PROCEDURE — 74176 CT ABD & PELVIS W/O CONTRAST: CPT

## 2024-05-08 PROCEDURE — 99215 OFFICE O/P EST HI 40 MIN: CPT | Mod: 25

## 2024-05-08 PROCEDURE — 96372 THER/PROPH/DIAG INJ SC/IM: CPT

## 2024-05-08 RX ORDER — DARBEPOETIN ALFA 100 UG/ML
100 SOLUTION INTRAVENOUS; SUBCUTANEOUS
Qty: 0 | Refills: 0 | Status: COMPLETED | OUTPATIENT
Start: 2024-05-08

## 2024-05-08 RX ADMIN — DARBEPOETIN ALFA 0 MCG/ML: 100 SOLUTION INTRAVENOUS; SUBCUTANEOUS at 00:00

## 2024-05-08 NOTE — ASSESSMENT
[FreeTextEntry1] :  Mr. Angulo is a 73 year old man with CKD stage V with proteinuria, hypertension, diabetes mellitus, hyperlipidemia, hypothyroidism, and recent hospitalization for worsening kidney function and hypervolemia (12/2023), here for kidney evaluation and management.  Mr. Angulo has advanced CKD due to diabetes, and his GFR is in the teens. We are preparing for dialysis and transplant.  CKD IV/V with proteinuria due to diabetes - Continue Lokelma/SZC 3x/week - Continue irbesartan - Continue bicarbonate for metabolic acidosis - Anemia s/p IV iron; continue Aranesp as needed -- administered 100mcg today to RUE, tolerated without complication - Continue in CKD Healthy Transitions program (Yuridia Hannon) - Transplant status 1  Hypertension: - Continue irbesartan - Continue torsemide - Continue amlodipine, carvedilol  Diabetes mellitus: - Mr. Angulo to follow with endocrinology  Hyperlipidemia: Continue statin and Vascepa   PLAN - Maintain current medications - NITA/Aranesp 100mcg today - Follow up about 8 weeks with labs prior  Discussed importance of healthful habits, including physical activity/exercise and improvements in diet.  I spent a total of 40 minutes on this encounter.

## 2024-05-08 NOTE — HISTORY OF PRESENT ILLNESS
[FreeTextEntry1] : Contacts:  Mariposa (daughter)  Dr. Mara Higgins (PCP)  Dr. Lalo Ta (endocrinology)  Dr. Carol Ann Jewell (vascular)  Dr. Zana Simpson (podiatry)  Dr. Jacobson (gastroenterology)  ------------------------------------------------------------------------------- Problem List:  Chronic kidney disease stage V with nephrotic-range proteinuria  Diabetic nephropathy (biopsy proven)  Hypertension with moderate concentric left ventricular hypertrophy  Diabetes mellitus  Hyperlipidemia  Hypothyroidism  Recent H. pylori s/p treatment  ------------------------------------------------------------------------------- HPI: [comes with daughter] Mr. Angulo is a 73 year old man with CKD stage V with proteinuria, hypertension, diabetes mellitus, hyperlipidemia, hypothyroidism, and recent hospitalization for worsening kidney function and hypervolemia (2023), here for kidney evaluation and management.  Last saw 2024.  In the interim, had some hypervolemia, treated with increased doses of diuretic. He was also given Aranesp for anemia of CKD.  Otherwise doing well. Trying to eat with less salt, though still eats out. Doing fine since. Otherwise without complaints.  ------------------------------------------------------------------------------- Social History:  From [place], came to  1960[place]  Lives in [place] with wife  Two daughters has 5 grandchild  Works as  for postal service  Smoked as teenager, stopped aged 29  Family History:  Father had stroked ( age 70s)  Mother  age 93  No known history of renal disease, hematuria, proteinuria, ESRD, dialysis, transplant  ------------------------------------------------------------------------------- Allergies: NKDA  Medications:  Lokelma 10g 3x/week  Amlodipine 5mg daily  Carvedilol 12.5mg two times per day  Irbesartan 150mg daily  Torsemide 40mg daily  Sodium bicarbonate 650mg two times per day  Glipizide 2.5mg daily  Januvia/sitagliptin 25mg daily  Atorvastatin 40mg daily  Aspirin 81mg daily  Vascepa 1g two times per day  Levothyroxine 50mcg daily.  ------------------------------------------------------------------------------- Physical Exam:   Gen: NAD  HEENT: Supple neck  Pulm: CTA  CV: RRR; no rub  Back: No spinal or CVA terndeness  Abd: +BS, soft  UE: Warm, FROM; no asterixis; LUE brachiocephalic AVF  LE: Warm, FROM; mild edema  Neuro: No focal deficits  Psych: Normal affect  Skin: Warm, dry  ------------------------------------------------------------------------------- Labs/Studies:   2024    143 | 108 | 83   ------------------< 76 Ca 8.6 eGFR 12   4.8 |  20 | 4.7   Phosphorus 5.5  Albumin 4   2023 UACR 2207 mg/g  2023 UACR 769 mg/g  2022 UACR 6447 mg/g   2024 UPCR 5.7 g/g  2024 UPCR 5.2 g/g  2023 UPCR 2.2 g/g  2022 UPCR 11.8 g/g   2024 H/H 9.3 / 30  2024 LDL 65  2024 HbA1c 5.4  2024 Vitamin D (25OH) 71  2024    2022 Kidney U/S   - Right kidney: 11.4 cm. No hydronephrosis. Upper pole and lower pole cyst measuring 2.9 cm a 1.6 cm.   - Left kidney: 12.4 cm. No renal mass, hydronephrosis or calculi.   2022 Kidney Biopsy   - Advanced diffuse and nodular diabetic glomerulosclerosis   - Acute tubular injury with focal tubular necrosis   - Chronic and focally active interstitial nephritis    Summary of chronic changes:    - Diffuse nodular glomerulosclerosis    - Global glomerulosclerosis (30% of glomeruli)    - Tubular atrophy and interstitial fibrosis (70% of the sampled cortex)    - Severe arterial and arteriolar sclerosis

## 2024-05-28 ENCOUNTER — APPOINTMENT (OUTPATIENT)
Dept: ULTRASOUND IMAGING | Facility: CLINIC | Age: 74
End: 2024-05-28

## 2024-05-28 ENCOUNTER — APPOINTMENT (OUTPATIENT)
Dept: TRANSPLANT | Facility: CLINIC | Age: 74
End: 2024-05-28

## 2024-05-29 NOTE — PATIENT PROFILE ADULT - NSPROPOAURINARYCATHETER_GEN_A_NUR
on exam: anteverted uterus, approx 7 week size, nl appearing cervix.  on suction under US guidance, endometrial strip 0.7cm after procedure, no POC noted using color flow no

## 2024-05-30 LAB
ALBUMIN SERPL ELPH-MCNC: 4 G/DL
ANION GAP SERPL CALC-SCNC: 19 MMOL/L
BUN SERPL-MCNC: 120 MG/DL
CALCIUM SERPL-MCNC: 8.4 MG/DL
CHLORIDE SERPL-SCNC: 106 MMOL/L
CO2 SERPL-SCNC: 16 MMOL/L
CREAT SERPL-MCNC: 5.62 MG/DL
CYSTATIN C SERPL-MCNC: 4.64 MG/L
EGFR: 10 ML/MIN/1.73M2
GFR/BSA.PRED SERPLBLD CYS-BASED-ARV: 10 ML/MIN/1.73M2
GLUCOSE SERPL-MCNC: 162 MG/DL
HCT VFR BLD CALC: 34.3 %
HGB BLD-MCNC: 11.3 G/DL
MCHC RBC-ENTMCNC: 29.5 PG
MCHC RBC-ENTMCNC: 32.9 GM/DL
MCV RBC AUTO: 89.6 FL
PHOSPHATE SERPL-MCNC: 6.1 MG/DL
PLATELET # BLD AUTO: 167 K/UL
POTASSIUM SERPL-SCNC: 4.4 MMOL/L
RBC # BLD: 3.83 M/UL
RBC # FLD: 14.1 %
SODIUM SERPL-SCNC: 141 MMOL/L
WBC # FLD AUTO: 7.32 K/UL

## 2024-06-14 ENCOUNTER — APPOINTMENT (OUTPATIENT)
Dept: NEPHROLOGY | Facility: CLINIC | Age: 74
End: 2024-06-14
Payer: MEDICARE

## 2024-06-14 VITALS
HEIGHT: 71 IN | BODY MASS INDEX: 25.62 KG/M2 | OXYGEN SATURATION: 99 % | HEART RATE: 68 BPM | RESPIRATION RATE: 16 BRPM | DIASTOLIC BLOOD PRESSURE: 59 MMHG | WEIGHT: 183 LBS | TEMPERATURE: 98.1 F | SYSTOLIC BLOOD PRESSURE: 103 MMHG

## 2024-06-14 VITALS — DIASTOLIC BLOOD PRESSURE: 62 MMHG | SYSTOLIC BLOOD PRESSURE: 110 MMHG

## 2024-06-14 DIAGNOSIS — N18.9 CHRONIC KIDNEY DISEASE, UNSPECIFIED: ICD-10-CM

## 2024-06-14 DIAGNOSIS — E87.20 ACIDOSIS, UNSPECIFIED: ICD-10-CM

## 2024-06-14 DIAGNOSIS — D63.1 CHRONIC KIDNEY DISEASE, UNSPECIFIED: ICD-10-CM

## 2024-06-14 DIAGNOSIS — E11.49 TYPE 2 DIABETES MELLITUS WITH OTHER DIABETIC NEUROLOGICAL COMPLICATION: ICD-10-CM

## 2024-06-14 DIAGNOSIS — I10 ESSENTIAL (PRIMARY) HYPERTENSION: ICD-10-CM

## 2024-06-14 DIAGNOSIS — I50.32 CHRONIC DIASTOLIC (CONGESTIVE) HEART FAILURE: ICD-10-CM

## 2024-06-14 DIAGNOSIS — N18.5 CHRONIC KIDNEY DISEASE, STAGE 5: ICD-10-CM

## 2024-06-14 DIAGNOSIS — E83.39 OTHER DISORDERS OF PHOSPHORUS METABOLISM: ICD-10-CM

## 2024-06-14 PROCEDURE — G2211 COMPLEX E/M VISIT ADD ON: CPT

## 2024-06-14 PROCEDURE — 99214 OFFICE O/P EST MOD 30 MIN: CPT

## 2024-06-14 RX ORDER — TORSEMIDE 20 MG/1
20 TABLET ORAL
Qty: 180 | Refills: 1 | Status: ACTIVE | COMMUNITY
Start: 2023-06-07

## 2024-06-14 NOTE — PHYSICAL EXAM
[General Appearance - Alert] : alert [General Appearance - In No Acute Distress] : in no acute distress [Sclera] : the sclera and conjunctiva were normal [Outer Ear] : the ears and nose were normal in appearance [Neck Appearance] : the appearance of the neck was normal [Auscultation Breath Sounds / Voice Sounds] : lungs were clear to auscultation bilaterally [Heart Rate And Rhythm] : heart rate was normal and rhythm regular [Heart Sounds] : normal S1 and S2 [Heart Sounds Gallop] : no gallops [Murmurs] : no murmurs [Heart Sounds Pericardial Friction Rub] : no pericardial rub [Edema] : there was no peripheral edema [Bowel Sounds] : normal bowel sounds [Abdomen Soft] : soft [No CVA Tenderness] : no ~M costovertebral angle tenderness [Involuntary Movements] : no involuntary movements were seen [___ (cm) Fistula] : [unfilled] (cm) fistula [Bruit] : a bruit was present [Thrill] : a thrill was present [Benign] : appears benign [] : no rash [No Focal Deficits] : no focal deficits [Oriented To Time, Place, And Person] : oriented to person, place, and time [Impaired Insight] : insight and judgment were intact [Affect] : the affect was normal [FreeTextEntry1] : appears lethargic

## 2024-06-14 NOTE — HISTORY OF PRESENT ILLNESS
[FreeTextEntry1] : APOLLO NICHOLAS is a 73 year male with a history of CKD stage V with proteinuria, hypertension, diabetes mellitus, hyperlipidemia, hypothyroidism, and recent hospitalization for worsening kidney function and hypervolemia (12/2023), here for follow up and review of recent labs.  He reports he has been feeling increasingly tired lately. Appetite is low.  Labs done on 5/28.

## 2024-06-14 NOTE — REVIEW OF SYSTEMS
[Feeling Tired] : feeling tired [Recent Weight Loss (___ Lbs)] : recent [unfilled] ~Ulb weight loss [Fever] : no fever [Chills] : no chills [Feeling Poorly] : not feeling poorly [Chest Pain] : no chest pain [Lower Ext Edema] : no extremity edema [Vomiting] : vomiting [Constipation] : no constipation [Diarrhea] : no diarrhea [Negative] : Neurological [FreeTextEntry7] : one episode of vomiting last week  [de-identified] : Brittani DM

## 2024-06-14 NOTE — ASSESSMENT
[FreeTextEntry1] : CKD5 with proteinuria from DM. Creatinine trend reviewed with patient; renal function progressive. Reduce Torsemide to 20mg daily x next 5 days. Then take Torsemide 20mg on TTSS and Torsemide 40mg ON MWF.  HTN: BP low today with c/o weakness. Pt instructed to reduce Torsemide for next 5 days as above. May hold Amlodipine for SBP < 100.  Volume status: appears dehydrated.  Metabolic Acidosis: cont sodium bicarb 4 tabs daily Anemia Management:  no epogen needed today Diabetes Importance of diabetes control stressed Continue Lokelma three x weekly (MWF) Pt educated on uremic symptoms and advised to go to ER for any concerning symptoms including but not limited to shortness of breath, chest pain, excessive swelling, persistent nausea and vomiting or mental status changes and pt expresses understanding. Follow up with Dr. Rees on July 25th . Labs before.

## 2024-06-25 RX ORDER — SODIUM BICARBONATE 650 MG/1
650 TABLET ORAL
Qty: 360 | Refills: 3 | Status: ACTIVE | COMMUNITY
Start: 2023-01-04 | End: 1900-01-01

## 2024-06-28 ENCOUNTER — TRANSCRIPTION ENCOUNTER (OUTPATIENT)
Age: 74
End: 2024-06-28

## 2024-07-03 ENCOUNTER — NON-APPOINTMENT (OUTPATIENT)
Age: 74
End: 2024-07-03

## 2024-07-03 ENCOUNTER — APPOINTMENT (OUTPATIENT)
Dept: CARDIOLOGY | Facility: CLINIC | Age: 74
End: 2024-07-03
Payer: COMMERCIAL

## 2024-07-03 VITALS
HEART RATE: 69 BPM | BODY MASS INDEX: 26.5 KG/M2 | WEIGHT: 190 LBS | DIASTOLIC BLOOD PRESSURE: 60 MMHG | SYSTOLIC BLOOD PRESSURE: 110 MMHG | OXYGEN SATURATION: 99 %

## 2024-07-03 DIAGNOSIS — Z01.818 ENCOUNTER FOR OTHER PREPROCEDURAL EXAMINATION: ICD-10-CM

## 2024-07-03 DIAGNOSIS — I10 ESSENTIAL (PRIMARY) HYPERTENSION: ICD-10-CM

## 2024-07-03 DIAGNOSIS — E78.5 HYPERLIPIDEMIA, UNSPECIFIED: ICD-10-CM

## 2024-07-03 PROCEDURE — 99215 OFFICE O/P EST HI 40 MIN: CPT

## 2024-07-03 PROCEDURE — G2211 COMPLEX E/M VISIT ADD ON: CPT

## 2024-07-03 PROCEDURE — 93000 ELECTROCARDIOGRAM COMPLETE: CPT | Mod: NC

## 2024-07-08 ENCOUNTER — NON-APPOINTMENT (OUTPATIENT)
Age: 74
End: 2024-07-08

## 2024-07-25 ENCOUNTER — APPOINTMENT (OUTPATIENT)
Dept: TRANSPLANT | Facility: CLINIC | Age: 74
End: 2024-07-25

## 2024-07-25 ENCOUNTER — APPOINTMENT (OUTPATIENT)
Dept: NEPHROLOGY | Facility: CLINIC | Age: 74
End: 2024-07-25
Payer: MEDICARE

## 2024-07-25 VITALS
TEMPERATURE: 97.2 F | WEIGHT: 183 LBS | DIASTOLIC BLOOD PRESSURE: 51 MMHG | OXYGEN SATURATION: 99 % | HEIGHT: 71 IN | SYSTOLIC BLOOD PRESSURE: 129 MMHG | HEART RATE: 61 BPM | BODY MASS INDEX: 25.62 KG/M2

## 2024-07-25 DIAGNOSIS — E87.20 ACIDOSIS, UNSPECIFIED: ICD-10-CM

## 2024-07-25 DIAGNOSIS — N18.5 CHRONIC KIDNEY DISEASE, STAGE 5: ICD-10-CM

## 2024-07-25 DIAGNOSIS — E11.22 TYPE 2 DIABETES MELLITUS WITH DIABETIC CHRONIC KIDNEY DISEASE: ICD-10-CM

## 2024-07-25 DIAGNOSIS — E11.29 TYPE 2 DIABETES MELLITUS WITH OTHER DIABETIC KIDNEY COMPLICATION: ICD-10-CM

## 2024-07-25 DIAGNOSIS — I10 ESSENTIAL (PRIMARY) HYPERTENSION: ICD-10-CM

## 2024-07-25 DIAGNOSIS — E87.5 HYPERKALEMIA: ICD-10-CM

## 2024-07-25 DIAGNOSIS — E78.5 HYPERLIPIDEMIA, UNSPECIFIED: ICD-10-CM

## 2024-07-25 DIAGNOSIS — R80.9 TYPE 2 DIABETES MELLITUS WITH OTHER DIABETIC KIDNEY COMPLICATION: ICD-10-CM

## 2024-07-25 DIAGNOSIS — D63.1 ANEMIA IN CHRONIC KIDNEY DISEASE: ICD-10-CM

## 2024-07-25 DIAGNOSIS — N18.5 TYPE 2 DIABETES MELLITUS WITH DIABETIC CHRONIC KIDNEY DISEASE: ICD-10-CM

## 2024-07-25 PROCEDURE — 99215 OFFICE O/P EST HI 40 MIN: CPT

## 2024-07-25 PROCEDURE — G2211 COMPLEX E/M VISIT ADD ON: CPT

## 2024-07-25 RX ORDER — DARBEPOETIN ALFA 100 UG/ML
100 SOLUTION INTRAVENOUS; SUBCUTANEOUS
Qty: 1 | Refills: 0 | Status: COMPLETED | OUTPATIENT
Start: 2024-07-25 | End: 2024-07-25

## 2024-07-25 NOTE — HISTORY OF PRESENT ILLNESS
[FreeTextEntry1] : Contacts: Mariposa (daughter) Dr. Mara Higgins (PCP) Dr. Lalo Ta (endocrinology) Dr. Carol Ann Jewell (vascular) Dr. aZna Simpson (podiatry) Dr. Jacobson (gastroenterology)  ------------------------------------------------------------------------------- Problem List: Chronic kidney disease stage V with nephrotic-range proteinuria Diabetic nephropathy (biopsy proven) Hypertension with moderate concentric left ventricular hypertrophy Diabetes mellitus Hyperlipidemia Hypothyroidism Recent H. pylori s/p treatment  ------------------------------------------------------------------------------- HPI: [comes with daughter] Mr. Angulo is a 73 year old man with CKD stage V with proteinuria, hypertension, diabetes mellitus, hyperlipidemia, hypothyroidism, and recent hospitalization for worsening kidney function and hypervolemia (2023), here for kidney evaluation and management.  Last saw May 2024. He notes that in the last week or two his whole body has been itching. Otherwise feeling OK with good appetite. His sleep is poor, but chronic and unchanged. No dizziness, lightheadedness, chest pain, dyspnea, nausea, vomiting, leg swelling. Walk each day. Eating poorly, with high salt.  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 Smoked as teenager, stopped aged 29  Family History: Father had stroked ( age 70s) Mother  age 93 No known history of renal disease, hematuria, proteinuria, ESRD, dialysis, transplant  ------------------------------------------------------------------------------- Allergies: NKDA  Medications:  Torsemide 20-40mg on varying days Lokelma 10g 3x/week Amlodipine 5mg daily Carvedilol 12.5mg two times per day Irbesartan 150mg daily Sodium bicarbonate 650mg two times per day Glipizide 2.5mg daily Januvia/sitagliptin 25mg daily Atorvastatin 40mg daily Aspirin 81mg daily Vascepa 1g two times per day Levothyroxine 50mcg daily.  ------------------------------------------------------------------------------- Physical Exam:  Gen: NAD HEENT: Supple neck Pulm: CTA CV: RRR; no rub Back: No spinal or CVA terndeness Abd: +BS, soft UE: Warm, FROM; no asterixis; LUE brachiocephalic AVF LE: Warm, FROM; no edema Neuro: No focal deficits Psych: Normal affect Skin: Warm, dry  ------------------------------------------------------------------------------- Labs/Studies:  2024  141 | 105 | 110 ------------------< 157 Ca 8.7 eGFR 8 6.1 |  17 | 6.7  Phosphorus 6.1 Albumin 3.7  2024 Cystatin-C 4.95, eGFR 9  2023 UACR 2207 mg/g 2023 UACR 769 mg/g 2022 UACR 6447 mg/g  2024 UPCR 5.7 g/g 2024 UPCR 5.2 g/g 2023 UPCR 2.2 g/g 2022 UPCR 11.8 g/g  2024 H/H 9.2 / 29.9 2024 LDL 65 2024 HbA1c 5.4 2024 Vitamin D (25OH) 71 2024   2022 Kidney U/S - Right kidney: 11.4 cm. No hydronephrosis. Upper pole and lower pole cyst measuring 2.9 cm a 1.6 cm. - Left kidney: 12.4 cm. No renal mass, hydronephrosis or calculi.  2022 Kidney Biopsy - Advanced diffuse and nodular diabetic glomerulosclerosis - Acute tubular injury with focal tubular necrosis - Chronic and focally active interstitial nephritis  Summary of chronic changes: - Diffuse nodular glomerulosclerosis - Global glomerulosclerosis (30% of glomeruli) - Tubular atrophy and interstitial fibrosis (70% of the sampled cortex) - Severe arterial and arteriolar sclerosis

## 2024-07-25 NOTE — ASSESSMENT
[FreeTextEntry1] :  Mr. Nicholas is a 73 year old man with CKD stage V with proteinuria, hypertension, diabetes mellitus, hyperlipidemia, hypothyroidism, and recent hospitalization for worsening kidney function and hypervolemia (12/2023), here for kidney evaluation and management.  Mr. Nicholas has advanced CKD due to diabetes, and his GFR is low. We are preparing for dialysis and transplant.  CKD IV/V with proteinuria due to diabetes - INCREASE Lokelma/SZC to daily due to hyperkalemia - Continue irbesartan - Continue bicarbonate for metabolic acidosis - Anemia s/p IV iron; continue Aranesp as needed -- administered 100mcg today to RUE, tolerated without complication - Continue in CKD Healthy Transitions program (Yuridia Hannon) - Transplant status 1  Hypertension: - Continue irbesartan - Continue torsemide -- decrease to only 20mg daily - Continue amlodipine, carvedilol  Diabetes mellitus: - Mr. Nicholas to follow with endocrinology  Hyperlipidemia: Continue statin and Vascepa   PLAN - Decrease torsemide back to 20mg daily - Increase Lokelma from thrice weekly to daily - NITA/Aranesp 100mcg todayjj - Labs in 1-2 weeks and follow up about 4 weeks  Discussed importance of healthful habits, including physical activity/exercise and improvements in diet.  I spent a total of 40 minutes on this encounter.      APOLLO NICHOLAS  Torsemide 20mg daily Lokelma 10g daily Amlodipine 5mg daily Carvedilol 12.5mg two times per day Irbesartan 150mg daily Sodium bicarbonate 650mg two times per day Glipizide 2.5mg daily Januvia/sitagliptin 25mg daily Atorvastatin 40mg daily Aspirin 81mg daily Vascepa 1g two times per day Levothyroxine 50mcg daily

## 2024-08-01 ENCOUNTER — RX RENEWAL (OUTPATIENT)
Age: 74
End: 2024-08-01

## 2024-08-15 NOTE — CONSULT NOTE ADULT - PROBLEM/RECOMMENDATION-2
Comment: 4 residual lesions present upon examination.\\nLN2 treatment #8 done today (08/15/24)\\nDiscussed R/B/SE of topical Imiquimod\\n\\nStart Imiquimod cream - apply to the affected areas of the genitals every other night then increase to nightly use.\\nF/u - 6 weeks Detail Level: Simple Render Risk Assessment In Note?: no DISPLAY PLAN FREE TEXT

## 2024-08-27 ENCOUNTER — APPOINTMENT (OUTPATIENT)
Dept: CARDIOLOGY | Facility: CLINIC | Age: 74
End: 2024-08-27

## 2024-08-29 ENCOUNTER — APPOINTMENT (OUTPATIENT)
Dept: NEPHROLOGY | Facility: CLINIC | Age: 74
End: 2024-08-29

## 2024-08-29 VITALS
HEIGHT: 71 IN | TEMPERATURE: 97.1 F | WEIGHT: 187.39 LBS | HEART RATE: 63 BPM | OXYGEN SATURATION: 98 % | DIASTOLIC BLOOD PRESSURE: 71 MMHG | BODY MASS INDEX: 26.23 KG/M2 | SYSTOLIC BLOOD PRESSURE: 159 MMHG

## 2024-08-29 DIAGNOSIS — N25.81 SECONDARY HYPERPARATHYROIDISM OF RENAL ORIGIN: ICD-10-CM

## 2024-08-29 DIAGNOSIS — E87.20 ACIDOSIS, UNSPECIFIED: ICD-10-CM

## 2024-08-29 DIAGNOSIS — E87.5 HYPERKALEMIA: ICD-10-CM

## 2024-08-29 DIAGNOSIS — E78.5 HYPERLIPIDEMIA, UNSPECIFIED: ICD-10-CM

## 2024-08-29 DIAGNOSIS — D63.1 ANEMIA IN CHRONIC KIDNEY DISEASE: ICD-10-CM

## 2024-08-29 DIAGNOSIS — E11.29 TYPE 2 DIABETES MELLITUS WITH OTHER DIABETIC KIDNEY COMPLICATION: ICD-10-CM

## 2024-08-29 DIAGNOSIS — N18.5 CHRONIC KIDNEY DISEASE, STAGE 5: ICD-10-CM

## 2024-08-29 DIAGNOSIS — E83.39 OTHER DISORDERS OF PHOSPHORUS METABOLISM: ICD-10-CM

## 2024-08-29 DIAGNOSIS — R80.9 TYPE 2 DIABETES MELLITUS WITH OTHER DIABETIC KIDNEY COMPLICATION: ICD-10-CM

## 2024-08-29 DIAGNOSIS — I10 ESSENTIAL (PRIMARY) HYPERTENSION: ICD-10-CM

## 2024-08-29 PROCEDURE — G2211 COMPLEX E/M VISIT ADD ON: CPT

## 2024-08-29 PROCEDURE — 99215 OFFICE O/P EST HI 40 MIN: CPT | Mod: 25

## 2024-08-29 PROCEDURE — 96372 THER/PROPH/DIAG INJ SC/IM: CPT

## 2024-08-29 RX ORDER — DARBEPOETIN ALFA 100 UG/ML
100 SOLUTION INTRAVENOUS; SUBCUTANEOUS
Qty: 0 | Refills: 0 | Status: COMPLETED | OUTPATIENT
Start: 2024-08-29

## 2024-08-29 RX ADMIN — DARBEPOETIN ALFA 0 MCG/ML: 100 SOLUTION INTRAVENOUS; SUBCUTANEOUS at 00:00

## 2024-08-29 NOTE — HISTORY OF PRESENT ILLNESS
[FreeTextEntry1] : Contacts: Mariposa (daughter) Dr. Mara Higgins (PCP) Dr. Lalo Ta (endocrinology) Dr. Carol Ann Jewell (vascular) Dr. Zana Simpson (podiatry) Dr. Jacobson (gastroenterology)  ------------------------------------------------------------------------------- Problem List: Chronic kidney disease stage V with nephrotic-range proteinuria Diabetic nephropathy (biopsy proven) Hypertension with moderate concentric left ventricular hypertrophy Diabetes mellitus Hyperlipidemia Hypothyroidism Recent H. pylori s/p treatment  ------------------------------------------------------------------------------- HPI: [comes with daughter] Mr. Angulo is a 73 year old man with CKD stage V with proteinuria, hypertension, diabetes mellitus, hyperlipidemia, hypothyroidism, and recent hospitalization for worsening kidney function and hypervolemia (2023), here for kidney evaluation and management.  Last saw 2024. Had some constipation for a day or two, otherwise well. Good appetite. His sleep is poor, but chronic and unchanged. No dizziness, lightheadedness, chest pain, dyspnea, nausea, vomiting, leg swelling. Walk each day.  ------------------------------------------------------------------------------- Social History: From [place], came to  1960[place] Lives in [place] with wife Two daughters has 5 grandchild Works as  for postal service Smoked as teenager, stopped aged 29  Family History: Father had stroked ( age 70s) Mother  age 93 No known history of renal disease, hematuria, proteinuria, ESRD, dialysis, transplant  ------------------------------------------------------------------------------- Allergies: NKDA  Medications:  Torsemide 20mg on varying days Lokelma 10g daily Amlodipine 5mg daily Carvedilol 12.5mg two times per day Irbesartan 150mg daily Sodium bicarbonate 650mg two times per day Glipizide 2.5mg daily Januvia/sitagliptin 25mg daily Atorvastatin 40mg daily Aspirin 81mg daily Vascepa 1g two times per day Levothyroxine 50mcg daily.  ------------------------------------------------------------------------------- Physical Exam:  Gen: NAD HEENT: Supple neck Pulm: CTA CV: RRR; no rub Back: No spinal or CVA terndeness Abd: +BS, soft UE: Warm, FROM; no asterixis; LUE brachiocephalic AVF LE: Warm, FROM; trace edema Neuro: No focal deficits Psych: Normal affect Skin: Warm, dry  ------------------------------------------------------------------------------- Labs/Studies:  2024  142 | 102 | 106 ------------------< 106 Ca 8.5 eGFR 10 4.2 |  22 | 5.46  Phosphorus 4.8 Albumin 4  2024 Cystatin-C 4.54, eGFR 10  2023 UACR 2207 mg/g 2023 UACR 769 mg/g 2022 UACR 6447 mg/g  2024 UPCR 5.7 g/g 2024 UPCR 5.2 g/g 2023 UPCR 2.2 g/g 2022 UPCR 11.8 g/g  2024 H/H 8.9 / 29.2 2024 Ferritin 789 2024 LDL 65 2024 HbA1c 6.8 2024 Vitamin D (25OH) 71 2024   2022 Kidney U/S - Right kidney: 11.4 cm. No hydronephrosis. Upper pole and lower pole cyst measuring 2.9 cm a 1.6 cm. - Left kidney: 12.4 cm. No renal mass, hydronephrosis or calculi.  2022 Kidney Biopsy - Advanced diffuse and nodular diabetic glomerulosclerosis - Acute tubular injury with focal tubular necrosis - Chronic and focally active interstitial nephritis  Summary of chronic changes: - Diffuse nodular glomerulosclerosis - Global glomerulosclerosis (30% of glomeruli) - Tubular atrophy and interstitial fibrosis (70% of the sampled cortex) - Severe arterial and arteriolar sclerosis

## 2024-08-29 NOTE — ASSESSMENT
[FreeTextEntry1] :  Mr. Angulo is a 73 year old man with CKD stage V with proteinuria, hypertension, diabetes mellitus, hyperlipidemia, hypothyroidism, and recent hospitalization for worsening kidney function and hypervolemia (12/2023), here for kidney evaluation and management.  Mr. Angulo has advanced CKD due to diabetes, and his GFR is low. We are preparing for dialysis and transplant.  CKD IV/V with proteinuria due to diabetes - Continue Lokelma/SZC to daily due to hyperkalemia - Continue irbesartan - Continue bicarbonate for metabolic acidosis - Anemia s/p IV iron; continue Aranesp as needed -- administered 100mcg today to RUE, tolerated without complication - Continue in CKD Healthy Transitions program (Yuridia Hannon) - Transplant status 1  Hypertension: - Continue irbesartan - Continue torsemide - Continue amlodipine, carvedilol  Diabetes mellitus: - Mr. Angulo to follow with endocrinology  Hyperlipidemia: Continue statin and Vascepa   PLAN - Continue current medications - NITA/Aranesp 100mcg today right arm - Follow up about 5-6 weeks; will try to coordinate lab draws with transplant  Discussed importance of healthful habits, including physical activity/exercise and improvements in diet.  I spent a total of 40 minutes on this encounter.

## 2024-09-03 ENCOUNTER — APPOINTMENT (OUTPATIENT)
Dept: UROLOGY | Facility: CLINIC | Age: 74
End: 2024-09-03
Payer: MEDICARE

## 2024-09-03 VITALS
RESPIRATION RATE: 16 BRPM | HEART RATE: 72 BPM | SYSTOLIC BLOOD PRESSURE: 163 MMHG | TEMPERATURE: 98.1 F | OXYGEN SATURATION: 98 % | WEIGHT: 187 LBS | BODY MASS INDEX: 26.08 KG/M2 | DIASTOLIC BLOOD PRESSURE: 64 MMHG

## 2024-09-03 DIAGNOSIS — R97.20 ELEVATED PROSTATE, SPECIFIC ANTIGEN [PSA]: ICD-10-CM

## 2024-09-03 PROCEDURE — G2211 COMPLEX E/M VISIT ADD ON: CPT

## 2024-09-03 PROCEDURE — 99214 OFFICE O/P EST MOD 30 MIN: CPT

## 2024-09-03 NOTE — ADDENDUM
[FreeTextEntry1] : Entered by Rocky Redd, acting as scribe for Dr. Sb Michel. The documentation recorded by the scribe accurately reflects the service I personally performed and the decisions made by me.

## 2024-09-03 NOTE — LETTER BODY
[FreeTextEntry1] : Mara Higgins MD [address] [facility] [address], [place] [id number] [phone]  Dear Dr. Higgins,  Reason for visit: Elevated PSA  This is a 73 year old man with elevated PSA. Patient returns for follow-up. His current PSA is 5.94 which has increased. Patient has stage V renal insufficiency status post AV fistula formation. His potassium is now better controlled. He is currently awaiting dialysis. Patient is also interested in kidney transplantation. Patient denies any interval complaints or difficulties. He denies any dysuria or gross hematuria. Patient denies any changes in health. The past medical history and family history and social history are unchanged. All other review of systems are negative. Patient denies any changes in medications. Medication list was reconciled.  On examination, the patient is in no acute distress. He is alert and oriented follows commands. He has normal mood and affect. He is normocephalic. Oral no thrush. Neck is supple. Respirations are unlabored. His abdomen is soft and nontender. Liver is nonpalpable. Bladder is nonpalpable. No CVA tenderness. Neurologically he is grossly intact. No peripheral edema. Skin without gross abnormality.  Patient has left forearm AV fistula.  His PSA was 5.94, which is elevated. His previous PSA was 4.12. His potassium is not controlled.  Assessment: Elevated PSA  I counseled the patient. His PSA has recently increased to 5.94. I recommended patient undergo transperineal prostate biopsy to rule out prostate cancer. He is not a candidate for prostate MRI given his renal sufficiency. He is not on hemodialysis. I counseled the patient regarding the procedure. The risks and benefits were discussed. Alternatives were given. I answered the patient questions. The patient will take the necessary preparations for the procedure. He will repeat PSA to establish baseline. The patient will follow-up as directed and will contact me with any questions or concerns. Thank you for the opportunity to participate in the care of this patient. I'll keep you updated on his progress.  Plan: Repeat PSA. Transperineal prostate biopsy. Follow up as directed.  I spent 30-minutes time today on all issues related to this patient on today date of service including non face to face time.

## 2024-09-03 NOTE — LETTER BODY
[FreeTextEntry1] : Mara Higgins MD [address] [facility] [address], [place] [id number] [phone]  Dear Dr. Higgins,  Reason for visit: Elevated PSA  This is a 73 year old man with elevated PSA. Patient returns for follow-up. His current PSA is 5.94 which has increased. Patient has stage V renal insufficiency status post AV fistula formation. His potassium is now better controlled. He is currently awaiting dialysis. Patient is also interested in kidney transplantation. Patient denies any interval complaints or difficulties. He denies any dysuria or gross hematuria. Patient denies any changes in health. The past medical history and family history and social history are unchanged. All other review of systems are negative. Patient denies any changes in medications. Medication list was reconciled.  On examination, the patient is in no acute distress. He is alert and oriented follows commands. He has normal mood and affect. He is normocephalic. Oral no thrush. Neck is supple. Respirations are unlabored. His abdomen is soft and nontender. Liver is nonpalpable. Bladder is nonpalpable. No CVA tenderness. Neurologically he is grossly intact. No peripheral edema. Skin without gross abnormality.  Patient has left forearm AV fistula.  His PSA was 5.94, which is elevated. His previous PSA was 4.12. His potassium is not controlled.  Assessment: Elevated PSA  I counseled the patient. His PSA has recently increased to 5.94. I recommended patient undergo transperineal prostate biopsy to rule out prostate cancer. He is not a candidate for prostate MRI given his renal sufficiency. He is not on hemodialysis. I counseled the patient regarding the procedure. The risks and benefits were discussed. Alternatives were given. I answered the patient questions. The patient will take the necessary preparations for the procedure. He will repeat PSA to establish baseline. The patient will follow-up as directed and will contact me with any questions or concerns. Thank you for the opportunity to participate in the care of this patient. I'll keep you updated on his progress.  Plan: Repeat PSA. Transperineal prostate biopsy. Follow up as directed.  I spent 30-minutes time today on all issues related to this patient on today date of service including non face to face time.

## 2024-09-11 ENCOUNTER — APPOINTMENT (OUTPATIENT)
Dept: CARDIOLOGY | Facility: CLINIC | Age: 74
End: 2024-09-11
Payer: COMMERCIAL

## 2024-09-11 PROCEDURE — 93306 TTE W/DOPPLER COMPLETE: CPT

## 2024-09-18 ENCOUNTER — APPOINTMENT (OUTPATIENT)
Dept: VASCULAR SURGERY | Facility: CLINIC | Age: 74
End: 2024-09-18
Payer: MEDICARE

## 2024-09-18 VITALS
BODY MASS INDEX: 27.16 KG/M2 | DIASTOLIC BLOOD PRESSURE: 65 MMHG | TEMPERATURE: 97.9 F | SYSTOLIC BLOOD PRESSURE: 138 MMHG | HEIGHT: 71 IN | HEART RATE: 60 BPM | WEIGHT: 194 LBS

## 2024-09-18 DIAGNOSIS — N18.5 CHRONIC KIDNEY DISEASE, STAGE 5: ICD-10-CM

## 2024-09-18 PROCEDURE — 93990 DOPPLER FLOW TESTING: CPT

## 2024-09-18 PROCEDURE — 99213 OFFICE O/P EST LOW 20 MIN: CPT

## 2024-09-19 NOTE — ASSESSMENT
[Arterial/Venous Disease] : arterial/venous disease [FreeTextEntry1] : Impression - chronic kidney disease not on HD, left avf is well mature and ready for use when HD is needed   Plan Left arm precautions - no BP, IV, or blood draw Left avf ready for use for dialysis when needed (letter given) Return to office prn

## 2024-09-19 NOTE — PHYSICAL EXAM
[2+] : left 2+ [Ankle Swelling (On Exam)] : present [Ankle Swelling Bilaterally] : bilaterally  [Ankle Swelling On The Right] : mild [] : bilaterally [Ankle Swelling On The Left] : moderate [No Rash or Lesion] : No rash or lesion [Alert] : alert [Oriented to Person] : oriented to person [Oriented to Place] : oriented to place [Oriented to Time] : oriented to time [Calm] : calm [Varicose Veins Of Lower Extremities] : not present [de-identified] : NAD [de-identified] : NCAT [de-identified] : unlabored breathing [FreeTextEntry1] : good palpable thrill over avf brisk capillary refill < 2 sec [de-identified] : cooperative

## 2024-09-19 NOTE — HISTORY OF PRESENT ILLNESS
[FreeTextEntry1] : Pt presents for left avf evaluation. He is s/p left brachiocephalic fistula on 4/6/2023. Accompanied by daughter. Denies problems with LUE. Not on dialysis yet. States he may be starting dialysis very soon.  Pt's nephrologist is Dr. Rees

## 2024-09-19 NOTE — REASON FOR VISIT
[Follow-Up: _____] : a [unfilled] follow-up visit [Family Member] : family member [FreeTextEntry1] : left brachiocephalic fistula

## 2024-09-19 NOTE — DATA REVIEWED
[FreeTextEntry1] : 6/28/2023 - LUE dialysis ultrasound left patent brachiocephalic avf w/o stenosis vf 981 ml/min, dprox 6.5 mm, dmid 4.7 mm, ddistal 4.9 mm  12/20/2023 LUE dialysis ultrasound left patent brachiocephalic avf w/o stenosis vf 1576 ml/min, diam prox 7.6 mm, diam mid 6.6 mm, diam distal 6.5 mm  9/18/2024 LUE dialysis ultrasound left patent brachiocephalic avf without stenosis volume flow 1133 ml/min diam prox 7.2 mm, diam mid 8.4 mm, diam distal 7.1 mm

## 2024-09-30 ENCOUNTER — APPOINTMENT (OUTPATIENT)
Dept: TRANSPLANT | Facility: CLINIC | Age: 74
End: 2024-09-30

## 2024-09-30 ENCOUNTER — OUTPATIENT (OUTPATIENT)
Dept: OUTPATIENT SERVICES | Facility: HOSPITAL | Age: 74
LOS: 1 days | End: 2024-09-30
Payer: MEDICARE

## 2024-09-30 ENCOUNTER — RESULT REVIEW (OUTPATIENT)
Age: 74
End: 2024-09-30

## 2024-09-30 ENCOUNTER — APPOINTMENT (OUTPATIENT)
Dept: CV DIAGNOSTICS | Facility: HOSPITAL | Age: 74
End: 2024-09-30

## 2024-09-30 VITALS
HEART RATE: 63 BPM | TEMPERATURE: 98 F | RESPIRATION RATE: 18 BRPM | DIASTOLIC BLOOD PRESSURE: 55 MMHG | SYSTOLIC BLOOD PRESSURE: 152 MMHG | HEIGHT: 71 IN | WEIGHT: 182.1 LBS | OXYGEN SATURATION: 100 %

## 2024-09-30 DIAGNOSIS — N18.9 CHRONIC KIDNEY DISEASE, UNSPECIFIED: ICD-10-CM

## 2024-09-30 DIAGNOSIS — N18.5 CHRONIC KIDNEY DISEASE, STAGE 5: ICD-10-CM

## 2024-09-30 DIAGNOSIS — I25.10 ATHEROSCLEROTIC HEART DISEASE OF NATIVE CORONARY ARTERY WITHOUT ANGINA PECTORIS: ICD-10-CM

## 2024-09-30 DIAGNOSIS — Z45.2 ENCOUNTER FOR ADJUSTMENT AND MANAGEMENT OF VASCULAR ACCESS DEVICE: ICD-10-CM

## 2024-09-30 LAB — GLUCOSE BLDC GLUCOMTR-MCNC: 113 MG/DL — HIGH (ref 70–99)

## 2024-09-30 PROCEDURE — 93016 CV STRESS TEST SUPVJ ONLY: CPT | Mod: MC

## 2024-09-30 PROCEDURE — A9500: CPT

## 2024-09-30 PROCEDURE — 36410 VNPNXR 3YR/> PHY/QHP DX/THER: CPT

## 2024-09-30 PROCEDURE — 82962 GLUCOSE BLOOD TEST: CPT

## 2024-09-30 PROCEDURE — 78452 HT MUSCLE IMAGE SPECT MULT: CPT | Mod: 26,MC

## 2024-09-30 PROCEDURE — 93017 CV STRESS TEST TRACING ONLY: CPT

## 2024-09-30 PROCEDURE — 93018 CV STRESS TEST I&R ONLY: CPT | Mod: MC

## 2024-09-30 PROCEDURE — 76937 US GUIDE VASCULAR ACCESS: CPT | Mod: 26

## 2024-09-30 PROCEDURE — 78452 HT MUSCLE IMAGE SPECT MULT: CPT | Mod: MC

## 2024-09-30 PROCEDURE — 76937 US GUIDE VASCULAR ACCESS: CPT

## 2024-09-30 NOTE — ASU DISCHARGE PLAN (ADULT/PEDIATRIC) - ASU DC SPECIAL INSTRUCTIONSFT
Ultrasound guided IV placement     Discharge Instructions  - You have had an IV palced in your arm  - The catheter is ready for use.    - You may resume your normal diet.  - You may resume your normal medications however you should wait 48 hours before restarting aspirin, plavix, or blood thinners.  - It is normal to experience some pain over the site for the next few days. You may take apply ice to the area (20 minutes on, 20 minutes off) and take Tylenol for that pain. Do not take more frequently than every 6 hours and do not exceed more than 3000mg of Tylenol in a 24 hour period.    Notify your primary physician and/or Interventional Radiology IMMEDIATELY if you experience any of the following       - Fever of 100.4F or 38C       - Chills or Rigors/ Shakes       - Swelling and/or Redness in the area around the port       - Worsening Pain       - Blood soaked bandages or worsening bleeding       - Lightheadedness and/or dizziness upon standing       - Chest Pain/ Tightness       - Shortness of Breath       - Difficulty walking    If you have a problem that you believe requires IMMEDIATE attention, please go to your NEAREST Emergency Room. If you believe your problem can safely wait until you speak to a physician, please call Interventional Radiology for any concerns.    Please feel free to contact us at [phone] if any problems arise. After 6PM, Monday through Friday, on weekends and on holidays, please call [phone] and ask for the radiology resident on call to be paged.

## 2024-09-30 NOTE — H&P ADULT - HISTORY OF PRESENT ILLNESS
Interventional Radiology    HPI: 72yo M with PMH HTN, HLD, DMT2, advanced CKD with recent LUE AV fistula placement (no dialysis yet), presenting for US guided IV placement for cardiac stress testing.     Review of Systems:   ROS negative x10 systems unless otherwise stated in HPI     Allergies: No Known Allergies    Medications (Abx/Cardiac/Anticoagulation/Blood Products)  Reviewed     Data:  180.3  82.6  T(C): 36.7  HR: 63  BP: 152/55  RR: 18  SpO2: 100%    Physical Exam  General: No acute distress, nontoxic, A&Ox3  Chest: Non labored breathing    RADIOLOGY & ADDITIONAL TESTS:    Imaging Reviewed    H & P Note Reviewed from: 3/1/24    Plan: 73y Male presents for diagnostic para  -Risks/Benefits/alternatives explained with the patient and/or healthcare proxy and witnessed informed consent obtained.

## 2024-09-30 NOTE — PROCEDURE NOTE - NSPROCNAME_GEN_A_CORE
Patient: Ritesh Soliman    Procedure(s):  EXCISIONAL BIOPSY RIGHT BREAST - Wound Class: I-Clean    Diagnosis:BREAST MASS/RIGHT  Diagnosis Additional Information: No value filed.    Anesthesia Type:  MAC    Note:  Anesthesia Post Evaluation    Patient location during evaluation: Phase 2  Patient participation: Able to fully participate in evaluation  Level of consciousness: awake and alert  Pain management: adequate  Airway patency: patent  Cardiovascular status: acceptable  Respiratory status: acceptable  Hydration status: acceptable  PONV: none             Last vitals:  Vitals:    10/19/17 0835 10/19/17 0840 10/19/17 0845   BP: 111/77 115/83    Resp: 18     Temp:      SpO2: 97% 97% 98%         Electronically Signed By: MARIANN Raymond CRNA  October 19, 2017  2:44 PM   Interventional Radiology

## 2024-10-09 ENCOUNTER — APPOINTMENT (OUTPATIENT)
Dept: ENDOCRINOLOGY | Facility: CLINIC | Age: 74
End: 2024-10-09
Payer: MEDICARE

## 2024-10-09 VITALS
WEIGHT: 210 LBS | SYSTOLIC BLOOD PRESSURE: 130 MMHG | HEART RATE: 66 BPM | OXYGEN SATURATION: 99 % | HEIGHT: 71 IN | TEMPERATURE: 97 F | BODY MASS INDEX: 29.4 KG/M2 | DIASTOLIC BLOOD PRESSURE: 80 MMHG | RESPIRATION RATE: 18 BRPM

## 2024-10-09 DIAGNOSIS — N18.5 TYPE 2 DIABETES MELLITUS WITH DIABETIC CHRONIC KIDNEY DISEASE: ICD-10-CM

## 2024-10-09 DIAGNOSIS — E11.22 TYPE 2 DIABETES MELLITUS WITH DIABETIC CHRONIC KIDNEY DISEASE: ICD-10-CM

## 2024-10-09 DIAGNOSIS — N25.81 SECONDARY HYPERPARATHYROIDISM OF RENAL ORIGIN: ICD-10-CM

## 2024-10-09 DIAGNOSIS — R80.9 TYPE 2 DIABETES MELLITUS WITH OTHER DIABETIC KIDNEY COMPLICATION: ICD-10-CM

## 2024-10-09 DIAGNOSIS — E11.29 TYPE 2 DIABETES MELLITUS WITH OTHER DIABETIC KIDNEY COMPLICATION: ICD-10-CM

## 2024-10-09 DIAGNOSIS — E03.9 HYPOTHYROIDISM, UNSPECIFIED: ICD-10-CM

## 2024-10-09 LAB
GLUCOSE BLDC GLUCOMTR-MCNC: 81
HBA1C MFR BLD HPLC: 6.1

## 2024-10-09 PROCEDURE — 83036 HEMOGLOBIN GLYCOSYLATED A1C: CPT | Mod: QW

## 2024-10-09 PROCEDURE — 99214 OFFICE O/P EST MOD 30 MIN: CPT

## 2024-10-09 PROCEDURE — 82962 GLUCOSE BLOOD TEST: CPT

## 2024-10-15 ENCOUNTER — APPOINTMENT (OUTPATIENT)
Dept: NEPHROLOGY | Facility: CLINIC | Age: 74
End: 2024-10-15

## 2024-10-16 ENCOUNTER — APPOINTMENT (OUTPATIENT)
Dept: TRANSPLANT | Facility: CLINIC | Age: 74
End: 2024-10-16

## 2024-10-23 ENCOUNTER — APPOINTMENT (OUTPATIENT)
Dept: CARDIOLOGY | Facility: CLINIC | Age: 74
End: 2024-10-23

## 2024-11-07 ENCOUNTER — APPOINTMENT (OUTPATIENT)
Dept: NEPHROLOGY | Facility: CLINIC | Age: 74
End: 2024-11-07
Payer: MEDICARE

## 2024-11-07 ENCOUNTER — NON-APPOINTMENT (OUTPATIENT)
Age: 74
End: 2024-11-07

## 2024-11-07 VITALS
HEART RATE: 77 BPM | BODY MASS INDEX: 29.17 KG/M2 | SYSTOLIC BLOOD PRESSURE: 152 MMHG | DIASTOLIC BLOOD PRESSURE: 65 MMHG | HEIGHT: 71 IN | TEMPERATURE: 97.9 F | OXYGEN SATURATION: 98 % | WEIGHT: 208.33 LBS

## 2024-11-07 DIAGNOSIS — N25.81 SECONDARY HYPERPARATHYROIDISM OF RENAL ORIGIN: ICD-10-CM

## 2024-11-07 DIAGNOSIS — E87.5 HYPERKALEMIA: ICD-10-CM

## 2024-11-07 DIAGNOSIS — N18.5 CHRONIC KIDNEY DISEASE, STAGE 5: ICD-10-CM

## 2024-11-07 DIAGNOSIS — R80.9 TYPE 2 DIABETES MELLITUS WITH OTHER DIABETIC KIDNEY COMPLICATION: ICD-10-CM

## 2024-11-07 DIAGNOSIS — D63.1 ANEMIA IN CHRONIC KIDNEY DISEASE: ICD-10-CM

## 2024-11-07 DIAGNOSIS — E11.29 TYPE 2 DIABETES MELLITUS WITH OTHER DIABETIC KIDNEY COMPLICATION: ICD-10-CM

## 2024-11-07 DIAGNOSIS — I10 ESSENTIAL (PRIMARY) HYPERTENSION: ICD-10-CM

## 2024-11-07 DIAGNOSIS — E78.5 HYPERLIPIDEMIA, UNSPECIFIED: ICD-10-CM

## 2024-11-07 PROCEDURE — 99215 OFFICE O/P EST HI 40 MIN: CPT

## 2024-11-07 PROCEDURE — G2211 COMPLEX E/M VISIT ADD ON: CPT

## 2024-11-14 LAB
ALBUMIN SERPL ELPH-MCNC: 3.9 G/DL
ANION GAP SERPL CALC-SCNC: 16 MMOL/L
BUN SERPL-MCNC: 79 MG/DL
CALCIUM SERPL-MCNC: 8.2 MG/DL
CHLORIDE SERPL-SCNC: 105 MMOL/L
CO2 SERPL-SCNC: 21 MMOL/L
CREAT SERPL-MCNC: 5.29 MG/DL
CYSTATIN C SERPL-MCNC: 4.49 MG/L
EGFR: 11 ML/MIN/1.73M2
GFR/BSA.PRED SERPLBLD CYS-BASED-ARV: 10 ML/MIN/1.73M2
GLUCOSE SERPL-MCNC: 125 MG/DL
HCT VFR BLD CALC: 25.3 %
HGB BLD-MCNC: 7.8 G/DL
MCHC RBC-ENTMCNC: 29.8 PG
MCHC RBC-ENTMCNC: 30.8 G/DL
MCV RBC AUTO: 96.6 FL
PHOSPHATE SERPL-MCNC: 4.2 MG/DL
PLATELET # BLD AUTO: 188 K/UL
POTASSIUM SERPL-SCNC: 4 MMOL/L
RBC # BLD: 2.62 M/UL
RBC # FLD: 13.6 %
SODIUM SERPL-SCNC: 142 MMOL/L
WBC # FLD AUTO: 8.11 K/UL

## 2024-11-14 NOTE — PATIENT PROFILE ADULT - NSPROMEDSBROUGHTTOHOSP_GEN_A_NUR
otherwise noted.  No orders to display     ED Course / Medical Decision Making     Medications   amoxicillin-clavulanate (AUGMENTIN) 875-125 MG per tablet 1 tablet (1 tablet Oral Given 11/13/24 2043)   ibuprofen (ADVIL;MOTRIN) tablet 600 mg (600 mg Oral Given 11/13/24 2043)   ofloxacin (FLOXIN) 0.3 % otic solution 5 drop (5 drops Left Ear Given 11/13/24 2206)          Consult(s):   None    Procedure(s):     PROCEDURE  11/13/24       Time: 2014    FOREIGN BODY REMOVAL  Risks, benefits and alternatives (for applicable procedures below) described.   Performed By: RN and myself SIA Noriega - CNP.    Location:   Cerumen impactions of Bilateral ear canals.    Informed consent: Verbal consent obtained.  The patient was counseled regarding the procedure in person, it's indications, risks, potential complications and alternatives and any questions were answered. Verbal consent was obtained.  Skin Prep:  none  required.  Local Anesthesia:  not required/indicated.  The patient's head was positioned appropriately and the foreign body was removed using irrigation and an ear curette.  Complications: none.  Patient tolerated the procedure well on the right but unable to tolerate well on the left due to the otitis externa.  Tympanic membrane on the right was normal.  Unable to view tympanic membrane on the left due to otitis externa and swelling of the external canal with no drainage on the left.  Cerumen impactions removed successfully on the right and procedure had to be stopped on the left because patient could not tolerate due to the pain and there was no bleeding noted.        MDM:   Moses Capellan is a 35 y.o. old male who presenting to the emergency department with complaint of gradual onset left ear pain and \"plugged\" sensation in the left ear. He can hear himself talk.  Symptoms began 2 weeks ago and have been persistent and gradually worsening since that time. Patient denies chills, dyspnea, eye irritation, facial 
yes

## 2024-11-22 ENCOUNTER — APPOINTMENT (OUTPATIENT)
Dept: NEPHROLOGY | Facility: CLINIC | Age: 74
End: 2024-11-22
Payer: MEDICARE

## 2024-11-22 VITALS
HEIGHT: 71 IN | DIASTOLIC BLOOD PRESSURE: 57 MMHG | HEART RATE: 75 BPM | SYSTOLIC BLOOD PRESSURE: 173 MMHG | BODY MASS INDEX: 30.1 KG/M2 | TEMPERATURE: 97.7 F | OXYGEN SATURATION: 97 % | WEIGHT: 215 LBS

## 2024-11-22 VITALS — DIASTOLIC BLOOD PRESSURE: 56 MMHG | SYSTOLIC BLOOD PRESSURE: 157 MMHG | HEART RATE: 79 BPM

## 2024-11-22 VITALS — SYSTOLIC BLOOD PRESSURE: 166 MMHG | HEART RATE: 79 BPM | DIASTOLIC BLOOD PRESSURE: 58 MMHG

## 2024-11-22 PROCEDURE — 96372 THER/PROPH/DIAG INJ SC/IM: CPT

## 2024-11-22 RX ORDER — DARBEPOETIN ALFA 100 UG/ML
100 SOLUTION INTRAVENOUS; SUBCUTANEOUS
Qty: 0 | Refills: 0 | Status: COMPLETED | OUTPATIENT
Start: 2024-11-22

## 2024-11-22 RX ADMIN — DARBEPOETIN ALFA 0 MCG/ML: 100 SOLUTION INTRAVENOUS; SUBCUTANEOUS at 00:00

## 2024-12-17 ENCOUNTER — TRANSCRIPTION ENCOUNTER (OUTPATIENT)
Age: 74
End: 2024-12-17

## 2025-01-31 ENCOUNTER — APPOINTMENT (OUTPATIENT)
Dept: NEPHROLOGY | Facility: CLINIC | Age: 75
End: 2025-01-31
Payer: MEDICARE

## 2025-01-31 VITALS
BODY MASS INDEX: 29.4 KG/M2 | OXYGEN SATURATION: 98 % | WEIGHT: 210 LBS | DIASTOLIC BLOOD PRESSURE: 74 MMHG | HEART RATE: 74 BPM | SYSTOLIC BLOOD PRESSURE: 176 MMHG | TEMPERATURE: 98 F | HEIGHT: 71 IN

## 2025-01-31 VITALS — HEART RATE: 72 BPM | DIASTOLIC BLOOD PRESSURE: 69 MMHG | SYSTOLIC BLOOD PRESSURE: 155 MMHG

## 2025-01-31 DIAGNOSIS — D63.1 ANEMIA IN CHRONIC KIDNEY DISEASE: ICD-10-CM

## 2025-01-31 DIAGNOSIS — R80.9 TYPE 2 DIABETES MELLITUS WITH OTHER DIABETIC KIDNEY COMPLICATION: ICD-10-CM

## 2025-01-31 DIAGNOSIS — I10 ESSENTIAL (PRIMARY) HYPERTENSION: ICD-10-CM

## 2025-01-31 DIAGNOSIS — N25.81 SECONDARY HYPERPARATHYROIDISM OF RENAL ORIGIN: ICD-10-CM

## 2025-01-31 DIAGNOSIS — E11.22 TYPE 2 DIABETES MELLITUS WITH DIABETIC CHRONIC KIDNEY DISEASE: ICD-10-CM

## 2025-01-31 DIAGNOSIS — E11.29 TYPE 2 DIABETES MELLITUS WITH OTHER DIABETIC KIDNEY COMPLICATION: ICD-10-CM

## 2025-01-31 DIAGNOSIS — N18.5 TYPE 2 DIABETES MELLITUS WITH DIABETIC CHRONIC KIDNEY DISEASE: ICD-10-CM

## 2025-01-31 DIAGNOSIS — E87.5 HYPERKALEMIA: ICD-10-CM

## 2025-01-31 DIAGNOSIS — E78.5 HYPERLIPIDEMIA, UNSPECIFIED: ICD-10-CM

## 2025-01-31 DIAGNOSIS — N18.5 CHRONIC KIDNEY DISEASE, STAGE 5: ICD-10-CM

## 2025-01-31 PROCEDURE — 96372 THER/PROPH/DIAG INJ SC/IM: CPT

## 2025-01-31 PROCEDURE — 99215 OFFICE O/P EST HI 40 MIN: CPT | Mod: 25

## 2025-02-21 ENCOUNTER — APPOINTMENT (OUTPATIENT)
Dept: COLORECTAL SURGERY | Facility: CLINIC | Age: 75
End: 2025-02-21

## 2025-02-21 VITALS
DIASTOLIC BLOOD PRESSURE: 81 MMHG | RESPIRATION RATE: 17 BRPM | HEART RATE: 64 BPM | SYSTOLIC BLOOD PRESSURE: 170 MMHG | OXYGEN SATURATION: 99 % | TEMPERATURE: 98 F

## 2025-02-21 VITALS
RESPIRATION RATE: 18 BRPM | SYSTOLIC BLOOD PRESSURE: 173 MMHG | HEART RATE: 69 BPM | OXYGEN SATURATION: 100 % | DIASTOLIC BLOOD PRESSURE: 72 MMHG | TEMPERATURE: 98 F

## 2025-02-21 PROCEDURE — 96365 THER/PROPH/DIAG IV INF INIT: CPT

## 2025-02-21 RX ORDER — FERUMOXYTOL 510 MG/17ML
510 INJECTION INTRAVENOUS
Qty: 0 | Refills: 0 | Status: COMPLETED | OUTPATIENT
Start: 2025-02-13

## 2025-02-21 RX ADMIN — ACETAMINOPHEN 2 MG: 325 TABLET ORAL at 00:00

## 2025-02-21 RX ADMIN — CETIRIZINE HYDROCHLORIDE 1 MG: 10 TABLET, FILM COATED ORAL at 00:00

## 2025-02-27 ENCOUNTER — APPOINTMENT (OUTPATIENT)
Dept: COLORECTAL SURGERY | Facility: CLINIC | Age: 75
End: 2025-02-27
Payer: MEDICARE

## 2025-02-27 ENCOUNTER — APPOINTMENT (OUTPATIENT)
Dept: NEPHROLOGY | Facility: CLINIC | Age: 75
End: 2025-02-27
Payer: MEDICARE

## 2025-02-27 ENCOUNTER — NON-APPOINTMENT (OUTPATIENT)
Age: 75
End: 2025-02-27

## 2025-02-27 VITALS
HEIGHT: 71 IN | WEIGHT: 219 LBS | SYSTOLIC BLOOD PRESSURE: 141 MMHG | DIASTOLIC BLOOD PRESSURE: 51 MMHG | HEART RATE: 71 BPM | TEMPERATURE: 97 F | OXYGEN SATURATION: 98 % | BODY MASS INDEX: 30.66 KG/M2

## 2025-02-27 VITALS
TEMPERATURE: 98.2 F | SYSTOLIC BLOOD PRESSURE: 165 MMHG | DIASTOLIC BLOOD PRESSURE: 63 MMHG | RESPIRATION RATE: 16 BRPM | OXYGEN SATURATION: 100 % | HEART RATE: 69 BPM

## 2025-02-27 VITALS
TEMPERATURE: 98.5 F | HEART RATE: 68 BPM | RESPIRATION RATE: 16 BRPM | DIASTOLIC BLOOD PRESSURE: 71 MMHG | SYSTOLIC BLOOD PRESSURE: 166 MMHG | OXYGEN SATURATION: 98 %

## 2025-02-27 DIAGNOSIS — N18.5 TYPE 2 DIABETES MELLITUS WITH DIABETIC CHRONIC KIDNEY DISEASE: ICD-10-CM

## 2025-02-27 DIAGNOSIS — N18.5 CHRONIC KIDNEY DISEASE, STAGE 5: ICD-10-CM

## 2025-02-27 DIAGNOSIS — E11.29 TYPE 2 DIABETES MELLITUS WITH OTHER DIABETIC KIDNEY COMPLICATION: ICD-10-CM

## 2025-02-27 DIAGNOSIS — I10 ESSENTIAL (PRIMARY) HYPERTENSION: ICD-10-CM

## 2025-02-27 DIAGNOSIS — E78.5 HYPERLIPIDEMIA, UNSPECIFIED: ICD-10-CM

## 2025-02-27 DIAGNOSIS — E11.22 TYPE 2 DIABETES MELLITUS WITH DIABETIC CHRONIC KIDNEY DISEASE: ICD-10-CM

## 2025-02-27 DIAGNOSIS — R80.9 TYPE 2 DIABETES MELLITUS WITH OTHER DIABETIC KIDNEY COMPLICATION: ICD-10-CM

## 2025-02-27 DIAGNOSIS — D50.9 IRON DEFICIENCY ANEMIA, UNSPECIFIED: ICD-10-CM

## 2025-02-27 DIAGNOSIS — E87.20 ACIDOSIS, UNSPECIFIED: ICD-10-CM

## 2025-02-27 DIAGNOSIS — E87.5 HYPERKALEMIA: ICD-10-CM

## 2025-02-27 DIAGNOSIS — D63.1 ANEMIA IN CHRONIC KIDNEY DISEASE: ICD-10-CM

## 2025-02-27 PROCEDURE — 96372 THER/PROPH/DIAG INJ SC/IM: CPT

## 2025-02-27 PROCEDURE — 96365 THER/PROPH/DIAG IV INF INIT: CPT

## 2025-02-27 PROCEDURE — 99215 OFFICE O/P EST HI 40 MIN: CPT | Mod: 25

## 2025-02-27 RX ORDER — CETIRIZINE HYDROCHLORIDE 10 MG/1
10 TABLET, FILM COATED ORAL
Qty: 0 | Refills: 0 | Status: COMPLETED | OUTPATIENT
Start: 2025-02-27

## 2025-02-27 RX ORDER — DARBEPOETIN ALFA 100 UG/ML
100 SOLUTION INTRAVENOUS; SUBCUTANEOUS
Qty: 0 | Refills: 0 | Status: COMPLETED | OUTPATIENT
Start: 2025-02-27

## 2025-02-27 RX ORDER — FERUMOXYTOL 510 MG/17ML
510 INJECTION INTRAVENOUS
Qty: 0 | Refills: 0 | Status: COMPLETED
Start: 2023-06-20

## 2025-02-27 RX ADMIN — DARBEPOETIN ALFA 1 MCG/ML: 100 SOLUTION INTRAVENOUS; SUBCUTANEOUS at 00:00

## 2025-04-16 ENCOUNTER — APPOINTMENT (OUTPATIENT)
Dept: ENDOCRINOLOGY | Facility: CLINIC | Age: 75
End: 2025-04-16
Payer: MEDICARE

## 2025-04-16 VITALS
HEART RATE: 80 BPM | DIASTOLIC BLOOD PRESSURE: 48 MMHG | BODY MASS INDEX: 40.4 KG/M2 | OXYGEN SATURATION: 100 % | HEIGHT: 61 IN | WEIGHT: 214 LBS | SYSTOLIC BLOOD PRESSURE: 146 MMHG | TEMPERATURE: 97 F | RESPIRATION RATE: 18 BRPM

## 2025-04-16 DIAGNOSIS — E11.22 TYPE 2 DIABETES MELLITUS WITH DIABETIC CHRONIC KIDNEY DISEASE: ICD-10-CM

## 2025-04-16 DIAGNOSIS — E11.29 TYPE 2 DIABETES MELLITUS WITH OTHER DIABETIC KIDNEY COMPLICATION: ICD-10-CM

## 2025-04-16 DIAGNOSIS — N25.81 SECONDARY HYPERPARATHYROIDISM OF RENAL ORIGIN: ICD-10-CM

## 2025-04-16 DIAGNOSIS — E03.9 HYPOTHYROIDISM, UNSPECIFIED: ICD-10-CM

## 2025-04-16 DIAGNOSIS — N18.5 TYPE 2 DIABETES MELLITUS WITH DIABETIC CHRONIC KIDNEY DISEASE: ICD-10-CM

## 2025-04-16 DIAGNOSIS — R80.9 TYPE 2 DIABETES MELLITUS WITH OTHER DIABETIC KIDNEY COMPLICATION: ICD-10-CM

## 2025-04-16 LAB
GLUCOSE BLDC GLUCOMTR-MCNC: 104
HBA1C MFR BLD HPLC: 6.3

## 2025-04-16 PROCEDURE — 83036 HEMOGLOBIN GLYCOSYLATED A1C: CPT | Mod: QW

## 2025-04-16 PROCEDURE — 99214 OFFICE O/P EST MOD 30 MIN: CPT

## 2025-04-16 PROCEDURE — 82962 GLUCOSE BLOOD TEST: CPT

## 2025-04-16 RX ORDER — LANCETS 33 GAUGE
EACH MISCELLANEOUS
Qty: 2 | Refills: 2 | Status: ACTIVE | COMMUNITY
Start: 2025-04-16 | End: 1900-01-01

## 2025-04-16 RX ORDER — GLUCOSAM/CHON-MSM1/C/MANG/BOSW 500-416.6
TABLET ORAL
Qty: 1 | Refills: 0 | Status: ACTIVE | COMMUNITY
Start: 2025-04-16 | End: 1900-01-01

## 2025-04-16 RX ORDER — BLOOD SUGAR DIAGNOSTIC
STRIP MISCELLANEOUS TWICE DAILY
Qty: 1 | Refills: 2 | Status: ACTIVE | COMMUNITY
Start: 2025-04-16 | End: 1900-01-01

## 2025-04-16 RX ORDER — BLOOD-GLUCOSE METER
W/DEVICE KIT MISCELLANEOUS
Qty: 1 | Refills: 0 | Status: ACTIVE | COMMUNITY
Start: 2025-04-16 | End: 1900-01-01

## 2025-04-16 RX ORDER — BLOOD-GLUCOSE METER
70 EACH MISCELLANEOUS
Qty: 2 | Refills: 3 | Status: ACTIVE | COMMUNITY
Start: 2025-04-16 | End: 1900-01-01

## 2025-04-24 ENCOUNTER — LABORATORY RESULT (OUTPATIENT)
Age: 75
End: 2025-04-24

## 2025-04-24 ENCOUNTER — APPOINTMENT (OUTPATIENT)
Dept: NEPHROLOGY | Facility: CLINIC | Age: 75
End: 2025-04-24

## 2025-04-24 ENCOUNTER — APPOINTMENT (OUTPATIENT)
Dept: TRANSPLANT | Facility: CLINIC | Age: 75
End: 2025-04-24

## 2025-04-24 VITALS
BODY MASS INDEX: 28.7 KG/M2 | TEMPERATURE: 97.9 F | HEART RATE: 62 BPM | OXYGEN SATURATION: 98 % | HEIGHT: 71 IN | RESPIRATION RATE: 18 BRPM | WEIGHT: 205 LBS | DIASTOLIC BLOOD PRESSURE: 66 MMHG | SYSTOLIC BLOOD PRESSURE: 169 MMHG

## 2025-04-24 DIAGNOSIS — Z01.818 ENCOUNTER FOR OTHER PREPROCEDURAL EXAMINATION: ICD-10-CM

## 2025-04-24 LAB
ABORH: NORMAL
C PEPTIDE SERPL-MCNC: 13.3 NG/ML
COVID-19 SPIKE DOMAIN ANTIBODY INTERPRETATION: POSITIVE
HCT VFR BLD CALC: 29.6 %
HGB BLD-MCNC: 9.4 G/DL
MCHC RBC-ENTMCNC: 29.1 PG
MCHC RBC-ENTMCNC: 31.8 G/DL
MCV RBC AUTO: 91.6 FL
PARATHYROID HORMONE INTACT: 361 PG/ML
PLATELET # BLD AUTO: 271 K/UL
PSA SERPL-MCNC: 7.3 NG/ML
RBC # BLD: 3.23 M/UL
RBC # FLD: 13.2 %
SARS-COV-2 AB SERPL IA-ACNC: >250 U/ML
T3 SERPL-MCNC: 79 NG/DL
T4 FREE SERPL-MCNC: 1.5 NG/DL
TSH SERPL-ACNC: 3.16 UIU/ML
WBC # FLD AUTO: 9.92 K/UL

## 2025-04-24 PROCEDURE — 99215 OFFICE O/P EST HI 40 MIN: CPT

## 2025-04-25 LAB
ALBUMIN SERPL ELPH-MCNC: 3.8 G/DL
ALP BLD-CCNC: 130 U/L
ALT SERPL-CCNC: 43 U/L
ANION GAP SERPL CALC-SCNC: 18 MMOL/L
APPEARANCE: CLEAR
AST SERPL-CCNC: 31 U/L
BILIRUB SERPL-MCNC: 0.3 MG/DL
BILIRUBIN URINE: NEGATIVE
BLOOD URINE: ABNORMAL
BUN SERPL-MCNC: 94 MG/DL
CALCIUM SERPL-MCNC: 8.4 MG/DL
CHLORIDE SERPL-SCNC: 100 MMOL/L
CHOLEST SERPL-MCNC: 111 MG/DL
CK SERPL-CCNC: 214 U/L
CMV IGG SERPL QL: >10 U/ML
CMV IGG SERPL-IMP: POSITIVE
CO2 SERPL-SCNC: 21 MMOL/L
COLOR: YELLOW
CREAT SERPL-MCNC: 7.02 MG/DL
CREAT SPEC-SCNC: 108 MG/DL
CREAT/PROT UR: 4.3 RATIO
CRP SERPL-MCNC: <3 MG/L
EBV DNA SERPL NAA+PROBE-ACNC: NOT DETECTED IU/ML
EBV EA AB SER IA-ACNC: 26.6 U/ML
EBV EA AB TITR SER IF: POSITIVE
EBV EA IGG SER QL IA: >600 U/ML
EBV EA IGG SER-ACNC: POSITIVE
EBV EA IGM SER IA-ACNC: NEGATIVE
EBV PATRN SPEC IB-IMP: NORMAL
EBV VCA IGG SER IA-ACNC: 394 U/ML
EBV VCA IGM SER QL IA: <10 U/ML
EBVPCR LOG: NOT DETECTED LOG10IU/ML
EGFRCR SERPLBLD CKD-EPI 2021: 8 ML/MIN/1.73M2
EPSTEIN-BARR VIRUS CAPSID ANTIGEN IGG: POSITIVE
ERYTHROCYTE [SEDIMENTATION RATE] IN BLOOD BY WESTERGREN METHOD: 111 MM/HR
ESTIMATED AVERAGE GLUCOSE: 140 MG/DL
GLUCOSE QUALITATIVE U: 100 MG/DL
GLUCOSE SERPL-MCNC: 104 MG/DL
HBA1C MFR BLD HPLC: 6.5 %
HBV CORE IGG+IGM SER QL: NONREACTIVE
HBV SURFACE AB SER QL: NONREACTIVE
HBV SURFACE AB SERPL IA-ACNC: <3.3 MIU/ML
HBV SURFACE AG SER QL: NONREACTIVE
HCV AB SER QL: NONREACTIVE
HCV S/CO RATIO: 0.29 S/CO
HDLC SERPL-MCNC: 37 MG/DL
HEPATITIS A IGG ANTIBODY: REACTIVE
HIV1+2 AB SPEC QL IA.RAPID: NONREACTIVE
HSV 1+2 IGG SER IA-IMP: NEGATIVE
HSV 1+2 IGG SER IA-IMP: POSITIVE
HSV 1+2 IGG SER IA-IMP: POSITIVE
HSV1 IGG SER QL: 53.5 INDEX
HSV1 IGG SER QL: 53.5 INDEX
HSV2 IGG SER QL: 0.2 INDEX
KETONES URINE: NEGATIVE MG/DL
LDLC SERPL-MCNC: 55 MG/DL
LEUKOCYTE ESTERASE URINE: NEGATIVE
MAGNESIUM SERPL-MCNC: 2.6 MG/DL
MEV IGG FLD QL IA: 5.78 AU/ML
MEV IGG FLD QL IA: 5.78 AU/ML
MEV IGG+IGM SER-IMP: NEGATIVE
MEV IGG+IGM SER-IMP: NEGATIVE
MUV AB SER-ACNC: NEGATIVE
MUV IGG SER QL IA: <5 AU/ML
NITRITE URINE: NEGATIVE
NONHDLC SERPL-MCNC: 73 MG/DL
PH URINE: 6
PHOSPHATE SERPL-MCNC: 4.1 MG/DL
POTASSIUM SERPL-SCNC: 4 MMOL/L
PROT SERPL-MCNC: 7.6 G/DL
PROT UR-MCNC: 460 MG/DL
PROTEIN URINE: 300 MG/DL
RUBV IGG FLD-ACNC: >33 INDEX
RUBV IGG FLD-ACNC: >33 INDEX
RUBV IGG SER-IMP: POSITIVE
RUBV IGG SER-IMP: POSITIVE
SODIUM SERPL-SCNC: 140 MMOL/L
SPECIFIC GRAVITY URINE: 1.02
T GONDII AB SER-IMP: NEGATIVE
T GONDII IGG SER QL: <3 IU/ML
T PALLIDUM AB SER QL IA: NEGATIVE
TRIGL SERPL-MCNC: 95 MG/DL
URATE SERPL-MCNC: 13.7 MG/DL
UROBILINOGEN URINE: 0.2 MG/DL
VZV AB TITR SER: POSITIVE
VZV AB TITR SER: POSITIVE
VZV IGG SER IF-ACNC: 31.8 S/CO
VZV IGG SER IF-ACNC: 31.8 S/CO

## 2025-04-26 LAB
M TB IFN-G BLD-IMP: ABNORMAL
QUANTIFERON TB PLUS MITOGEN MINUS NIL: 0.03 IU/ML
QUANTIFERON TB PLUS NIL: 0.04 IU/ML
QUANTIFERON TB PLUS TB1 MINUS NIL: 0 IU/ML
QUANTIFERON TB PLUS TB2 MINUS NIL: 0 IU/ML

## 2025-04-27 LAB — T CRUZI AB SER-ACNC: NONREACTIVE

## 2025-04-28 ENCOUNTER — LABORATORY RESULT (OUTPATIENT)
Age: 75
End: 2025-04-28

## 2025-04-29 ENCOUNTER — OUTPATIENT (OUTPATIENT)
Dept: OUTPATIENT SERVICES | Facility: HOSPITAL | Age: 75
LOS: 1 days | End: 2025-04-29
Payer: COMMERCIAL

## 2025-04-29 ENCOUNTER — APPOINTMENT (OUTPATIENT)
Dept: ULTRASOUND IMAGING | Facility: CLINIC | Age: 75
End: 2025-04-29
Payer: COMMERCIAL

## 2025-04-29 DIAGNOSIS — Z01.818 ENCOUNTER FOR OTHER PREPROCEDURAL EXAMINATION: ICD-10-CM

## 2025-04-29 LAB — STRONGYLOIDES AB SER IA-ACNC: NEGATIVE

## 2025-04-29 PROCEDURE — 76856 US EXAM PELVIC COMPLETE: CPT | Mod: 26

## 2025-04-29 PROCEDURE — 76856 US EXAM PELVIC COMPLETE: CPT

## 2025-04-29 PROCEDURE — 76700 US EXAM ABDOM COMPLETE: CPT | Mod: 26

## 2025-04-29 PROCEDURE — 76700 US EXAM ABDOM COMPLETE: CPT

## 2025-05-01 ENCOUNTER — APPOINTMENT (OUTPATIENT)
Dept: NEPHROLOGY | Facility: CLINIC | Age: 75
End: 2025-05-01
Payer: MEDICARE

## 2025-05-01 VITALS
WEIGHT: 204 LBS | HEART RATE: 97 BPM | DIASTOLIC BLOOD PRESSURE: 71 MMHG | BODY MASS INDEX: 28.45 KG/M2 | SYSTOLIC BLOOD PRESSURE: 181 MMHG

## 2025-05-01 VITALS — SYSTOLIC BLOOD PRESSURE: 160 MMHG | HEART RATE: 70 BPM | DIASTOLIC BLOOD PRESSURE: 66 MMHG

## 2025-05-01 DIAGNOSIS — E87.20 ACIDOSIS, UNSPECIFIED: ICD-10-CM

## 2025-05-01 DIAGNOSIS — I10 ESSENTIAL (PRIMARY) HYPERTENSION: ICD-10-CM

## 2025-05-01 DIAGNOSIS — R80.9 TYPE 2 DIABETES MELLITUS WITH OTHER DIABETIC KIDNEY COMPLICATION: ICD-10-CM

## 2025-05-01 DIAGNOSIS — N25.81 SECONDARY HYPERPARATHYROIDISM OF RENAL ORIGIN: ICD-10-CM

## 2025-05-01 DIAGNOSIS — N18.5 CHRONIC KIDNEY DISEASE, STAGE 5: ICD-10-CM

## 2025-05-01 DIAGNOSIS — E87.5 HYPERKALEMIA: ICD-10-CM

## 2025-05-01 DIAGNOSIS — E11.29 TYPE 2 DIABETES MELLITUS WITH OTHER DIABETIC KIDNEY COMPLICATION: ICD-10-CM

## 2025-05-01 DIAGNOSIS — D63.1 ANEMIA IN CHRONIC KIDNEY DISEASE: ICD-10-CM

## 2025-05-01 DIAGNOSIS — E11.22 TYPE 2 DIABETES MELLITUS WITH DIABETIC CHRONIC KIDNEY DISEASE: ICD-10-CM

## 2025-05-01 DIAGNOSIS — E78.5 HYPERLIPIDEMIA, UNSPECIFIED: ICD-10-CM

## 2025-05-01 DIAGNOSIS — N18.5 TYPE 2 DIABETES MELLITUS WITH DIABETIC CHRONIC KIDNEY DISEASE: ICD-10-CM

## 2025-05-01 LAB
FERRITIN SERPL-MCNC: 1182 NG/ML
IRON SATN MFR SERPL: 44 %
IRON SERPL-MCNC: 92 UG/DL
TIBC SERPL-MCNC: 210 UG/DL
UIBC SERPL-MCNC: 118 UG/DL

## 2025-05-01 PROCEDURE — 99215 OFFICE O/P EST HI 40 MIN: CPT | Mod: 25

## 2025-05-01 PROCEDURE — 96372 THER/PROPH/DIAG INJ SC/IM: CPT

## 2025-05-01 RX ORDER — DARBEPOETIN ALFA 100 UG/ML
100 SOLUTION INTRAVENOUS; SUBCUTANEOUS
Qty: 0 | Refills: 0 | Status: COMPLETED | OUTPATIENT
Start: 2025-05-01

## 2025-05-01 RX ADMIN — DARBEPOETIN ALFA 1 MCG/ML: 100 SOLUTION INTRAVENOUS; SUBCUTANEOUS at 00:00

## 2025-05-08 ENCOUNTER — TRANSCRIPTION ENCOUNTER (OUTPATIENT)
Age: 75
End: 2025-05-08

## 2025-05-28 ENCOUNTER — TRANSCRIPTION ENCOUNTER (OUTPATIENT)
Age: 75
End: 2025-05-28

## 2025-05-30 ENCOUNTER — APPOINTMENT (OUTPATIENT)
Dept: UROLOGY | Facility: CLINIC | Age: 75
End: 2025-05-30
Payer: MEDICARE

## 2025-05-30 VITALS
SYSTOLIC BLOOD PRESSURE: 126 MMHG | WEIGHT: 215 LBS | RESPIRATION RATE: 18 BRPM | OXYGEN SATURATION: 99 % | BODY MASS INDEX: 29.99 KG/M2 | DIASTOLIC BLOOD PRESSURE: 72 MMHG | HEART RATE: 70 BPM | TEMPERATURE: 97.2 F

## 2025-05-30 DIAGNOSIS — N18.5 CHRONIC KIDNEY DISEASE, STAGE 5: ICD-10-CM

## 2025-05-30 DIAGNOSIS — R26.2 DIFFICULTY IN WALKING, NOT ELSEWHERE CLASSIFIED: ICD-10-CM

## 2025-05-30 DIAGNOSIS — I50.22 CHRONIC SYSTOLIC (CONGESTIVE) HEART FAILURE: ICD-10-CM

## 2025-05-30 DIAGNOSIS — R97.20 ELEVATED PROSTATE, SPECIFIC ANTIGEN [PSA]: ICD-10-CM

## 2025-05-30 PROCEDURE — G2211 COMPLEX E/M VISIT ADD ON: CPT

## 2025-05-30 PROCEDURE — 99214 OFFICE O/P EST MOD 30 MIN: CPT

## 2025-05-30 RX ORDER — POLYETHYLENE GLYCOL 3350 AND ELECTROLYTES WITH LEMON FLAVOR 236; 22.74; 6.74; 5.86; 2.97 G/4L; G/4L; G/4L; G/4L; G/4L
236 POWDER, FOR SOLUTION ORAL
Qty: 1 | Refills: 0 | Status: ACTIVE | COMMUNITY
Start: 2025-05-30 | End: 1900-01-01

## 2025-06-11 ENCOUNTER — APPOINTMENT (OUTPATIENT)
Dept: HEPATOLOGY | Facility: CLINIC | Age: 75
End: 2025-06-11
Payer: MEDICARE

## 2025-06-11 VITALS
RESPIRATION RATE: 16 BRPM | OXYGEN SATURATION: 98 % | HEART RATE: 85 BPM | DIASTOLIC BLOOD PRESSURE: 70 MMHG | SYSTOLIC BLOOD PRESSURE: 126 MMHG

## 2025-06-11 PROCEDURE — 99204 OFFICE O/P NEW MOD 45 MIN: CPT

## 2025-06-12 LAB
AFP-TM SERPL-MCNC: <1.8 NG/ML
ALBUMIN SERPL ELPH-MCNC: 3.6 G/DL
ALP BLD-CCNC: 129 U/L
ALT SERPL-CCNC: 51 U/L
ANA SER IF-ACNC: NEGATIVE
ANION GAP SERPL CALC-SCNC: 18 MMOL/L
AST SERPL-CCNC: 31 U/L
BILIRUB SERPL-MCNC: 0.3 MG/DL
BUN SERPL-MCNC: 106 MG/DL
CALCIUM SERPL-MCNC: 7.8 MG/DL
CERULOPLASMIN SERPL-MCNC: 25 MG/DL
CHLORIDE SERPL-SCNC: 105 MMOL/L
CO2 SERPL-SCNC: 16 MMOL/L
CREAT SERPL-MCNC: 6.96 MG/DL
DEPRECATED KAPPA LC FREE/LAMBDA SER: 0.86 RATIO
EGFRCR SERPLBLD CKD-EPI 2021: 8 ML/MIN/1.73M2
FERRITIN SERPL-MCNC: 939 NG/ML
GGT SERPL-CCNC: 31 U/L
GLUCOSE SERPL-MCNC: 154 MG/DL
HCT VFR BLD CALC: 23.6 %
HGB BLD-MCNC: 7.4 G/DL
IGA SERPL-MCNC: 375 MG/DL
IGG SERPL-MCNC: 1819 MG/DL
IGM SERPL-MCNC: 53 MG/DL
INR PPP: 0.91 RATIO
IRON SATN MFR SERPL: 43 %
IRON SERPL-MCNC: 84 UG/DL
KAPPA LC CSF-MCNC: 17.58 MG/DL
KAPPA LC SERPL-MCNC: 15.13 MG/DL
MCHC RBC-ENTMCNC: 28.5 PG
MCHC RBC-ENTMCNC: 31.4 G/DL
MCV RBC AUTO: 90.8 FL
PLATELET # BLD AUTO: 206 K/UL
POTASSIUM SERPL-SCNC: 4.5 MMOL/L
PROT SERPL-MCNC: 7.2 G/DL
PT BLD: 10.8 SEC
RBC # BLD: 2.6 M/UL
RBC # FLD: 14.7 %
SODIUM SERPL-SCNC: 139 MMOL/L
TIBC SERPL-MCNC: 198 UG/DL
UIBC SERPL-MCNC: 114 UG/DL
WBC # FLD AUTO: 11.05 K/UL

## 2025-06-13 LAB
SMOOTH MUSCLE AB SER QL IF: NORMAL
TTG IGA SER IA-ACNC: <0.5 U/ML
TTG IGA SER-ACNC: NEGATIVE

## 2025-06-16 LAB
A1AT PHENOTYP SERPL-IMP: NORMAL
A1AT SERPL-MCNC: 146 MG/DL

## 2025-06-25 ENCOUNTER — APPOINTMENT (OUTPATIENT)
Dept: HEPATOLOGY | Facility: CLINIC | Age: 75
End: 2025-06-25

## 2025-06-25 ENCOUNTER — NON-APPOINTMENT (OUTPATIENT)
Age: 75
End: 2025-06-25

## 2025-06-25 VITALS
SYSTOLIC BLOOD PRESSURE: 158 MMHG | HEART RATE: 68 BPM | RESPIRATION RATE: 16 BRPM | DIASTOLIC BLOOD PRESSURE: 65 MMHG | OXYGEN SATURATION: 99 %

## 2025-06-25 PROCEDURE — 99214 OFFICE O/P EST MOD 30 MIN: CPT

## 2025-07-01 ENCOUNTER — NON-APPOINTMENT (OUTPATIENT)
Age: 75
End: 2025-07-01

## 2025-07-01 ENCOUNTER — APPOINTMENT (OUTPATIENT)
Dept: NEPHROLOGY | Facility: CLINIC | Age: 75
End: 2025-07-01
Payer: MEDICARE

## 2025-07-01 PROCEDURE — 96372 THER/PROPH/DIAG INJ SC/IM: CPT

## 2025-07-01 RX ORDER — DARBEPOETIN ALFA 100 UG/ML
100 SOLUTION INTRAVENOUS; SUBCUTANEOUS
Qty: 0 | Refills: 0 | Status: COMPLETED | OUTPATIENT
Start: 2025-07-01

## 2025-07-01 RX ADMIN — DARBEPOETIN ALFA 1 MCG/ML: 100 SOLUTION INTRAVENOUS; SUBCUTANEOUS at 00:00

## 2025-07-03 ENCOUNTER — APPOINTMENT (OUTPATIENT)
Dept: NEPHROLOGY | Facility: CLINIC | Age: 75
End: 2025-07-03

## 2025-07-07 ENCOUNTER — APPOINTMENT (OUTPATIENT)
Dept: UROLOGY | Facility: CLINIC | Age: 75
End: 2025-07-07
Payer: MEDICARE

## 2025-07-07 ENCOUNTER — OUTPATIENT (OUTPATIENT)
Dept: OUTPATIENT SERVICES | Facility: HOSPITAL | Age: 75
LOS: 1 days | End: 2025-07-07
Payer: MEDICARE

## 2025-07-07 VITALS — OXYGEN SATURATION: 98 % | HEART RATE: 68 BPM | DIASTOLIC BLOOD PRESSURE: 66 MMHG | SYSTOLIC BLOOD PRESSURE: 173 MMHG

## 2025-07-07 DIAGNOSIS — R35.0 FREQUENCY OF MICTURITION: ICD-10-CM

## 2025-07-07 PROCEDURE — 76872 US TRANSRECTAL: CPT

## 2025-07-07 PROCEDURE — 55700: CPT

## 2025-07-07 PROCEDURE — 76872 US TRANSRECTAL: CPT | Mod: 26

## 2025-07-08 ENCOUNTER — NON-APPOINTMENT (OUTPATIENT)
Age: 75
End: 2025-07-08

## 2025-07-08 DIAGNOSIS — R97.20 ELEVATED PROSTATE SPECIFIC ANTIGEN [PSA]: ICD-10-CM

## 2025-07-09 ENCOUNTER — APPOINTMENT (OUTPATIENT)
Dept: HEPATOLOGY | Facility: CLINIC | Age: 75
End: 2025-07-09

## 2025-07-09 VITALS
DIASTOLIC BLOOD PRESSURE: 64 MMHG | OXYGEN SATURATION: 98 % | HEART RATE: 73 BPM | SYSTOLIC BLOOD PRESSURE: 147 MMHG | RESPIRATION RATE: 16 BRPM

## 2025-07-09 PROBLEM — R74.8 LIVER ENZYME ELEVATION: Status: ACTIVE | Noted: 2025-06-11

## 2025-07-09 PROBLEM — C61 GLEASON GRADE GROUP 1 ADENOCARCINOMA OF PROSTATE: Status: ACTIVE | Noted: 2025-07-09

## 2025-07-09 PROCEDURE — 99213 OFFICE O/P EST LOW 20 MIN: CPT

## 2025-07-11 ENCOUNTER — NON-APPOINTMENT (OUTPATIENT)
Age: 75
End: 2025-07-11

## 2025-07-11 ENCOUNTER — APPOINTMENT (OUTPATIENT)
Age: 75
End: 2025-07-11
Payer: MEDICARE

## 2025-07-11 PROCEDURE — 92286 ANT SGM IMG I&R SPECLR MIC: CPT

## 2025-07-11 PROCEDURE — 92136 OPHTHALMIC BIOMETRY: CPT | Mod: RT

## 2025-07-11 PROCEDURE — 92202 OPSCPY EXTND ON/MAC DRAW: CPT

## 2025-07-11 PROCEDURE — 92025 CPTRIZED CORNEAL TOPOGRAPHY: CPT

## 2025-07-11 PROCEDURE — 92004 COMPRE OPH EXAM NEW PT 1/>: CPT

## 2025-07-11 PROCEDURE — 92134 CPTRZ OPH DX IMG PST SGM RTA: CPT | Mod: 59

## 2025-07-17 ENCOUNTER — NON-APPOINTMENT (OUTPATIENT)
Age: 75
End: 2025-07-17

## 2025-07-17 ENCOUNTER — APPOINTMENT (OUTPATIENT)
Dept: UROLOGY | Facility: CLINIC | Age: 75
End: 2025-07-17
Payer: MEDICARE

## 2025-07-17 ENCOUNTER — TRANSCRIPTION ENCOUNTER (OUTPATIENT)
Age: 75
End: 2025-07-17

## 2025-07-17 PROCEDURE — G2211 COMPLEX E/M VISIT ADD ON: CPT

## 2025-07-17 PROCEDURE — 99214 OFFICE O/P EST MOD 30 MIN: CPT

## 2025-07-20 ENCOUNTER — INPATIENT (INPATIENT)
Facility: HOSPITAL | Age: 75
LOS: 3 days | Discharge: NURSING, REHAB OR LONG-TERM CARE | DRG: 556 | End: 2025-07-24
Attending: STUDENT IN AN ORGANIZED HEALTH CARE EDUCATION/TRAINING PROGRAM | Admitting: STUDENT IN AN ORGANIZED HEALTH CARE EDUCATION/TRAINING PROGRAM
Payer: MEDICARE

## 2025-07-20 VITALS
OXYGEN SATURATION: 97 % | HEART RATE: 70 BPM | RESPIRATION RATE: 18 BRPM | HEIGHT: 71 IN | WEIGHT: 210.1 LBS | DIASTOLIC BLOOD PRESSURE: 66 MMHG | SYSTOLIC BLOOD PRESSURE: 150 MMHG | TEMPERATURE: 98 F

## 2025-07-20 DIAGNOSIS — M25.561 PAIN IN RIGHT KNEE: ICD-10-CM

## 2025-07-20 LAB
ALBUMIN SERPL ELPH-MCNC: 3.8 G/DL — SIGNIFICANT CHANGE UP (ref 3.3–5)
ALP SERPL-CCNC: 108 U/L — SIGNIFICANT CHANGE UP (ref 40–120)
ALT FLD-CCNC: 23 U/L — SIGNIFICANT CHANGE UP (ref 10–45)
ANION GAP SERPL CALC-SCNC: 19 MMOL/L — HIGH (ref 5–17)
AST SERPL-CCNC: 24 U/L — SIGNIFICANT CHANGE UP (ref 10–40)
BASOPHILS # BLD AUTO: 0.07 K/UL — SIGNIFICANT CHANGE UP (ref 0–0.2)
BASOPHILS NFR BLD AUTO: 0.7 % — SIGNIFICANT CHANGE UP (ref 0–2)
BILIRUB SERPL-MCNC: 0.6 MG/DL — SIGNIFICANT CHANGE UP (ref 0.2–1.2)
BUN SERPL-MCNC: 84 MG/DL — HIGH (ref 7–23)
CALCIUM SERPL-MCNC: 8.6 MG/DL — SIGNIFICANT CHANGE UP (ref 8.4–10.5)
CHLORIDE SERPL-SCNC: 98 MMOL/L — SIGNIFICANT CHANGE UP (ref 96–108)
CO2 SERPL-SCNC: 22 MMOL/L — SIGNIFICANT CHANGE UP (ref 22–31)
CREAT SERPL-MCNC: 6.97 MG/DL — HIGH (ref 0.5–1.3)
EGFR: 8 ML/MIN/1.73M2 — LOW
EGFR: 8 ML/MIN/1.73M2 — LOW
EOSINOPHIL # BLD AUTO: 0.16 K/UL — SIGNIFICANT CHANGE UP (ref 0–0.5)
EOSINOPHIL NFR BLD AUTO: 1.5 % — SIGNIFICANT CHANGE UP (ref 0–6)
GLUCOSE SERPL-MCNC: 120 MG/DL — HIGH (ref 70–99)
HCT VFR BLD CALC: 24.7 % — LOW (ref 39–50)
HGB BLD-MCNC: 7.8 G/DL — LOW (ref 13–17)
IMM GRANULOCYTES # BLD AUTO: 0.04 K/UL — SIGNIFICANT CHANGE UP (ref 0–0.07)
IMM GRANULOCYTES NFR BLD AUTO: 0.4 % — SIGNIFICANT CHANGE UP (ref 0–0.9)
LYMPHOCYTES # BLD AUTO: 1.07 K/UL — SIGNIFICANT CHANGE UP (ref 1–3.3)
LYMPHOCYTES NFR BLD AUTO: 10 % — LOW (ref 13–44)
MAGNESIUM SERPL-MCNC: 2.2 MG/DL — SIGNIFICANT CHANGE UP (ref 1.6–2.6)
MCHC RBC-ENTMCNC: 29 PG — SIGNIFICANT CHANGE UP (ref 27–34)
MCHC RBC-ENTMCNC: 31.6 G/DL — LOW (ref 32–36)
MCV RBC AUTO: 91.8 FL — SIGNIFICANT CHANGE UP (ref 80–100)
MONOCYTES # BLD AUTO: 1.45 K/UL — HIGH (ref 0–0.9)
MONOCYTES NFR BLD AUTO: 13.5 % — SIGNIFICANT CHANGE UP (ref 2–14)
NEUTROPHILS # BLD AUTO: 7.92 K/UL — HIGH (ref 1.8–7.4)
NEUTROPHILS NFR BLD AUTO: 73.9 % — SIGNIFICANT CHANGE UP (ref 43–77)
NRBC # BLD AUTO: 0 K/UL — SIGNIFICANT CHANGE UP (ref 0–0)
NRBC # FLD: 0 K/UL — SIGNIFICANT CHANGE UP (ref 0–0)
NRBC BLD AUTO-RTO: 0 /100 WBCS — SIGNIFICANT CHANGE UP (ref 0–0)
NT-PROBNP SERPL-SCNC: 4554 PG/ML — HIGH (ref 0–300)
PHOSPHATE SERPL-MCNC: 5.4 MG/DL — HIGH (ref 2.5–4.5)
PLATELET # BLD AUTO: 255 K/UL — SIGNIFICANT CHANGE UP (ref 150–400)
PMV BLD: 10.5 FL — SIGNIFICANT CHANGE UP (ref 7–13)
POTASSIUM SERPL-MCNC: 3 MMOL/L — LOW (ref 3.5–5.3)
POTASSIUM SERPL-SCNC: 3 MMOL/L — LOW (ref 3.5–5.3)
PROT SERPL-MCNC: 8.2 G/DL — SIGNIFICANT CHANGE UP (ref 6–8.3)
RBC # BLD: 2.69 M/UL — LOW (ref 4.2–5.8)
RBC # FLD: 13.7 % — SIGNIFICANT CHANGE UP (ref 10.3–14.5)
SODIUM SERPL-SCNC: 139 MMOL/L — SIGNIFICANT CHANGE UP (ref 135–145)
WBC # BLD: 10.71 K/UL — HIGH (ref 3.8–10.5)
WBC # FLD AUTO: 10.71 K/UL — HIGH (ref 3.8–10.5)

## 2025-07-20 PROCEDURE — 73562 X-RAY EXAM OF KNEE 3: CPT

## 2025-07-20 PROCEDURE — 84100 ASSAY OF PHOSPHORUS: CPT

## 2025-07-20 PROCEDURE — 83735 ASSAY OF MAGNESIUM: CPT

## 2025-07-20 PROCEDURE — 73552 X-RAY EXAM OF FEMUR 2/>: CPT

## 2025-07-20 PROCEDURE — 83880 ASSAY OF NATRIURETIC PEPTIDE: CPT

## 2025-07-20 PROCEDURE — 73522 X-RAY EXAM HIPS BI 3-4 VIEWS: CPT | Mod: 26

## 2025-07-20 PROCEDURE — 80053 COMPREHEN METABOLIC PANEL: CPT

## 2025-07-20 PROCEDURE — 82962 GLUCOSE BLOOD TEST: CPT

## 2025-07-20 PROCEDURE — 85025 COMPLETE CBC W/AUTO DIFF WBC: CPT

## 2025-07-20 PROCEDURE — 93970 EXTREMITY STUDY: CPT | Mod: 26

## 2025-07-20 PROCEDURE — 99285 EMERGENCY DEPT VISIT HI MDM: CPT | Mod: GC

## 2025-07-20 PROCEDURE — 93970 EXTREMITY STUDY: CPT

## 2025-07-20 PROCEDURE — 72125 CT NECK SPINE W/O DYE: CPT

## 2025-07-20 PROCEDURE — 73522 X-RAY EXAM HIPS BI 3-4 VIEWS: CPT

## 2025-07-20 PROCEDURE — 72125 CT NECK SPINE W/O DYE: CPT | Mod: 26

## 2025-07-20 PROCEDURE — 99223 1ST HOSP IP/OBS HIGH 75: CPT

## 2025-07-20 PROCEDURE — 70450 CT HEAD/BRAIN W/O DYE: CPT | Mod: 26

## 2025-07-20 PROCEDURE — 73562 X-RAY EXAM OF KNEE 3: CPT | Mod: 26,50

## 2025-07-20 PROCEDURE — 70450 CT HEAD/BRAIN W/O DYE: CPT

## 2025-07-20 PROCEDURE — 73552 X-RAY EXAM OF FEMUR 2/>: CPT | Mod: 26,50

## 2025-07-20 RX ORDER — HYDROMORPHONE/SOD CHLOR,ISO/PF 2 MG/10 ML
1 SYRINGE (ML) INJECTION ONCE
Refills: 0 | Status: DISCONTINUED | OUTPATIENT
Start: 2025-07-20 | End: 2025-07-20

## 2025-07-20 RX ADMIN — Medication 1 MILLIGRAM(S): at 17:55

## 2025-07-20 RX ADMIN — Medication 1 MILLIGRAM(S): at 18:24

## 2025-07-20 RX ADMIN — Medication 40 MILLIEQUIVALENT(S): at 21:13

## 2025-07-21 DIAGNOSIS — I10 ESSENTIAL (PRIMARY) HYPERTENSION: ICD-10-CM

## 2025-07-21 DIAGNOSIS — E11.9 TYPE 2 DIABETES MELLITUS WITHOUT COMPLICATIONS: ICD-10-CM

## 2025-07-21 DIAGNOSIS — M25.561 PAIN IN RIGHT KNEE: ICD-10-CM

## 2025-07-21 DIAGNOSIS — R01.1 CARDIAC MURMUR, UNSPECIFIED: ICD-10-CM

## 2025-07-21 DIAGNOSIS — N17.9 ACUTE KIDNEY FAILURE, UNSPECIFIED: ICD-10-CM

## 2025-07-21 DIAGNOSIS — R53.1 WEAKNESS: ICD-10-CM

## 2025-07-21 LAB
24R-OH-CALCIDIOL SERPL-MCNC: 27 NG/ML — SIGNIFICANT CHANGE UP
ALBUMIN SERPL ELPH-MCNC: 3.6 G/DL — SIGNIFICANT CHANGE UP (ref 3.3–5)
ALP SERPL-CCNC: 108 U/L — SIGNIFICANT CHANGE UP (ref 40–120)
ALT FLD-CCNC: 24 U/L — SIGNIFICANT CHANGE UP (ref 10–45)
ANION GAP SERPL CALC-SCNC: 17 MMOL/L — SIGNIFICANT CHANGE UP (ref 5–17)
ANION GAP SERPL CALC-SCNC: 18 MMOL/L — HIGH (ref 5–17)
ANISOCYTOSIS BLD QL: SLIGHT — SIGNIFICANT CHANGE UP
AST SERPL-CCNC: 24 U/L — SIGNIFICANT CHANGE UP (ref 10–40)
BASOPHILS # BLD AUTO: 0.09 K/UL — SIGNIFICANT CHANGE UP (ref 0–0.2)
BASOPHILS # BLD MANUAL: 0.3 K/UL — HIGH (ref 0–0.2)
BASOPHILS NFR BLD AUTO: 0.8 % — SIGNIFICANT CHANGE UP (ref 0–2)
BASOPHILS NFR BLD MANUAL: 2.7 % — HIGH (ref 0–2)
BILIRUB SERPL-MCNC: 0.6 MG/DL — SIGNIFICANT CHANGE UP (ref 0.2–1.2)
BUN SERPL-MCNC: 83 MG/DL — HIGH (ref 7–23)
BUN SERPL-MCNC: 86 MG/DL — HIGH (ref 7–23)
CALCIUM SERPL-MCNC: 7.8 MG/DL — LOW (ref 8.4–10.5)
CALCIUM SERPL-MCNC: 8.5 MG/DL — SIGNIFICANT CHANGE UP (ref 8.4–10.5)
CHLORIDE SERPL-SCNC: 100 MMOL/L — SIGNIFICANT CHANGE UP (ref 96–108)
CHLORIDE SERPL-SCNC: 100 MMOL/L — SIGNIFICANT CHANGE UP (ref 96–108)
CK MB BLD-MCNC: 1 % — SIGNIFICANT CHANGE UP (ref 0–3.5)
CK MB CFR SERPL CALC: 1.8 NG/ML — SIGNIFICANT CHANGE UP (ref 0–6.7)
CK SERPL-CCNC: 175 U/L — SIGNIFICANT CHANGE UP (ref 30–200)
CK SERPL-CCNC: 234 U/L — HIGH (ref 30–200)
CO2 SERPL-SCNC: 19 MMOL/L — LOW (ref 22–31)
CO2 SERPL-SCNC: 21 MMOL/L — LOW (ref 22–31)
CREAT SERPL-MCNC: 6.74 MG/DL — HIGH (ref 0.5–1.3)
CREAT SERPL-MCNC: 6.93 MG/DL — HIGH (ref 0.5–1.3)
DACRYOCYTES BLD QL SMEAR: SLIGHT — SIGNIFICANT CHANGE UP
EGFR: 8 ML/MIN/1.73M2 — LOW
ELLIPTOCYTES BLD QL SMEAR: SLIGHT — SIGNIFICANT CHANGE UP
EOSINOPHIL # BLD AUTO: 0.5 K/UL — SIGNIFICANT CHANGE UP (ref 0–0.5)
EOSINOPHIL # BLD MANUAL: 0.7 K/UL — HIGH (ref 0–0.5)
EOSINOPHIL NFR BLD AUTO: 4.5 % — SIGNIFICANT CHANGE UP (ref 0–6)
EOSINOPHIL NFR BLD MANUAL: 6.3 % — HIGH (ref 0–6)
FERRITIN SERPL-MCNC: 1337 NG/ML — HIGH (ref 30–400)
GLUCOSE SERPL-MCNC: 166 MG/DL — HIGH (ref 70–99)
GLUCOSE SERPL-MCNC: 175 MG/DL — HIGH (ref 70–99)
HCT VFR BLD CALC: 22.6 % — LOW (ref 39–50)
HCT VFR BLD CALC: 25.7 % — LOW (ref 39–50)
HGB BLD-MCNC: 7.2 G/DL — LOW (ref 13–17)
HGB BLD-MCNC: 8.1 G/DL — LOW (ref 13–17)
IMM GRANULOCYTES # BLD AUTO: 0.06 K/UL — SIGNIFICANT CHANGE UP (ref 0–0.07)
IMM GRANULOCYTES NFR BLD AUTO: 0.5 % — SIGNIFICANT CHANGE UP (ref 0–0.9)
IRON SATN MFR SERPL: 14 % — LOW (ref 16–55)
IRON SATN MFR SERPL: 27 UG/DL — LOW (ref 45–165)
LDH SERPL L TO P-CCNC: 287 U/L — HIGH (ref 50–242)
LYMPHOCYTES # BLD AUTO: 1.09 K/UL — SIGNIFICANT CHANGE UP (ref 1–3.3)
LYMPHOCYTES # BLD MANUAL: 0.99 K/UL — LOW (ref 1–3.3)
LYMPHOCYTES NFR BLD AUTO: 9.9 % — LOW (ref 13–44)
LYMPHOCYTES NFR BLD MANUAL: 9 % — LOW (ref 13–44)
MACROCYTES BLD QL: SLIGHT — SIGNIFICANT CHANGE UP
MAGNESIUM SERPL-MCNC: 2 MG/DL — SIGNIFICANT CHANGE UP (ref 1.6–2.6)
MAGNESIUM SERPL-MCNC: 2.1 MG/DL — SIGNIFICANT CHANGE UP (ref 1.6–2.6)
MCHC RBC-ENTMCNC: 28.6 PG — SIGNIFICANT CHANGE UP (ref 27–34)
MCHC RBC-ENTMCNC: 29.4 PG — SIGNIFICANT CHANGE UP (ref 27–34)
MCHC RBC-ENTMCNC: 31.5 G/DL — LOW (ref 32–36)
MCHC RBC-ENTMCNC: 31.9 G/DL — LOW (ref 32–36)
MCV RBC AUTO: 90.8 FL — SIGNIFICANT CHANGE UP (ref 80–100)
MCV RBC AUTO: 92.2 FL — SIGNIFICANT CHANGE UP (ref 80–100)
MICROCYTES BLD QL: SLIGHT — SIGNIFICANT CHANGE UP
MONOCYTES # BLD AUTO: 1.64 K/UL — HIGH (ref 0–0.9)
MONOCYTES # BLD MANUAL: 0.4 K/UL — SIGNIFICANT CHANGE UP (ref 0–0.9)
MONOCYTES NFR BLD AUTO: 14.9 % — HIGH (ref 2–14)
MONOCYTES NFR BLD MANUAL: 3.6 % — SIGNIFICANT CHANGE UP (ref 2–14)
NEUTROPHILS # BLD AUTO: 7.66 K/UL — HIGH (ref 1.8–7.4)
NEUTROPHILS # BLD MANUAL: 8.66 K/UL — HIGH (ref 1.8–7.4)
NEUTROPHILS NFR BLD AUTO: 69.4 % — SIGNIFICANT CHANGE UP (ref 43–77)
NEUTROPHILS NFR BLD MANUAL: 78.4 % — HIGH (ref 43–77)
NRBC # BLD AUTO: 0 K/UL — SIGNIFICANT CHANGE UP (ref 0–0)
NRBC # BLD AUTO: 0 K/UL — SIGNIFICANT CHANGE UP (ref 0–0)
NRBC # FLD: 0 K/UL — SIGNIFICANT CHANGE UP (ref 0–0)
NRBC # FLD: 0 K/UL — SIGNIFICANT CHANGE UP (ref 0–0)
NRBC BLD AUTO-RTO: 0 /100 WBCS — SIGNIFICANT CHANGE UP (ref 0–0)
NRBC BLD AUTO-RTO: 0 /100 WBCS — SIGNIFICANT CHANGE UP (ref 0–0)
PHOSPHATE SERPL-MCNC: 4.2 MG/DL — SIGNIFICANT CHANGE UP (ref 2.5–4.5)
PHOSPHATE SERPL-MCNC: 5.1 MG/DL — HIGH (ref 2.5–4.5)
PLAT MORPH BLD: NORMAL — SIGNIFICANT CHANGE UP
PLATELET # BLD AUTO: 220 K/UL — SIGNIFICANT CHANGE UP (ref 150–400)
PLATELET # BLD AUTO: 270 K/UL — SIGNIFICANT CHANGE UP (ref 150–400)
PMV BLD: 10.4 FL — SIGNIFICANT CHANGE UP (ref 7–13)
PMV BLD: 10.6 FL — SIGNIFICANT CHANGE UP (ref 7–13)
POLYCHROMASIA BLD QL SMEAR: ABNORMAL
POTASSIUM SERPL-MCNC: 2.9 MMOL/L — CRITICAL LOW (ref 3.5–5.3)
POTASSIUM SERPL-MCNC: 4 MMOL/L — SIGNIFICANT CHANGE UP (ref 3.5–5.3)
POTASSIUM SERPL-SCNC: 2.9 MMOL/L — CRITICAL LOW (ref 3.5–5.3)
POTASSIUM SERPL-SCNC: 4 MMOL/L — SIGNIFICANT CHANGE UP (ref 3.5–5.3)
PROT SERPL-MCNC: 8 G/DL — SIGNIFICANT CHANGE UP (ref 6–8.3)
RBC # BLD: 2.45 M/UL — LOW (ref 4.2–5.8)
RBC # BLD: 2.83 M/UL — LOW (ref 4.2–5.8)
RBC # FLD: 13.6 % — SIGNIFICANT CHANGE UP (ref 10.3–14.5)
RBC # FLD: 13.6 % — SIGNIFICANT CHANGE UP (ref 10.3–14.5)
RBC BLD AUTO: ABNORMAL
ROULEAUX BLD QL SMEAR: PRESENT
SCHISTOCYTES BLD QL AUTO: SLIGHT — SIGNIFICANT CHANGE UP
SODIUM SERPL-SCNC: 136 MMOL/L — SIGNIFICANT CHANGE UP (ref 135–145)
SODIUM SERPL-SCNC: 139 MMOL/L — SIGNIFICANT CHANGE UP (ref 135–145)
TIBC SERPL-MCNC: 195 UG/DL — LOW (ref 220–430)
TRANSFERRIN SERPL-MCNC: 132 MG/DL — LOW (ref 200–360)
TROPONIN T, HIGH SENSITIVITY RESULT: 139 NG/L — HIGH (ref 0–51)
TROPONIN T, HIGH SENSITIVITY RESULT: 150 NG/L — HIGH (ref 0–51)
TSH SERPL-MCNC: 1.78 UIU/ML — SIGNIFICANT CHANGE UP (ref 0.27–4.2)
UIBC SERPL-MCNC: 168 UG/DL — SIGNIFICANT CHANGE UP (ref 110–370)
WBC # BLD: 10.16 K/UL — SIGNIFICANT CHANGE UP (ref 3.8–10.5)
WBC # BLD: 11.04 K/UL — HIGH (ref 3.8–10.5)
WBC # FLD AUTO: 10.16 K/UL — SIGNIFICANT CHANGE UP (ref 3.8–10.5)
WBC # FLD AUTO: 11.04 K/UL — HIGH (ref 3.8–10.5)

## 2025-07-21 PROCEDURE — 99232 SBSQ HOSP IP/OBS MODERATE 35: CPT

## 2025-07-21 PROCEDURE — 80053 COMPREHEN METABOLIC PANEL: CPT

## 2025-07-21 PROCEDURE — 83880 ASSAY OF NATRIURETIC PEPTIDE: CPT

## 2025-07-21 PROCEDURE — 84484 ASSAY OF TROPONIN QUANT: CPT

## 2025-07-21 PROCEDURE — 85027 COMPLETE CBC AUTOMATED: CPT

## 2025-07-21 PROCEDURE — 70450 CT HEAD/BRAIN W/O DYE: CPT

## 2025-07-21 PROCEDURE — 73562 X-RAY EXAM OF KNEE 3: CPT

## 2025-07-21 PROCEDURE — 93010 ELECTROCARDIOGRAM REPORT: CPT

## 2025-07-21 PROCEDURE — 84443 ASSAY THYROID STIM HORMONE: CPT

## 2025-07-21 PROCEDURE — 83615 LACTATE (LD) (LDH) ENZYME: CPT

## 2025-07-21 PROCEDURE — 73552 X-RAY EXAM OF FEMUR 2/>: CPT

## 2025-07-21 PROCEDURE — 80048 BASIC METABOLIC PNL TOTAL CA: CPT

## 2025-07-21 PROCEDURE — 99222 1ST HOSP IP/OBS MODERATE 55: CPT

## 2025-07-21 PROCEDURE — 82550 ASSAY OF CK (CPK): CPT

## 2025-07-21 PROCEDURE — 93005 ELECTROCARDIOGRAM TRACING: CPT

## 2025-07-21 PROCEDURE — 82306 VITAMIN D 25 HYDROXY: CPT

## 2025-07-21 PROCEDURE — 93970 EXTREMITY STUDY: CPT

## 2025-07-21 PROCEDURE — 82962 GLUCOSE BLOOD TEST: CPT

## 2025-07-21 PROCEDURE — 97161 PT EVAL LOW COMPLEX 20 MIN: CPT

## 2025-07-21 PROCEDURE — 36415 COLL VENOUS BLD VENIPUNCTURE: CPT

## 2025-07-21 PROCEDURE — 83540 ASSAY OF IRON: CPT

## 2025-07-21 PROCEDURE — 82553 CREATINE MB FRACTION: CPT

## 2025-07-21 PROCEDURE — 84100 ASSAY OF PHOSPHORUS: CPT

## 2025-07-21 PROCEDURE — 82728 ASSAY OF FERRITIN: CPT

## 2025-07-21 PROCEDURE — 84466 ASSAY OF TRANSFERRIN: CPT

## 2025-07-21 PROCEDURE — 83550 IRON BINDING TEST: CPT

## 2025-07-21 PROCEDURE — 83735 ASSAY OF MAGNESIUM: CPT

## 2025-07-21 PROCEDURE — 73522 X-RAY EXAM HIPS BI 3-4 VIEWS: CPT

## 2025-07-21 PROCEDURE — 72125 CT NECK SPINE W/O DYE: CPT

## 2025-07-21 PROCEDURE — 85025 COMPLETE CBC W/AUTO DIFF WBC: CPT

## 2025-07-21 RX ORDER — SODIUM BICARBONATE 1 MEQ/ML
1300 SYRINGE (ML) INTRAVENOUS THREE TIMES A DAY
Refills: 0 | Status: DISCONTINUED | OUTPATIENT
Start: 2025-07-21 | End: 2025-07-22

## 2025-07-21 RX ORDER — LEVOTHYROXINE SODIUM 300 MCG
50 TABLET ORAL DAILY
Refills: 0 | Status: DISCONTINUED | OUTPATIENT
Start: 2025-07-21 | End: 2025-07-24

## 2025-07-21 RX ORDER — TRAMADOL HYDROCHLORIDE 50 MG/1
50 TABLET, FILM COATED ORAL EVERY 4 HOURS
Refills: 0 | Status: DISCONTINUED | OUTPATIENT
Start: 2025-07-21 | End: 2025-07-24

## 2025-07-21 RX ORDER — SEVELAMER HYDROCHLORIDE 800 MG/1
800 TABLET ORAL
Refills: 0 | Status: DISCONTINUED | OUTPATIENT
Start: 2025-07-21 | End: 2025-07-24

## 2025-07-21 RX ORDER — ASPIRIN 325 MG
81 TABLET ORAL DAILY
Refills: 0 | Status: DISCONTINUED | OUTPATIENT
Start: 2025-07-21 | End: 2025-07-24

## 2025-07-21 RX ORDER — CARVEDILOL 3.12 MG/1
25 TABLET, FILM COATED ORAL
Refills: 0 | Status: DISCONTINUED | OUTPATIENT
Start: 2025-07-21 | End: 2025-07-24

## 2025-07-21 RX ORDER — ACETAMINOPHEN 500 MG/5ML
650 LIQUID (ML) ORAL ONCE
Refills: 0 | Status: COMPLETED | OUTPATIENT
Start: 2025-07-21 | End: 2025-07-21

## 2025-07-21 RX ORDER — AMLODIPINE BESYLATE 10 MG/1
5 TABLET ORAL DAILY
Refills: 0 | Status: DISCONTINUED | OUTPATIENT
Start: 2025-07-21 | End: 2025-07-24

## 2025-07-21 RX ORDER — SENNA 187 MG
2 TABLET ORAL AT BEDTIME
Refills: 0 | Status: DISCONTINUED | OUTPATIENT
Start: 2025-07-21 | End: 2025-07-24

## 2025-07-21 RX ORDER — POLYETHYLENE GLYCOL 3350 17 G/17G
17 POWDER, FOR SOLUTION ORAL
Refills: 0 | Status: DISCONTINUED | OUTPATIENT
Start: 2025-07-21 | End: 2025-07-24

## 2025-07-21 RX ORDER — OXYCODONE HYDROCHLORIDE AND ACETAMINOPHEN 10; 325 MG/1; MG/1
1 TABLET ORAL EVERY 4 HOURS
Refills: 0 | Status: DISCONTINUED | OUTPATIENT
Start: 2025-07-21 | End: 2025-07-21

## 2025-07-21 RX ORDER — TRAMADOL HYDROCHLORIDE 50 MG/1
25 TABLET, FILM COATED ORAL EVERY 4 HOURS
Refills: 0 | Status: DISCONTINUED | OUTPATIENT
Start: 2025-07-21 | End: 2025-07-24

## 2025-07-21 RX ORDER — DICLOFENAC SODIUM 10 MG/G
4 GEL TOPICAL EVERY 8 HOURS
Refills: 0 | Status: DISCONTINUED | OUTPATIENT
Start: 2025-07-21 | End: 2025-07-24

## 2025-07-21 RX ORDER — HEPARIN SODIUM 1000 [USP'U]/ML
5000 INJECTION INTRAVENOUS; SUBCUTANEOUS THREE TIMES A DAY
Refills: 0 | Status: DISCONTINUED | OUTPATIENT
Start: 2025-07-21 | End: 2025-07-24

## 2025-07-21 RX ADMIN — Medication 1000 UNIT(S): at 11:16

## 2025-07-21 RX ADMIN — SEVELAMER HYDROCHLORIDE 800 MILLIGRAM(S): 800 TABLET ORAL at 11:16

## 2025-07-21 RX ADMIN — Medication 1300 MILLIGRAM(S): at 13:37

## 2025-07-21 RX ADMIN — TRAMADOL HYDROCHLORIDE 50 MILLIGRAM(S): 50 TABLET, FILM COATED ORAL at 13:37

## 2025-07-21 RX ADMIN — Medication 2 TABLET(S): at 21:31

## 2025-07-21 RX ADMIN — Medication 650 MILLIGRAM(S): at 07:57

## 2025-07-21 RX ADMIN — Medication 1300 MILLIGRAM(S): at 21:31

## 2025-07-21 RX ADMIN — Medication 1300 MILLIGRAM(S): at 06:24

## 2025-07-21 RX ADMIN — Medication 100 MILLIEQUIVALENT(S): at 06:23

## 2025-07-21 RX ADMIN — SEVELAMER HYDROCHLORIDE 800 MILLIGRAM(S): 800 TABLET ORAL at 07:58

## 2025-07-21 RX ADMIN — Medication 50 MILLIEQUIVALENT(S): at 03:37

## 2025-07-21 RX ADMIN — POLYETHYLENE GLYCOL 3350 17 GRAM(S): 17 POWDER, FOR SOLUTION ORAL at 17:49

## 2025-07-21 RX ADMIN — Medication 40 MILLIEQUIVALENT(S): at 02:24

## 2025-07-21 RX ADMIN — AMLODIPINE BESYLATE 5 MILLIGRAM(S): 10 TABLET ORAL at 06:24

## 2025-07-21 RX ADMIN — Medication 81 MILLIGRAM(S): at 11:16

## 2025-07-21 RX ADMIN — HEPARIN SODIUM 5000 UNIT(S): 1000 INJECTION INTRAVENOUS; SUBCUTANEOUS at 13:37

## 2025-07-21 RX ADMIN — HEPARIN SODIUM 5000 UNIT(S): 1000 INJECTION INTRAVENOUS; SUBCUTANEOUS at 21:31

## 2025-07-21 RX ADMIN — CARVEDILOL 25 MILLIGRAM(S): 3.12 TABLET, FILM COATED ORAL at 01:33

## 2025-07-21 RX ADMIN — TRAMADOL HYDROCHLORIDE 50 MILLIGRAM(S): 50 TABLET, FILM COATED ORAL at 21:30

## 2025-07-21 RX ADMIN — HEPARIN SODIUM 5000 UNIT(S): 1000 INJECTION INTRAVENOUS; SUBCUTANEOUS at 06:24

## 2025-07-21 RX ADMIN — Medication 100 MILLIEQUIVALENT(S): at 07:29

## 2025-07-21 RX ADMIN — Medication 50 MICROGRAM(S): at 06:24

## 2025-07-21 RX ADMIN — SEVELAMER HYDROCHLORIDE 800 MILLIGRAM(S): 800 TABLET ORAL at 17:49

## 2025-07-21 RX ADMIN — TRAMADOL HYDROCHLORIDE 50 MILLIGRAM(S): 50 TABLET, FILM COATED ORAL at 13:53

## 2025-07-21 RX ADMIN — DICLOFENAC SODIUM 4 GRAM(S): 10 GEL TOPICAL at 21:31

## 2025-07-21 RX ADMIN — TRAMADOL HYDROCHLORIDE 50 MILLIGRAM(S): 50 TABLET, FILM COATED ORAL at 22:30

## 2025-07-21 RX ADMIN — CARVEDILOL 25 MILLIGRAM(S): 3.12 TABLET, FILM COATED ORAL at 17:49

## 2025-07-22 LAB
ANION GAP SERPL CALC-SCNC: 21 MMOL/L — HIGH (ref 5–17)
BUN SERPL-MCNC: 81 MG/DL — HIGH (ref 7–23)
CALCIUM SERPL-MCNC: 8.4 MG/DL — SIGNIFICANT CHANGE UP (ref 8.4–10.5)
CHLORIDE SERPL-SCNC: 100 MMOL/L — SIGNIFICANT CHANGE UP (ref 96–108)
CO2 SERPL-SCNC: 17 MMOL/L — LOW (ref 22–31)
CREAT SERPL-MCNC: 6.4 MG/DL — HIGH (ref 0.5–1.3)
EGFR: 9 ML/MIN/1.73M2 — LOW
EGFR: 9 ML/MIN/1.73M2 — LOW
GLUCOSE SERPL-MCNC: 172 MG/DL — HIGH (ref 70–99)
HCT VFR BLD CALC: 27.1 % — LOW (ref 39–50)
HGB BLD-MCNC: 8.2 G/DL — LOW (ref 13–17)
MCHC RBC-ENTMCNC: 28.5 PG — SIGNIFICANT CHANGE UP (ref 27–34)
MCHC RBC-ENTMCNC: 30.3 G/DL — LOW (ref 32–36)
MCV RBC AUTO: 94.1 FL — SIGNIFICANT CHANGE UP (ref 80–100)
NRBC # BLD AUTO: 0 K/UL — SIGNIFICANT CHANGE UP (ref 0–0)
NRBC # FLD: 0 K/UL — SIGNIFICANT CHANGE UP (ref 0–0)
NRBC BLD AUTO-RTO: 0 /100 WBCS — SIGNIFICANT CHANGE UP (ref 0–0)
PLATELET # BLD AUTO: 273 K/UL — SIGNIFICANT CHANGE UP (ref 150–400)
PMV BLD: 10.5 FL — SIGNIFICANT CHANGE UP (ref 7–13)
POTASSIUM SERPL-MCNC: 4.2 MMOL/L — SIGNIFICANT CHANGE UP (ref 3.5–5.3)
POTASSIUM SERPL-SCNC: 4.2 MMOL/L — SIGNIFICANT CHANGE UP (ref 3.5–5.3)
RBC # BLD: 2.88 M/UL — LOW (ref 4.2–5.8)
RBC # FLD: 13.7 % — SIGNIFICANT CHANGE UP (ref 10.3–14.5)
SODIUM SERPL-SCNC: 138 MMOL/L — SIGNIFICANT CHANGE UP (ref 135–145)
WBC # BLD: 10.79 K/UL — HIGH (ref 3.8–10.5)
WBC # FLD AUTO: 10.79 K/UL — HIGH (ref 3.8–10.5)

## 2025-07-22 PROCEDURE — 99232 SBSQ HOSP IP/OBS MODERATE 35: CPT | Mod: GC

## 2025-07-22 PROCEDURE — 99232 SBSQ HOSP IP/OBS MODERATE 35: CPT

## 2025-07-22 RX ORDER — MAGNESIUM CITRATE
296 SOLUTION, ORAL ORAL ONCE
Refills: 0 | Status: COMPLETED | OUTPATIENT
Start: 2025-07-22 | End: 2025-07-22

## 2025-07-22 RX ORDER — DARBEPOETIN ALFA 40 UG/ML
100 SOLUTION INTRAVENOUS; SUBCUTANEOUS ONCE
Refills: 0 | Status: DISCONTINUED | OUTPATIENT
Start: 2025-07-22 | End: 2025-07-22

## 2025-07-22 RX ORDER — DARBEPOETIN ALFA 40 UG/ML
100 SOLUTION INTRAVENOUS; SUBCUTANEOUS ONCE
Refills: 0 | Status: COMPLETED | OUTPATIENT
Start: 2025-07-22 | End: 2025-07-22

## 2025-07-22 RX ORDER — SODIUM BICARBONATE 1 MEQ/ML
1300 SYRINGE (ML) INTRAVENOUS EVERY 8 HOURS
Refills: 0 | Status: DISCONTINUED | OUTPATIENT
Start: 2025-07-22 | End: 2025-07-23

## 2025-07-22 RX ORDER — TORSEMIDE 10 MG
20 TABLET ORAL DAILY
Refills: 0 | Status: DISCONTINUED | OUTPATIENT
Start: 2025-07-22 | End: 2025-07-24

## 2025-07-22 RX ORDER — OXYCODONE HYDROCHLORIDE 30 MG/1
10 TABLET ORAL ONCE
Refills: 0 | Status: DISCONTINUED | OUTPATIENT
Start: 2025-07-22 | End: 2025-07-23

## 2025-07-22 RX ADMIN — DICLOFENAC SODIUM 4 GRAM(S): 10 GEL TOPICAL at 21:18

## 2025-07-22 RX ADMIN — DARBEPOETIN ALFA 100 MICROGRAM(S): 40 SOLUTION INTRAVENOUS; SUBCUTANEOUS at 18:40

## 2025-07-22 RX ADMIN — TRAMADOL HYDROCHLORIDE 50 MILLIGRAM(S): 50 TABLET, FILM COATED ORAL at 06:14

## 2025-07-22 RX ADMIN — Medication 1300 MILLIGRAM(S): at 21:17

## 2025-07-22 RX ADMIN — DICLOFENAC SODIUM 4 GRAM(S): 10 GEL TOPICAL at 05:30

## 2025-07-22 RX ADMIN — TRAMADOL HYDROCHLORIDE 50 MILLIGRAM(S): 50 TABLET, FILM COATED ORAL at 21:17

## 2025-07-22 RX ADMIN — HEPARIN SODIUM 5000 UNIT(S): 1000 INJECTION INTRAVENOUS; SUBCUTANEOUS at 13:35

## 2025-07-22 RX ADMIN — Medication 1300 MILLIGRAM(S): at 13:35

## 2025-07-22 RX ADMIN — TRAMADOL HYDROCHLORIDE 50 MILLIGRAM(S): 50 TABLET, FILM COATED ORAL at 05:28

## 2025-07-22 RX ADMIN — CARVEDILOL 25 MILLIGRAM(S): 3.12 TABLET, FILM COATED ORAL at 05:29

## 2025-07-22 RX ADMIN — POLYETHYLENE GLYCOL 3350 17 GRAM(S): 17 POWDER, FOR SOLUTION ORAL at 05:29

## 2025-07-22 RX ADMIN — TRAMADOL HYDROCHLORIDE 50 MILLIGRAM(S): 50 TABLET, FILM COATED ORAL at 22:00

## 2025-07-22 RX ADMIN — SEVELAMER HYDROCHLORIDE 800 MILLIGRAM(S): 800 TABLET ORAL at 17:12

## 2025-07-22 RX ADMIN — AMLODIPINE BESYLATE 5 MILLIGRAM(S): 10 TABLET ORAL at 05:29

## 2025-07-22 RX ADMIN — Medication 2 TABLET(S): at 21:17

## 2025-07-22 RX ADMIN — Medication 50 MICROGRAM(S): at 05:29

## 2025-07-22 RX ADMIN — CARVEDILOL 25 MILLIGRAM(S): 3.12 TABLET, FILM COATED ORAL at 17:13

## 2025-07-22 RX ADMIN — DICLOFENAC SODIUM 4 GRAM(S): 10 GEL TOPICAL at 13:35

## 2025-07-22 RX ADMIN — SEVELAMER HYDROCHLORIDE 800 MILLIGRAM(S): 800 TABLET ORAL at 11:07

## 2025-07-22 RX ADMIN — Medication 1300 MILLIGRAM(S): at 05:29

## 2025-07-22 RX ADMIN — SEVELAMER HYDROCHLORIDE 800 MILLIGRAM(S): 800 TABLET ORAL at 08:26

## 2025-07-22 RX ADMIN — Medication 81 MILLIGRAM(S): at 11:06

## 2025-07-22 RX ADMIN — Medication 1000 UNIT(S): at 11:07

## 2025-07-22 RX ADMIN — HEPARIN SODIUM 5000 UNIT(S): 1000 INJECTION INTRAVENOUS; SUBCUTANEOUS at 21:18

## 2025-07-22 RX ADMIN — Medication 296 MILLILITER(S): at 10:52

## 2025-07-22 RX ADMIN — HEPARIN SODIUM 5000 UNIT(S): 1000 INJECTION INTRAVENOUS; SUBCUTANEOUS at 05:29

## 2025-07-23 ENCOUNTER — TRANSCRIPTION ENCOUNTER (OUTPATIENT)
Age: 75
End: 2025-07-23

## 2025-07-23 LAB
ANION GAP SERPL CALC-SCNC: 14 MMOL/L — SIGNIFICANT CHANGE UP (ref 5–17)
BUN SERPL-MCNC: 81 MG/DL — HIGH (ref 7–23)
CALCIUM SERPL-MCNC: 8.5 MG/DL — SIGNIFICANT CHANGE UP (ref 8.4–10.5)
CHLORIDE SERPL-SCNC: 97 MMOL/L — SIGNIFICANT CHANGE UP (ref 96–108)
CO2 SERPL-SCNC: 23 MMOL/L — SIGNIFICANT CHANGE UP (ref 22–31)
CREAT SERPL-MCNC: 6.11 MG/DL — HIGH (ref 0.5–1.3)
EGFR: 9 ML/MIN/1.73M2 — LOW
EGFR: 9 ML/MIN/1.73M2 — LOW
GLUCOSE SERPL-MCNC: 183 MG/DL — HIGH (ref 70–99)
POTASSIUM SERPL-MCNC: 4.6 MMOL/L — SIGNIFICANT CHANGE UP (ref 3.5–5.3)
POTASSIUM SERPL-SCNC: 4.6 MMOL/L — SIGNIFICANT CHANGE UP (ref 3.5–5.3)
SODIUM SERPL-SCNC: 134 MMOL/L — LOW (ref 135–145)

## 2025-07-23 PROCEDURE — 99232 SBSQ HOSP IP/OBS MODERATE 35: CPT

## 2025-07-23 RX ORDER — SODIUM BICARBONATE 1 MEQ/ML
1950 SYRINGE (ML) INTRAVENOUS EVERY 8 HOURS
Refills: 0 | Status: DISCONTINUED | OUTPATIENT
Start: 2025-07-23 | End: 2025-07-23

## 2025-07-23 RX ORDER — SODIUM BICARBONATE 1 MEQ/ML
1300 SYRINGE (ML) INTRAVENOUS EVERY 8 HOURS
Refills: 0 | Status: DISCONTINUED | OUTPATIENT
Start: 2025-07-23 | End: 2025-07-24

## 2025-07-23 RX ADMIN — HEPARIN SODIUM 5000 UNIT(S): 1000 INJECTION INTRAVENOUS; SUBCUTANEOUS at 05:46

## 2025-07-23 RX ADMIN — POLYETHYLENE GLYCOL 3350 17 GRAM(S): 17 POWDER, FOR SOLUTION ORAL at 17:47

## 2025-07-23 RX ADMIN — Medication 1950 MILLIGRAM(S): at 13:40

## 2025-07-23 RX ADMIN — HEPARIN SODIUM 5000 UNIT(S): 1000 INJECTION INTRAVENOUS; SUBCUTANEOUS at 21:53

## 2025-07-23 RX ADMIN — DICLOFENAC SODIUM 4 GRAM(S): 10 GEL TOPICAL at 21:53

## 2025-07-23 RX ADMIN — CARVEDILOL 25 MILLIGRAM(S): 3.12 TABLET, FILM COATED ORAL at 05:45

## 2025-07-23 RX ADMIN — Medication 1300 MILLIGRAM(S): at 05:46

## 2025-07-23 RX ADMIN — Medication 20 MILLIGRAM(S): at 05:46

## 2025-07-23 RX ADMIN — Medication 1300 MILLIGRAM(S): at 21:53

## 2025-07-23 RX ADMIN — Medication 81 MILLIGRAM(S): at 11:11

## 2025-07-23 RX ADMIN — OXYCODONE HYDROCHLORIDE 10 MILLIGRAM(S): 30 TABLET ORAL at 13:20

## 2025-07-23 RX ADMIN — Medication 50 MICROGRAM(S): at 05:46

## 2025-07-23 RX ADMIN — SEVELAMER HYDROCHLORIDE 800 MILLIGRAM(S): 800 TABLET ORAL at 11:11

## 2025-07-23 RX ADMIN — DICLOFENAC SODIUM 4 GRAM(S): 10 GEL TOPICAL at 05:46

## 2025-07-23 RX ADMIN — Medication 1000 UNIT(S): at 11:11

## 2025-07-23 RX ADMIN — SEVELAMER HYDROCHLORIDE 800 MILLIGRAM(S): 800 TABLET ORAL at 08:55

## 2025-07-23 RX ADMIN — AMLODIPINE BESYLATE 5 MILLIGRAM(S): 10 TABLET ORAL at 05:46

## 2025-07-23 RX ADMIN — CARVEDILOL 25 MILLIGRAM(S): 3.12 TABLET, FILM COATED ORAL at 17:47

## 2025-07-23 RX ADMIN — HEPARIN SODIUM 5000 UNIT(S): 1000 INJECTION INTRAVENOUS; SUBCUTANEOUS at 13:40

## 2025-07-23 RX ADMIN — OXYCODONE HYDROCHLORIDE 10 MILLIGRAM(S): 30 TABLET ORAL at 12:22

## 2025-07-23 RX ADMIN — SEVELAMER HYDROCHLORIDE 800 MILLIGRAM(S): 800 TABLET ORAL at 17:47

## 2025-07-23 RX ADMIN — DICLOFENAC SODIUM 4 GRAM(S): 10 GEL TOPICAL at 13:41

## 2025-07-24 ENCOUNTER — TRANSCRIPTION ENCOUNTER (OUTPATIENT)
Age: 75
End: 2025-07-24

## 2025-07-24 ENCOUNTER — APPOINTMENT (OUTPATIENT)
Age: 75
End: 2025-07-24

## 2025-07-24 VITALS
TEMPERATURE: 98 F | HEART RATE: 68 BPM | RESPIRATION RATE: 18 BRPM | DIASTOLIC BLOOD PRESSURE: 67 MMHG | SYSTOLIC BLOOD PRESSURE: 112 MMHG | OXYGEN SATURATION: 94 %

## 2025-07-24 LAB
MRSA PCR RESULT.: SIGNIFICANT CHANGE UP
S AUREUS DNA NOSE QL NAA+PROBE: SIGNIFICANT CHANGE UP

## 2025-07-24 PROCEDURE — 97530 THERAPEUTIC ACTIVITIES: CPT

## 2025-07-24 PROCEDURE — 85025 COMPLETE CBC W/AUTO DIFF WBC: CPT

## 2025-07-24 PROCEDURE — 73552 X-RAY EXAM OF FEMUR 2/>: CPT

## 2025-07-24 PROCEDURE — 84100 ASSAY OF PHOSPHORUS: CPT

## 2025-07-24 PROCEDURE — 87640 STAPH A DNA AMP PROBE: CPT

## 2025-07-24 PROCEDURE — 83540 ASSAY OF IRON: CPT

## 2025-07-24 PROCEDURE — 85027 COMPLETE CBC AUTOMATED: CPT

## 2025-07-24 PROCEDURE — 97161 PT EVAL LOW COMPLEX 20 MIN: CPT

## 2025-07-24 PROCEDURE — 93970 EXTREMITY STUDY: CPT

## 2025-07-24 PROCEDURE — 97116 GAIT TRAINING THERAPY: CPT

## 2025-07-24 PROCEDURE — 82962 GLUCOSE BLOOD TEST: CPT

## 2025-07-24 PROCEDURE — 36415 COLL VENOUS BLD VENIPUNCTURE: CPT

## 2025-07-24 PROCEDURE — 82728 ASSAY OF FERRITIN: CPT

## 2025-07-24 PROCEDURE — 70450 CT HEAD/BRAIN W/O DYE: CPT

## 2025-07-24 PROCEDURE — 99239 HOSP IP/OBS DSCHRG MGMT >30: CPT

## 2025-07-24 PROCEDURE — 83735 ASSAY OF MAGNESIUM: CPT

## 2025-07-24 PROCEDURE — 83615 LACTATE (LD) (LDH) ENZYME: CPT

## 2025-07-24 PROCEDURE — 96374 THER/PROPH/DIAG INJ IV PUSH: CPT

## 2025-07-24 PROCEDURE — 99285 EMERGENCY DEPT VISIT HI MDM: CPT

## 2025-07-24 PROCEDURE — 72125 CT NECK SPINE W/O DYE: CPT

## 2025-07-24 PROCEDURE — 87641 MR-STAPH DNA AMP PROBE: CPT

## 2025-07-24 PROCEDURE — 82553 CREATINE MB FRACTION: CPT

## 2025-07-24 PROCEDURE — 73562 X-RAY EXAM OF KNEE 3: CPT

## 2025-07-24 PROCEDURE — 82306 VITAMIN D 25 HYDROXY: CPT

## 2025-07-24 PROCEDURE — 73522 X-RAY EXAM HIPS BI 3-4 VIEWS: CPT

## 2025-07-24 PROCEDURE — 84443 ASSAY THYROID STIM HORMONE: CPT

## 2025-07-24 PROCEDURE — 80048 BASIC METABOLIC PNL TOTAL CA: CPT

## 2025-07-24 PROCEDURE — 84466 ASSAY OF TRANSFERRIN: CPT

## 2025-07-24 PROCEDURE — 83880 ASSAY OF NATRIURETIC PEPTIDE: CPT

## 2025-07-24 PROCEDURE — 80053 COMPREHEN METABOLIC PANEL: CPT

## 2025-07-24 PROCEDURE — 93005 ELECTROCARDIOGRAM TRACING: CPT

## 2025-07-24 PROCEDURE — 82550 ASSAY OF CK (CPK): CPT

## 2025-07-24 PROCEDURE — 83550 IRON BINDING TEST: CPT

## 2025-07-24 PROCEDURE — 84484 ASSAY OF TROPONIN QUANT: CPT

## 2025-07-24 PROCEDURE — 36410 VNPNXR 3YR/> PHY/QHP DX/THER: CPT

## 2025-07-24 RX ORDER — POLYETHYLENE GLYCOL 3350 17 G/17G
17 POWDER, FOR SOLUTION ORAL
Refills: 0 | DISCHARGE
Start: 2025-07-24

## 2025-07-24 RX ORDER — DICLOFENAC SODIUM 10 MG/G
1 GEL TOPICAL
Qty: 0 | Refills: 0 | DISCHARGE
Start: 2025-07-24

## 2025-07-24 RX ORDER — IRBESARTAN 75 MG/1
1 TABLET ORAL
Refills: 0 | DISCHARGE

## 2025-07-24 RX ORDER — SODIUM BICARBONATE 1 MEQ/ML
2 SYRINGE (ML) INTRAVENOUS
Qty: 0 | Refills: 0 | DISCHARGE
Start: 2025-07-24

## 2025-07-24 RX ORDER — SENNA 187 MG
1 TABLET ORAL
Refills: 0 | DISCHARGE
Start: 2025-07-24

## 2025-07-24 RX ORDER — TRAMADOL HYDROCHLORIDE 50 MG/1
1 TABLET, FILM COATED ORAL
Qty: 0 | Refills: 0 | DISCHARGE
Start: 2025-07-24

## 2025-07-24 RX ORDER — SEVELAMER HYDROCHLORIDE 800 MG/1
1 TABLET ORAL
Qty: 0 | Refills: 0 | DISCHARGE
Start: 2025-07-24

## 2025-07-24 RX ADMIN — DICLOFENAC SODIUM 4 GRAM(S): 10 GEL TOPICAL at 06:53

## 2025-07-24 RX ADMIN — SEVELAMER HYDROCHLORIDE 800 MILLIGRAM(S): 800 TABLET ORAL at 12:17

## 2025-07-24 RX ADMIN — AMLODIPINE BESYLATE 5 MILLIGRAM(S): 10 TABLET ORAL at 06:50

## 2025-07-24 RX ADMIN — HEPARIN SODIUM 5000 UNIT(S): 1000 INJECTION INTRAVENOUS; SUBCUTANEOUS at 06:50

## 2025-07-24 RX ADMIN — Medication 81 MILLIGRAM(S): at 12:18

## 2025-07-24 RX ADMIN — CARVEDILOL 25 MILLIGRAM(S): 3.12 TABLET, FILM COATED ORAL at 06:49

## 2025-07-24 RX ADMIN — Medication 1 APPLICATION(S): at 12:25

## 2025-07-24 RX ADMIN — POLYETHYLENE GLYCOL 3350 17 GRAM(S): 17 POWDER, FOR SOLUTION ORAL at 06:57

## 2025-07-24 RX ADMIN — Medication 1300 MILLIGRAM(S): at 12:18

## 2025-07-24 RX ADMIN — Medication 20 MILLIGRAM(S): at 06:50

## 2025-07-24 RX ADMIN — Medication 1300 MILLIGRAM(S): at 06:49

## 2025-07-24 RX ADMIN — SEVELAMER HYDROCHLORIDE 800 MILLIGRAM(S): 800 TABLET ORAL at 08:07

## 2025-07-24 RX ADMIN — Medication 1000 UNIT(S): at 12:18

## 2025-07-24 RX ADMIN — Medication 50 MICROGRAM(S): at 06:49

## 2025-07-24 RX ADMIN — DICLOFENAC SODIUM 4 GRAM(S): 10 GEL TOPICAL at 12:19

## 2025-07-24 RX ADMIN — HEPARIN SODIUM 5000 UNIT(S): 1000 INJECTION INTRAVENOUS; SUBCUTANEOUS at 12:19

## 2025-07-25 ENCOUNTER — APPOINTMENT (OUTPATIENT)
Dept: NEPHROLOGY | Facility: CLINIC | Age: 75
End: 2025-07-25

## 2025-07-25 ENCOUNTER — APPOINTMENT (OUTPATIENT)
Age: 75
End: 2025-07-25

## 2025-07-26 ENCOUNTER — INPATIENT (INPATIENT)
Facility: HOSPITAL | Age: 75
LOS: 3 days | Discharge: NURSING, REHAB OR LONG-TERM CARE | DRG: 204 | End: 2025-07-30
Attending: STUDENT IN AN ORGANIZED HEALTH CARE EDUCATION/TRAINING PROGRAM | Admitting: HOSPITALIST
Payer: MEDICARE

## 2025-07-26 VITALS
RESPIRATION RATE: 20 BRPM | HEIGHT: 71 IN | SYSTOLIC BLOOD PRESSURE: 167 MMHG | TEMPERATURE: 100 F | HEART RATE: 82 BPM | DIASTOLIC BLOOD PRESSURE: 70 MMHG | OXYGEN SATURATION: 93 %

## 2025-07-26 DIAGNOSIS — A41.9 SEPSIS, UNSPECIFIED ORGANISM: ICD-10-CM

## 2025-07-26 DIAGNOSIS — G93.41 METABOLIC ENCEPHALOPATHY: ICD-10-CM

## 2025-07-26 DIAGNOSIS — E03.9 HYPOTHYROIDISM, UNSPECIFIED: ICD-10-CM

## 2025-07-26 DIAGNOSIS — N18.9 CHRONIC KIDNEY DISEASE, UNSPECIFIED: ICD-10-CM

## 2025-07-26 DIAGNOSIS — R79.89 OTHER SPECIFIED ABNORMAL FINDINGS OF BLOOD CHEMISTRY: ICD-10-CM

## 2025-07-26 DIAGNOSIS — Z29.9 ENCOUNTER FOR PROPHYLACTIC MEASURES, UNSPECIFIED: ICD-10-CM

## 2025-07-26 DIAGNOSIS — E11.9 TYPE 2 DIABETES MELLITUS WITHOUT COMPLICATIONS: ICD-10-CM

## 2025-07-26 DIAGNOSIS — R06.02 SHORTNESS OF BREATH: ICD-10-CM

## 2025-07-26 DIAGNOSIS — R07.89 OTHER CHEST PAIN: ICD-10-CM

## 2025-07-26 DIAGNOSIS — E83.19 OTHER DISORDERS OF IRON METABOLISM: ICD-10-CM

## 2025-07-26 DIAGNOSIS — N18.5 CHRONIC KIDNEY DISEASE, STAGE 5: ICD-10-CM

## 2025-07-26 LAB
ALBUMIN SERPL ELPH-MCNC: 2.9 G/DL — LOW (ref 3.3–5)
ALBUMIN SERPL ELPH-MCNC: 3.1 G/DL — LOW (ref 3.3–5)
ALP SERPL-CCNC: 104 U/L — SIGNIFICANT CHANGE UP (ref 40–120)
ALP SERPL-CCNC: 118 U/L — SIGNIFICANT CHANGE UP (ref 40–120)
ALT FLD-CCNC: 39 U/L — SIGNIFICANT CHANGE UP (ref 10–45)
ALT FLD-CCNC: 41 U/L — SIGNIFICANT CHANGE UP (ref 10–45)
ANION GAP SERPL CALC-SCNC: 14 MMOL/L — SIGNIFICANT CHANGE UP (ref 5–17)
ANION GAP SERPL CALC-SCNC: 15 MMOL/L — SIGNIFICANT CHANGE UP (ref 5–17)
ANION GAP SERPL CALC-SCNC: 17 MMOL/L — SIGNIFICANT CHANGE UP (ref 5–17)
APPEARANCE UR: CLEAR — SIGNIFICANT CHANGE UP
AST SERPL-CCNC: 27 U/L — SIGNIFICANT CHANGE UP (ref 10–40)
AST SERPL-CCNC: 36 U/L — SIGNIFICANT CHANGE UP (ref 10–40)
BACTERIA # UR AUTO: NEGATIVE /HPF — SIGNIFICANT CHANGE UP
BASOPHILS # BLD AUTO: 0.12 K/UL — SIGNIFICANT CHANGE UP (ref 0–0.2)
BASOPHILS # BLD MANUAL: 0.32 K/UL — HIGH (ref 0–0.2)
BASOPHILS NFR BLD AUTO: 0.6 % — SIGNIFICANT CHANGE UP (ref 0–2)
BASOPHILS NFR BLD MANUAL: 1.7 % — SIGNIFICANT CHANGE UP (ref 0–2)
BILIRUB SERPL-MCNC: 0.5 MG/DL — SIGNIFICANT CHANGE UP (ref 0.2–1.2)
BILIRUB SERPL-MCNC: 0.5 MG/DL — SIGNIFICANT CHANGE UP (ref 0.2–1.2)
BILIRUB UR-MCNC: NEGATIVE — SIGNIFICANT CHANGE UP
BUN SERPL-MCNC: 96 MG/DL — HIGH (ref 7–23)
CALCIUM SERPL-MCNC: 8 MG/DL — LOW (ref 8.4–10.5)
CALCIUM SERPL-MCNC: 8.5 MG/DL — SIGNIFICANT CHANGE UP (ref 8.4–10.5)
CALCIUM SERPL-MCNC: 8.7 MG/DL — SIGNIFICANT CHANGE UP (ref 8.4–10.5)
CAST: 0 /LPF — SIGNIFICANT CHANGE UP (ref 0–4)
CHLORIDE SERPL-SCNC: 93 MMOL/L — LOW (ref 96–108)
CHLORIDE SERPL-SCNC: 93 MMOL/L — LOW (ref 96–108)
CHLORIDE SERPL-SCNC: 95 MMOL/L — LOW (ref 96–108)
CO2 SERPL-SCNC: 20 MMOL/L — LOW (ref 22–31)
CO2 SERPL-SCNC: 22 MMOL/L — SIGNIFICANT CHANGE UP (ref 22–31)
CO2 SERPL-SCNC: 23 MMOL/L — SIGNIFICANT CHANGE UP (ref 22–31)
COLOR SPEC: YELLOW — SIGNIFICANT CHANGE UP
CREAT SERPL-MCNC: 6.26 MG/DL — HIGH (ref 0.5–1.3)
CREAT SERPL-MCNC: 6.33 MG/DL — HIGH (ref 0.5–1.3)
CREAT SERPL-MCNC: 6.63 MG/DL — HIGH (ref 0.5–1.3)
DIFF PNL FLD: ABNORMAL
EGFR: 8 ML/MIN/1.73M2 — LOW
EGFR: 8 ML/MIN/1.73M2 — LOW
EGFR: 9 ML/MIN/1.73M2 — LOW
EOSINOPHIL # BLD AUTO: 0.14 K/UL — SIGNIFICANT CHANGE UP (ref 0–0.5)
EOSINOPHIL # BLD MANUAL: 0 K/UL — SIGNIFICANT CHANGE UP (ref 0–0.5)
EOSINOPHIL NFR BLD AUTO: 0.8 % — SIGNIFICANT CHANGE UP (ref 0–6)
EOSINOPHIL NFR BLD MANUAL: 0 % — SIGNIFICANT CHANGE UP (ref 0–6)
FLUAV AG NPH QL: SIGNIFICANT CHANGE UP
FLUBV AG NPH QL: SIGNIFICANT CHANGE UP
GAS PNL BLDV: SIGNIFICANT CHANGE UP
GAS PNL BLDV: SIGNIFICANT CHANGE UP
GLUCOSE BLDC GLUCOMTR-MCNC: 245 MG/DL — HIGH (ref 70–99)
GLUCOSE BLDC GLUCOMTR-MCNC: 278 MG/DL — HIGH (ref 70–99)
GLUCOSE SERPL-MCNC: 184 MG/DL — HIGH (ref 70–99)
GLUCOSE SERPL-MCNC: 206 MG/DL — HIGH (ref 70–99)
GLUCOSE SERPL-MCNC: 216 MG/DL — HIGH (ref 70–99)
GLUCOSE UR QL: 250 MG/DL
HCT VFR BLD CALC: 23.9 % — LOW (ref 39–50)
HCT VFR BLD CALC: 24.2 % — LOW (ref 39–50)
HGB BLD-MCNC: 7.5 G/DL — LOW (ref 13–17)
HGB BLD-MCNC: 7.7 G/DL — LOW (ref 13–17)
IMM GRANULOCYTES # BLD AUTO: 0.26 K/UL — HIGH (ref 0–0.07)
IMM GRANULOCYTES NFR BLD AUTO: 1.4 % — HIGH (ref 0–0.9)
KETONES UR QL: NEGATIVE MG/DL — SIGNIFICANT CHANGE UP
LEUKOCYTE ESTERASE UR-ACNC: NEGATIVE — SIGNIFICANT CHANGE UP
LYMPHOCYTES # BLD AUTO: 0.83 K/UL — LOW (ref 1–3.3)
LYMPHOCYTES # BLD MANUAL: 0.65 K/UL — LOW (ref 1–3.3)
LYMPHOCYTES NFR BLD AUTO: 4.5 % — LOW (ref 13–44)
LYMPHOCYTES NFR BLD MANUAL: 3.5 % — LOW (ref 13–44)
MANUAL METAMYELOCYTE #: 0.32 K/UL — HIGH (ref 0–0)
MCHC RBC-ENTMCNC: 28.8 PG — SIGNIFICANT CHANGE UP (ref 27–34)
MCHC RBC-ENTMCNC: 29.2 PG — SIGNIFICANT CHANGE UP (ref 27–34)
MCHC RBC-ENTMCNC: 31.4 G/DL — LOW (ref 32–36)
MCHC RBC-ENTMCNC: 31.8 G/DL — LOW (ref 32–36)
MCV RBC AUTO: 91.7 FL — SIGNIFICANT CHANGE UP (ref 80–100)
MCV RBC AUTO: 91.9 FL — SIGNIFICANT CHANGE UP (ref 80–100)
METAMYELOCYTES # FLD: 1.7 % — HIGH (ref 0–0)
METAMYELOCYTES NFR BLD: 1.7 % — HIGH (ref 0–0)
MONOCYTES # BLD AUTO: 1.8 K/UL — HIGH (ref 0–0.9)
MONOCYTES # BLD MANUAL: 1.14 K/UL — HIGH (ref 0–0.9)
MONOCYTES NFR BLD AUTO: 9.7 % — SIGNIFICANT CHANGE UP (ref 2–14)
MONOCYTES NFR BLD MANUAL: 6.1 % — SIGNIFICANT CHANGE UP (ref 2–14)
NEUTROPHILS # BLD AUTO: 15.5 K/UL — HIGH (ref 1.8–7.4)
NEUTROPHILS # BLD MANUAL: 16.23 K/UL — HIGH (ref 1.8–7.4)
NEUTROPHILS NFR BLD AUTO: 83 % — HIGH (ref 43–77)
NEUTROPHILS NFR BLD MANUAL: 87 % — HIGH (ref 43–77)
NITRITE UR-MCNC: NEGATIVE — SIGNIFICANT CHANGE UP
NRBC # BLD AUTO: 0 K/UL — SIGNIFICANT CHANGE UP (ref 0–0)
NRBC # BLD AUTO: 0 K/UL — SIGNIFICANT CHANGE UP (ref 0–0)
NRBC # FLD: 0 K/UL — SIGNIFICANT CHANGE UP (ref 0–0)
NRBC # FLD: 0 K/UL — SIGNIFICANT CHANGE UP (ref 0–0)
NRBC BLD AUTO-RTO: 0 /100 WBCS — SIGNIFICANT CHANGE UP (ref 0–0)
NRBC BLD AUTO-RTO: 0 /100 WBCS — SIGNIFICANT CHANGE UP (ref 0–0)
NT-PROBNP SERPL-SCNC: HIGH PG/ML (ref 0–300)
PH UR: 8 — SIGNIFICANT CHANGE UP (ref 5–8)
PLAT MORPH BLD: NORMAL — SIGNIFICANT CHANGE UP
PLATELET # BLD AUTO: 314 K/UL — SIGNIFICANT CHANGE UP (ref 150–400)
PLATELET # BLD AUTO: 356 K/UL — SIGNIFICANT CHANGE UP (ref 150–400)
PMV BLD: 10.1 FL — SIGNIFICANT CHANGE UP (ref 7–13)
PMV BLD: 11.8 FL — SIGNIFICANT CHANGE UP (ref 7–13)
POTASSIUM SERPL-MCNC: 4.9 MMOL/L — SIGNIFICANT CHANGE UP (ref 3.5–5.3)
POTASSIUM SERPL-MCNC: 5.6 MMOL/L — HIGH (ref 3.5–5.3)
POTASSIUM SERPL-MCNC: 5.8 MMOL/L — HIGH (ref 3.5–5.3)
POTASSIUM SERPL-SCNC: 4.9 MMOL/L — SIGNIFICANT CHANGE UP (ref 3.5–5.3)
POTASSIUM SERPL-SCNC: 5.6 MMOL/L — HIGH (ref 3.5–5.3)
POTASSIUM SERPL-SCNC: 5.8 MMOL/L — HIGH (ref 3.5–5.3)
PROT SERPL-MCNC: 7.1 G/DL — SIGNIFICANT CHANGE UP (ref 6–8.3)
PROT SERPL-MCNC: 8 G/DL — SIGNIFICANT CHANGE UP (ref 6–8.3)
PROT UR-MCNC: 300 MG/DL
RBC # BLD: 2.6 M/UL — LOW (ref 4.2–5.8)
RBC # BLD: 2.64 M/UL — LOW (ref 4.2–5.8)
RBC # FLD: 13.8 % — SIGNIFICANT CHANGE UP (ref 10.3–14.5)
RBC # FLD: 13.9 % — SIGNIFICANT CHANGE UP (ref 10.3–14.5)
RBC BLD AUTO: SIGNIFICANT CHANGE UP
RBC CASTS # UR COMP ASSIST: 1 /HPF — SIGNIFICANT CHANGE UP (ref 0–4)
RSV RNA NPH QL NAA+NON-PROBE: SIGNIFICANT CHANGE UP
SARS-COV-2 RNA SPEC QL NAA+PROBE: SIGNIFICANT CHANGE UP
SODIUM SERPL-SCNC: 130 MMOL/L — LOW (ref 135–145)
SODIUM SERPL-SCNC: 130 MMOL/L — LOW (ref 135–145)
SODIUM SERPL-SCNC: 132 MMOL/L — LOW (ref 135–145)
SOURCE RESPIRATORY: SIGNIFICANT CHANGE UP
SP GR SPEC: 1.01 — SIGNIFICANT CHANGE UP (ref 1–1.03)
SQUAMOUS # UR AUTO: 0 /HPF — SIGNIFICANT CHANGE UP (ref 0–5)
TROPONIN T, HIGH SENSITIVITY RESULT: 250 NG/L — HIGH (ref 0–51)
TROPONIN T, HIGH SENSITIVITY RESULT: 306 NG/L — HIGH (ref 0–51)
TROPONIN T, HIGH SENSITIVITY RESULT: 316 NG/L — HIGH (ref 0–51)
UROBILINOGEN FLD QL: 0.2 MG/DL — SIGNIFICANT CHANGE UP (ref 0.2–1)
WBC # BLD: 16.03 K/UL — HIGH (ref 3.8–10.5)
WBC # BLD: 18.65 K/UL — HIGH (ref 3.8–10.5)
WBC # FLD AUTO: 16.03 K/UL — HIGH (ref 3.8–10.5)
WBC # FLD AUTO: 18.65 K/UL — HIGH (ref 3.8–10.5)
WBC UR QL: 0 /HPF — SIGNIFICANT CHANGE UP (ref 0–5)

## 2025-07-26 PROCEDURE — 99222 1ST HOSP IP/OBS MODERATE 55: CPT | Mod: GC

## 2025-07-26 PROCEDURE — 82947 ASSAY GLUCOSE BLOOD QUANT: CPT

## 2025-07-26 PROCEDURE — 81001 URINALYSIS AUTO W/SCOPE: CPT

## 2025-07-26 PROCEDURE — 84132 ASSAY OF SERUM POTASSIUM: CPT

## 2025-07-26 PROCEDURE — 85018 HEMOGLOBIN: CPT

## 2025-07-26 PROCEDURE — 85027 COMPLETE CBC AUTOMATED: CPT

## 2025-07-26 PROCEDURE — 85014 HEMATOCRIT: CPT

## 2025-07-26 PROCEDURE — 71045 X-RAY EXAM CHEST 1 VIEW: CPT

## 2025-07-26 PROCEDURE — 82962 GLUCOSE BLOOD TEST: CPT

## 2025-07-26 PROCEDURE — 36410 VNPNXR 3YR/> PHY/QHP DX/THER: CPT

## 2025-07-26 PROCEDURE — 71250 CT THORAX DX C-: CPT | Mod: 26

## 2025-07-26 PROCEDURE — 83605 ASSAY OF LACTIC ACID: CPT

## 2025-07-26 PROCEDURE — 82803 BLOOD GASES ANY COMBINATION: CPT

## 2025-07-26 PROCEDURE — 71250 CT THORAX DX C-: CPT

## 2025-07-26 PROCEDURE — 85025 COMPLETE CBC W/AUTO DIFF WBC: CPT

## 2025-07-26 PROCEDURE — 99497 ADVNCD CARE PLAN 30 MIN: CPT | Mod: 25

## 2025-07-26 PROCEDURE — 87040 BLOOD CULTURE FOR BACTERIA: CPT

## 2025-07-26 PROCEDURE — 71045 X-RAY EXAM CHEST 1 VIEW: CPT | Mod: 26

## 2025-07-26 PROCEDURE — 87637 SARSCOV2&INF A&B&RSV AMP PRB: CPT

## 2025-07-26 PROCEDURE — 80048 BASIC METABOLIC PNL TOTAL CA: CPT

## 2025-07-26 PROCEDURE — 99285 EMERGENCY DEPT VISIT HI MDM: CPT | Mod: GC

## 2025-07-26 PROCEDURE — 82435 ASSAY OF BLOOD CHLORIDE: CPT

## 2025-07-26 PROCEDURE — 80053 COMPREHEN METABOLIC PANEL: CPT

## 2025-07-26 PROCEDURE — 82330 ASSAY OF CALCIUM: CPT

## 2025-07-26 PROCEDURE — 99223 1ST HOSP IP/OBS HIGH 75: CPT

## 2025-07-26 PROCEDURE — 84484 ASSAY OF TROPONIN QUANT: CPT

## 2025-07-26 PROCEDURE — 84295 ASSAY OF SERUM SODIUM: CPT

## 2025-07-26 PROCEDURE — 83880 ASSAY OF NATRIURETIC PEPTIDE: CPT

## 2025-07-26 RX ORDER — VANCOMYCIN HCL IN 5 % DEXTROSE 1.5G/250ML
750 PLASTIC BAG, INJECTION (ML) INTRAVENOUS ONCE
Refills: 0 | Status: COMPLETED | OUTPATIENT
Start: 2025-07-26 | End: 2025-07-26

## 2025-07-26 RX ORDER — SEVELAMER HYDROCHLORIDE 800 MG/1
800 TABLET ORAL
Refills: 0 | Status: DISCONTINUED | OUTPATIENT
Start: 2025-07-26 | End: 2025-07-30

## 2025-07-26 RX ORDER — FUROSEMIDE 10 MG/ML
20 INJECTION INTRAMUSCULAR; INTRAVENOUS ONCE
Refills: 0 | Status: COMPLETED | OUTPATIENT
Start: 2025-07-26 | End: 2025-07-26

## 2025-07-26 RX ORDER — SODIUM CHLORIDE 9 G/1000ML
1000 INJECTION, SOLUTION INTRAVENOUS
Refills: 0 | Status: DISCONTINUED | OUTPATIENT
Start: 2025-07-26 | End: 2025-07-30

## 2025-07-26 RX ORDER — POLYETHYLENE GLYCOL 3350 17 G/17G
17 POWDER, FOR SOLUTION ORAL
Refills: 0 | Status: DISCONTINUED | OUTPATIENT
Start: 2025-07-26 | End: 2025-07-30

## 2025-07-26 RX ORDER — BUMETANIDE 1 MG/1
4 TABLET ORAL ONCE
Refills: 0 | Status: COMPLETED | OUTPATIENT
Start: 2025-07-26 | End: 2025-07-27

## 2025-07-26 RX ORDER — INSULIN LISPRO 100 U/ML
INJECTION, SOLUTION INTRAVENOUS; SUBCUTANEOUS
Refills: 0 | Status: DISCONTINUED | OUTPATIENT
Start: 2025-07-26 | End: 2025-07-30

## 2025-07-26 RX ORDER — ACETAMINOPHEN 500 MG/5ML
1000 LIQUID (ML) ORAL ONCE
Refills: 0 | Status: COMPLETED | OUTPATIENT
Start: 2025-07-26 | End: 2025-07-26

## 2025-07-26 RX ORDER — ACETAMINOPHEN 500 MG/5ML
975 LIQUID (ML) ORAL ONCE
Refills: 0 | Status: DISCONTINUED | OUTPATIENT
Start: 2025-07-26 | End: 2025-07-26

## 2025-07-26 RX ORDER — HEPARIN SODIUM 1000 [USP'U]/ML
5000 INJECTION INTRAVENOUS; SUBCUTANEOUS EVERY 12 HOURS
Refills: 0 | Status: DISCONTINUED | OUTPATIENT
Start: 2025-07-26 | End: 2025-07-30

## 2025-07-26 RX ORDER — CEFEPIME 2 G/20ML
500 INJECTION, POWDER, FOR SOLUTION INTRAVENOUS EVERY 24 HOURS
Refills: 0 | Status: DISCONTINUED | OUTPATIENT
Start: 2025-07-26 | End: 2025-07-30

## 2025-07-26 RX ORDER — SODIUM ZIRCONIUM CYCLOSILICATE 5 G/5G
10 POWDER, FOR SUSPENSION ORAL ONCE
Refills: 0 | Status: COMPLETED | OUTPATIENT
Start: 2025-07-26 | End: 2025-07-26

## 2025-07-26 RX ORDER — DEXTROSE 50 % IN WATER 50 %
15 SYRINGE (ML) INTRAVENOUS ONCE
Refills: 0 | Status: DISCONTINUED | OUTPATIENT
Start: 2025-07-26 | End: 2025-07-30

## 2025-07-26 RX ORDER — CEFEPIME 2 G/20ML
INJECTION, POWDER, FOR SOLUTION INTRAVENOUS
Refills: 0 | Status: DISCONTINUED | OUTPATIENT
Start: 2025-07-26 | End: 2025-07-26

## 2025-07-26 RX ORDER — GLUCAGON 3 MG/1
1 POWDER NASAL ONCE
Refills: 0 | Status: DISCONTINUED | OUTPATIENT
Start: 2025-07-26 | End: 2025-07-30

## 2025-07-26 RX ORDER — CEFTRIAXONE 500 MG/1
1000 INJECTION, POWDER, FOR SOLUTION INTRAMUSCULAR; INTRAVENOUS ONCE
Refills: 0 | Status: COMPLETED | OUTPATIENT
Start: 2025-07-26 | End: 2025-07-26

## 2025-07-26 RX ORDER — MELATONIN 5 MG
3 TABLET ORAL AT BEDTIME
Refills: 0 | Status: DISCONTINUED | OUTPATIENT
Start: 2025-07-26 | End: 2025-07-30

## 2025-07-26 RX ORDER — ACETAMINOPHEN 500 MG/5ML
650 LIQUID (ML) ORAL EVERY 6 HOURS
Refills: 0 | Status: DISCONTINUED | OUTPATIENT
Start: 2025-07-26 | End: 2025-07-28

## 2025-07-26 RX ORDER — CARVEDILOL 3.12 MG/1
12.5 TABLET, FILM COATED ORAL EVERY 12 HOURS
Refills: 0 | Status: DISCONTINUED | OUTPATIENT
Start: 2025-07-26 | End: 2025-07-27

## 2025-07-26 RX ORDER — AZITHROMYCIN 250 MG
500 CAPSULE ORAL ONCE
Refills: 0 | Status: COMPLETED | OUTPATIENT
Start: 2025-07-26 | End: 2025-07-26

## 2025-07-26 RX ORDER — ATORVASTATIN CALCIUM 80 MG/1
40 TABLET, FILM COATED ORAL AT BEDTIME
Refills: 0 | Status: DISCONTINUED | OUTPATIENT
Start: 2025-07-26 | End: 2025-07-30

## 2025-07-26 RX ORDER — INSULIN LISPRO 100 U/ML
INJECTION, SOLUTION INTRAVENOUS; SUBCUTANEOUS AT BEDTIME
Refills: 0 | Status: DISCONTINUED | OUTPATIENT
Start: 2025-07-26 | End: 2025-07-30

## 2025-07-26 RX ORDER — SODIUM BICARBONATE 1 MEQ/ML
650 SYRINGE (ML) INTRAVENOUS EVERY 8 HOURS
Refills: 0 | Status: DISCONTINUED | OUTPATIENT
Start: 2025-07-26 | End: 2025-07-30

## 2025-07-26 RX ORDER — SENNA 187 MG
2 TABLET ORAL AT BEDTIME
Refills: 0 | Status: DISCONTINUED | OUTPATIENT
Start: 2025-07-26 | End: 2025-07-30

## 2025-07-26 RX ORDER — ASPIRIN 325 MG
81 TABLET ORAL DAILY
Refills: 0 | Status: DISCONTINUED | OUTPATIENT
Start: 2025-07-26 | End: 2025-07-30

## 2025-07-26 RX ORDER — LEVOTHYROXINE SODIUM 300 MCG
50 TABLET ORAL DAILY
Refills: 0 | Status: DISCONTINUED | OUTPATIENT
Start: 2025-07-26 | End: 2025-07-30

## 2025-07-26 RX ORDER — DEXTROSE 50 % IN WATER 50 %
25 SYRINGE (ML) INTRAVENOUS ONCE
Refills: 0 | Status: DISCONTINUED | OUTPATIENT
Start: 2025-07-26 | End: 2025-07-30

## 2025-07-26 RX ADMIN — CEFTRIAXONE 100 MILLIGRAM(S): 500 INJECTION, POWDER, FOR SOLUTION INTRAMUSCULAR; INTRAVENOUS at 05:41

## 2025-07-26 RX ADMIN — SODIUM ZIRCONIUM CYCLOSILICATE 10 GRAM(S): 5 POWDER, FOR SUSPENSION ORAL at 09:43

## 2025-07-26 RX ADMIN — INSULIN LISPRO 3: 100 INJECTION, SOLUTION INTRAVENOUS; SUBCUTANEOUS at 18:07

## 2025-07-26 RX ADMIN — Medication 81 MILLIGRAM(S): at 14:09

## 2025-07-26 RX ADMIN — SEVELAMER HYDROCHLORIDE 800 MILLIGRAM(S): 800 TABLET ORAL at 18:07

## 2025-07-26 RX ADMIN — Medication 650 MILLIGRAM(S): at 14:09

## 2025-07-26 RX ADMIN — FUROSEMIDE 20 MILLIGRAM(S): 10 INJECTION INTRAMUSCULAR; INTRAVENOUS at 04:18

## 2025-07-26 RX ADMIN — HEPARIN SODIUM 5000 UNIT(S): 1000 INJECTION INTRAVENOUS; SUBCUTANEOUS at 18:07

## 2025-07-26 RX ADMIN — Medication 2 TABLET(S): at 21:21

## 2025-07-26 RX ADMIN — Medication 250 MILLIGRAM(S): at 14:09

## 2025-07-26 RX ADMIN — CEFEPIME 100 MILLIGRAM(S): 2 INJECTION, POWDER, FOR SOLUTION INTRAVENOUS at 21:19

## 2025-07-26 RX ADMIN — CARVEDILOL 12.5 MILLIGRAM(S): 3.12 TABLET, FILM COATED ORAL at 18:07

## 2025-07-26 RX ADMIN — Medication 250 MILLIGRAM(S): at 06:28

## 2025-07-26 RX ADMIN — ATORVASTATIN CALCIUM 40 MILLIGRAM(S): 80 TABLET, FILM COATED ORAL at 21:21

## 2025-07-26 RX ADMIN — POLYETHYLENE GLYCOL 3350 17 GRAM(S): 17 POWDER, FOR SOLUTION ORAL at 18:07

## 2025-07-26 RX ADMIN — Medication 400 MILLIGRAM(S): at 04:18

## 2025-07-26 RX ADMIN — Medication 650 MILLIGRAM(S): at 21:22

## 2025-07-27 LAB
ALBUMIN SERPL ELPH-MCNC: 2.7 G/DL — LOW (ref 3.3–5)
ALP SERPL-CCNC: 115 U/L — SIGNIFICANT CHANGE UP (ref 40–120)
ALT FLD-CCNC: 57 U/L — HIGH (ref 10–45)
ANION GAP SERPL CALC-SCNC: 14 MMOL/L — SIGNIFICANT CHANGE UP (ref 5–17)
AST SERPL-CCNC: 40 U/L — SIGNIFICANT CHANGE UP (ref 10–40)
BILIRUB SERPL-MCNC: 0.4 MG/DL — SIGNIFICANT CHANGE UP (ref 0.2–1.2)
BUN SERPL-MCNC: 102 MG/DL — HIGH (ref 7–23)
CALCIUM SERPL-MCNC: 8.3 MG/DL — LOW (ref 8.4–10.5)
CHLORIDE SERPL-SCNC: 98 MMOL/L — SIGNIFICANT CHANGE UP (ref 96–108)
CO2 SERPL-SCNC: 22 MMOL/L — SIGNIFICANT CHANGE UP (ref 22–31)
CREAT SERPL-MCNC: 6.89 MG/DL — HIGH (ref 0.5–1.3)
EGFR: 8 ML/MIN/1.73M2 — LOW
EGFR: 8 ML/MIN/1.73M2 — LOW
FERRITIN SERPL-MCNC: 3326 NG/ML — HIGH (ref 30–400)
GLUCOSE BLDC GLUCOMTR-MCNC: 178 MG/DL — HIGH (ref 70–99)
GLUCOSE BLDC GLUCOMTR-MCNC: 206 MG/DL — HIGH (ref 70–99)
GLUCOSE BLDC GLUCOMTR-MCNC: 215 MG/DL — HIGH (ref 70–99)
GLUCOSE BLDC GLUCOMTR-MCNC: 263 MG/DL — HIGH (ref 70–99)
GLUCOSE SERPL-MCNC: 164 MG/DL — HIGH (ref 70–99)
HCT VFR BLD CALC: 21.7 % — LOW (ref 39–50)
HCT VFR BLD CALC: 25.7 % — LOW (ref 39–50)
HGB BLD-MCNC: 6.6 G/DL — CRITICAL LOW (ref 13–17)
HGB BLD-MCNC: 8 G/DL — LOW (ref 13–17)
IRON SATN MFR SERPL: 19 % — SIGNIFICANT CHANGE UP (ref 16–55)
IRON SATN MFR SERPL: 26 UG/DL — LOW (ref 45–165)
MCHC RBC-ENTMCNC: 28.3 PG — SIGNIFICANT CHANGE UP (ref 27–34)
MCHC RBC-ENTMCNC: 28.8 PG — SIGNIFICANT CHANGE UP (ref 27–34)
MCHC RBC-ENTMCNC: 30.4 G/DL — LOW (ref 32–36)
MCHC RBC-ENTMCNC: 31.1 G/DL — LOW (ref 32–36)
MCV RBC AUTO: 92.4 FL — SIGNIFICANT CHANGE UP (ref 80–100)
MCV RBC AUTO: 93.1 FL — SIGNIFICANT CHANGE UP (ref 80–100)
NRBC # BLD AUTO: 0 K/UL — SIGNIFICANT CHANGE UP (ref 0–0)
NRBC # BLD AUTO: 0 K/UL — SIGNIFICANT CHANGE UP (ref 0–0)
NRBC # FLD: 0 K/UL — SIGNIFICANT CHANGE UP (ref 0–0)
NRBC # FLD: 0 K/UL — SIGNIFICANT CHANGE UP (ref 0–0)
NRBC BLD AUTO-RTO: 0 /100 WBCS — SIGNIFICANT CHANGE UP (ref 0–0)
NRBC BLD AUTO-RTO: 0 /100 WBCS — SIGNIFICANT CHANGE UP (ref 0–0)
PLATELET # BLD AUTO: 342 K/UL — SIGNIFICANT CHANGE UP (ref 150–400)
PLATELET # BLD AUTO: 383 K/UL — SIGNIFICANT CHANGE UP (ref 150–400)
PMV BLD: 10.3 FL — SIGNIFICANT CHANGE UP (ref 7–13)
PMV BLD: 10.7 FL — SIGNIFICANT CHANGE UP (ref 7–13)
POTASSIUM SERPL-MCNC: 5.2 MMOL/L — SIGNIFICANT CHANGE UP (ref 3.5–5.3)
POTASSIUM SERPL-SCNC: 5.2 MMOL/L — SIGNIFICANT CHANGE UP (ref 3.5–5.3)
PROT SERPL-MCNC: 7.4 G/DL — SIGNIFICANT CHANGE UP (ref 6–8.3)
RBC # BLD: 2.33 M/UL — LOW (ref 4.2–5.8)
RBC # BLD: 2.78 M/UL — LOW (ref 4.2–5.8)
RBC # FLD: 13.8 % — SIGNIFICANT CHANGE UP (ref 10.3–14.5)
RBC # FLD: 13.9 % — SIGNIFICANT CHANGE UP (ref 10.3–14.5)
SODIUM SERPL-SCNC: 134 MMOL/L — LOW (ref 135–145)
TIBC SERPL-MCNC: 133 UG/DL — LOW (ref 220–430)
UIBC SERPL-MCNC: 108 UG/DL — LOW (ref 110–370)
WBC # BLD: 13.86 K/UL — HIGH (ref 3.8–10.5)
WBC # BLD: 14.6 K/UL — HIGH (ref 3.8–10.5)
WBC # FLD AUTO: 13.86 K/UL — HIGH (ref 3.8–10.5)
WBC # FLD AUTO: 14.6 K/UL — HIGH (ref 3.8–10.5)

## 2025-07-27 PROCEDURE — 36415 COLL VENOUS BLD VENIPUNCTURE: CPT

## 2025-07-27 PROCEDURE — 82803 BLOOD GASES ANY COMBINATION: CPT

## 2025-07-27 PROCEDURE — 87040 BLOOD CULTURE FOR BACTERIA: CPT

## 2025-07-27 PROCEDURE — 83540 ASSAY OF IRON: CPT

## 2025-07-27 PROCEDURE — 85027 COMPLETE CBC AUTOMATED: CPT

## 2025-07-27 PROCEDURE — 82962 GLUCOSE BLOOD TEST: CPT

## 2025-07-27 PROCEDURE — 85025 COMPLETE CBC W/AUTO DIFF WBC: CPT

## 2025-07-27 PROCEDURE — 84484 ASSAY OF TROPONIN QUANT: CPT

## 2025-07-27 PROCEDURE — 82728 ASSAY OF FERRITIN: CPT

## 2025-07-27 PROCEDURE — 99232 SBSQ HOSP IP/OBS MODERATE 35: CPT

## 2025-07-27 PROCEDURE — 83880 ASSAY OF NATRIURETIC PEPTIDE: CPT

## 2025-07-27 PROCEDURE — 86901 BLOOD TYPING SEROLOGIC RH(D): CPT

## 2025-07-27 PROCEDURE — 99233 SBSQ HOSP IP/OBS HIGH 50: CPT

## 2025-07-27 PROCEDURE — 86900 BLOOD TYPING SEROLOGIC ABO: CPT

## 2025-07-27 PROCEDURE — 85018 HEMOGLOBIN: CPT

## 2025-07-27 PROCEDURE — 87637 SARSCOV2&INF A&B&RSV AMP PRB: CPT

## 2025-07-27 PROCEDURE — 80053 COMPREHEN METABOLIC PANEL: CPT

## 2025-07-27 PROCEDURE — 71250 CT THORAX DX C-: CPT

## 2025-07-27 PROCEDURE — 84295 ASSAY OF SERUM SODIUM: CPT

## 2025-07-27 PROCEDURE — 71045 X-RAY EXAM CHEST 1 VIEW: CPT

## 2025-07-27 PROCEDURE — 82947 ASSAY GLUCOSE BLOOD QUANT: CPT

## 2025-07-27 PROCEDURE — 83036 HEMOGLOBIN GLYCOSYLATED A1C: CPT

## 2025-07-27 PROCEDURE — 86850 RBC ANTIBODY SCREEN: CPT

## 2025-07-27 PROCEDURE — 81001 URINALYSIS AUTO W/SCOPE: CPT

## 2025-07-27 PROCEDURE — 80048 BASIC METABOLIC PNL TOTAL CA: CPT

## 2025-07-27 PROCEDURE — 83550 IRON BINDING TEST: CPT

## 2025-07-27 PROCEDURE — 82330 ASSAY OF CALCIUM: CPT

## 2025-07-27 PROCEDURE — 85014 HEMATOCRIT: CPT

## 2025-07-27 PROCEDURE — 83605 ASSAY OF LACTIC ACID: CPT

## 2025-07-27 PROCEDURE — 82435 ASSAY OF BLOOD CHLORIDE: CPT

## 2025-07-27 PROCEDURE — 97161 PT EVAL LOW COMPLEX 20 MIN: CPT

## 2025-07-27 PROCEDURE — 84132 ASSAY OF SERUM POTASSIUM: CPT

## 2025-07-27 RX ORDER — BUMETANIDE 1 MG/1
4 TABLET ORAL ONCE
Refills: 0 | Status: COMPLETED | OUTPATIENT
Start: 2025-07-27 | End: 2025-07-27

## 2025-07-27 RX ORDER — CARVEDILOL 3.12 MG/1
25 TABLET, FILM COATED ORAL EVERY 12 HOURS
Refills: 0 | Status: DISCONTINUED | OUTPATIENT
Start: 2025-07-27 | End: 2025-07-30

## 2025-07-27 RX ADMIN — Medication 2 TABLET(S): at 21:49

## 2025-07-27 RX ADMIN — Medication 81 MILLIGRAM(S): at 11:42

## 2025-07-27 RX ADMIN — INSULIN LISPRO 1: 100 INJECTION, SOLUTION INTRAVENOUS; SUBCUTANEOUS at 08:08

## 2025-07-27 RX ADMIN — CARVEDILOL 25 MILLIGRAM(S): 3.12 TABLET, FILM COATED ORAL at 05:29

## 2025-07-27 RX ADMIN — SEVELAMER HYDROCHLORIDE 800 MILLIGRAM(S): 800 TABLET ORAL at 16:50

## 2025-07-27 RX ADMIN — BUMETANIDE 132 MILLIGRAM(S): 1 TABLET ORAL at 05:31

## 2025-07-27 RX ADMIN — Medication 650 MILLIGRAM(S): at 14:55

## 2025-07-27 RX ADMIN — BUMETANIDE 132 MILLIGRAM(S): 1 TABLET ORAL at 12:16

## 2025-07-27 RX ADMIN — SEVELAMER HYDROCHLORIDE 800 MILLIGRAM(S): 800 TABLET ORAL at 11:41

## 2025-07-27 RX ADMIN — SEVELAMER HYDROCHLORIDE 800 MILLIGRAM(S): 800 TABLET ORAL at 08:07

## 2025-07-27 RX ADMIN — Medication 650 MILLIGRAM(S): at 05:30

## 2025-07-27 RX ADMIN — HEPARIN SODIUM 5000 UNIT(S): 1000 INJECTION INTRAVENOUS; SUBCUTANEOUS at 16:50

## 2025-07-27 RX ADMIN — Medication 50 MICROGRAM(S): at 05:29

## 2025-07-27 RX ADMIN — POLYETHYLENE GLYCOL 3350 17 GRAM(S): 17 POWDER, FOR SOLUTION ORAL at 16:50

## 2025-07-27 RX ADMIN — Medication 650 MILLIGRAM(S): at 21:49

## 2025-07-27 RX ADMIN — ATORVASTATIN CALCIUM 40 MILLIGRAM(S): 80 TABLET, FILM COATED ORAL at 21:49

## 2025-07-27 RX ADMIN — INSULIN LISPRO 3: 100 INJECTION, SOLUTION INTRAVENOUS; SUBCUTANEOUS at 16:51

## 2025-07-27 RX ADMIN — CEFEPIME 100 MILLIGRAM(S): 2 INJECTION, POWDER, FOR SOLUTION INTRAVENOUS at 21:47

## 2025-07-27 RX ADMIN — CARVEDILOL 25 MILLIGRAM(S): 3.12 TABLET, FILM COATED ORAL at 16:52

## 2025-07-27 RX ADMIN — HEPARIN SODIUM 5000 UNIT(S): 1000 INJECTION INTRAVENOUS; SUBCUTANEOUS at 05:31

## 2025-07-27 RX ADMIN — INSULIN LISPRO 2: 100 INJECTION, SOLUTION INTRAVENOUS; SUBCUTANEOUS at 11:41

## 2025-07-28 ENCOUNTER — RESULT REVIEW (OUTPATIENT)
Age: 75
End: 2025-07-28

## 2025-07-28 LAB
ANION GAP SERPL CALC-SCNC: 18 MMOL/L — HIGH (ref 5–17)
BUN SERPL-MCNC: 104 MG/DL — HIGH (ref 7–23)
CALCIUM SERPL-MCNC: 8.3 MG/DL — LOW (ref 8.4–10.5)
CHLORIDE SERPL-SCNC: 97 MMOL/L — SIGNIFICANT CHANGE UP (ref 96–108)
CO2 SERPL-SCNC: 22 MMOL/L — SIGNIFICANT CHANGE UP (ref 22–31)
CREAT SERPL-MCNC: 6.81 MG/DL — HIGH (ref 0.5–1.3)
EGFR: 8 ML/MIN/1.73M2 — LOW
EGFR: 8 ML/MIN/1.73M2 — LOW
GLUCOSE BLDC GLUCOMTR-MCNC: 142 MG/DL — HIGH (ref 70–99)
GLUCOSE BLDC GLUCOMTR-MCNC: 216 MG/DL — HIGH (ref 70–99)
GLUCOSE BLDC GLUCOMTR-MCNC: 217 MG/DL — HIGH (ref 70–99)
GLUCOSE BLDC GLUCOMTR-MCNC: 223 MG/DL — HIGH (ref 70–99)
GLUCOSE SERPL-MCNC: 126 MG/DL — HIGH (ref 70–99)
HCT VFR BLD CALC: 20.6 % — CRITICAL LOW (ref 39–50)
HCT VFR BLD CALC: 23 % — LOW (ref 39–50)
HGB BLD-MCNC: 6.5 G/DL — CRITICAL LOW (ref 13–17)
HGB BLD-MCNC: 7.2 G/DL — LOW (ref 13–17)
MCHC RBC-ENTMCNC: 29 PG — SIGNIFICANT CHANGE UP (ref 27–34)
MCHC RBC-ENTMCNC: 29 PG — SIGNIFICANT CHANGE UP (ref 27–34)
MCHC RBC-ENTMCNC: 31.3 G/DL — LOW (ref 32–36)
MCHC RBC-ENTMCNC: 31.6 G/DL — LOW (ref 32–36)
MCV RBC AUTO: 92 FL — SIGNIFICANT CHANGE UP (ref 80–100)
MCV RBC AUTO: 92.7 FL — SIGNIFICANT CHANGE UP (ref 80–100)
NRBC # BLD AUTO: 0 K/UL — SIGNIFICANT CHANGE UP (ref 0–0)
NRBC # BLD AUTO: 0 K/UL — SIGNIFICANT CHANGE UP (ref 0–0)
NRBC # FLD: 0 K/UL — SIGNIFICANT CHANGE UP (ref 0–0)
NRBC # FLD: 0 K/UL — SIGNIFICANT CHANGE UP (ref 0–0)
NRBC BLD AUTO-RTO: 0 /100 WBCS — SIGNIFICANT CHANGE UP (ref 0–0)
NRBC BLD AUTO-RTO: 0 /100 WBCS — SIGNIFICANT CHANGE UP (ref 0–0)
PLATELET # BLD AUTO: 348 K/UL — SIGNIFICANT CHANGE UP (ref 150–400)
PLATELET # BLD AUTO: 358 K/UL — SIGNIFICANT CHANGE UP (ref 150–400)
PMV BLD: 10.1 FL — SIGNIFICANT CHANGE UP (ref 7–13)
PMV BLD: 10.3 FL — SIGNIFICANT CHANGE UP (ref 7–13)
POTASSIUM SERPL-MCNC: 4.8 MMOL/L — SIGNIFICANT CHANGE UP (ref 3.5–5.3)
POTASSIUM SERPL-SCNC: 4.8 MMOL/L — SIGNIFICANT CHANGE UP (ref 3.5–5.3)
RBC # BLD: 2.24 M/UL — LOW (ref 4.2–5.8)
RBC # BLD: 2.48 M/UL — LOW (ref 4.2–5.8)
RBC # FLD: 13.7 % — SIGNIFICANT CHANGE UP (ref 10.3–14.5)
RBC # FLD: 13.9 % — SIGNIFICANT CHANGE UP (ref 10.3–14.5)
SODIUM SERPL-SCNC: 137 MMOL/L — SIGNIFICANT CHANGE UP (ref 135–145)
WBC # BLD: 11.36 K/UL — HIGH (ref 3.8–10.5)
WBC # BLD: 11.37 K/UL — HIGH (ref 3.8–10.5)
WBC # FLD AUTO: 11.36 K/UL — HIGH (ref 3.8–10.5)
WBC # FLD AUTO: 11.37 K/UL — HIGH (ref 3.8–10.5)

## 2025-07-28 PROCEDURE — 83036 HEMOGLOBIN GLYCOSYLATED A1C: CPT

## 2025-07-28 PROCEDURE — 83540 ASSAY OF IRON: CPT

## 2025-07-28 PROCEDURE — 97161 PT EVAL LOW COMPLEX 20 MIN: CPT

## 2025-07-28 PROCEDURE — 82803 BLOOD GASES ANY COMBINATION: CPT

## 2025-07-28 PROCEDURE — 83880 ASSAY OF NATRIURETIC PEPTIDE: CPT

## 2025-07-28 PROCEDURE — 99232 SBSQ HOSP IP/OBS MODERATE 35: CPT

## 2025-07-28 PROCEDURE — 86850 RBC ANTIBODY SCREEN: CPT

## 2025-07-28 PROCEDURE — 71045 X-RAY EXAM CHEST 1 VIEW: CPT

## 2025-07-28 PROCEDURE — 82435 ASSAY OF BLOOD CHLORIDE: CPT

## 2025-07-28 PROCEDURE — 87637 SARSCOV2&INF A&B&RSV AMP PRB: CPT

## 2025-07-28 PROCEDURE — 83550 IRON BINDING TEST: CPT

## 2025-07-28 PROCEDURE — 81001 URINALYSIS AUTO W/SCOPE: CPT

## 2025-07-28 PROCEDURE — 87040 BLOOD CULTURE FOR BACTERIA: CPT

## 2025-07-28 PROCEDURE — 85018 HEMOGLOBIN: CPT

## 2025-07-28 PROCEDURE — 82728 ASSAY OF FERRITIN: CPT

## 2025-07-28 PROCEDURE — 86901 BLOOD TYPING SEROLOGIC RH(D): CPT

## 2025-07-28 PROCEDURE — 82947 ASSAY GLUCOSE BLOOD QUANT: CPT

## 2025-07-28 PROCEDURE — 84484 ASSAY OF TROPONIN QUANT: CPT

## 2025-07-28 PROCEDURE — 85027 COMPLETE CBC AUTOMATED: CPT

## 2025-07-28 PROCEDURE — 82330 ASSAY OF CALCIUM: CPT

## 2025-07-28 PROCEDURE — 71250 CT THORAX DX C-: CPT

## 2025-07-28 PROCEDURE — 84132 ASSAY OF SERUM POTASSIUM: CPT

## 2025-07-28 PROCEDURE — 83605 ASSAY OF LACTIC ACID: CPT

## 2025-07-28 PROCEDURE — 85025 COMPLETE CBC W/AUTO DIFF WBC: CPT

## 2025-07-28 PROCEDURE — 36415 COLL VENOUS BLD VENIPUNCTURE: CPT

## 2025-07-28 PROCEDURE — 85014 HEMATOCRIT: CPT

## 2025-07-28 PROCEDURE — 82962 GLUCOSE BLOOD TEST: CPT

## 2025-07-28 PROCEDURE — 93306 TTE W/DOPPLER COMPLETE: CPT | Mod: 26

## 2025-07-28 PROCEDURE — 84295 ASSAY OF SERUM SODIUM: CPT

## 2025-07-28 PROCEDURE — 80053 COMPREHEN METABOLIC PANEL: CPT

## 2025-07-28 PROCEDURE — 86900 BLOOD TYPING SEROLOGIC ABO: CPT

## 2025-07-28 PROCEDURE — 80048 BASIC METABOLIC PNL TOTAL CA: CPT

## 2025-07-28 RX ORDER — BUMETANIDE 1 MG/1
4 TABLET ORAL DAILY
Refills: 0 | Status: DISCONTINUED | OUTPATIENT
Start: 2025-07-28 | End: 2025-07-30

## 2025-07-28 RX ORDER — ACETAMINOPHEN 500 MG/5ML
975 LIQUID (ML) ORAL EVERY 8 HOURS
Refills: 0 | Status: DISCONTINUED | OUTPATIENT
Start: 2025-07-28 | End: 2025-07-30

## 2025-07-28 RX ADMIN — INSULIN LISPRO 2: 100 INJECTION, SOLUTION INTRAVENOUS; SUBCUTANEOUS at 17:10

## 2025-07-28 RX ADMIN — Medication 650 MILLIGRAM(S): at 21:44

## 2025-07-28 RX ADMIN — Medication 81 MILLIGRAM(S): at 12:22

## 2025-07-28 RX ADMIN — INSULIN LISPRO 2: 100 INJECTION, SOLUTION INTRAVENOUS; SUBCUTANEOUS at 12:20

## 2025-07-28 RX ADMIN — SEVELAMER HYDROCHLORIDE 800 MILLIGRAM(S): 800 TABLET ORAL at 17:11

## 2025-07-28 RX ADMIN — CARVEDILOL 25 MILLIGRAM(S): 3.12 TABLET, FILM COATED ORAL at 17:11

## 2025-07-28 RX ADMIN — Medication 50 MICROGRAM(S): at 05:35

## 2025-07-28 RX ADMIN — SEVELAMER HYDROCHLORIDE 800 MILLIGRAM(S): 800 TABLET ORAL at 12:23

## 2025-07-28 RX ADMIN — Medication 650 MILLIGRAM(S): at 14:13

## 2025-07-28 RX ADMIN — Medication 650 MILLIGRAM(S): at 05:34

## 2025-07-28 RX ADMIN — CEFEPIME 100 MILLIGRAM(S): 2 INJECTION, POWDER, FOR SOLUTION INTRAVENOUS at 21:42

## 2025-07-28 RX ADMIN — Medication 975 MILLIGRAM(S): at 14:12

## 2025-07-28 RX ADMIN — POLYETHYLENE GLYCOL 3350 17 GRAM(S): 17 POWDER, FOR SOLUTION ORAL at 05:35

## 2025-07-28 RX ADMIN — BUMETANIDE 4 MILLIGRAM(S): 1 TABLET ORAL at 12:23

## 2025-07-28 RX ADMIN — POLYETHYLENE GLYCOL 3350 17 GRAM(S): 17 POWDER, FOR SOLUTION ORAL at 17:10

## 2025-07-28 RX ADMIN — CARVEDILOL 25 MILLIGRAM(S): 3.12 TABLET, FILM COATED ORAL at 05:35

## 2025-07-28 RX ADMIN — SEVELAMER HYDROCHLORIDE 800 MILLIGRAM(S): 800 TABLET ORAL at 07:48

## 2025-07-28 RX ADMIN — HEPARIN SODIUM 5000 UNIT(S): 1000 INJECTION INTRAVENOUS; SUBCUTANEOUS at 05:35

## 2025-07-28 RX ADMIN — ATORVASTATIN CALCIUM 40 MILLIGRAM(S): 80 TABLET, FILM COATED ORAL at 21:44

## 2025-07-28 RX ADMIN — Medication 975 MILLIGRAM(S): at 21:44

## 2025-07-28 RX ADMIN — HEPARIN SODIUM 5000 UNIT(S): 1000 INJECTION INTRAVENOUS; SUBCUTANEOUS at 17:10

## 2025-07-29 DIAGNOSIS — Z71.89 OTHER SPECIFIED COUNSELING: ICD-10-CM

## 2025-07-29 DIAGNOSIS — Z51.5 ENCOUNTER FOR PALLIATIVE CARE: ICD-10-CM

## 2025-07-29 LAB
A1C WITH ESTIMATED AVERAGE GLUCOSE RESULT: 5.6 % — SIGNIFICANT CHANGE UP (ref 4–5.6)
ANION GAP SERPL CALC-SCNC: 15 MMOL/L — SIGNIFICANT CHANGE UP (ref 5–17)
BUN SERPL-MCNC: 103 MG/DL — HIGH (ref 7–23)
CALCIUM SERPL-MCNC: 8.5 MG/DL — SIGNIFICANT CHANGE UP (ref 8.4–10.5)
CHLORIDE SERPL-SCNC: 101 MMOL/L — SIGNIFICANT CHANGE UP (ref 96–108)
CO2 SERPL-SCNC: 22 MMOL/L — SIGNIFICANT CHANGE UP (ref 22–31)
CREAT SERPL-MCNC: 6.83 MG/DL — HIGH (ref 0.5–1.3)
EGFR: 8 ML/MIN/1.73M2 — LOW
EGFR: 8 ML/MIN/1.73M2 — LOW
ESTIMATED AVERAGE GLUCOSE: 114 MG/DL — SIGNIFICANT CHANGE UP (ref 68–114)
GLUCOSE BLDC GLUCOMTR-MCNC: 132 MG/DL — HIGH (ref 70–99)
GLUCOSE BLDC GLUCOMTR-MCNC: 152 MG/DL — HIGH (ref 70–99)
GLUCOSE BLDC GLUCOMTR-MCNC: 199 MG/DL — HIGH (ref 70–99)
GLUCOSE BLDC GLUCOMTR-MCNC: 203 MG/DL — HIGH (ref 70–99)
GLUCOSE SERPL-MCNC: 121 MG/DL — HIGH (ref 70–99)
HCT VFR BLD CALC: 22.4 % — LOW (ref 39–50)
HGB BLD-MCNC: 6.8 G/DL — CRITICAL LOW (ref 13–17)
MAGNESIUM SERPL-MCNC: 3.3 MG/DL — HIGH (ref 1.6–2.6)
MCHC RBC-ENTMCNC: 28.6 PG — SIGNIFICANT CHANGE UP (ref 27–34)
MCHC RBC-ENTMCNC: 30.4 G/DL — LOW (ref 32–36)
MCV RBC AUTO: 94.1 FL — SIGNIFICANT CHANGE UP (ref 80–100)
NRBC # BLD AUTO: 0 K/UL — SIGNIFICANT CHANGE UP (ref 0–0)
NRBC # FLD: 0 K/UL — SIGNIFICANT CHANGE UP (ref 0–0)
NRBC BLD AUTO-RTO: 0 /100 WBCS — SIGNIFICANT CHANGE UP (ref 0–0)
PHOSPHATE SERPL-MCNC: 5.9 MG/DL — HIGH (ref 2.5–4.5)
PLATELET # BLD AUTO: 383 K/UL — SIGNIFICANT CHANGE UP (ref 150–400)
PMV BLD: 10.2 FL — SIGNIFICANT CHANGE UP (ref 7–13)
POTASSIUM SERPL-MCNC: 4.8 MMOL/L — SIGNIFICANT CHANGE UP (ref 3.5–5.3)
POTASSIUM SERPL-SCNC: 4.8 MMOL/L — SIGNIFICANT CHANGE UP (ref 3.5–5.3)
RBC # BLD: 2.38 M/UL — LOW (ref 4.2–5.8)
RBC # FLD: 13.9 % — SIGNIFICANT CHANGE UP (ref 10.3–14.5)
SODIUM SERPL-SCNC: 138 MMOL/L — SIGNIFICANT CHANGE UP (ref 135–145)
WBC # BLD: 10.28 K/UL — SIGNIFICANT CHANGE UP (ref 3.8–10.5)
WBC # FLD AUTO: 10.28 K/UL — SIGNIFICANT CHANGE UP (ref 3.8–10.5)

## 2025-07-29 PROCEDURE — 99497 ADVNCD CARE PLAN 30 MIN: CPT | Mod: 25

## 2025-07-29 PROCEDURE — 99232 SBSQ HOSP IP/OBS MODERATE 35: CPT

## 2025-07-29 PROCEDURE — 99223 1ST HOSP IP/OBS HIGH 75: CPT

## 2025-07-29 RX ORDER — EPOETIN ALFA 10000 [IU]/ML
20000 SOLUTION INTRAVENOUS; SUBCUTANEOUS ONCE
Refills: 0 | Status: DISCONTINUED | OUTPATIENT
Start: 2025-07-29 | End: 2025-07-30

## 2025-07-29 RX ADMIN — SEVELAMER HYDROCHLORIDE 800 MILLIGRAM(S): 800 TABLET ORAL at 12:37

## 2025-07-29 RX ADMIN — Medication 50 MICROGRAM(S): at 05:01

## 2025-07-29 RX ADMIN — BUMETANIDE 4 MILLIGRAM(S): 1 TABLET ORAL at 05:01

## 2025-07-29 RX ADMIN — SEVELAMER HYDROCHLORIDE 800 MILLIGRAM(S): 800 TABLET ORAL at 17:34

## 2025-07-29 RX ADMIN — HEPARIN SODIUM 5000 UNIT(S): 1000 INJECTION INTRAVENOUS; SUBCUTANEOUS at 17:37

## 2025-07-29 RX ADMIN — HEPARIN SODIUM 5000 UNIT(S): 1000 INJECTION INTRAVENOUS; SUBCUTANEOUS at 05:00

## 2025-07-29 RX ADMIN — CARVEDILOL 25 MILLIGRAM(S): 3.12 TABLET, FILM COATED ORAL at 05:00

## 2025-07-29 RX ADMIN — ATORVASTATIN CALCIUM 40 MILLIGRAM(S): 80 TABLET, FILM COATED ORAL at 21:37

## 2025-07-29 RX ADMIN — Medication 81 MILLIGRAM(S): at 11:01

## 2025-07-29 RX ADMIN — Medication 1 APPLICATION(S): at 11:13

## 2025-07-29 RX ADMIN — Medication 650 MILLIGRAM(S): at 21:38

## 2025-07-29 RX ADMIN — Medication 975 MILLIGRAM(S): at 05:01

## 2025-07-29 RX ADMIN — INSULIN LISPRO 2: 100 INJECTION, SOLUTION INTRAVENOUS; SUBCUTANEOUS at 17:35

## 2025-07-29 RX ADMIN — CARVEDILOL 25 MILLIGRAM(S): 3.12 TABLET, FILM COATED ORAL at 17:35

## 2025-07-29 RX ADMIN — INSULIN LISPRO 1: 100 INJECTION, SOLUTION INTRAVENOUS; SUBCUTANEOUS at 12:38

## 2025-07-29 RX ADMIN — SEVELAMER HYDROCHLORIDE 800 MILLIGRAM(S): 800 TABLET ORAL at 08:38

## 2025-07-29 RX ADMIN — Medication 650 MILLIGRAM(S): at 14:11

## 2025-07-29 RX ADMIN — Medication 975 MILLIGRAM(S): at 21:38

## 2025-07-29 RX ADMIN — Medication 650 MILLIGRAM(S): at 05:01

## 2025-07-29 RX ADMIN — CEFEPIME 100 MILLIGRAM(S): 2 INJECTION, POWDER, FOR SOLUTION INTRAVENOUS at 21:38

## 2025-07-30 ENCOUNTER — TRANSCRIPTION ENCOUNTER (OUTPATIENT)
Age: 75
End: 2025-07-30

## 2025-07-30 VITALS
DIASTOLIC BLOOD PRESSURE: 83 MMHG | OXYGEN SATURATION: 100 % | TEMPERATURE: 97 F | HEART RATE: 68 BPM | RESPIRATION RATE: 18 BRPM | SYSTOLIC BLOOD PRESSURE: 176 MMHG

## 2025-07-30 LAB
ANION GAP SERPL CALC-SCNC: 17 MMOL/L — SIGNIFICANT CHANGE UP (ref 5–17)
BUN SERPL-MCNC: 99 MG/DL — HIGH (ref 7–23)
CALCIUM SERPL-MCNC: 8.6 MG/DL — SIGNIFICANT CHANGE UP (ref 8.4–10.5)
CHLORIDE SERPL-SCNC: 99 MMOL/L — SIGNIFICANT CHANGE UP (ref 96–108)
CO2 SERPL-SCNC: 21 MMOL/L — LOW (ref 22–31)
CREAT SERPL-MCNC: 6.44 MG/DL — HIGH (ref 0.5–1.3)
EGFR: 8 ML/MIN/1.73M2 — LOW
EGFR: 8 ML/MIN/1.73M2 — LOW
GLUCOSE BLDC GLUCOMTR-MCNC: 138 MG/DL — HIGH (ref 70–99)
GLUCOSE BLDC GLUCOMTR-MCNC: 182 MG/DL — HIGH (ref 70–99)
GLUCOSE BLDC GLUCOMTR-MCNC: 187 MG/DL — HIGH (ref 70–99)
GLUCOSE SERPL-MCNC: 118 MG/DL — HIGH (ref 70–99)
HCT VFR BLD CALC: 27.1 % — LOW (ref 39–50)
HGB BLD-MCNC: 8.4 G/DL — LOW (ref 13–17)
MAGNESIUM SERPL-MCNC: 3.2 MG/DL — HIGH (ref 1.6–2.6)
MCHC RBC-ENTMCNC: 28.4 PG — SIGNIFICANT CHANGE UP (ref 27–34)
MCHC RBC-ENTMCNC: 31 G/DL — LOW (ref 32–36)
MCV RBC AUTO: 91.6 FL — SIGNIFICANT CHANGE UP (ref 80–100)
NRBC # BLD AUTO: 0 K/UL — SIGNIFICANT CHANGE UP (ref 0–0)
NRBC # FLD: 0 K/UL — SIGNIFICANT CHANGE UP (ref 0–0)
NRBC BLD AUTO-RTO: 0 /100 WBCS — SIGNIFICANT CHANGE UP (ref 0–0)
PHOSPHATE SERPL-MCNC: 5.9 MG/DL — HIGH (ref 2.5–4.5)
PLATELET # BLD AUTO: 382 K/UL — SIGNIFICANT CHANGE UP (ref 150–400)
PMV BLD: 10 FL — SIGNIFICANT CHANGE UP (ref 7–13)
POTASSIUM SERPL-MCNC: 5.3 MMOL/L — SIGNIFICANT CHANGE UP (ref 3.5–5.3)
POTASSIUM SERPL-SCNC: 5.3 MMOL/L — SIGNIFICANT CHANGE UP (ref 3.5–5.3)
RBC # BLD: 2.96 M/UL — LOW (ref 4.2–5.8)
RBC # FLD: 14.1 % — SIGNIFICANT CHANGE UP (ref 10.3–14.5)
SODIUM SERPL-SCNC: 137 MMOL/L — SIGNIFICANT CHANGE UP (ref 135–145)
WBC # BLD: 9.78 K/UL — SIGNIFICANT CHANGE UP (ref 3.8–10.5)
WBC # FLD AUTO: 9.78 K/UL — SIGNIFICANT CHANGE UP (ref 3.8–10.5)

## 2025-07-30 PROCEDURE — 83540 ASSAY OF IRON: CPT

## 2025-07-30 PROCEDURE — 71250 CT THORAX DX C-: CPT

## 2025-07-30 PROCEDURE — 93308 TTE F-UP OR LMTD: CPT

## 2025-07-30 PROCEDURE — 97161 PT EVAL LOW COMPLEX 20 MIN: CPT

## 2025-07-30 PROCEDURE — 85014 HEMATOCRIT: CPT

## 2025-07-30 PROCEDURE — 86901 BLOOD TYPING SEROLOGIC RH(D): CPT

## 2025-07-30 PROCEDURE — 83550 IRON BINDING TEST: CPT

## 2025-07-30 PROCEDURE — 84295 ASSAY OF SERUM SODIUM: CPT

## 2025-07-30 PROCEDURE — 82947 ASSAY GLUCOSE BLOOD QUANT: CPT

## 2025-07-30 PROCEDURE — 85027 COMPLETE CBC AUTOMATED: CPT

## 2025-07-30 PROCEDURE — 85025 COMPLETE CBC W/AUTO DIFF WBC: CPT

## 2025-07-30 PROCEDURE — 82330 ASSAY OF CALCIUM: CPT

## 2025-07-30 PROCEDURE — 83036 HEMOGLOBIN GLYCOSYLATED A1C: CPT

## 2025-07-30 PROCEDURE — 86850 RBC ANTIBODY SCREEN: CPT

## 2025-07-30 PROCEDURE — 87637 SARSCOV2&INF A&B&RSV AMP PRB: CPT

## 2025-07-30 PROCEDURE — 99239 HOSP IP/OBS DSCHRG MGMT >30: CPT

## 2025-07-30 PROCEDURE — 99285 EMERGENCY DEPT VISIT HI MDM: CPT

## 2025-07-30 PROCEDURE — 84132 ASSAY OF SERUM POTASSIUM: CPT

## 2025-07-30 PROCEDURE — 82435 ASSAY OF BLOOD CHLORIDE: CPT

## 2025-07-30 PROCEDURE — 36415 COLL VENOUS BLD VENIPUNCTURE: CPT

## 2025-07-30 PROCEDURE — 81001 URINALYSIS AUTO W/SCOPE: CPT

## 2025-07-30 PROCEDURE — 93308 TTE F-UP OR LMTD: CPT | Mod: 26

## 2025-07-30 PROCEDURE — 96375 TX/PRO/DX INJ NEW DRUG ADDON: CPT

## 2025-07-30 PROCEDURE — 80053 COMPREHEN METABOLIC PANEL: CPT

## 2025-07-30 PROCEDURE — 86900 BLOOD TYPING SEROLOGIC ABO: CPT

## 2025-07-30 PROCEDURE — 83605 ASSAY OF LACTIC ACID: CPT

## 2025-07-30 PROCEDURE — 93306 TTE W/DOPPLER COMPLETE: CPT

## 2025-07-30 PROCEDURE — 87040 BLOOD CULTURE FOR BACTERIA: CPT

## 2025-07-30 PROCEDURE — 86923 COMPATIBILITY TEST ELECTRIC: CPT

## 2025-07-30 PROCEDURE — 82728 ASSAY OF FERRITIN: CPT

## 2025-07-30 PROCEDURE — 83735 ASSAY OF MAGNESIUM: CPT

## 2025-07-30 PROCEDURE — 84484 ASSAY OF TROPONIN QUANT: CPT

## 2025-07-30 PROCEDURE — 82803 BLOOD GASES ANY COMBINATION: CPT

## 2025-07-30 PROCEDURE — 85018 HEMOGLOBIN: CPT

## 2025-07-30 PROCEDURE — 82962 GLUCOSE BLOOD TEST: CPT

## 2025-07-30 PROCEDURE — 83880 ASSAY OF NATRIURETIC PEPTIDE: CPT

## 2025-07-30 PROCEDURE — 84100 ASSAY OF PHOSPHORUS: CPT

## 2025-07-30 PROCEDURE — 99232 SBSQ HOSP IP/OBS MODERATE 35: CPT

## 2025-07-30 PROCEDURE — 80048 BASIC METABOLIC PNL TOTAL CA: CPT

## 2025-07-30 PROCEDURE — 36430 TRANSFUSION BLD/BLD COMPNT: CPT

## 2025-07-30 PROCEDURE — P9016: CPT

## 2025-07-30 PROCEDURE — 71045 X-RAY EXAM CHEST 1 VIEW: CPT

## 2025-07-30 PROCEDURE — 96374 THER/PROPH/DIAG INJ IV PUSH: CPT

## 2025-07-30 RX ORDER — CEFEPIME 2 G/20ML
500 INJECTION, POWDER, FOR SOLUTION INTRAVENOUS ONCE
Refills: 0 | Status: COMPLETED | OUTPATIENT
Start: 2025-07-30 | End: 2025-07-30

## 2025-07-30 RX ORDER — ACETAMINOPHEN 500 MG/5ML
3 LIQUID (ML) ORAL
Qty: 0 | Refills: 0 | DISCHARGE
Start: 2025-07-30

## 2025-07-30 RX ORDER — SODIUM ZIRCONIUM CYCLOSILICATE 5 G/5G
10 POWDER, FOR SUSPENSION ORAL ONCE
Refills: 0 | Status: COMPLETED | OUTPATIENT
Start: 2025-07-30 | End: 2025-07-30

## 2025-07-30 RX ORDER — TORSEMIDE 10 MG
1 TABLET ORAL
Refills: 0 | DISCHARGE

## 2025-07-30 RX ORDER — MELATONIN 5 MG
1 TABLET ORAL
Qty: 0 | Refills: 0 | DISCHARGE
Start: 2025-07-30

## 2025-07-30 RX ORDER — BUMETANIDE 1 MG/1
2 TABLET ORAL
Qty: 0 | Refills: 0 | DISCHARGE
Start: 2025-07-30

## 2025-07-30 RX ADMIN — SODIUM ZIRCONIUM CYCLOSILICATE 10 GRAM(S): 5 POWDER, FOR SUSPENSION ORAL at 11:26

## 2025-07-30 RX ADMIN — Medication 650 MILLIGRAM(S): at 06:13

## 2025-07-30 RX ADMIN — HEPARIN SODIUM 5000 UNIT(S): 1000 INJECTION INTRAVENOUS; SUBCUTANEOUS at 17:13

## 2025-07-30 RX ADMIN — Medication 650 MILLIGRAM(S): at 14:06

## 2025-07-30 RX ADMIN — Medication 975 MILLIGRAM(S): at 07:44

## 2025-07-30 RX ADMIN — HEPARIN SODIUM 5000 UNIT(S): 1000 INJECTION INTRAVENOUS; SUBCUTANEOUS at 06:12

## 2025-07-30 RX ADMIN — SEVELAMER HYDROCHLORIDE 800 MILLIGRAM(S): 800 TABLET ORAL at 11:37

## 2025-07-30 RX ADMIN — CARVEDILOL 25 MILLIGRAM(S): 3.12 TABLET, FILM COATED ORAL at 06:13

## 2025-07-30 RX ADMIN — Medication 50 MICROGRAM(S): at 06:13

## 2025-07-30 RX ADMIN — Medication 975 MILLIGRAM(S): at 06:13

## 2025-07-30 RX ADMIN — INSULIN LISPRO 1: 100 INJECTION, SOLUTION INTRAVENOUS; SUBCUTANEOUS at 16:57

## 2025-07-30 RX ADMIN — SEVELAMER HYDROCHLORIDE 800 MILLIGRAM(S): 800 TABLET ORAL at 08:25

## 2025-07-30 RX ADMIN — SEVELAMER HYDROCHLORIDE 800 MILLIGRAM(S): 800 TABLET ORAL at 16:58

## 2025-07-30 RX ADMIN — BUMETANIDE 4 MILLIGRAM(S): 1 TABLET ORAL at 06:12

## 2025-07-30 RX ADMIN — CARVEDILOL 25 MILLIGRAM(S): 3.12 TABLET, FILM COATED ORAL at 17:14

## 2025-07-30 RX ADMIN — CEFEPIME 100 MILLIGRAM(S): 2 INJECTION, POWDER, FOR SOLUTION INTRAVENOUS at 16:08

## 2025-07-30 RX ADMIN — INSULIN LISPRO 1: 100 INJECTION, SOLUTION INTRAVENOUS; SUBCUTANEOUS at 11:36

## 2025-07-30 RX ADMIN — Medication 1 APPLICATION(S): at 11:30

## 2025-07-30 RX ADMIN — Medication 81 MILLIGRAM(S): at 11:30

## 2025-07-31 ENCOUNTER — APPOINTMENT (OUTPATIENT)
Age: 75
End: 2025-07-31

## 2025-07-31 LAB
CULTURE RESULTS: SIGNIFICANT CHANGE UP
CULTURE RESULTS: SIGNIFICANT CHANGE UP
SPECIMEN SOURCE: SIGNIFICANT CHANGE UP
SPECIMEN SOURCE: SIGNIFICANT CHANGE UP

## 2025-08-01 ENCOUNTER — APPOINTMENT (OUTPATIENT)
Age: 75
End: 2025-08-01

## 2025-08-09 ENCOUNTER — INPATIENT (INPATIENT)
Facility: HOSPITAL | Age: 75
LOS: 12 days | Discharge: NURSING, REHAB OR LONG-TERM CARE | DRG: 641 | End: 2025-08-22
Attending: STUDENT IN AN ORGANIZED HEALTH CARE EDUCATION/TRAINING PROGRAM | Admitting: STUDENT IN AN ORGANIZED HEALTH CARE EDUCATION/TRAINING PROGRAM
Payer: MEDICARE

## 2025-08-09 VITALS
WEIGHT: 197.09 LBS | HEART RATE: 69 BPM | TEMPERATURE: 98 F | OXYGEN SATURATION: 100 % | RESPIRATION RATE: 20 BRPM | DIASTOLIC BLOOD PRESSURE: 70 MMHG | HEIGHT: 71 IN | SYSTOLIC BLOOD PRESSURE: 187 MMHG

## 2025-08-09 DIAGNOSIS — N17.9 ACUTE KIDNEY FAILURE, UNSPECIFIED: ICD-10-CM

## 2025-08-09 DIAGNOSIS — E87.5 HYPERKALEMIA: ICD-10-CM

## 2025-08-09 LAB
ALBUMIN SERPL ELPH-MCNC: 3.8 G/DL — SIGNIFICANT CHANGE UP (ref 3.3–5)
ALP SERPL-CCNC: 105 U/L — SIGNIFICANT CHANGE UP (ref 40–120)
ALT FLD-CCNC: 56 U/L — HIGH (ref 10–45)
ANION GAP SERPL CALC-SCNC: 18 MMOL/L — HIGH (ref 5–17)
ANION GAP SERPL CALC-SCNC: 20 MMOL/L — HIGH (ref 5–17)
APPEARANCE UR: CLEAR — SIGNIFICANT CHANGE UP
APTT BLD: 33.9 SEC — SIGNIFICANT CHANGE UP (ref 26.1–36.8)
AST SERPL-CCNC: 36 U/L — SIGNIFICANT CHANGE UP (ref 10–40)
BACTERIA # UR AUTO: NEGATIVE /HPF — SIGNIFICANT CHANGE UP
BASOPHILS # BLD AUTO: 0.13 K/UL — SIGNIFICANT CHANGE UP (ref 0–0.2)
BASOPHILS NFR BLD AUTO: 1.1 % — SIGNIFICANT CHANGE UP (ref 0–2)
BILIRUB SERPL-MCNC: 0.3 MG/DL — SIGNIFICANT CHANGE UP (ref 0.2–1.2)
BILIRUB UR-MCNC: NEGATIVE — SIGNIFICANT CHANGE UP
BUN SERPL-MCNC: 120 MG/DL — HIGH (ref 7–23)
BUN SERPL-MCNC: 122 MG/DL — HIGH (ref 7–23)
CALCIUM SERPL-MCNC: 8.7 MG/DL — SIGNIFICANT CHANGE UP (ref 8.4–10.5)
CALCIUM SERPL-MCNC: 8.8 MG/DL — SIGNIFICANT CHANGE UP (ref 8.4–10.5)
CAST: 0 /LPF — SIGNIFICANT CHANGE UP (ref 0–4)
CHLORIDE SERPL-SCNC: 100 MMOL/L — SIGNIFICANT CHANGE UP (ref 96–108)
CHLORIDE SERPL-SCNC: 98 MMOL/L — SIGNIFICANT CHANGE UP (ref 96–108)
CO2 SERPL-SCNC: 18 MMOL/L — LOW (ref 22–31)
CO2 SERPL-SCNC: 18 MMOL/L — LOW (ref 22–31)
COLOR SPEC: YELLOW — SIGNIFICANT CHANGE UP
CREAT SERPL-MCNC: 8.35 MG/DL — HIGH (ref 0.5–1.3)
CREAT SERPL-MCNC: 8.39 MG/DL — HIGH (ref 0.5–1.3)
DIFF PNL FLD: ABNORMAL
EGFR: 6 ML/MIN/1.73M2 — LOW
EOSINOPHIL # BLD AUTO: 0.64 K/UL — HIGH (ref 0–0.5)
EOSINOPHIL NFR BLD AUTO: 5.4 % — SIGNIFICANT CHANGE UP (ref 0–6)
GAS PNL BLDV: SIGNIFICANT CHANGE UP
GLUCOSE SERPL-MCNC: 115 MG/DL — HIGH (ref 70–99)
GLUCOSE SERPL-MCNC: 174 MG/DL — HIGH (ref 70–99)
GLUCOSE UR QL: 100 MG/DL
HCT VFR BLD CALC: 29.9 % — LOW (ref 39–50)
HGB BLD-MCNC: 9.1 G/DL — LOW (ref 13–17)
IMM GRANULOCYTES # BLD AUTO: 0.07 K/UL — SIGNIFICANT CHANGE UP (ref 0–0.07)
IMM GRANULOCYTES NFR BLD AUTO: 0.6 % — SIGNIFICANT CHANGE UP (ref 0–0.9)
INR BLD: 0.96 RATIO — SIGNIFICANT CHANGE UP (ref 0.85–1.16)
KETONES UR QL: NEGATIVE MG/DL — SIGNIFICANT CHANGE UP
LEUKOCYTE ESTERASE UR-ACNC: NEGATIVE — SIGNIFICANT CHANGE UP
LYMPHOCYTES # BLD AUTO: 1.49 K/UL — SIGNIFICANT CHANGE UP (ref 1–3.3)
LYMPHOCYTES NFR BLD AUTO: 12.6 % — LOW (ref 13–44)
MCHC RBC-ENTMCNC: 29.2 PG — SIGNIFICANT CHANGE UP (ref 27–34)
MCHC RBC-ENTMCNC: 30.4 G/DL — LOW (ref 32–36)
MCV RBC AUTO: 95.8 FL — SIGNIFICANT CHANGE UP (ref 80–100)
MONOCYTES # BLD AUTO: 1.16 K/UL — HIGH (ref 0–0.9)
MONOCYTES NFR BLD AUTO: 9.8 % — SIGNIFICANT CHANGE UP (ref 2–14)
NEUTROPHILS # BLD AUTO: 8.36 K/UL — HIGH (ref 1.8–7.4)
NEUTROPHILS NFR BLD AUTO: 70.5 % — SIGNIFICANT CHANGE UP (ref 43–77)
NITRITE UR-MCNC: NEGATIVE — SIGNIFICANT CHANGE UP
NRBC # BLD AUTO: 0 K/UL — SIGNIFICANT CHANGE UP (ref 0–0)
NRBC # FLD: 0 K/UL — SIGNIFICANT CHANGE UP (ref 0–0)
NRBC BLD AUTO-RTO: 0 /100 WBCS — SIGNIFICANT CHANGE UP (ref 0–0)
PH UR: 8 — SIGNIFICANT CHANGE UP (ref 5–8)
PLATELET # BLD AUTO: 245 K/UL — SIGNIFICANT CHANGE UP (ref 150–400)
PMV BLD: 10.8 FL — SIGNIFICANT CHANGE UP (ref 7–13)
POTASSIUM SERPL-MCNC: 6.2 MMOL/L — CRITICAL HIGH (ref 3.5–5.3)
POTASSIUM SERPL-MCNC: 7 MMOL/L — CRITICAL HIGH (ref 3.5–5.3)
POTASSIUM SERPL-SCNC: 6.2 MMOL/L — CRITICAL HIGH (ref 3.5–5.3)
POTASSIUM SERPL-SCNC: 7 MMOL/L — CRITICAL HIGH (ref 3.5–5.3)
PROT SERPL-MCNC: 8.3 G/DL — SIGNIFICANT CHANGE UP (ref 6–8.3)
PROT UR-MCNC: 300 MG/DL
PROTHROM AB SERPL-ACNC: 11.1 SEC — SIGNIFICANT CHANGE UP (ref 9.9–13.4)
RBC # BLD: 3.12 M/UL — LOW (ref 4.2–5.8)
RBC # FLD: 15.1 % — HIGH (ref 10.3–14.5)
RBC CASTS # UR COMP ASSIST: 1 /HPF — SIGNIFICANT CHANGE UP (ref 0–4)
SODIUM SERPL-SCNC: 136 MMOL/L — SIGNIFICANT CHANGE UP (ref 135–145)
SODIUM SERPL-SCNC: 136 MMOL/L — SIGNIFICANT CHANGE UP (ref 135–145)
SP GR SPEC: 1.01 — SIGNIFICANT CHANGE UP (ref 1–1.03)
SQUAMOUS # UR AUTO: 0 /HPF — SIGNIFICANT CHANGE UP (ref 0–5)
UROBILINOGEN FLD QL: 0.2 MG/DL — SIGNIFICANT CHANGE UP (ref 0.2–1)
WBC # BLD: 11.85 K/UL — HIGH (ref 3.8–10.5)
WBC # FLD AUTO: 11.85 K/UL — HIGH (ref 3.8–10.5)
WBC UR QL: 1 /HPF — SIGNIFICANT CHANGE UP (ref 0–5)

## 2025-08-09 PROCEDURE — 84295 ASSAY OF SERUM SODIUM: CPT

## 2025-08-09 PROCEDURE — 80048 BASIC METABOLIC PNL TOTAL CA: CPT

## 2025-08-09 PROCEDURE — 85018 HEMOGLOBIN: CPT

## 2025-08-09 PROCEDURE — 82435 ASSAY OF BLOOD CHLORIDE: CPT

## 2025-08-09 PROCEDURE — 81001 URINALYSIS AUTO W/SCOPE: CPT

## 2025-08-09 PROCEDURE — 86850 RBC ANTIBODY SCREEN: CPT

## 2025-08-09 PROCEDURE — 80053 COMPREHEN METABOLIC PANEL: CPT

## 2025-08-09 PROCEDURE — 84132 ASSAY OF SERUM POTASSIUM: CPT

## 2025-08-09 PROCEDURE — 85025 COMPLETE CBC W/AUTO DIFF WBC: CPT

## 2025-08-09 PROCEDURE — 82330 ASSAY OF CALCIUM: CPT

## 2025-08-09 PROCEDURE — 85014 HEMATOCRIT: CPT

## 2025-08-09 PROCEDURE — 71045 X-RAY EXAM CHEST 1 VIEW: CPT

## 2025-08-09 PROCEDURE — 83605 ASSAY OF LACTIC ACID: CPT

## 2025-08-09 PROCEDURE — 85730 THROMBOPLASTIN TIME PARTIAL: CPT

## 2025-08-09 PROCEDURE — 82962 GLUCOSE BLOOD TEST: CPT

## 2025-08-09 PROCEDURE — 71045 X-RAY EXAM CHEST 1 VIEW: CPT | Mod: 26

## 2025-08-09 PROCEDURE — 86901 BLOOD TYPING SEROLOGIC RH(D): CPT

## 2025-08-09 PROCEDURE — 85610 PROTHROMBIN TIME: CPT

## 2025-08-09 PROCEDURE — 82947 ASSAY GLUCOSE BLOOD QUANT: CPT

## 2025-08-09 PROCEDURE — 93010 ELECTROCARDIOGRAM REPORT: CPT

## 2025-08-09 PROCEDURE — 99291 CRITICAL CARE FIRST HOUR: CPT | Mod: GC

## 2025-08-09 PROCEDURE — 86900 BLOOD TYPING SEROLOGIC ABO: CPT

## 2025-08-09 PROCEDURE — 82803 BLOOD GASES ANY COMBINATION: CPT

## 2025-08-09 RX ORDER — LIDOCAINE HYDROCHLORIDE 20 MG/ML
1 JELLY TOPICAL ONCE
Refills: 0 | Status: COMPLETED | OUTPATIENT
Start: 2025-08-09 | End: 2025-08-09

## 2025-08-09 RX ORDER — DEXTROSE 50 % IN WATER 50 %
50 SYRINGE (ML) INTRAVENOUS ONCE
Refills: 0 | Status: COMPLETED | OUTPATIENT
Start: 2025-08-09 | End: 2025-08-09

## 2025-08-09 RX ORDER — ACETAMINOPHEN 500 MG/5ML
650 LIQUID (ML) ORAL ONCE
Refills: 0 | Status: COMPLETED | OUTPATIENT
Start: 2025-08-09 | End: 2025-08-10

## 2025-08-09 RX ORDER — BUMETANIDE 1 MG/1
4 TABLET ORAL ONCE
Refills: 0 | Status: COMPLETED | OUTPATIENT
Start: 2025-08-09 | End: 2025-08-09

## 2025-08-09 RX ORDER — SODIUM ZIRCONIUM CYCLOSILICATE 5 G/5G
10 POWDER, FOR SUSPENSION ORAL ONCE
Refills: 0 | Status: COMPLETED | OUTPATIENT
Start: 2025-08-09 | End: 2025-08-09

## 2025-08-09 RX ORDER — CALCIUM GLUCONATE 20 MG/ML
1 INJECTION, SOLUTION INTRAVENOUS ONCE
Refills: 0 | Status: COMPLETED | OUTPATIENT
Start: 2025-08-09 | End: 2025-08-09

## 2025-08-09 RX ADMIN — Medication 50 MILLIGRAM(S): at 22:33

## 2025-08-09 RX ADMIN — Medication 50 MILLILITER(S): at 21:03

## 2025-08-09 RX ADMIN — SODIUM ZIRCONIUM CYCLOSILICATE 10 GRAM(S): 5 POWDER, FOR SUSPENSION ORAL at 21:04

## 2025-08-09 RX ADMIN — CALCIUM GLUCONATE 1 GRAM(S): 20 INJECTION, SOLUTION INTRAVENOUS at 21:00

## 2025-08-09 RX ADMIN — BUMETANIDE 132 MILLIGRAM(S): 1 TABLET ORAL at 21:22

## 2025-08-09 RX ADMIN — CALCIUM GLUCONATE 100 GRAM(S): 20 INJECTION, SOLUTION INTRAVENOUS at 20:25

## 2025-08-09 RX ADMIN — Medication 5 UNIT(S): at 21:03

## 2025-08-09 RX ADMIN — Medication 5 UNIT(S): at 23:08

## 2025-08-09 RX ADMIN — Medication 50 MILLILITER(S): at 23:08

## 2025-08-09 RX ADMIN — BUMETANIDE 4 MILLIGRAM(S): 1 TABLET ORAL at 22:12

## 2025-08-10 DIAGNOSIS — R74.01 ELEVATION OF LEVELS OF LIVER TRANSAMINASE LEVELS: ICD-10-CM

## 2025-08-10 DIAGNOSIS — E11.9 TYPE 2 DIABETES MELLITUS WITHOUT COMPLICATIONS: ICD-10-CM

## 2025-08-10 DIAGNOSIS — Z29.9 ENCOUNTER FOR PROPHYLACTIC MEASURES, UNSPECIFIED: ICD-10-CM

## 2025-08-10 DIAGNOSIS — N18.9 CHRONIC KIDNEY DISEASE, UNSPECIFIED: ICD-10-CM

## 2025-08-10 DIAGNOSIS — I50.32 CHRONIC DIASTOLIC (CONGESTIVE) HEART FAILURE: ICD-10-CM

## 2025-08-10 DIAGNOSIS — D72.829 ELEVATED WHITE BLOOD CELL COUNT, UNSPECIFIED: ICD-10-CM

## 2025-08-10 DIAGNOSIS — I16.0 HYPERTENSIVE URGENCY: ICD-10-CM

## 2025-08-10 DIAGNOSIS — I77.0 ARTERIOVENOUS FISTULA, ACQUIRED: Chronic | ICD-10-CM

## 2025-08-10 LAB
ALBUMIN SERPL ELPH-MCNC: 3.4 G/DL — SIGNIFICANT CHANGE UP (ref 3.3–5)
ALP SERPL-CCNC: 90 U/L — SIGNIFICANT CHANGE UP (ref 40–120)
ALT FLD-CCNC: 44 U/L — SIGNIFICANT CHANGE UP (ref 10–45)
ANION GAP SERPL CALC-SCNC: 16 MMOL/L — SIGNIFICANT CHANGE UP (ref 5–17)
AST SERPL-CCNC: 27 U/L — SIGNIFICANT CHANGE UP (ref 10–40)
BASOPHILS # BLD AUTO: 0.11 K/UL — SIGNIFICANT CHANGE UP (ref 0–0.2)
BASOPHILS NFR BLD AUTO: 1.1 % — SIGNIFICANT CHANGE UP (ref 0–2)
BILIRUB SERPL-MCNC: 0.4 MG/DL — SIGNIFICANT CHANGE UP (ref 0.2–1.2)
BUN SERPL-MCNC: 86 MG/DL — HIGH (ref 7–23)
CALCIUM SERPL-MCNC: 8.7 MG/DL — SIGNIFICANT CHANGE UP (ref 8.4–10.5)
CHLORIDE SERPL-SCNC: 99 MMOL/L — SIGNIFICANT CHANGE UP (ref 96–108)
CK SERPL-CCNC: 96 U/L — SIGNIFICANT CHANGE UP (ref 30–200)
CO2 SERPL-SCNC: 23 MMOL/L — SIGNIFICANT CHANGE UP (ref 22–31)
CREAT SERPL-MCNC: 6.28 MG/DL — HIGH (ref 0.5–1.3)
EGFR: 9 ML/MIN/1.73M2 — LOW
EGFR: 9 ML/MIN/1.73M2 — LOW
EOSINOPHIL # BLD AUTO: 0.66 K/UL — HIGH (ref 0–0.5)
EOSINOPHIL NFR BLD AUTO: 6.7 % — HIGH (ref 0–6)
GLUCOSE BLDC GLUCOMTR-MCNC: 134 MG/DL — HIGH (ref 70–99)
GLUCOSE BLDC GLUCOMTR-MCNC: 158 MG/DL — HIGH (ref 70–99)
GLUCOSE BLDC GLUCOMTR-MCNC: 189 MG/DL — HIGH (ref 70–99)
GLUCOSE BLDC GLUCOMTR-MCNC: 97 MG/DL — SIGNIFICANT CHANGE UP (ref 70–99)
GLUCOSE SERPL-MCNC: 126 MG/DL — HIGH (ref 70–99)
HBV SURFACE AG SER-ACNC: SIGNIFICANT CHANGE UP
HCT VFR BLD CALC: 26.8 % — LOW (ref 39–50)
HGB BLD-MCNC: 8.2 G/DL — LOW (ref 13–17)
IMM GRANULOCYTES # BLD AUTO: 0.05 K/UL — SIGNIFICANT CHANGE UP (ref 0–0.07)
IMM GRANULOCYTES NFR BLD AUTO: 0.5 % — SIGNIFICANT CHANGE UP (ref 0–0.9)
LYMPHOCYTES # BLD AUTO: 1.3 K/UL — SIGNIFICANT CHANGE UP (ref 1–3.3)
LYMPHOCYTES NFR BLD AUTO: 13.2 % — SIGNIFICANT CHANGE UP (ref 13–44)
MAGNESIUM SERPL-MCNC: 2.2 MG/DL — SIGNIFICANT CHANGE UP (ref 1.6–2.6)
MCHC RBC-ENTMCNC: 28.4 PG — SIGNIFICANT CHANGE UP (ref 27–34)
MCHC RBC-ENTMCNC: 30.6 G/DL — LOW (ref 32–36)
MCV RBC AUTO: 92.7 FL — SIGNIFICANT CHANGE UP (ref 80–100)
MONOCYTES # BLD AUTO: 1.13 K/UL — HIGH (ref 0–0.9)
MONOCYTES NFR BLD AUTO: 11.5 % — SIGNIFICANT CHANGE UP (ref 2–14)
NEUTROPHILS # BLD AUTO: 6.6 K/UL — SIGNIFICANT CHANGE UP (ref 1.8–7.4)
NEUTROPHILS NFR BLD AUTO: 67 % — SIGNIFICANT CHANGE UP (ref 43–77)
NRBC # BLD AUTO: 0 K/UL — SIGNIFICANT CHANGE UP (ref 0–0)
NRBC # FLD: 0 K/UL — SIGNIFICANT CHANGE UP (ref 0–0)
NRBC BLD AUTO-RTO: 0 /100 WBCS — SIGNIFICANT CHANGE UP (ref 0–0)
PHOSPHATE SERPL-MCNC: 5.1 MG/DL — HIGH (ref 2.5–4.5)
PLATELET # BLD AUTO: 215 K/UL — SIGNIFICANT CHANGE UP (ref 150–400)
PMV BLD: 11 FL — SIGNIFICANT CHANGE UP (ref 7–13)
POTASSIUM SERPL-MCNC: 4.4 MMOL/L — SIGNIFICANT CHANGE UP (ref 3.5–5.3)
POTASSIUM SERPL-SCNC: 4.4 MMOL/L — SIGNIFICANT CHANGE UP (ref 3.5–5.3)
PROT SERPL-MCNC: 7.3 G/DL — SIGNIFICANT CHANGE UP (ref 6–8.3)
RBC # BLD: 2.89 M/UL — LOW (ref 4.2–5.8)
RBC # FLD: 14.9 % — HIGH (ref 10.3–14.5)
SODIUM SERPL-SCNC: 138 MMOL/L — SIGNIFICANT CHANGE UP (ref 135–145)
WBC # BLD: 9.85 K/UL — SIGNIFICANT CHANGE UP (ref 3.8–10.5)
WBC # FLD AUTO: 9.85 K/UL — SIGNIFICANT CHANGE UP (ref 3.8–10.5)

## 2025-08-10 PROCEDURE — 82435 ASSAY OF BLOOD CHLORIDE: CPT

## 2025-08-10 PROCEDURE — 71045 X-RAY EXAM CHEST 1 VIEW: CPT

## 2025-08-10 PROCEDURE — 82947 ASSAY GLUCOSE BLOOD QUANT: CPT

## 2025-08-10 PROCEDURE — 99232 SBSQ HOSP IP/OBS MODERATE 35: CPT

## 2025-08-10 PROCEDURE — 83605 ASSAY OF LACTIC ACID: CPT

## 2025-08-10 PROCEDURE — 82550 ASSAY OF CK (CPK): CPT

## 2025-08-10 PROCEDURE — 87340 HEPATITIS B SURFACE AG IA: CPT

## 2025-08-10 PROCEDURE — 82803 BLOOD GASES ANY COMBINATION: CPT

## 2025-08-10 PROCEDURE — 85018 HEMOGLOBIN: CPT

## 2025-08-10 PROCEDURE — 83516 IMMUNOASSAY NONANTIBODY: CPT

## 2025-08-10 PROCEDURE — 97161 PT EVAL LOW COMPLEX 20 MIN: CPT

## 2025-08-10 PROCEDURE — 93005 ELECTROCARDIOGRAM TRACING: CPT

## 2025-08-10 PROCEDURE — 86901 BLOOD TYPING SEROLOGIC RH(D): CPT

## 2025-08-10 PROCEDURE — 93010 ELECTROCARDIOGRAM REPORT: CPT

## 2025-08-10 PROCEDURE — 86682 HELMINTH ANTIBODY: CPT

## 2025-08-10 PROCEDURE — 85610 PROTHROMBIN TIME: CPT

## 2025-08-10 PROCEDURE — 84132 ASSAY OF SERUM POTASSIUM: CPT

## 2025-08-10 PROCEDURE — 85730 THROMBOPLASTIN TIME PARTIAL: CPT

## 2025-08-10 PROCEDURE — 84295 ASSAY OF SERUM SODIUM: CPT

## 2025-08-10 PROCEDURE — 81001 URINALYSIS AUTO W/SCOPE: CPT

## 2025-08-10 PROCEDURE — 83735 ASSAY OF MAGNESIUM: CPT

## 2025-08-10 PROCEDURE — 86900 BLOOD TYPING SEROLOGIC ABO: CPT

## 2025-08-10 PROCEDURE — 82330 ASSAY OF CALCIUM: CPT

## 2025-08-10 PROCEDURE — 84100 ASSAY OF PHOSPHORUS: CPT

## 2025-08-10 PROCEDURE — 99223 1ST HOSP IP/OBS HIGH 75: CPT

## 2025-08-10 PROCEDURE — 80053 COMPREHEN METABOLIC PANEL: CPT

## 2025-08-10 PROCEDURE — 36415 COLL VENOUS BLD VENIPUNCTURE: CPT

## 2025-08-10 PROCEDURE — 80048 BASIC METABOLIC PNL TOTAL CA: CPT

## 2025-08-10 PROCEDURE — 86850 RBC ANTIBODY SCREEN: CPT

## 2025-08-10 PROCEDURE — 85014 HEMATOCRIT: CPT

## 2025-08-10 PROCEDURE — 85025 COMPLETE CBC W/AUTO DIFF WBC: CPT

## 2025-08-10 PROCEDURE — 82962 GLUCOSE BLOOD TEST: CPT

## 2025-08-10 PROCEDURE — 99222 1ST HOSP IP/OBS MODERATE 55: CPT | Mod: GC

## 2025-08-10 RX ORDER — SODIUM BICARBONATE 1 MEQ/ML
1300 SYRINGE (ML) INTRAVENOUS EVERY 8 HOURS
Refills: 0 | Status: DISCONTINUED | OUTPATIENT
Start: 2025-08-10 | End: 2025-08-11

## 2025-08-10 RX ORDER — DEXTROSE 50 % IN WATER 50 %
25 SYRINGE (ML) INTRAVENOUS ONCE
Refills: 0 | Status: DISCONTINUED | OUTPATIENT
Start: 2025-08-10 | End: 2025-08-22

## 2025-08-10 RX ORDER — INSULIN LISPRO 100 U/ML
INJECTION, SOLUTION INTRAVENOUS; SUBCUTANEOUS
Refills: 0 | Status: DISCONTINUED | OUTPATIENT
Start: 2025-08-10 | End: 2025-08-22

## 2025-08-10 RX ORDER — DEXTROSE 50 % IN WATER 50 %
15 SYRINGE (ML) INTRAVENOUS ONCE
Refills: 0 | Status: DISCONTINUED | OUTPATIENT
Start: 2025-08-10 | End: 2025-08-22

## 2025-08-10 RX ORDER — ATORVASTATIN CALCIUM 80 MG/1
40 TABLET, FILM COATED ORAL AT BEDTIME
Refills: 0 | Status: DISCONTINUED | OUTPATIENT
Start: 2025-08-09 | End: 2025-08-22

## 2025-08-10 RX ORDER — POLYETHYLENE GLYCOL 3350 17 G/17G
17 POWDER, FOR SOLUTION ORAL DAILY
Refills: 0 | Status: DISCONTINUED | OUTPATIENT
Start: 2025-08-10 | End: 2025-08-22

## 2025-08-10 RX ORDER — CARVEDILOL 3.12 MG/1
25 TABLET, FILM COATED ORAL EVERY 12 HOURS
Refills: 0 | Status: DISCONTINUED | OUTPATIENT
Start: 2025-08-10 | End: 2025-08-22

## 2025-08-10 RX ORDER — ACETAMINOPHEN 500 MG/5ML
650 LIQUID (ML) ORAL EVERY 6 HOURS
Refills: 0 | Status: DISCONTINUED | OUTPATIENT
Start: 2025-08-10 | End: 2025-08-22

## 2025-08-10 RX ORDER — SODIUM CHLORIDE 9 G/1000ML
1000 INJECTION, SOLUTION INTRAVENOUS
Refills: 0 | Status: DISCONTINUED | OUTPATIENT
Start: 2025-08-10 | End: 2025-08-22

## 2025-08-10 RX ORDER — INSULIN LISPRO 100 U/ML
INJECTION, SOLUTION INTRAVENOUS; SUBCUTANEOUS AT BEDTIME
Refills: 0 | Status: DISCONTINUED | OUTPATIENT
Start: 2025-08-10 | End: 2025-08-22

## 2025-08-10 RX ORDER — HEPARIN SODIUM 1000 [USP'U]/ML
5000 INJECTION INTRAVENOUS; SUBCUTANEOUS EVERY 8 HOURS
Refills: 0 | Status: DISCONTINUED | OUTPATIENT
Start: 2025-08-10 | End: 2025-08-22

## 2025-08-10 RX ORDER — DEXTROSE 50 % IN WATER 50 %
12.5 SYRINGE (ML) INTRAVENOUS ONCE
Refills: 0 | Status: DISCONTINUED | OUTPATIENT
Start: 2025-08-10 | End: 2025-08-22

## 2025-08-10 RX ORDER — LEVOTHYROXINE SODIUM 300 MCG
50 TABLET ORAL DAILY
Refills: 0 | Status: DISCONTINUED | OUTPATIENT
Start: 2025-08-10 | End: 2025-08-22

## 2025-08-10 RX ORDER — MELATONIN 5 MG
3 TABLET ORAL AT BEDTIME
Refills: 0 | Status: DISCONTINUED | OUTPATIENT
Start: 2025-08-10 | End: 2025-08-22

## 2025-08-10 RX ORDER — ASPIRIN 325 MG
81 TABLET ORAL DAILY
Refills: 0 | Status: DISCONTINUED | OUTPATIENT
Start: 2025-08-10 | End: 2025-08-22

## 2025-08-10 RX ORDER — CARVEDILOL 3.12 MG/1
1 TABLET, FILM COATED ORAL
Refills: 0 | DISCHARGE

## 2025-08-10 RX ORDER — SENNA 187 MG
1 TABLET ORAL AT BEDTIME
Refills: 0 | Status: DISCONTINUED | OUTPATIENT
Start: 2025-08-10 | End: 2025-08-22

## 2025-08-10 RX ORDER — SEVELAMER HYDROCHLORIDE 800 MG/1
800 TABLET ORAL
Refills: 0 | Status: DISCONTINUED | OUTPATIENT
Start: 2025-08-10 | End: 2025-08-22

## 2025-08-10 RX ORDER — GLUCAGON 3 MG/1
1 POWDER NASAL ONCE
Refills: 0 | Status: DISCONTINUED | OUTPATIENT
Start: 2025-08-10 | End: 2025-08-22

## 2025-08-10 RX ADMIN — Medication 81 MILLIGRAM(S): at 12:31

## 2025-08-10 RX ADMIN — HEPARIN SODIUM 5000 UNIT(S): 1000 INJECTION INTRAVENOUS; SUBCUTANEOUS at 21:49

## 2025-08-10 RX ADMIN — CARVEDILOL 25 MILLIGRAM(S): 3.12 TABLET, FILM COATED ORAL at 06:29

## 2025-08-10 RX ADMIN — LIDOCAINE HYDROCHLORIDE 1 APPLICATION(S): 20 JELLY TOPICAL at 01:52

## 2025-08-10 RX ADMIN — Medication 1300 MILLIGRAM(S): at 06:29

## 2025-08-10 RX ADMIN — SEVELAMER HYDROCHLORIDE 800 MILLIGRAM(S): 800 TABLET ORAL at 08:51

## 2025-08-10 RX ADMIN — Medication 1300 MILLIGRAM(S): at 14:45

## 2025-08-10 RX ADMIN — SEVELAMER HYDROCHLORIDE 800 MILLIGRAM(S): 800 TABLET ORAL at 18:16

## 2025-08-10 RX ADMIN — HEPARIN SODIUM 5000 UNIT(S): 1000 INJECTION INTRAVENOUS; SUBCUTANEOUS at 14:45

## 2025-08-10 RX ADMIN — ATORVASTATIN CALCIUM 40 MILLIGRAM(S): 80 TABLET, FILM COATED ORAL at 21:49

## 2025-08-10 RX ADMIN — Medication 650 MILLIGRAM(S): at 02:45

## 2025-08-10 RX ADMIN — SEVELAMER HYDROCHLORIDE 800 MILLIGRAM(S): 800 TABLET ORAL at 12:31

## 2025-08-10 RX ADMIN — Medication 650 MILLIGRAM(S): at 01:46

## 2025-08-10 RX ADMIN — Medication 50 MICROGRAM(S): at 06:29

## 2025-08-10 RX ADMIN — INSULIN LISPRO 1: 100 INJECTION, SOLUTION INTRAVENOUS; SUBCUTANEOUS at 12:27

## 2025-08-10 RX ADMIN — Medication 1000 UNIT(S): at 12:31

## 2025-08-10 RX ADMIN — HEPARIN SODIUM 5000 UNIT(S): 1000 INJECTION INTRAVENOUS; SUBCUTANEOUS at 06:29

## 2025-08-10 RX ADMIN — Medication 1300 MILLIGRAM(S): at 21:49

## 2025-08-10 RX ADMIN — CARVEDILOL 25 MILLIGRAM(S): 3.12 TABLET, FILM COATED ORAL at 18:16

## 2025-08-10 RX ADMIN — Medication 1 APPLICATION(S): at 12:32

## 2025-08-11 LAB
ADD ON TEST-SPECIMEN IN LAB: SIGNIFICANT CHANGE UP
ANION GAP SERPL CALC-SCNC: 18 MMOL/L — HIGH (ref 5–17)
ANISOCYTOSIS BLD QL: SLIGHT — SIGNIFICANT CHANGE UP
BASOPHILS # BLD AUTO: 0.11 K/UL — SIGNIFICANT CHANGE UP (ref 0–0.2)
BASOPHILS # BLD MANUAL: 0.13 K/UL — SIGNIFICANT CHANGE UP (ref 0–0.2)
BASOPHILS NFR BLD AUTO: 1.3 % — SIGNIFICANT CHANGE UP (ref 0–2)
BASOPHILS NFR BLD MANUAL: 1.6 % — SIGNIFICANT CHANGE UP (ref 0–2)
BUN SERPL-MCNC: 99 MG/DL — HIGH (ref 7–23)
CALCIUM SERPL-MCNC: 8.6 MG/DL — SIGNIFICANT CHANGE UP (ref 8.4–10.5)
CHLORIDE SERPL-SCNC: 101 MMOL/L — SIGNIFICANT CHANGE UP (ref 96–108)
CO2 SERPL-SCNC: 20 MMOL/L — LOW (ref 22–31)
CREAT SERPL-MCNC: 7.62 MG/DL — HIGH (ref 0.5–1.3)
EGFR: 7 ML/MIN/1.73M2 — LOW
EGFR: 7 ML/MIN/1.73M2 — LOW
ELLIPTOCYTES BLD QL SMEAR: SLIGHT — SIGNIFICANT CHANGE UP
EOSINOPHIL # BLD AUTO: 0.61 K/UL — HIGH (ref 0–0.5)
EOSINOPHIL # BLD MANUAL: 0.6 K/UL — HIGH (ref 0–0.5)
EOSINOPHIL NFR BLD AUTO: 7.2 % — HIGH (ref 0–6)
EOSINOPHIL NFR BLD MANUAL: 7.3 % — HIGH (ref 0–6)
GBM IGG SER-ACNC: <0.2 AI — SIGNIFICANT CHANGE UP
GIANT PLATELETS BLD QL SMEAR: PRESENT
GLOMERULAR BASEMENT MEMBRANE A INTERPRETATION: NEGATIVE — SIGNIFICANT CHANGE UP
GLUCOSE BLDC GLUCOMTR-MCNC: 101 MG/DL — HIGH (ref 70–99)
GLUCOSE BLDC GLUCOMTR-MCNC: 118 MG/DL — HIGH (ref 70–99)
GLUCOSE BLDC GLUCOMTR-MCNC: 125 MG/DL — HIGH (ref 70–99)
GLUCOSE BLDC GLUCOMTR-MCNC: 170 MG/DL — HIGH (ref 70–99)
GLUCOSE SERPL-MCNC: 90 MG/DL — SIGNIFICANT CHANGE UP (ref 70–99)
HAV IGM SER-ACNC: SIGNIFICANT CHANGE UP
HBV CORE IGM SER-ACNC: SIGNIFICANT CHANGE UP
HBV SURFACE AB SER-ACNC: ABNORMAL
HBV SURFACE AG SER-ACNC: SIGNIFICANT CHANGE UP
HCT VFR BLD CALC: 26.3 % — LOW (ref 39–50)
HCT VFR BLD CALC: 26.3 % — LOW (ref 39–50)
HCV AB S/CO SERPL IA: 0.22 S/CO — SIGNIFICANT CHANGE UP (ref 0–0.79)
HCV AB SERPL-IMP: SIGNIFICANT CHANGE UP
HGB BLD-MCNC: 8 G/DL — LOW (ref 13–17)
HGB BLD-MCNC: 8 G/DL — LOW (ref 13–17)
IMM GRANULOCYTES # BLD AUTO: 0.06 K/UL — SIGNIFICANT CHANGE UP (ref 0–0.07)
IMM GRANULOCYTES NFR BLD AUTO: 0.7 % — SIGNIFICANT CHANGE UP (ref 0–0.9)
LYMPHOCYTES # BLD AUTO: 1.66 K/UL — SIGNIFICANT CHANGE UP (ref 1–3.3)
LYMPHOCYTES # BLD MANUAL: 2.35 K/UL — SIGNIFICANT CHANGE UP (ref 1–3.3)
LYMPHOCYTES NFR BLD AUTO: 19.5 % — SIGNIFICANT CHANGE UP (ref 13–44)
LYMPHOCYTES NFR BLD MANUAL: 28.5 % — SIGNIFICANT CHANGE UP (ref 13–44)
MACROCYTES BLD QL: SLIGHT — SIGNIFICANT CHANGE UP
MAGNESIUM SERPL-MCNC: 2.6 MG/DL — SIGNIFICANT CHANGE UP (ref 1.6–2.6)
MCHC RBC-ENTMCNC: 28.7 PG — SIGNIFICANT CHANGE UP (ref 27–34)
MCHC RBC-ENTMCNC: 28.7 PG — SIGNIFICANT CHANGE UP (ref 27–34)
MCHC RBC-ENTMCNC: 30.4 G/DL — LOW (ref 32–36)
MCHC RBC-ENTMCNC: 30.4 G/DL — LOW (ref 32–36)
MCV RBC AUTO: 94.3 FL — SIGNIFICANT CHANGE UP (ref 80–100)
MCV RBC AUTO: 94.3 FL — SIGNIFICANT CHANGE UP (ref 80–100)
MICROCYTES BLD QL: SLIGHT — SIGNIFICANT CHANGE UP
MONOCYTES # BLD AUTO: 1.46 K/UL — HIGH (ref 0–0.9)
MONOCYTES # BLD MANUAL: 0.74 K/UL — SIGNIFICANT CHANGE UP (ref 0–0.9)
MONOCYTES NFR BLD AUTO: 17.2 % — HIGH (ref 2–14)
MONOCYTES NFR BLD MANUAL: 8.9 % — SIGNIFICANT CHANGE UP (ref 2–14)
MYELOPEROXIDASE AB SER-ACNC: <0.2 AI — SIGNIFICANT CHANGE UP
MYELOPEROXIDASE CELLS FLD QL: NEGATIVE — SIGNIFICANT CHANGE UP
NEUTROPHILS # BLD AUTO: 4.61 K/UL — SIGNIFICANT CHANGE UP (ref 1.8–7.4)
NEUTROPHILS # BLD MANUAL: 4.44 K/UL — SIGNIFICANT CHANGE UP (ref 1.8–7.4)
NEUTROPHILS NFR BLD AUTO: 54.1 % — SIGNIFICANT CHANGE UP (ref 43–77)
NEUTROPHILS NFR BLD MANUAL: 53.7 % — SIGNIFICANT CHANGE UP (ref 43–77)
NRBC # BLD AUTO: 0 K/UL — SIGNIFICANT CHANGE UP (ref 0–0)
NRBC # FLD: 0 K/UL — SIGNIFICANT CHANGE UP (ref 0–0)
NRBC BLD AUTO-RTO: 0 /100 WBCS — SIGNIFICANT CHANGE UP (ref 0–0)
NRBC BLD AUTO-RTO: 0 /100 WBCS — SIGNIFICANT CHANGE UP (ref 0–0)
PHOSPHATE SERPL-MCNC: 6.8 MG/DL — HIGH (ref 2.5–4.5)
PLAT MORPH BLD: ABNORMAL
PLATELET # BLD AUTO: 204 K/UL — SIGNIFICANT CHANGE UP (ref 150–400)
PLATELET # BLD AUTO: 204 K/UL — SIGNIFICANT CHANGE UP (ref 150–400)
PMV BLD: 11.3 FL — SIGNIFICANT CHANGE UP (ref 7–13)
POIKILOCYTOSIS BLD QL AUTO: SLIGHT — SIGNIFICANT CHANGE UP
POTASSIUM SERPL-MCNC: 4.9 MMOL/L — SIGNIFICANT CHANGE UP (ref 3.5–5.3)
POTASSIUM SERPL-SCNC: 4.9 MMOL/L — SIGNIFICANT CHANGE UP (ref 3.5–5.3)
PROTEINASE3 AB FLD-ACNC: <0.2 AI — SIGNIFICANT CHANGE UP
PROTEINASE3 AB SER-ACNC: NEGATIVE — SIGNIFICANT CHANGE UP
RBC # BLD: 2.79 M/UL — LOW (ref 4.2–5.8)
RBC # BLD: 2.79 M/UL — LOW (ref 4.2–5.8)
RBC # FLD: 15 % — HIGH (ref 10.3–14.5)
RBC # FLD: 15 % — HIGH (ref 10.3–14.5)
RBC BLD AUTO: ABNORMAL
SCHISTOCYTES BLD QL AUTO: SLIGHT — SIGNIFICANT CHANGE UP
SODIUM SERPL-SCNC: 139 MMOL/L — SIGNIFICANT CHANGE UP (ref 135–145)
WBC # BLD: 8.26 K/UL — SIGNIFICANT CHANGE UP (ref 3.8–10.5)
WBC # BLD: 8.26 K/UL — SIGNIFICANT CHANGE UP (ref 3.8–10.5)
WBC # FLD AUTO: 8.26 K/UL — SIGNIFICANT CHANGE UP (ref 3.8–10.5)
WBC # FLD AUTO: 8.26 K/UL — SIGNIFICANT CHANGE UP (ref 3.8–10.5)

## 2025-08-11 PROCEDURE — 82330 ASSAY OF CALCIUM: CPT

## 2025-08-11 PROCEDURE — 85018 HEMOGLOBIN: CPT

## 2025-08-11 PROCEDURE — 84295 ASSAY OF SERUM SODIUM: CPT

## 2025-08-11 PROCEDURE — 85025 COMPLETE CBC W/AUTO DIFF WBC: CPT

## 2025-08-11 PROCEDURE — 83516 IMMUNOASSAY NONANTIBODY: CPT

## 2025-08-11 PROCEDURE — 99232 SBSQ HOSP IP/OBS MODERATE 35: CPT

## 2025-08-11 PROCEDURE — 97161 PT EVAL LOW COMPLEX 20 MIN: CPT

## 2025-08-11 PROCEDURE — 84100 ASSAY OF PHOSPHORUS: CPT

## 2025-08-11 PROCEDURE — 86682 HELMINTH ANTIBODY: CPT

## 2025-08-11 PROCEDURE — 86850 RBC ANTIBODY SCREEN: CPT

## 2025-08-11 PROCEDURE — 86706 HEP B SURFACE ANTIBODY: CPT

## 2025-08-11 PROCEDURE — 85610 PROTHROMBIN TIME: CPT

## 2025-08-11 PROCEDURE — 84132 ASSAY OF SERUM POTASSIUM: CPT

## 2025-08-11 PROCEDURE — 86901 BLOOD TYPING SEROLOGIC RH(D): CPT

## 2025-08-11 PROCEDURE — 83735 ASSAY OF MAGNESIUM: CPT

## 2025-08-11 PROCEDURE — 83605 ASSAY OF LACTIC ACID: CPT

## 2025-08-11 PROCEDURE — 87340 HEPATITIS B SURFACE AG IA: CPT

## 2025-08-11 PROCEDURE — 93005 ELECTROCARDIOGRAM TRACING: CPT

## 2025-08-11 PROCEDURE — 85027 COMPLETE CBC AUTOMATED: CPT

## 2025-08-11 PROCEDURE — 36415 COLL VENOUS BLD VENIPUNCTURE: CPT

## 2025-08-11 PROCEDURE — 80074 ACUTE HEPATITIS PANEL: CPT

## 2025-08-11 PROCEDURE — 81001 URINALYSIS AUTO W/SCOPE: CPT

## 2025-08-11 PROCEDURE — 85014 HEMATOCRIT: CPT

## 2025-08-11 PROCEDURE — 71045 X-RAY EXAM CHEST 1 VIEW: CPT

## 2025-08-11 PROCEDURE — 80053 COMPREHEN METABOLIC PANEL: CPT

## 2025-08-11 PROCEDURE — 86900 BLOOD TYPING SEROLOGIC ABO: CPT

## 2025-08-11 PROCEDURE — 82550 ASSAY OF CK (CPK): CPT

## 2025-08-11 PROCEDURE — 82947 ASSAY GLUCOSE BLOOD QUANT: CPT

## 2025-08-11 PROCEDURE — 82803 BLOOD GASES ANY COMBINATION: CPT

## 2025-08-11 PROCEDURE — 82962 GLUCOSE BLOOD TEST: CPT

## 2025-08-11 PROCEDURE — 82435 ASSAY OF BLOOD CHLORIDE: CPT

## 2025-08-11 PROCEDURE — 80048 BASIC METABOLIC PNL TOTAL CA: CPT

## 2025-08-11 PROCEDURE — 85730 THROMBOPLASTIN TIME PARTIAL: CPT

## 2025-08-11 RX ORDER — EPOETIN ALFA 10000 [IU]/ML
4000 SOLUTION INTRAVENOUS; SUBCUTANEOUS
Refills: 0 | Status: DISCONTINUED | OUTPATIENT
Start: 2025-08-11 | End: 2025-08-12

## 2025-08-11 RX ORDER — NIFEDIPINE 30 MG
30 TABLET, EXTENDED RELEASE 24 HR ORAL DAILY
Refills: 0 | Status: DISCONTINUED | OUTPATIENT
Start: 2025-08-11 | End: 2025-08-12

## 2025-08-11 RX ADMIN — ATORVASTATIN CALCIUM 40 MILLIGRAM(S): 80 TABLET, FILM COATED ORAL at 21:28

## 2025-08-11 RX ADMIN — Medication 1000 UNIT(S): at 12:03

## 2025-08-11 RX ADMIN — HEPARIN SODIUM 5000 UNIT(S): 1000 INJECTION INTRAVENOUS; SUBCUTANEOUS at 05:19

## 2025-08-11 RX ADMIN — Medication 30 MILLIGRAM(S): at 16:09

## 2025-08-11 RX ADMIN — SEVELAMER HYDROCHLORIDE 800 MILLIGRAM(S): 800 TABLET ORAL at 08:44

## 2025-08-11 RX ADMIN — HEPARIN SODIUM 5000 UNIT(S): 1000 INJECTION INTRAVENOUS; SUBCUTANEOUS at 21:31

## 2025-08-11 RX ADMIN — EPOETIN ALFA 4000 UNIT(S): 10000 SOLUTION INTRAVENOUS; SUBCUTANEOUS at 13:35

## 2025-08-11 RX ADMIN — Medication 50 MICROGRAM(S): at 05:19

## 2025-08-11 RX ADMIN — SEVELAMER HYDROCHLORIDE 800 MILLIGRAM(S): 800 TABLET ORAL at 12:03

## 2025-08-11 RX ADMIN — SEVELAMER HYDROCHLORIDE 800 MILLIGRAM(S): 800 TABLET ORAL at 17:20

## 2025-08-11 RX ADMIN — Medication 81 MILLIGRAM(S): at 12:03

## 2025-08-11 RX ADMIN — Medication 1300 MILLIGRAM(S): at 05:19

## 2025-08-11 RX ADMIN — Medication 1 APPLICATION(S): at 12:04

## 2025-08-11 RX ADMIN — CARVEDILOL 25 MILLIGRAM(S): 3.12 TABLET, FILM COATED ORAL at 05:19

## 2025-08-11 RX ADMIN — INSULIN LISPRO 1: 100 INJECTION, SOLUTION INTRAVENOUS; SUBCUTANEOUS at 16:22

## 2025-08-12 LAB
ANION GAP SERPL CALC-SCNC: 14 MMOL/L — SIGNIFICANT CHANGE UP (ref 5–17)
BUN SERPL-MCNC: 64 MG/DL — HIGH (ref 7–23)
CALCIUM SERPL-MCNC: 8.4 MG/DL — SIGNIFICANT CHANGE UP (ref 8.4–10.5)
CHLORIDE SERPL-SCNC: 101 MMOL/L — SIGNIFICANT CHANGE UP (ref 96–108)
CO2 SERPL-SCNC: 23 MMOL/L — SIGNIFICANT CHANGE UP (ref 22–31)
CREAT SERPL-MCNC: 5.65 MG/DL — HIGH (ref 0.5–1.3)
EGFR: 10 ML/MIN/1.73M2 — LOW
EGFR: 10 ML/MIN/1.73M2 — LOW
GLUCOSE BLDC GLUCOMTR-MCNC: 108 MG/DL — HIGH (ref 70–99)
GLUCOSE BLDC GLUCOMTR-MCNC: 138 MG/DL — HIGH (ref 70–99)
GLUCOSE BLDC GLUCOMTR-MCNC: 179 MG/DL — HIGH (ref 70–99)
GLUCOSE BLDC GLUCOMTR-MCNC: 183 MG/DL — HIGH (ref 70–99)
GLUCOSE SERPL-MCNC: 96 MG/DL — SIGNIFICANT CHANGE UP (ref 70–99)
HCT VFR BLD CALC: 28 % — LOW (ref 39–50)
HGB BLD-MCNC: 8.7 G/DL — LOW (ref 13–17)
MAGNESIUM SERPL-MCNC: 2.2 MG/DL — SIGNIFICANT CHANGE UP (ref 1.6–2.6)
MCHC RBC-ENTMCNC: 29.5 PG — SIGNIFICANT CHANGE UP (ref 27–34)
MCHC RBC-ENTMCNC: 31.1 G/DL — LOW (ref 32–36)
MCV RBC AUTO: 94.9 FL — SIGNIFICANT CHANGE UP (ref 80–100)
NRBC # BLD AUTO: 0 K/UL — SIGNIFICANT CHANGE UP (ref 0–0)
NRBC # FLD: 0 K/UL — SIGNIFICANT CHANGE UP (ref 0–0)
NRBC BLD AUTO-RTO: 0 /100 WBCS — SIGNIFICANT CHANGE UP (ref 0–0)
PHOSPHATE SERPL-MCNC: 4.8 MG/DL — HIGH (ref 2.5–4.5)
PLATELET # BLD AUTO: 187 K/UL — SIGNIFICANT CHANGE UP (ref 150–400)
PMV BLD: 11.3 FL — SIGNIFICANT CHANGE UP (ref 7–13)
POTASSIUM SERPL-MCNC: 4.2 MMOL/L — SIGNIFICANT CHANGE UP (ref 3.5–5.3)
POTASSIUM SERPL-SCNC: 4.2 MMOL/L — SIGNIFICANT CHANGE UP (ref 3.5–5.3)
RBC # BLD: 2.95 M/UL — LOW (ref 4.2–5.8)
RBC # FLD: 14.8 % — HIGH (ref 10.3–14.5)
SODIUM SERPL-SCNC: 138 MMOL/L — SIGNIFICANT CHANGE UP (ref 135–145)
STRONGYLOIDES AB SER-ACNC: NEGATIVE — SIGNIFICANT CHANGE UP
WBC # BLD: 9.05 K/UL — SIGNIFICANT CHANGE UP (ref 3.8–10.5)
WBC # FLD AUTO: 9.05 K/UL — SIGNIFICANT CHANGE UP (ref 3.8–10.5)

## 2025-08-12 PROCEDURE — 36415 COLL VENOUS BLD VENIPUNCTURE: CPT

## 2025-08-12 PROCEDURE — 80074 ACUTE HEPATITIS PANEL: CPT

## 2025-08-12 PROCEDURE — 99232 SBSQ HOSP IP/OBS MODERATE 35: CPT

## 2025-08-12 PROCEDURE — 85027 COMPLETE CBC AUTOMATED: CPT

## 2025-08-12 PROCEDURE — 82803 BLOOD GASES ANY COMBINATION: CPT

## 2025-08-12 PROCEDURE — 86901 BLOOD TYPING SEROLOGIC RH(D): CPT

## 2025-08-12 PROCEDURE — 85018 HEMOGLOBIN: CPT

## 2025-08-12 PROCEDURE — 84100 ASSAY OF PHOSPHORUS: CPT

## 2025-08-12 PROCEDURE — 81001 URINALYSIS AUTO W/SCOPE: CPT

## 2025-08-12 PROCEDURE — 97161 PT EVAL LOW COMPLEX 20 MIN: CPT

## 2025-08-12 PROCEDURE — 86682 HELMINTH ANTIBODY: CPT

## 2025-08-12 PROCEDURE — 83735 ASSAY OF MAGNESIUM: CPT

## 2025-08-12 PROCEDURE — 83605 ASSAY OF LACTIC ACID: CPT

## 2025-08-12 PROCEDURE — 86900 BLOOD TYPING SEROLOGIC ABO: CPT

## 2025-08-12 PROCEDURE — 84295 ASSAY OF SERUM SODIUM: CPT

## 2025-08-12 PROCEDURE — 80053 COMPREHEN METABOLIC PANEL: CPT

## 2025-08-12 PROCEDURE — 85014 HEMATOCRIT: CPT

## 2025-08-12 PROCEDURE — 83516 IMMUNOASSAY NONANTIBODY: CPT

## 2025-08-12 PROCEDURE — 82435 ASSAY OF BLOOD CHLORIDE: CPT

## 2025-08-12 PROCEDURE — 82330 ASSAY OF CALCIUM: CPT

## 2025-08-12 PROCEDURE — 84132 ASSAY OF SERUM POTASSIUM: CPT

## 2025-08-12 PROCEDURE — 87340 HEPATITIS B SURFACE AG IA: CPT

## 2025-08-12 PROCEDURE — 97116 GAIT TRAINING THERAPY: CPT

## 2025-08-12 PROCEDURE — 93005 ELECTROCARDIOGRAM TRACING: CPT

## 2025-08-12 PROCEDURE — 71045 X-RAY EXAM CHEST 1 VIEW: CPT

## 2025-08-12 PROCEDURE — 82550 ASSAY OF CK (CPK): CPT

## 2025-08-12 PROCEDURE — 86850 RBC ANTIBODY SCREEN: CPT

## 2025-08-12 PROCEDURE — 82947 ASSAY GLUCOSE BLOOD QUANT: CPT

## 2025-08-12 PROCEDURE — 85730 THROMBOPLASTIN TIME PARTIAL: CPT

## 2025-08-12 PROCEDURE — 82962 GLUCOSE BLOOD TEST: CPT

## 2025-08-12 PROCEDURE — 80048 BASIC METABOLIC PNL TOTAL CA: CPT

## 2025-08-12 PROCEDURE — 97530 THERAPEUTIC ACTIVITIES: CPT

## 2025-08-12 PROCEDURE — 85610 PROTHROMBIN TIME: CPT

## 2025-08-12 PROCEDURE — 85025 COMPLETE CBC W/AUTO DIFF WBC: CPT

## 2025-08-12 PROCEDURE — 86706 HEP B SURFACE ANTIBODY: CPT

## 2025-08-12 RX ORDER — EPOETIN ALFA 10000 [IU]/ML
4000 SOLUTION INTRAVENOUS; SUBCUTANEOUS
Refills: 0 | Status: DISCONTINUED | OUTPATIENT
Start: 2025-08-12 | End: 2025-08-18

## 2025-08-12 RX ADMIN — CARVEDILOL 25 MILLIGRAM(S): 3.12 TABLET, FILM COATED ORAL at 06:28

## 2025-08-12 RX ADMIN — HEPARIN SODIUM 5000 UNIT(S): 1000 INJECTION INTRAVENOUS; SUBCUTANEOUS at 15:00

## 2025-08-12 RX ADMIN — CARVEDILOL 25 MILLIGRAM(S): 3.12 TABLET, FILM COATED ORAL at 17:25

## 2025-08-12 RX ADMIN — SEVELAMER HYDROCHLORIDE 800 MILLIGRAM(S): 800 TABLET ORAL at 17:25

## 2025-08-12 RX ADMIN — EPOETIN ALFA 4000 UNIT(S): 10000 SOLUTION INTRAVENOUS; SUBCUTANEOUS at 14:35

## 2025-08-12 RX ADMIN — Medication 1000 UNIT(S): at 08:01

## 2025-08-12 RX ADMIN — Medication 1 APPLICATION(S): at 08:06

## 2025-08-12 RX ADMIN — HEPARIN SODIUM 5000 UNIT(S): 1000 INJECTION INTRAVENOUS; SUBCUTANEOUS at 06:29

## 2025-08-12 RX ADMIN — SEVELAMER HYDROCHLORIDE 800 MILLIGRAM(S): 800 TABLET ORAL at 08:01

## 2025-08-12 RX ADMIN — Medication 50 MICROGRAM(S): at 06:28

## 2025-08-12 RX ADMIN — HEPARIN SODIUM 5000 UNIT(S): 1000 INJECTION INTRAVENOUS; SUBCUTANEOUS at 21:41

## 2025-08-12 RX ADMIN — ATORVASTATIN CALCIUM 40 MILLIGRAM(S): 80 TABLET, FILM COATED ORAL at 21:41

## 2025-08-12 RX ADMIN — Medication 30 MILLIGRAM(S): at 06:28

## 2025-08-12 RX ADMIN — Medication 81 MILLIGRAM(S): at 08:01

## 2025-08-12 RX ADMIN — INSULIN LISPRO 1: 100 INJECTION, SOLUTION INTRAVENOUS; SUBCUTANEOUS at 16:28

## 2025-08-13 LAB
ANION GAP SERPL CALC-SCNC: 15 MMOL/L — SIGNIFICANT CHANGE UP (ref 5–17)
BUN SERPL-MCNC: 47 MG/DL — HIGH (ref 7–23)
CALCIUM SERPL-MCNC: 8.9 MG/DL — SIGNIFICANT CHANGE UP (ref 8.4–10.5)
CHLORIDE SERPL-SCNC: 98 MMOL/L — SIGNIFICANT CHANGE UP (ref 96–108)
CO2 SERPL-SCNC: 24 MMOL/L — SIGNIFICANT CHANGE UP (ref 22–31)
CREAT SERPL-MCNC: 4.93 MG/DL — HIGH (ref 0.5–1.3)
EGFR: 12 ML/MIN/1.73M2 — LOW
EGFR: 12 ML/MIN/1.73M2 — LOW
GLUCOSE BLDC GLUCOMTR-MCNC: 110 MG/DL — HIGH (ref 70–99)
GLUCOSE BLDC GLUCOMTR-MCNC: 143 MG/DL — HIGH (ref 70–99)
GLUCOSE BLDC GLUCOMTR-MCNC: 144 MG/DL — HIGH (ref 70–99)
GLUCOSE BLDC GLUCOMTR-MCNC: 173 MG/DL — HIGH (ref 70–99)
GLUCOSE SERPL-MCNC: 98 MG/DL — SIGNIFICANT CHANGE UP (ref 70–99)
MAGNESIUM SERPL-MCNC: 2.2 MG/DL — SIGNIFICANT CHANGE UP (ref 1.6–2.6)
PHOSPHATE SERPL-MCNC: 4.9 MG/DL — HIGH (ref 2.5–4.5)
POTASSIUM SERPL-MCNC: 4.1 MMOL/L — SIGNIFICANT CHANGE UP (ref 3.5–5.3)
POTASSIUM SERPL-SCNC: 4.1 MMOL/L — SIGNIFICANT CHANGE UP (ref 3.5–5.3)
SODIUM SERPL-SCNC: 137 MMOL/L — SIGNIFICANT CHANGE UP (ref 135–145)

## 2025-08-13 PROCEDURE — 99232 SBSQ HOSP IP/OBS MODERATE 35: CPT

## 2025-08-13 RX ADMIN — HEPARIN SODIUM 5000 UNIT(S): 1000 INJECTION INTRAVENOUS; SUBCUTANEOUS at 21:24

## 2025-08-13 RX ADMIN — Medication 50 MICROGRAM(S): at 05:57

## 2025-08-13 RX ADMIN — SEVELAMER HYDROCHLORIDE 800 MILLIGRAM(S): 800 TABLET ORAL at 13:12

## 2025-08-13 RX ADMIN — SEVELAMER HYDROCHLORIDE 800 MILLIGRAM(S): 800 TABLET ORAL at 17:23

## 2025-08-13 RX ADMIN — Medication 1 APPLICATION(S): at 12:47

## 2025-08-13 RX ADMIN — HEPARIN SODIUM 5000 UNIT(S): 1000 INJECTION INTRAVENOUS; SUBCUTANEOUS at 05:58

## 2025-08-13 RX ADMIN — Medication 1000 UNIT(S): at 08:05

## 2025-08-13 RX ADMIN — SEVELAMER HYDROCHLORIDE 800 MILLIGRAM(S): 800 TABLET ORAL at 08:05

## 2025-08-13 RX ADMIN — Medication 81 MILLIGRAM(S): at 08:05

## 2025-08-13 RX ADMIN — ATORVASTATIN CALCIUM 40 MILLIGRAM(S): 80 TABLET, FILM COATED ORAL at 21:24

## 2025-08-13 RX ADMIN — CARVEDILOL 25 MILLIGRAM(S): 3.12 TABLET, FILM COATED ORAL at 17:23

## 2025-08-13 RX ADMIN — HEPARIN SODIUM 5000 UNIT(S): 1000 INJECTION INTRAVENOUS; SUBCUTANEOUS at 13:08

## 2025-08-13 RX ADMIN — CARVEDILOL 25 MILLIGRAM(S): 3.12 TABLET, FILM COATED ORAL at 05:57

## 2025-08-14 ENCOUNTER — APPOINTMENT (OUTPATIENT)
Dept: NEPHROLOGY | Facility: CLINIC | Age: 75
End: 2025-08-14

## 2025-08-14 LAB
ANION GAP SERPL CALC-SCNC: 16 MMOL/L — SIGNIFICANT CHANGE UP (ref 5–17)
BUN SERPL-MCNC: 68 MG/DL — HIGH (ref 7–23)
CALCIUM SERPL-MCNC: 8.7 MG/DL — SIGNIFICANT CHANGE UP (ref 8.4–10.5)
CHLORIDE SERPL-SCNC: 98 MMOL/L — SIGNIFICANT CHANGE UP (ref 96–108)
CO2 SERPL-SCNC: 22 MMOL/L — SIGNIFICANT CHANGE UP (ref 22–31)
CREAT SERPL-MCNC: 6.23 MG/DL — HIGH (ref 0.5–1.3)
EGFR: 9 ML/MIN/1.73M2 — LOW
EGFR: 9 ML/MIN/1.73M2 — LOW
GLUCOSE BLDC GLUCOMTR-MCNC: 100 MG/DL — HIGH (ref 70–99)
GLUCOSE BLDC GLUCOMTR-MCNC: 119 MG/DL — HIGH (ref 70–99)
GLUCOSE BLDC GLUCOMTR-MCNC: 165 MG/DL — HIGH (ref 70–99)
GLUCOSE BLDC GLUCOMTR-MCNC: 207 MG/DL — HIGH (ref 70–99)
GLUCOSE SERPL-MCNC: 90 MG/DL — SIGNIFICANT CHANGE UP (ref 70–99)
MAGNESIUM SERPL-MCNC: 2.3 MG/DL — SIGNIFICANT CHANGE UP (ref 1.6–2.6)
MRSA PCR RESULT.: SIGNIFICANT CHANGE UP
PHOSPHATE SERPL-MCNC: 6 MG/DL — HIGH (ref 2.5–4.5)
POTASSIUM SERPL-MCNC: 4.6 MMOL/L — SIGNIFICANT CHANGE UP (ref 3.5–5.3)
POTASSIUM SERPL-SCNC: 4.6 MMOL/L — SIGNIFICANT CHANGE UP (ref 3.5–5.3)
S AUREUS DNA NOSE QL NAA+PROBE: SIGNIFICANT CHANGE UP
SODIUM SERPL-SCNC: 136 MMOL/L — SIGNIFICANT CHANGE UP (ref 135–145)

## 2025-08-14 PROCEDURE — 90935 HEMODIALYSIS ONE EVALUATION: CPT

## 2025-08-14 PROCEDURE — 93990 DOPPLER FLOW TESTING: CPT | Mod: 26

## 2025-08-14 PROCEDURE — 99232 SBSQ HOSP IP/OBS MODERATE 35: CPT

## 2025-08-14 PROCEDURE — 99222 1ST HOSP IP/OBS MODERATE 55: CPT

## 2025-08-14 RX ADMIN — HEPARIN SODIUM 5000 UNIT(S): 1000 INJECTION INTRAVENOUS; SUBCUTANEOUS at 22:25

## 2025-08-14 RX ADMIN — CARVEDILOL 25 MILLIGRAM(S): 3.12 TABLET, FILM COATED ORAL at 05:07

## 2025-08-14 RX ADMIN — Medication 81 MILLIGRAM(S): at 14:20

## 2025-08-14 RX ADMIN — SEVELAMER HYDROCHLORIDE 800 MILLIGRAM(S): 800 TABLET ORAL at 18:01

## 2025-08-14 RX ADMIN — EPOETIN ALFA 4000 UNIT(S): 10000 SOLUTION INTRAVENOUS; SUBCUTANEOUS at 13:06

## 2025-08-14 RX ADMIN — Medication 1 APPLICATION(S): at 14:25

## 2025-08-14 RX ADMIN — HEPARIN SODIUM 5000 UNIT(S): 1000 INJECTION INTRAVENOUS; SUBCUTANEOUS at 14:20

## 2025-08-14 RX ADMIN — CARVEDILOL 25 MILLIGRAM(S): 3.12 TABLET, FILM COATED ORAL at 18:01

## 2025-08-14 RX ADMIN — Medication 1000 UNIT(S): at 14:20

## 2025-08-14 RX ADMIN — SEVELAMER HYDROCHLORIDE 800 MILLIGRAM(S): 800 TABLET ORAL at 14:20

## 2025-08-14 RX ADMIN — SEVELAMER HYDROCHLORIDE 800 MILLIGRAM(S): 800 TABLET ORAL at 08:24

## 2025-08-14 RX ADMIN — INSULIN LISPRO 2: 100 INJECTION, SOLUTION INTRAVENOUS; SUBCUTANEOUS at 18:02

## 2025-08-14 RX ADMIN — HEPARIN SODIUM 5000 UNIT(S): 1000 INJECTION INTRAVENOUS; SUBCUTANEOUS at 05:07

## 2025-08-14 RX ADMIN — Medication 50 MICROGRAM(S): at 05:07

## 2025-08-14 RX ADMIN — ATORVASTATIN CALCIUM 40 MILLIGRAM(S): 80 TABLET, FILM COATED ORAL at 22:24

## 2025-08-15 LAB
ANION GAP SERPL CALC-SCNC: 18 MMOL/L — HIGH (ref 5–17)
BUN SERPL-MCNC: 49 MG/DL — HIGH (ref 7–23)
CALCIUM SERPL-MCNC: 8.9 MG/DL — SIGNIFICANT CHANGE UP (ref 8.4–10.5)
CHLORIDE SERPL-SCNC: 99 MMOL/L — SIGNIFICANT CHANGE UP (ref 96–108)
CO2 SERPL-SCNC: 20 MMOL/L — LOW (ref 22–31)
CREAT SERPL-MCNC: 4.99 MG/DL — HIGH (ref 0.5–1.3)
EGFR: 11 ML/MIN/1.73M2 — LOW
EGFR: 11 ML/MIN/1.73M2 — LOW
GLUCOSE BLDC GLUCOMTR-MCNC: 119 MG/DL — HIGH (ref 70–99)
GLUCOSE BLDC GLUCOMTR-MCNC: 144 MG/DL — HIGH (ref 70–99)
GLUCOSE BLDC GLUCOMTR-MCNC: 155 MG/DL — HIGH (ref 70–99)
GLUCOSE BLDC GLUCOMTR-MCNC: 176 MG/DL — HIGH (ref 70–99)
GLUCOSE SERPL-MCNC: 103 MG/DL — HIGH (ref 70–99)
MAGNESIUM SERPL-MCNC: 2.2 MG/DL — SIGNIFICANT CHANGE UP (ref 1.6–2.6)
PHOSPHATE SERPL-MCNC: 4.4 MG/DL — SIGNIFICANT CHANGE UP (ref 2.5–4.5)
POTASSIUM SERPL-MCNC: 4.2 MMOL/L — SIGNIFICANT CHANGE UP (ref 3.5–5.3)
POTASSIUM SERPL-SCNC: 4.2 MMOL/L — SIGNIFICANT CHANGE UP (ref 3.5–5.3)
SODIUM SERPL-SCNC: 137 MMOL/L — SIGNIFICANT CHANGE UP (ref 135–145)

## 2025-08-15 PROCEDURE — 99232 SBSQ HOSP IP/OBS MODERATE 35: CPT

## 2025-08-15 RX ADMIN — Medication 1 APPLICATION(S): at 11:31

## 2025-08-15 RX ADMIN — Medication 50 MICROGRAM(S): at 05:39

## 2025-08-15 RX ADMIN — HEPARIN SODIUM 5000 UNIT(S): 1000 INJECTION INTRAVENOUS; SUBCUTANEOUS at 21:25

## 2025-08-15 RX ADMIN — Medication 81 MILLIGRAM(S): at 11:30

## 2025-08-15 RX ADMIN — HEPARIN SODIUM 5000 UNIT(S): 1000 INJECTION INTRAVENOUS; SUBCUTANEOUS at 13:47

## 2025-08-15 RX ADMIN — SEVELAMER HYDROCHLORIDE 800 MILLIGRAM(S): 800 TABLET ORAL at 17:54

## 2025-08-15 RX ADMIN — CARVEDILOL 25 MILLIGRAM(S): 3.12 TABLET, FILM COATED ORAL at 05:39

## 2025-08-15 RX ADMIN — HEPARIN SODIUM 5000 UNIT(S): 1000 INJECTION INTRAVENOUS; SUBCUTANEOUS at 05:39

## 2025-08-15 RX ADMIN — INSULIN LISPRO 1: 100 INJECTION, SOLUTION INTRAVENOUS; SUBCUTANEOUS at 11:28

## 2025-08-15 RX ADMIN — SEVELAMER HYDROCHLORIDE 800 MILLIGRAM(S): 800 TABLET ORAL at 08:51

## 2025-08-15 RX ADMIN — SEVELAMER HYDROCHLORIDE 800 MILLIGRAM(S): 800 TABLET ORAL at 11:30

## 2025-08-15 RX ADMIN — Medication 1000 UNIT(S): at 11:30

## 2025-08-15 RX ADMIN — ATORVASTATIN CALCIUM 40 MILLIGRAM(S): 80 TABLET, FILM COATED ORAL at 21:25

## 2025-08-16 LAB
ANION GAP SERPL CALC-SCNC: 15 MMOL/L — SIGNIFICANT CHANGE UP (ref 5–17)
BUN SERPL-MCNC: 69 MG/DL — HIGH (ref 7–23)
CALCIUM SERPL-MCNC: 8.6 MG/DL — SIGNIFICANT CHANGE UP (ref 8.4–10.5)
CHLORIDE SERPL-SCNC: 98 MMOL/L — SIGNIFICANT CHANGE UP (ref 96–108)
CO2 SERPL-SCNC: 23 MMOL/L — SIGNIFICANT CHANGE UP (ref 22–31)
CREAT SERPL-MCNC: 5.79 MG/DL — HIGH (ref 0.5–1.3)
EGFR: 10 ML/MIN/1.73M2 — LOW
EGFR: 10 ML/MIN/1.73M2 — LOW
GLUCOSE BLDC GLUCOMTR-MCNC: 107 MG/DL — HIGH (ref 70–99)
GLUCOSE BLDC GLUCOMTR-MCNC: 122 MG/DL — HIGH (ref 70–99)
GLUCOSE BLDC GLUCOMTR-MCNC: 135 MG/DL — HIGH (ref 70–99)
GLUCOSE BLDC GLUCOMTR-MCNC: 146 MG/DL — HIGH (ref 70–99)
GLUCOSE SERPL-MCNC: 108 MG/DL — HIGH (ref 70–99)
MAGNESIUM SERPL-MCNC: 2.2 MG/DL — SIGNIFICANT CHANGE UP (ref 1.6–2.6)
PHOSPHATE SERPL-MCNC: 4.5 MG/DL — SIGNIFICANT CHANGE UP (ref 2.5–4.5)
POTASSIUM SERPL-MCNC: 4.1 MMOL/L — SIGNIFICANT CHANGE UP (ref 3.5–5.3)
POTASSIUM SERPL-SCNC: 4.1 MMOL/L — SIGNIFICANT CHANGE UP (ref 3.5–5.3)
SODIUM SERPL-SCNC: 136 MMOL/L — SIGNIFICANT CHANGE UP (ref 135–145)

## 2025-08-16 PROCEDURE — 99232 SBSQ HOSP IP/OBS MODERATE 35: CPT | Mod: GC

## 2025-08-16 PROCEDURE — 99232 SBSQ HOSP IP/OBS MODERATE 35: CPT

## 2025-08-16 RX ADMIN — HEPARIN SODIUM 5000 UNIT(S): 1000 INJECTION INTRAVENOUS; SUBCUTANEOUS at 21:40

## 2025-08-16 RX ADMIN — CARVEDILOL 25 MILLIGRAM(S): 3.12 TABLET, FILM COATED ORAL at 17:47

## 2025-08-16 RX ADMIN — Medication 50 MICROGRAM(S): at 05:13

## 2025-08-16 RX ADMIN — Medication 1000 UNIT(S): at 17:47

## 2025-08-16 RX ADMIN — HEPARIN SODIUM 5000 UNIT(S): 1000 INJECTION INTRAVENOUS; SUBCUTANEOUS at 05:13

## 2025-08-16 RX ADMIN — Medication 3 MILLIGRAM(S): at 21:41

## 2025-08-16 RX ADMIN — ATORVASTATIN CALCIUM 40 MILLIGRAM(S): 80 TABLET, FILM COATED ORAL at 21:40

## 2025-08-16 RX ADMIN — Medication 1 APPLICATION(S): at 17:46

## 2025-08-16 RX ADMIN — EPOETIN ALFA 4000 UNIT(S): 10000 SOLUTION INTRAVENOUS; SUBCUTANEOUS at 15:00

## 2025-08-16 RX ADMIN — Medication 81 MILLIGRAM(S): at 17:47

## 2025-08-16 RX ADMIN — HEPARIN SODIUM 5000 UNIT(S): 1000 INJECTION INTRAVENOUS; SUBCUTANEOUS at 17:48

## 2025-08-16 RX ADMIN — SEVELAMER HYDROCHLORIDE 800 MILLIGRAM(S): 800 TABLET ORAL at 17:47

## 2025-08-16 RX ADMIN — SEVELAMER HYDROCHLORIDE 800 MILLIGRAM(S): 800 TABLET ORAL at 07:58

## 2025-08-17 LAB
ANION GAP SERPL CALC-SCNC: 15 MMOL/L — SIGNIFICANT CHANGE UP (ref 5–17)
BUN SERPL-MCNC: 52 MG/DL — HIGH (ref 7–23)
CALCIUM SERPL-MCNC: 8.7 MG/DL — SIGNIFICANT CHANGE UP (ref 8.4–10.5)
CHLORIDE SERPL-SCNC: 96 MMOL/L — SIGNIFICANT CHANGE UP (ref 96–108)
CO2 SERPL-SCNC: 23 MMOL/L — SIGNIFICANT CHANGE UP (ref 22–31)
CREAT SERPL-MCNC: 4.9 MG/DL — HIGH (ref 0.5–1.3)
EGFR: 12 ML/MIN/1.73M2 — LOW
EGFR: 12 ML/MIN/1.73M2 — LOW
GLUCOSE BLDC GLUCOMTR-MCNC: 131 MG/DL — HIGH (ref 70–99)
GLUCOSE BLDC GLUCOMTR-MCNC: 140 MG/DL — HIGH (ref 70–99)
GLUCOSE BLDC GLUCOMTR-MCNC: 144 MG/DL — HIGH (ref 70–99)
GLUCOSE BLDC GLUCOMTR-MCNC: 167 MG/DL — HIGH (ref 70–99)
GLUCOSE SERPL-MCNC: 149 MG/DL — HIGH (ref 70–99)
MAGNESIUM SERPL-MCNC: 2.2 MG/DL — SIGNIFICANT CHANGE UP (ref 1.6–2.6)
PHOSPHATE SERPL-MCNC: 4.1 MG/DL — SIGNIFICANT CHANGE UP (ref 2.5–4.5)
POTASSIUM SERPL-MCNC: 4.8 MMOL/L — SIGNIFICANT CHANGE UP (ref 3.5–5.3)
POTASSIUM SERPL-SCNC: 4.8 MMOL/L — SIGNIFICANT CHANGE UP (ref 3.5–5.3)
SODIUM SERPL-SCNC: 134 MMOL/L — LOW (ref 135–145)

## 2025-08-17 PROCEDURE — 99232 SBSQ HOSP IP/OBS MODERATE 35: CPT

## 2025-08-17 RX ADMIN — Medication 50 MICROGRAM(S): at 05:26

## 2025-08-17 RX ADMIN — HEPARIN SODIUM 5000 UNIT(S): 1000 INJECTION INTRAVENOUS; SUBCUTANEOUS at 16:00

## 2025-08-17 RX ADMIN — SEVELAMER HYDROCHLORIDE 800 MILLIGRAM(S): 800 TABLET ORAL at 12:44

## 2025-08-17 RX ADMIN — CARVEDILOL 25 MILLIGRAM(S): 3.12 TABLET, FILM COATED ORAL at 21:42

## 2025-08-17 RX ADMIN — ATORVASTATIN CALCIUM 40 MILLIGRAM(S): 80 TABLET, FILM COATED ORAL at 21:42

## 2025-08-17 RX ADMIN — Medication 81 MILLIGRAM(S): at 12:44

## 2025-08-17 RX ADMIN — CARVEDILOL 25 MILLIGRAM(S): 3.12 TABLET, FILM COATED ORAL at 05:26

## 2025-08-17 RX ADMIN — HEPARIN SODIUM 5000 UNIT(S): 1000 INJECTION INTRAVENOUS; SUBCUTANEOUS at 21:43

## 2025-08-17 RX ADMIN — HEPARIN SODIUM 5000 UNIT(S): 1000 INJECTION INTRAVENOUS; SUBCUTANEOUS at 05:26

## 2025-08-17 RX ADMIN — Medication 1000 UNIT(S): at 12:44

## 2025-08-17 RX ADMIN — SEVELAMER HYDROCHLORIDE 800 MILLIGRAM(S): 800 TABLET ORAL at 08:21

## 2025-08-17 RX ADMIN — Medication 1 APPLICATION(S): at 12:43

## 2025-08-17 RX ADMIN — SEVELAMER HYDROCHLORIDE 800 MILLIGRAM(S): 800 TABLET ORAL at 17:32

## 2025-08-18 LAB
ANION GAP SERPL CALC-SCNC: 15 MMOL/L — SIGNIFICANT CHANGE UP (ref 5–17)
APTT BLD: 26.6 SEC — SIGNIFICANT CHANGE UP (ref 26.1–36.8)
BUN SERPL-MCNC: 67 MG/DL — HIGH (ref 7–23)
CALCIUM SERPL-MCNC: 8.9 MG/DL — SIGNIFICANT CHANGE UP (ref 8.4–10.5)
CHLORIDE SERPL-SCNC: 100 MMOL/L — SIGNIFICANT CHANGE UP (ref 96–108)
CO2 SERPL-SCNC: 22 MMOL/L — SIGNIFICANT CHANGE UP (ref 22–31)
CREAT SERPL-MCNC: 5.54 MG/DL — HIGH (ref 0.5–1.3)
EGFR: 10 ML/MIN/1.73M2 — LOW
EGFR: 10 ML/MIN/1.73M2 — LOW
GLUCOSE BLDC GLUCOMTR-MCNC: 115 MG/DL — HIGH (ref 70–99)
GLUCOSE BLDC GLUCOMTR-MCNC: 129 MG/DL — HIGH (ref 70–99)
GLUCOSE BLDC GLUCOMTR-MCNC: 135 MG/DL — HIGH (ref 70–99)
GLUCOSE BLDC GLUCOMTR-MCNC: 99 MG/DL — SIGNIFICANT CHANGE UP (ref 70–99)
GLUCOSE SERPL-MCNC: 104 MG/DL — HIGH (ref 70–99)
HCT VFR BLD CALC: 26.2 % — LOW (ref 39–50)
HGB BLD-MCNC: 8.1 G/DL — LOW (ref 13–17)
INR BLD: 0.91 RATIO — SIGNIFICANT CHANGE UP (ref 0.85–1.16)
MAGNESIUM SERPL-MCNC: 2.4 MG/DL — SIGNIFICANT CHANGE UP (ref 1.6–2.6)
MCHC RBC-ENTMCNC: 29.6 PG — SIGNIFICANT CHANGE UP (ref 27–34)
MCHC RBC-ENTMCNC: 30.9 G/DL — LOW (ref 32–36)
MCV RBC AUTO: 95.6 FL — SIGNIFICANT CHANGE UP (ref 80–100)
NRBC # BLD AUTO: 0 K/UL — SIGNIFICANT CHANGE UP (ref 0–0)
NRBC # FLD: 0 K/UL — SIGNIFICANT CHANGE UP (ref 0–0)
NRBC BLD AUTO-RTO: 0 /100 WBCS — SIGNIFICANT CHANGE UP (ref 0–0)
PHOSPHATE SERPL-MCNC: 4.9 MG/DL — HIGH (ref 2.5–4.5)
PLATELET # BLD AUTO: 218 K/UL — SIGNIFICANT CHANGE UP (ref 150–400)
PMV BLD: 10.4 FL — SIGNIFICANT CHANGE UP (ref 7–13)
POTASSIUM SERPL-MCNC: 4.7 MMOL/L — SIGNIFICANT CHANGE UP (ref 3.5–5.3)
POTASSIUM SERPL-SCNC: 4.7 MMOL/L — SIGNIFICANT CHANGE UP (ref 3.5–5.3)
PROTHROM AB SERPL-ACNC: 10.5 SEC — SIGNIFICANT CHANGE UP (ref 9.9–13.4)
RBC # BLD: 2.74 M/UL — LOW (ref 4.2–5.8)
RBC # FLD: 14.9 % — HIGH (ref 10.3–14.5)
SODIUM SERPL-SCNC: 137 MMOL/L — SIGNIFICANT CHANGE UP (ref 135–145)
WBC # BLD: 8.89 K/UL — SIGNIFICANT CHANGE UP (ref 3.8–10.5)
WBC # FLD AUTO: 8.89 K/UL — SIGNIFICANT CHANGE UP (ref 3.8–10.5)

## 2025-08-18 PROCEDURE — 86850 RBC ANTIBODY SCREEN: CPT

## 2025-08-18 PROCEDURE — C1887: CPT

## 2025-08-18 PROCEDURE — 80074 ACUTE HEPATITIS PANEL: CPT

## 2025-08-18 PROCEDURE — 80053 COMPREHEN METABOLIC PANEL: CPT

## 2025-08-18 PROCEDURE — 82962 GLUCOSE BLOOD TEST: CPT

## 2025-08-18 PROCEDURE — 86900 BLOOD TYPING SEROLOGIC ABO: CPT

## 2025-08-18 PROCEDURE — 36415 COLL VENOUS BLD VENIPUNCTURE: CPT

## 2025-08-18 PROCEDURE — 82803 BLOOD GASES ANY COMBINATION: CPT

## 2025-08-18 PROCEDURE — 99232 SBSQ HOSP IP/OBS MODERATE 35: CPT

## 2025-08-18 PROCEDURE — 82435 ASSAY OF BLOOD CHLORIDE: CPT

## 2025-08-18 PROCEDURE — 85018 HEMOGLOBIN: CPT

## 2025-08-18 PROCEDURE — 84295 ASSAY OF SERUM SODIUM: CPT

## 2025-08-18 PROCEDURE — 87640 STAPH A DNA AMP PROBE: CPT

## 2025-08-18 PROCEDURE — 93005 ELECTROCARDIOGRAM TRACING: CPT

## 2025-08-18 PROCEDURE — 76937 US GUIDE VASCULAR ACCESS: CPT | Mod: 26

## 2025-08-18 PROCEDURE — C1750: CPT

## 2025-08-18 PROCEDURE — C1769: CPT

## 2025-08-18 PROCEDURE — 93990 DOPPLER FLOW TESTING: CPT

## 2025-08-18 PROCEDURE — 97161 PT EVAL LOW COMPLEX 20 MIN: CPT

## 2025-08-18 PROCEDURE — 82947 ASSAY GLUCOSE BLOOD QUANT: CPT

## 2025-08-18 PROCEDURE — 83605 ASSAY OF LACTIC ACID: CPT

## 2025-08-18 PROCEDURE — 77001 FLUOROGUIDE FOR VEIN DEVICE: CPT | Mod: 26

## 2025-08-18 PROCEDURE — 86706 HEP B SURFACE ANTIBODY: CPT

## 2025-08-18 PROCEDURE — 83735 ASSAY OF MAGNESIUM: CPT

## 2025-08-18 PROCEDURE — 36558 INSERT TUNNELED CV CATH: CPT | Mod: RT

## 2025-08-18 PROCEDURE — 86682 HELMINTH ANTIBODY: CPT

## 2025-08-18 PROCEDURE — 84100 ASSAY OF PHOSPHORUS: CPT

## 2025-08-18 PROCEDURE — 87641 MR-STAPH DNA AMP PROBE: CPT

## 2025-08-18 PROCEDURE — 85730 THROMBOPLASTIN TIME PARTIAL: CPT

## 2025-08-18 PROCEDURE — 84132 ASSAY OF SERUM POTASSIUM: CPT

## 2025-08-18 PROCEDURE — 85014 HEMATOCRIT: CPT

## 2025-08-18 PROCEDURE — 71045 X-RAY EXAM CHEST 1 VIEW: CPT

## 2025-08-18 PROCEDURE — 85027 COMPLETE CBC AUTOMATED: CPT

## 2025-08-18 PROCEDURE — 87340 HEPATITIS B SURFACE AG IA: CPT

## 2025-08-18 PROCEDURE — 81001 URINALYSIS AUTO W/SCOPE: CPT

## 2025-08-18 PROCEDURE — 97530 THERAPEUTIC ACTIVITIES: CPT

## 2025-08-18 PROCEDURE — 97116 GAIT TRAINING THERAPY: CPT

## 2025-08-18 PROCEDURE — 85025 COMPLETE CBC W/AUTO DIFF WBC: CPT

## 2025-08-18 PROCEDURE — 85610 PROTHROMBIN TIME: CPT

## 2025-08-18 PROCEDURE — C1894: CPT

## 2025-08-18 PROCEDURE — 86901 BLOOD TYPING SEROLOGIC RH(D): CPT

## 2025-08-18 PROCEDURE — 82330 ASSAY OF CALCIUM: CPT

## 2025-08-18 PROCEDURE — 82550 ASSAY OF CK (CPK): CPT

## 2025-08-18 PROCEDURE — 80048 BASIC METABOLIC PNL TOTAL CA: CPT

## 2025-08-18 PROCEDURE — 83516 IMMUNOASSAY NONANTIBODY: CPT

## 2025-08-18 RX ORDER — EPOETIN ALFA 10000 [IU]/ML
4000 SOLUTION INTRAVENOUS; SUBCUTANEOUS
Refills: 0 | Status: DISCONTINUED | OUTPATIENT
Start: 2025-08-18 | End: 2025-08-19

## 2025-08-18 RX ADMIN — EPOETIN ALFA 4000 UNIT(S): 10000 SOLUTION INTRAVENOUS; SUBCUTANEOUS at 17:19

## 2025-08-18 RX ADMIN — SEVELAMER HYDROCHLORIDE 800 MILLIGRAM(S): 800 TABLET ORAL at 12:31

## 2025-08-18 RX ADMIN — Medication 81 MILLIGRAM(S): at 12:31

## 2025-08-18 RX ADMIN — Medication 50 MICROGRAM(S): at 05:20

## 2025-08-18 RX ADMIN — CARVEDILOL 25 MILLIGRAM(S): 3.12 TABLET, FILM COATED ORAL at 05:20

## 2025-08-18 RX ADMIN — Medication 1000 UNIT(S): at 12:31

## 2025-08-18 RX ADMIN — HEPARIN SODIUM 5000 UNIT(S): 1000 INJECTION INTRAVENOUS; SUBCUTANEOUS at 21:32

## 2025-08-18 RX ADMIN — SEVELAMER HYDROCHLORIDE 800 MILLIGRAM(S): 800 TABLET ORAL at 19:55

## 2025-08-18 RX ADMIN — HEPARIN SODIUM 5000 UNIT(S): 1000 INJECTION INTRAVENOUS; SUBCUTANEOUS at 05:20

## 2025-08-18 RX ADMIN — HEPARIN SODIUM 5000 UNIT(S): 1000 INJECTION INTRAVENOUS; SUBCUTANEOUS at 14:45

## 2025-08-18 RX ADMIN — CARVEDILOL 25 MILLIGRAM(S): 3.12 TABLET, FILM COATED ORAL at 19:55

## 2025-08-18 RX ADMIN — Medication 1 APPLICATION(S): at 12:31

## 2025-08-18 RX ADMIN — ATORVASTATIN CALCIUM 40 MILLIGRAM(S): 80 TABLET, FILM COATED ORAL at 21:31

## 2025-08-19 LAB
ANION GAP SERPL CALC-SCNC: 14 MMOL/L — SIGNIFICANT CHANGE UP (ref 5–17)
BUN SERPL-MCNC: 41 MG/DL — HIGH (ref 7–23)
CALCIUM SERPL-MCNC: 8.7 MG/DL — SIGNIFICANT CHANGE UP (ref 8.4–10.5)
CHLORIDE SERPL-SCNC: 98 MMOL/L — SIGNIFICANT CHANGE UP (ref 96–108)
CO2 SERPL-SCNC: 25 MMOL/L — SIGNIFICANT CHANGE UP (ref 22–31)
CREAT SERPL-MCNC: 4.12 MG/DL — HIGH (ref 0.5–1.3)
EGFR: 14 ML/MIN/1.73M2 — LOW
EGFR: 14 ML/MIN/1.73M2 — LOW
GLUCOSE BLDC GLUCOMTR-MCNC: 115 MG/DL — HIGH (ref 70–99)
GLUCOSE BLDC GLUCOMTR-MCNC: 140 MG/DL — HIGH (ref 70–99)
GLUCOSE BLDC GLUCOMTR-MCNC: 144 MG/DL — HIGH (ref 70–99)
GLUCOSE BLDC GLUCOMTR-MCNC: 155 MG/DL — HIGH (ref 70–99)
GLUCOSE SERPL-MCNC: 98 MG/DL — SIGNIFICANT CHANGE UP (ref 70–99)
HCT VFR BLD CALC: 26.3 % — LOW (ref 39–50)
HGB BLD-MCNC: 8.1 G/DL — LOW (ref 13–17)
MAGNESIUM SERPL-MCNC: 2.2 MG/DL — SIGNIFICANT CHANGE UP (ref 1.6–2.6)
MCHC RBC-ENTMCNC: 29.7 PG — SIGNIFICANT CHANGE UP (ref 27–34)
MCHC RBC-ENTMCNC: 30.8 G/DL — LOW (ref 32–36)
MCV RBC AUTO: 96.3 FL — SIGNIFICANT CHANGE UP (ref 80–100)
NRBC # BLD AUTO: 0 K/UL — SIGNIFICANT CHANGE UP (ref 0–0)
NRBC # FLD: 0 K/UL — SIGNIFICANT CHANGE UP (ref 0–0)
NRBC BLD AUTO-RTO: 0 /100 WBCS — SIGNIFICANT CHANGE UP (ref 0–0)
PHOSPHATE SERPL-MCNC: 4 MG/DL — SIGNIFICANT CHANGE UP (ref 2.5–4.5)
PLATELET # BLD AUTO: 227 K/UL — SIGNIFICANT CHANGE UP (ref 150–400)
PMV BLD: 10.7 FL — SIGNIFICANT CHANGE UP (ref 7–13)
POTASSIUM SERPL-MCNC: 4.3 MMOL/L — SIGNIFICANT CHANGE UP (ref 3.5–5.3)
POTASSIUM SERPL-SCNC: 4.3 MMOL/L — SIGNIFICANT CHANGE UP (ref 3.5–5.3)
RBC # BLD: 2.73 M/UL — LOW (ref 4.2–5.8)
RBC # FLD: 15.2 % — HIGH (ref 10.3–14.5)
SODIUM SERPL-SCNC: 137 MMOL/L — SIGNIFICANT CHANGE UP (ref 135–145)
WBC # BLD: 10.65 K/UL — HIGH (ref 3.8–10.5)
WBC # FLD AUTO: 10.65 K/UL — HIGH (ref 3.8–10.5)

## 2025-08-19 PROCEDURE — 85730 THROMBOPLASTIN TIME PARTIAL: CPT

## 2025-08-19 PROCEDURE — 83516 IMMUNOASSAY NONANTIBODY: CPT

## 2025-08-19 PROCEDURE — 93990 DOPPLER FLOW TESTING: CPT

## 2025-08-19 PROCEDURE — 82330 ASSAY OF CALCIUM: CPT

## 2025-08-19 PROCEDURE — 86682 HELMINTH ANTIBODY: CPT

## 2025-08-19 PROCEDURE — 99232 SBSQ HOSP IP/OBS MODERATE 35: CPT

## 2025-08-19 PROCEDURE — C1894: CPT

## 2025-08-19 PROCEDURE — 71045 X-RAY EXAM CHEST 1 VIEW: CPT

## 2025-08-19 PROCEDURE — 97110 THERAPEUTIC EXERCISES: CPT

## 2025-08-19 PROCEDURE — 81001 URINALYSIS AUTO W/SCOPE: CPT

## 2025-08-19 PROCEDURE — 83735 ASSAY OF MAGNESIUM: CPT

## 2025-08-19 PROCEDURE — 85018 HEMOGLOBIN: CPT

## 2025-08-19 PROCEDURE — 82550 ASSAY OF CK (CPK): CPT

## 2025-08-19 PROCEDURE — 85014 HEMATOCRIT: CPT

## 2025-08-19 PROCEDURE — 86900 BLOOD TYPING SEROLOGIC ABO: CPT

## 2025-08-19 PROCEDURE — 97530 THERAPEUTIC ACTIVITIES: CPT

## 2025-08-19 PROCEDURE — 97161 PT EVAL LOW COMPLEX 20 MIN: CPT

## 2025-08-19 PROCEDURE — 84295 ASSAY OF SERUM SODIUM: CPT

## 2025-08-19 PROCEDURE — 87640 STAPH A DNA AMP PROBE: CPT

## 2025-08-19 PROCEDURE — 82947 ASSAY GLUCOSE BLOOD QUANT: CPT

## 2025-08-19 PROCEDURE — 93005 ELECTROCARDIOGRAM TRACING: CPT

## 2025-08-19 PROCEDURE — 86706 HEP B SURFACE ANTIBODY: CPT

## 2025-08-19 PROCEDURE — 36558 INSERT TUNNELED CV CATH: CPT

## 2025-08-19 PROCEDURE — 82803 BLOOD GASES ANY COMBINATION: CPT

## 2025-08-19 PROCEDURE — 83605 ASSAY OF LACTIC ACID: CPT

## 2025-08-19 PROCEDURE — 80053 COMPREHEN METABOLIC PANEL: CPT

## 2025-08-19 PROCEDURE — 87340 HEPATITIS B SURFACE AG IA: CPT

## 2025-08-19 PROCEDURE — 77001 FLUOROGUIDE FOR VEIN DEVICE: CPT

## 2025-08-19 PROCEDURE — C1769: CPT

## 2025-08-19 PROCEDURE — 85610 PROTHROMBIN TIME: CPT

## 2025-08-19 PROCEDURE — 86850 RBC ANTIBODY SCREEN: CPT

## 2025-08-19 PROCEDURE — 36415 COLL VENOUS BLD VENIPUNCTURE: CPT

## 2025-08-19 PROCEDURE — 87641 MR-STAPH DNA AMP PROBE: CPT

## 2025-08-19 PROCEDURE — 97116 GAIT TRAINING THERAPY: CPT

## 2025-08-19 PROCEDURE — 82962 GLUCOSE BLOOD TEST: CPT

## 2025-08-19 PROCEDURE — 80074 ACUTE HEPATITIS PANEL: CPT

## 2025-08-19 PROCEDURE — 85027 COMPLETE CBC AUTOMATED: CPT

## 2025-08-19 PROCEDURE — 76937 US GUIDE VASCULAR ACCESS: CPT

## 2025-08-19 PROCEDURE — 85025 COMPLETE CBC W/AUTO DIFF WBC: CPT

## 2025-08-19 PROCEDURE — 82435 ASSAY OF BLOOD CHLORIDE: CPT

## 2025-08-19 PROCEDURE — 84132 ASSAY OF SERUM POTASSIUM: CPT

## 2025-08-19 PROCEDURE — C1887: CPT

## 2025-08-19 PROCEDURE — C1750: CPT

## 2025-08-19 PROCEDURE — 84100 ASSAY OF PHOSPHORUS: CPT

## 2025-08-19 PROCEDURE — 80048 BASIC METABOLIC PNL TOTAL CA: CPT

## 2025-08-19 PROCEDURE — 86901 BLOOD TYPING SEROLOGIC RH(D): CPT

## 2025-08-19 RX ORDER — EPOETIN ALFA 10000 [IU]/ML
6000 SOLUTION INTRAVENOUS; SUBCUTANEOUS
Refills: 0 | Status: DISCONTINUED | OUTPATIENT
Start: 2025-08-19 | End: 2025-08-22

## 2025-08-19 RX ADMIN — Medication 50 MICROGRAM(S): at 05:24

## 2025-08-19 RX ADMIN — ATORVASTATIN CALCIUM 40 MILLIGRAM(S): 80 TABLET, FILM COATED ORAL at 21:27

## 2025-08-19 RX ADMIN — Medication 81 MILLIGRAM(S): at 08:40

## 2025-08-19 RX ADMIN — Medication 1000 UNIT(S): at 08:41

## 2025-08-19 RX ADMIN — SEVELAMER HYDROCHLORIDE 800 MILLIGRAM(S): 800 TABLET ORAL at 11:21

## 2025-08-19 RX ADMIN — CARVEDILOL 25 MILLIGRAM(S): 3.12 TABLET, FILM COATED ORAL at 05:25

## 2025-08-19 RX ADMIN — CARVEDILOL 25 MILLIGRAM(S): 3.12 TABLET, FILM COATED ORAL at 17:39

## 2025-08-19 RX ADMIN — Medication 1 APPLICATION(S): at 05:25

## 2025-08-19 RX ADMIN — SEVELAMER HYDROCHLORIDE 800 MILLIGRAM(S): 800 TABLET ORAL at 17:39

## 2025-08-19 RX ADMIN — INSULIN LISPRO 1: 100 INJECTION, SOLUTION INTRAVENOUS; SUBCUTANEOUS at 12:33

## 2025-08-19 RX ADMIN — HEPARIN SODIUM 5000 UNIT(S): 1000 INJECTION INTRAVENOUS; SUBCUTANEOUS at 22:23

## 2025-08-19 RX ADMIN — HEPARIN SODIUM 5000 UNIT(S): 1000 INJECTION INTRAVENOUS; SUBCUTANEOUS at 05:25

## 2025-08-19 RX ADMIN — Medication 1 APPLICATION(S): at 08:42

## 2025-08-19 RX ADMIN — SEVELAMER HYDROCHLORIDE 800 MILLIGRAM(S): 800 TABLET ORAL at 08:40

## 2025-08-19 RX ADMIN — HEPARIN SODIUM 5000 UNIT(S): 1000 INJECTION INTRAVENOUS; SUBCUTANEOUS at 14:05

## 2025-08-20 ENCOUNTER — TRANSCRIPTION ENCOUNTER (OUTPATIENT)
Age: 75
End: 2025-08-20

## 2025-08-20 LAB
ANION GAP SERPL CALC-SCNC: 14 MMOL/L — SIGNIFICANT CHANGE UP (ref 5–17)
BUN SERPL-MCNC: 61 MG/DL — HIGH (ref 7–23)
CALCIUM SERPL-MCNC: 8.6 MG/DL — SIGNIFICANT CHANGE UP (ref 8.4–10.5)
CHLORIDE SERPL-SCNC: 99 MMOL/L — SIGNIFICANT CHANGE UP (ref 96–108)
CO2 SERPL-SCNC: 24 MMOL/L — SIGNIFICANT CHANGE UP (ref 22–31)
CREAT SERPL-MCNC: 5.39 MG/DL — HIGH (ref 0.5–1.3)
EGFR: 10 ML/MIN/1.73M2 — LOW
EGFR: 10 ML/MIN/1.73M2 — LOW
GLUCOSE BLDC GLUCOMTR-MCNC: 104 MG/DL — HIGH (ref 70–99)
GLUCOSE BLDC GLUCOMTR-MCNC: 106 MG/DL — HIGH (ref 70–99)
GLUCOSE BLDC GLUCOMTR-MCNC: 151 MG/DL — HIGH (ref 70–99)
GLUCOSE BLDC GLUCOMTR-MCNC: 181 MG/DL — HIGH (ref 70–99)
GLUCOSE SERPL-MCNC: 104 MG/DL — HIGH (ref 70–99)
HCT VFR BLD CALC: 24.2 % — LOW (ref 39–50)
HGB BLD-MCNC: 7.5 G/DL — LOW (ref 13–17)
MAGNESIUM SERPL-MCNC: 2.4 MG/DL — SIGNIFICANT CHANGE UP (ref 1.6–2.6)
MCHC RBC-ENTMCNC: 29.6 PG — SIGNIFICANT CHANGE UP (ref 27–34)
MCHC RBC-ENTMCNC: 31 G/DL — LOW (ref 32–36)
MCV RBC AUTO: 95.7 FL — SIGNIFICANT CHANGE UP (ref 80–100)
NRBC # BLD AUTO: 0 K/UL — SIGNIFICANT CHANGE UP (ref 0–0)
NRBC # FLD: 0 K/UL — SIGNIFICANT CHANGE UP (ref 0–0)
NRBC BLD AUTO-RTO: 0 /100 WBCS — SIGNIFICANT CHANGE UP (ref 0–0)
PHOSPHATE SERPL-MCNC: 5 MG/DL — HIGH (ref 2.5–4.5)
PLATELET # BLD AUTO: 220 K/UL — SIGNIFICANT CHANGE UP (ref 150–400)
PMV BLD: 10.4 FL — SIGNIFICANT CHANGE UP (ref 7–13)
POTASSIUM SERPL-MCNC: 4.1 MMOL/L — SIGNIFICANT CHANGE UP (ref 3.5–5.3)
POTASSIUM SERPL-SCNC: 4.1 MMOL/L — SIGNIFICANT CHANGE UP (ref 3.5–5.3)
RBC # BLD: 2.53 M/UL — LOW (ref 4.2–5.8)
RBC # FLD: 15.2 % — HIGH (ref 10.3–14.5)
SODIUM SERPL-SCNC: 137 MMOL/L — SIGNIFICANT CHANGE UP (ref 135–145)
WBC # BLD: 9.15 K/UL — SIGNIFICANT CHANGE UP (ref 3.8–10.5)
WBC # FLD AUTO: 9.15 K/UL — SIGNIFICANT CHANGE UP (ref 3.8–10.5)

## 2025-08-20 PROCEDURE — 90935 HEMODIALYSIS ONE EVALUATION: CPT

## 2025-08-20 PROCEDURE — 99232 SBSQ HOSP IP/OBS MODERATE 35: CPT

## 2025-08-20 RX ADMIN — SEVELAMER HYDROCHLORIDE 800 MILLIGRAM(S): 800 TABLET ORAL at 17:06

## 2025-08-20 RX ADMIN — Medication 1000 UNIT(S): at 11:33

## 2025-08-20 RX ADMIN — EPOETIN ALFA 6000 UNIT(S): 10000 SOLUTION INTRAVENOUS; SUBCUTANEOUS at 08:42

## 2025-08-20 RX ADMIN — HEPARIN SODIUM 5000 UNIT(S): 1000 INJECTION INTRAVENOUS; SUBCUTANEOUS at 13:15

## 2025-08-20 RX ADMIN — Medication 1 APPLICATION(S): at 05:14

## 2025-08-20 RX ADMIN — SEVELAMER HYDROCHLORIDE 800 MILLIGRAM(S): 800 TABLET ORAL at 11:33

## 2025-08-20 RX ADMIN — Medication 50 MICROGRAM(S): at 06:21

## 2025-08-20 RX ADMIN — Medication 1 APPLICATION(S): at 11:43

## 2025-08-20 RX ADMIN — Medication 3 MILLIGRAM(S): at 21:30

## 2025-08-20 RX ADMIN — CARVEDILOL 25 MILLIGRAM(S): 3.12 TABLET, FILM COATED ORAL at 17:06

## 2025-08-20 RX ADMIN — HEPARIN SODIUM 5000 UNIT(S): 1000 INJECTION INTRAVENOUS; SUBCUTANEOUS at 21:31

## 2025-08-20 RX ADMIN — Medication 81 MILLIGRAM(S): at 11:33

## 2025-08-20 RX ADMIN — ATORVASTATIN CALCIUM 40 MILLIGRAM(S): 80 TABLET, FILM COATED ORAL at 21:30

## 2025-08-20 RX ADMIN — INSULIN LISPRO 1: 100 INJECTION, SOLUTION INTRAVENOUS; SUBCUTANEOUS at 17:07

## 2025-08-20 RX ADMIN — HEPARIN SODIUM 5000 UNIT(S): 1000 INJECTION INTRAVENOUS; SUBCUTANEOUS at 05:14

## 2025-08-21 LAB
GLUCOSE BLDC GLUCOMTR-MCNC: 116 MG/DL — HIGH (ref 70–99)
GLUCOSE BLDC GLUCOMTR-MCNC: 131 MG/DL — HIGH (ref 70–99)
GLUCOSE BLDC GLUCOMTR-MCNC: 160 MG/DL — HIGH (ref 70–99)
GLUCOSE BLDC GLUCOMTR-MCNC: 178 MG/DL — HIGH (ref 70–99)
HCT VFR BLD CALC: 27.5 % — LOW (ref 39–50)
HGB BLD-MCNC: 8.4 G/DL — LOW (ref 13–17)
MCHC RBC-ENTMCNC: 29.5 PG — SIGNIFICANT CHANGE UP (ref 27–34)
MCHC RBC-ENTMCNC: 30.5 G/DL — LOW (ref 32–36)
MCV RBC AUTO: 96.5 FL — SIGNIFICANT CHANGE UP (ref 80–100)
NRBC # BLD AUTO: 0 K/UL — SIGNIFICANT CHANGE UP (ref 0–0)
NRBC # FLD: 0 K/UL — SIGNIFICANT CHANGE UP (ref 0–0)
NRBC BLD AUTO-RTO: 0 /100 WBCS — SIGNIFICANT CHANGE UP (ref 0–0)
PLATELET # BLD AUTO: 219 K/UL — SIGNIFICANT CHANGE UP (ref 150–400)
PMV BLD: 10.4 FL — SIGNIFICANT CHANGE UP (ref 7–13)
RBC # BLD: 2.85 M/UL — LOW (ref 4.2–5.8)
RBC # FLD: 15.4 % — HIGH (ref 10.3–14.5)
WBC # BLD: 10.48 K/UL — SIGNIFICANT CHANGE UP (ref 3.8–10.5)
WBC # FLD AUTO: 10.48 K/UL — SIGNIFICANT CHANGE UP (ref 3.8–10.5)

## 2025-08-21 PROCEDURE — 99232 SBSQ HOSP IP/OBS MODERATE 35: CPT

## 2025-08-21 RX ADMIN — HEPARIN SODIUM 5000 UNIT(S): 1000 INJECTION INTRAVENOUS; SUBCUTANEOUS at 06:15

## 2025-08-21 RX ADMIN — CARVEDILOL 25 MILLIGRAM(S): 3.12 TABLET, FILM COATED ORAL at 17:05

## 2025-08-21 RX ADMIN — SEVELAMER HYDROCHLORIDE 800 MILLIGRAM(S): 800 TABLET ORAL at 11:44

## 2025-08-21 RX ADMIN — HEPARIN SODIUM 5000 UNIT(S): 1000 INJECTION INTRAVENOUS; SUBCUTANEOUS at 15:00

## 2025-08-21 RX ADMIN — Medication 50 MICROGRAM(S): at 06:16

## 2025-08-21 RX ADMIN — Medication 81 MILLIGRAM(S): at 11:44

## 2025-08-21 RX ADMIN — Medication 1000 UNIT(S): at 11:44

## 2025-08-21 RX ADMIN — HEPARIN SODIUM 5000 UNIT(S): 1000 INJECTION INTRAVENOUS; SUBCUTANEOUS at 22:04

## 2025-08-21 RX ADMIN — ATORVASTATIN CALCIUM 40 MILLIGRAM(S): 80 TABLET, FILM COATED ORAL at 22:00

## 2025-08-21 RX ADMIN — Medication 1 APPLICATION(S): at 06:16

## 2025-08-21 RX ADMIN — SEVELAMER HYDROCHLORIDE 800 MILLIGRAM(S): 800 TABLET ORAL at 08:00

## 2025-08-21 RX ADMIN — INSULIN LISPRO 1: 100 INJECTION, SOLUTION INTRAVENOUS; SUBCUTANEOUS at 11:42

## 2025-08-21 RX ADMIN — SEVELAMER HYDROCHLORIDE 800 MILLIGRAM(S): 800 TABLET ORAL at 17:05

## 2025-08-21 RX ADMIN — CARVEDILOL 25 MILLIGRAM(S): 3.12 TABLET, FILM COATED ORAL at 06:15

## 2025-08-21 RX ADMIN — Medication 3 MILLIGRAM(S): at 22:00

## 2025-08-21 RX ADMIN — Medication 1 APPLICATION(S): at 13:00

## 2025-08-22 VITALS
OXYGEN SATURATION: 98 % | RESPIRATION RATE: 18 BRPM | TEMPERATURE: 99 F | DIASTOLIC BLOOD PRESSURE: 68 MMHG | HEART RATE: 74 BPM | SYSTOLIC BLOOD PRESSURE: 149 MMHG

## 2025-08-22 LAB
ANION GAP SERPL CALC-SCNC: 14 MMOL/L — SIGNIFICANT CHANGE UP (ref 5–17)
BUN SERPL-MCNC: 62 MG/DL — HIGH (ref 7–23)
CALCIUM SERPL-MCNC: 8.9 MG/DL — SIGNIFICANT CHANGE UP (ref 8.4–10.5)
CHLORIDE SERPL-SCNC: 97 MMOL/L — SIGNIFICANT CHANGE UP (ref 96–108)
CO2 SERPL-SCNC: 22 MMOL/L — SIGNIFICANT CHANGE UP (ref 22–31)
CREAT SERPL-MCNC: 5.35 MG/DL — HIGH (ref 0.5–1.3)
EGFR: 11 ML/MIN/1.73M2 — LOW
EGFR: 11 ML/MIN/1.73M2 — LOW
GLUCOSE BLDC GLUCOMTR-MCNC: 104 MG/DL — HIGH (ref 70–99)
GLUCOSE BLDC GLUCOMTR-MCNC: 185 MG/DL — HIGH (ref 70–99)
GLUCOSE SERPL-MCNC: 96 MG/DL — SIGNIFICANT CHANGE UP (ref 70–99)
HCT VFR BLD CALC: 27.9 % — LOW (ref 39–50)
HGB BLD-MCNC: 8.6 G/DL — LOW (ref 13–17)
MAGNESIUM SERPL-MCNC: 2.4 MG/DL — SIGNIFICANT CHANGE UP (ref 1.6–2.6)
MCHC RBC-ENTMCNC: 29.7 PG — SIGNIFICANT CHANGE UP (ref 27–34)
MCHC RBC-ENTMCNC: 30.8 G/DL — LOW (ref 32–36)
MCV RBC AUTO: 96.2 FL — SIGNIFICANT CHANGE UP (ref 80–100)
NRBC # BLD AUTO: 0 K/UL — SIGNIFICANT CHANGE UP (ref 0–0)
NRBC # FLD: 0 K/UL — SIGNIFICANT CHANGE UP (ref 0–0)
NRBC BLD AUTO-RTO: 0 /100 WBCS — SIGNIFICANT CHANGE UP (ref 0–0)
PHOSPHATE SERPL-MCNC: 5.4 MG/DL — HIGH (ref 2.5–4.5)
PLATELET # BLD AUTO: 268 K/UL — SIGNIFICANT CHANGE UP (ref 150–400)
PMV BLD: 10.5 FL — SIGNIFICANT CHANGE UP (ref 7–13)
POTASSIUM SERPL-MCNC: 5 MMOL/L — SIGNIFICANT CHANGE UP (ref 3.5–5.3)
POTASSIUM SERPL-SCNC: 5 MMOL/L — SIGNIFICANT CHANGE UP (ref 3.5–5.3)
RBC # BLD: 2.9 M/UL — LOW (ref 4.2–5.8)
RBC # FLD: 15.3 % — HIGH (ref 10.3–14.5)
SODIUM SERPL-SCNC: 133 MMOL/L — LOW (ref 135–145)
WBC # BLD: 9.9 K/UL — SIGNIFICANT CHANGE UP (ref 3.8–10.5)
WBC # FLD AUTO: 9.9 K/UL — SIGNIFICANT CHANGE UP (ref 3.8–10.5)

## 2025-08-22 PROCEDURE — 97161 PT EVAL LOW COMPLEX 20 MIN: CPT

## 2025-08-22 PROCEDURE — 97530 THERAPEUTIC ACTIVITIES: CPT

## 2025-08-22 PROCEDURE — C1750: CPT

## 2025-08-22 PROCEDURE — 85027 COMPLETE CBC AUTOMATED: CPT

## 2025-08-22 PROCEDURE — 77001 FLUOROGUIDE FOR VEIN DEVICE: CPT

## 2025-08-22 PROCEDURE — 86850 RBC ANTIBODY SCREEN: CPT

## 2025-08-22 PROCEDURE — 87640 STAPH A DNA AMP PROBE: CPT

## 2025-08-22 PROCEDURE — 82947 ASSAY GLUCOSE BLOOD QUANT: CPT

## 2025-08-22 PROCEDURE — 71045 X-RAY EXAM CHEST 1 VIEW: CPT

## 2025-08-22 PROCEDURE — 83735 ASSAY OF MAGNESIUM: CPT

## 2025-08-22 PROCEDURE — 80048 BASIC METABOLIC PNL TOTAL CA: CPT

## 2025-08-22 PROCEDURE — 87340 HEPATITIS B SURFACE AG IA: CPT

## 2025-08-22 PROCEDURE — 80053 COMPREHEN METABOLIC PANEL: CPT

## 2025-08-22 PROCEDURE — 90935 HEMODIALYSIS ONE EVALUATION: CPT

## 2025-08-22 PROCEDURE — 82550 ASSAY OF CK (CPK): CPT

## 2025-08-22 PROCEDURE — 86900 BLOOD TYPING SEROLOGIC ABO: CPT

## 2025-08-22 PROCEDURE — 83516 IMMUNOASSAY NONANTIBODY: CPT

## 2025-08-22 PROCEDURE — 36558 INSERT TUNNELED CV CATH: CPT

## 2025-08-22 PROCEDURE — 36415 COLL VENOUS BLD VENIPUNCTURE: CPT

## 2025-08-22 PROCEDURE — 83605 ASSAY OF LACTIC ACID: CPT

## 2025-08-22 PROCEDURE — C1769: CPT

## 2025-08-22 PROCEDURE — 85730 THROMBOPLASTIN TIME PARTIAL: CPT

## 2025-08-22 PROCEDURE — 93990 DOPPLER FLOW TESTING: CPT

## 2025-08-22 PROCEDURE — 86901 BLOOD TYPING SEROLOGIC RH(D): CPT

## 2025-08-22 PROCEDURE — C1887: CPT

## 2025-08-22 PROCEDURE — 84295 ASSAY OF SERUM SODIUM: CPT

## 2025-08-22 PROCEDURE — 96367 TX/PROPH/DG ADDL SEQ IV INF: CPT

## 2025-08-22 PROCEDURE — 87641 MR-STAPH DNA AMP PROBE: CPT

## 2025-08-22 PROCEDURE — 85025 COMPLETE CBC W/AUTO DIFF WBC: CPT

## 2025-08-22 PROCEDURE — 82962 GLUCOSE BLOOD TEST: CPT

## 2025-08-22 PROCEDURE — 99291 CRITICAL CARE FIRST HOUR: CPT | Mod: 25

## 2025-08-22 PROCEDURE — 76937 US GUIDE VASCULAR ACCESS: CPT

## 2025-08-22 PROCEDURE — 84100 ASSAY OF PHOSPHORUS: CPT

## 2025-08-22 PROCEDURE — 85610 PROTHROMBIN TIME: CPT

## 2025-08-22 PROCEDURE — 80074 ACUTE HEPATITIS PANEL: CPT

## 2025-08-22 PROCEDURE — 86682 HELMINTH ANTIBODY: CPT

## 2025-08-22 PROCEDURE — 99261: CPT

## 2025-08-22 PROCEDURE — 81001 URINALYSIS AUTO W/SCOPE: CPT

## 2025-08-22 PROCEDURE — 82435 ASSAY OF BLOOD CHLORIDE: CPT

## 2025-08-22 PROCEDURE — 93005 ELECTROCARDIOGRAM TRACING: CPT

## 2025-08-22 PROCEDURE — 82803 BLOOD GASES ANY COMBINATION: CPT

## 2025-08-22 PROCEDURE — 84132 ASSAY OF SERUM POTASSIUM: CPT

## 2025-08-22 PROCEDURE — 97116 GAIT TRAINING THERAPY: CPT

## 2025-08-22 PROCEDURE — 85018 HEMOGLOBIN: CPT

## 2025-08-22 PROCEDURE — 85014 HEMATOCRIT: CPT

## 2025-08-22 PROCEDURE — 82330 ASSAY OF CALCIUM: CPT

## 2025-08-22 PROCEDURE — 96375 TX/PRO/DX INJ NEW DRUG ADDON: CPT

## 2025-08-22 PROCEDURE — 96365 THER/PROPH/DIAG IV INF INIT: CPT

## 2025-08-22 PROCEDURE — 99239 HOSP IP/OBS DSCHRG MGMT >30: CPT

## 2025-08-22 PROCEDURE — C1894: CPT

## 2025-08-22 PROCEDURE — 97110 THERAPEUTIC EXERCISES: CPT

## 2025-08-22 PROCEDURE — 86706 HEP B SURFACE ANTIBODY: CPT

## 2025-08-22 RX ADMIN — SEVELAMER HYDROCHLORIDE 800 MILLIGRAM(S): 800 TABLET ORAL at 13:05

## 2025-08-22 RX ADMIN — HEPARIN SODIUM 5000 UNIT(S): 1000 INJECTION INTRAVENOUS; SUBCUTANEOUS at 13:05

## 2025-08-22 RX ADMIN — Medication 1000 UNIT(S): at 13:05

## 2025-08-22 RX ADMIN — INSULIN LISPRO 1: 100 INJECTION, SOLUTION INTRAVENOUS; SUBCUTANEOUS at 12:54

## 2025-08-22 RX ADMIN — HEPARIN SODIUM 5000 UNIT(S): 1000 INJECTION INTRAVENOUS; SUBCUTANEOUS at 06:08

## 2025-08-22 RX ADMIN — Medication 81 MILLIGRAM(S): at 13:04

## 2025-08-22 RX ADMIN — EPOETIN ALFA 6000 UNIT(S): 10000 SOLUTION INTRAVENOUS; SUBCUTANEOUS at 08:15

## 2025-08-22 RX ADMIN — Medication 1 APPLICATION(S): at 13:04

## 2025-08-22 RX ADMIN — Medication 1 APPLICATION(S): at 07:07

## 2025-08-22 RX ADMIN — Medication 50 MICROGRAM(S): at 06:09

## 2025-08-26 ENCOUNTER — OUTPATIENT (OUTPATIENT)
Dept: OUTPATIENT SERVICES | Facility: HOSPITAL | Age: 75
LOS: 1 days | End: 2025-08-26
Payer: MEDICARE

## 2025-08-26 ENCOUNTER — APPOINTMENT (OUTPATIENT)
Dept: VASCULAR SURGERY | Facility: HOSPITAL | Age: 75
End: 2025-08-26

## 2025-08-26 ENCOUNTER — TRANSCRIPTION ENCOUNTER (OUTPATIENT)
Age: 75
End: 2025-08-26

## 2025-08-26 VITALS
DIASTOLIC BLOOD PRESSURE: 75 MMHG | SYSTOLIC BLOOD PRESSURE: 175 MMHG | WEIGHT: 190.04 LBS | OXYGEN SATURATION: 100 % | HEART RATE: 70 BPM | TEMPERATURE: 99 F | RESPIRATION RATE: 18 BRPM | HEIGHT: 69 IN

## 2025-08-26 VITALS
HEART RATE: 71 BPM | RESPIRATION RATE: 18 BRPM | DIASTOLIC BLOOD PRESSURE: 72 MMHG | SYSTOLIC BLOOD PRESSURE: 165 MMHG | OXYGEN SATURATION: 100 %

## 2025-08-26 DIAGNOSIS — N18.9 CHRONIC KIDNEY DISEASE, UNSPECIFIED: ICD-10-CM

## 2025-08-26 DIAGNOSIS — I77.0 ARTERIOVENOUS FISTULA, ACQUIRED: Chronic | ICD-10-CM

## 2025-08-26 PROBLEM — I50.32 CHRONIC DIASTOLIC (CONGESTIVE) HEART FAILURE: Chronic | Status: ACTIVE | Noted: 2025-08-10

## 2025-08-26 PROBLEM — E83.19 OTHER DISORDERS OF IRON METABOLISM: Chronic | Status: ACTIVE | Noted: 2025-08-10

## 2025-08-26 PROBLEM — E03.9 HYPOTHYROIDISM, UNSPECIFIED: Chronic | Status: ACTIVE | Noted: 2025-08-10

## 2025-08-26 PROBLEM — H54.7 UNSPECIFIED VISUAL LOSS: Chronic | Status: ACTIVE | Noted: 2025-08-10

## 2025-08-26 LAB
ANION GAP SERPL CALC-SCNC: 16 MMOL/L — SIGNIFICANT CHANGE UP (ref 5–17)
BUN SERPL-MCNC: 39 MG/DL — HIGH (ref 7–23)
CALCIUM SERPL-MCNC: 8.8 MG/DL — SIGNIFICANT CHANGE UP (ref 8.4–10.5)
CHLORIDE SERPL-SCNC: 100 MMOL/L — SIGNIFICANT CHANGE UP (ref 96–108)
CO2 SERPL-SCNC: 21 MMOL/L — LOW (ref 22–31)
CREAT SERPL-MCNC: 4.29 MG/DL — HIGH (ref 0.5–1.3)
EGFR: 14 ML/MIN/1.73M2 — LOW
EGFR: 14 ML/MIN/1.73M2 — LOW
GLUCOSE SERPL-MCNC: 122 MG/DL — HIGH (ref 70–99)
HCT VFR BLD CALC: 31.2 % — LOW (ref 39–50)
HGB BLD-MCNC: 9.4 G/DL — LOW (ref 13–17)
MCHC RBC-ENTMCNC: 29.7 PG — SIGNIFICANT CHANGE UP (ref 27–34)
MCHC RBC-ENTMCNC: 30.1 G/DL — LOW (ref 32–36)
MCV RBC AUTO: 98.4 FL — SIGNIFICANT CHANGE UP (ref 80–100)
NRBC # BLD AUTO: 0 K/UL — SIGNIFICANT CHANGE UP (ref 0–0)
NRBC # FLD: 0 K/UL — SIGNIFICANT CHANGE UP (ref 0–0)
NRBC BLD AUTO-RTO: 0 /100 WBCS — SIGNIFICANT CHANGE UP (ref 0–0)
PLATELET # BLD AUTO: 248 K/UL — SIGNIFICANT CHANGE UP (ref 150–400)
PMV BLD: 9.6 FL — SIGNIFICANT CHANGE UP (ref 7–13)
POTASSIUM SERPL-MCNC: 5.3 MMOL/L — SIGNIFICANT CHANGE UP (ref 3.5–5.3)
POTASSIUM SERPL-SCNC: 5.3 MMOL/L — SIGNIFICANT CHANGE UP (ref 3.5–5.3)
RBC # BLD: 3.17 M/UL — LOW (ref 4.2–5.8)
RBC # FLD: 15.8 % — HIGH (ref 10.3–14.5)
SODIUM SERPL-SCNC: 137 MMOL/L — SIGNIFICANT CHANGE UP (ref 135–145)
WBC # BLD: 11.67 K/UL — HIGH (ref 3.8–10.5)
WBC # FLD AUTO: 11.67 K/UL — HIGH (ref 3.8–10.5)

## 2025-08-26 PROCEDURE — 36902 INTRO CATH DIALYSIS CIRCUIT: CPT | Mod: LT

## 2025-08-26 PROCEDURE — C1725: CPT

## 2025-08-26 PROCEDURE — C1894: CPT

## 2025-08-26 PROCEDURE — 85027 COMPLETE CBC AUTOMATED: CPT

## 2025-08-26 PROCEDURE — C1887: CPT

## 2025-08-26 PROCEDURE — 93005 ELECTROCARDIOGRAM TRACING: CPT

## 2025-08-26 PROCEDURE — 80048 BASIC METABOLIC PNL TOTAL CA: CPT

## 2025-08-26 PROCEDURE — 99152 MOD SED SAME PHYS/QHP 5/>YRS: CPT

## 2025-08-26 PROCEDURE — 93010 ELECTROCARDIOGRAM REPORT: CPT

## 2025-08-26 PROCEDURE — C1769: CPT

## 2025-08-26 PROCEDURE — 76937 US GUIDE VASCULAR ACCESS: CPT | Mod: 26

## 2025-08-26 PROCEDURE — 36902 INTRO CATH DIALYSIS CIRCUIT: CPT

## 2025-08-26 RX ORDER — TORSEMIDE 20 MG/1
1 TABLET ORAL
Refills: 0 | DISCHARGE

## 2025-08-26 RX ORDER — IRBESARTAN 75 MG/1
1 TABLET ORAL
Refills: 0 | DISCHARGE

## 2025-08-26 RX ORDER — SODIUM ZIRCONIUM CYCLOSILICATE 5 G/5G
1 POWDER, FOR SUSPENSION ORAL
Refills: 0 | DISCHARGE

## 2025-08-26 RX ORDER — AMLODIPINE BESYLATE 10 MG/1
1 TABLET ORAL
Refills: 0 | DISCHARGE

## 2025-08-26 RX ORDER — LEVOTHYROXINE SODIUM 300 MCG
1 TABLET ORAL
Refills: 0 | DISCHARGE

## 2025-08-29 ENCOUNTER — TRANSCRIPTION ENCOUNTER (OUTPATIENT)
Age: 75
End: 2025-08-29

## (undated) DEVICE — PACK AV FISTULA

## (undated) DEVICE — SUT SILK 2-0 30" TIES

## (undated) DEVICE — SOL IRR POUR H2O 250ML

## (undated) DEVICE — BALLOON US ENDO

## (undated) DEVICE — SUT PROLENE 6-0 18" BV-1

## (undated) DEVICE — GLV 8 PROTEXIS (WHITE)

## (undated) DEVICE — VESSEL LOOP MINI-BLUE 0.075" X 16"

## (undated) DEVICE — POLY TRAP ETRAP

## (undated) DEVICE — FORCEP RADIAL JAW 4 JUMBO 2.8MM 3.2MM 240CM ORANGE DISP

## (undated) DEVICE — CATH IV SAFE BC 22G X 1" (BLUE)

## (undated) DEVICE — STAPLER SKIN VISI-STAT 35 WIDE

## (undated) DEVICE — SUCTION YANKAUER NO CONTROL VENT

## (undated) DEVICE — CLAMP BULLDOG MIDI STRAIGHT (YELLOW) DISP

## (undated) DEVICE — BLADE SCALPEL SAFETYLOCK #15

## (undated) DEVICE — SOL IRR POUR NS 0.9% 500ML

## (undated) DEVICE — GLV 6.5 PROTEXIS (WHITE)

## (undated) DEVICE — SUT SILK 4-0 18" G-3

## (undated) DEVICE — SYR ALLIANCE II INFLATION 60ML

## (undated) DEVICE — CLAMP BX HOT RAD JAW 3

## (undated) DEVICE — DRSG OPSITE 13.75 X 4"

## (undated) DEVICE — DISSECTOR ENDO PEANUT 5MM

## (undated) DEVICE — SPECIMEN CONTAINER 100ML

## (undated) DEVICE — GLV 7.5 PROTEXIS (WHITE)

## (undated) DEVICE — DRAPE LIGHT HANDLE COVER (BLUE)

## (undated) DEVICE — ELCTR GROUNDING PAD ADULT COVIDIEN

## (undated) DEVICE — BLADE SCALPEL SAFETYLOCK #11

## (undated) DEVICE — DRAPE TOWEL BLUE 17" X 24"

## (undated) DEVICE — SUT SILK 3-0 18" TIES

## (undated) DEVICE — BRUSH COLONOSCOPY CYTOLOGY

## (undated) DEVICE — SENSOR O2 FINGER ADULT

## (undated) DEVICE — GLV 8.5 PROTEXIS (WHITE)

## (undated) DEVICE — SUT BIOSYN 4-0 18" P-12

## (undated) DEVICE — VENODYNE/SCD SLEEVE CALF LARGE

## (undated) DEVICE — DRSG TEGADERM 6"X8"

## (undated) DEVICE — GLV 7 PROTEXIS (WHITE)

## (undated) DEVICE — TUBING SUCTION 20FT

## (undated) DEVICE — SYR LUER LOK 50CC

## (undated) DEVICE — SUT POLYSORB 3-0 30" V-20 UNDYED

## (undated) DEVICE — IRRIGATOR BIO SHIELD

## (undated) DEVICE — TUBING IV SET GRAVITY 3Y 100" MACRO

## (undated) DEVICE — PREP CHLORAPREP HI-LITE ORANGE 26ML

## (undated) DEVICE — FOLEY HOLDER STATLOCK 2 WAY ADULT

## (undated) DEVICE — SOL INJ NS 0.9% 500ML 2 PORT

## (undated) DEVICE — BIOPSY FORCEP RADIAL JAW 4 STANDARD WITH NEEDLE

## (undated) DEVICE — MEDICATION LABELS W MARKER

## (undated) DEVICE — POSITIONER FOAM EGG CRATE ULNAR 2PCS (PINK)

## (undated) DEVICE — CATH IV SAFE BC 20G X 1.16" (PINK)

## (undated) DEVICE — SUT SILK 0 18" CT-1 (POP-OFF)

## (undated) DEVICE — PACK IV START WITH CHG

## (undated) DEVICE — SUT PROLENE 6-0 4-18" BV-1

## (undated) DEVICE — DRSG STERISTRIPS 0.5 X 4"

## (undated) DEVICE — BITE BLOCK ADULT 20 X 27MM (GREEN)

## (undated) DEVICE — BLADE SCALPEL SAFETYLOCK #10

## (undated) DEVICE — SOL INJ NS 0.9% 500ML 1-PORT

## (undated) DEVICE — TUBING SUCTION CONN 6FT STERILE

## (undated) DEVICE — GOWN TRIMAX LG

## (undated) DEVICE — WARMING BLANKET LOWER ADULT

## (undated) DEVICE — VESSEL LOOP MAXI-BLUE 0.120" X 16"

## (undated) DEVICE — DRAPE INSTRUMENT POUCH 6.75" X 11"